# Patient Record
Sex: FEMALE | Race: WHITE | NOT HISPANIC OR LATINO | Employment: FULL TIME | URBAN - METROPOLITAN AREA
[De-identification: names, ages, dates, MRNs, and addresses within clinical notes are randomized per-mention and may not be internally consistent; named-entity substitution may affect disease eponyms.]

---

## 2017-08-06 ENCOUNTER — HOSPITAL ENCOUNTER (EMERGENCY)
Facility: CLINIC | Age: 22
Discharge: HOME | End: 2017-08-06
Attending: EMERGENCY MEDICINE | Admitting: EMERGENCY MEDICINE
Payer: COMMERCIAL

## 2017-08-06 VITALS
OXYGEN SATURATION: 99 % | TEMPERATURE: 97.6 F | RESPIRATION RATE: 18 BRPM | SYSTOLIC BLOOD PRESSURE: 132 MMHG | HEART RATE: 82 BPM | DIASTOLIC BLOOD PRESSURE: 96 MMHG

## 2017-08-06 DIAGNOSIS — N23 RENAL COLIC: ICD-10-CM

## 2017-08-06 LAB
ALBUMIN SERPL-MCNC: 4.2 G/DL (ref 3.4–5)
ALBUMIN UR-MCNC: NEGATIVE MG/DL
ALP SERPL-CCNC: 61 U/L (ref 40–150)
ALT SERPL W P-5'-P-CCNC: 31 U/L (ref 0–50)
ANION GAP SERPL CALCULATED.3IONS-SCNC: 6 MMOL/L (ref 3–14)
APPEARANCE UR: CLEAR
AST SERPL W P-5'-P-CCNC: 31 U/L (ref 0–45)
BACTERIA #/AREA URNS HPF: ABNORMAL /HPF
BASOPHILS # BLD AUTO: 0.1 10E9/L (ref 0–0.2)
BASOPHILS NFR BLD AUTO: 0.7 %
BILIRUB SERPL-MCNC: 0.3 MG/DL (ref 0.2–1.3)
BILIRUB UR QL STRIP: NEGATIVE
BUN SERPL-MCNC: 12 MG/DL (ref 7–30)
CALCIUM SERPL-MCNC: 9.7 MG/DL (ref 8.5–10.1)
CHLORIDE SERPL-SCNC: 108 MMOL/L (ref 94–109)
CO2 SERPL-SCNC: 28 MMOL/L (ref 20–32)
COLOR UR AUTO: ABNORMAL
CREAT SERPL-MCNC: 0.95 MG/DL (ref 0.52–1.04)
DIFFERENTIAL METHOD BLD: NORMAL
EOSINOPHIL # BLD AUTO: 0.2 10E9/L (ref 0–0.7)
EOSINOPHIL NFR BLD AUTO: 2.6 %
ERYTHROCYTE [DISTWIDTH] IN BLOOD BY AUTOMATED COUNT: 12 % (ref 10–15)
GFR SERPL CREATININE-BSD FRML MDRD: 74 ML/MIN/1.7M2
GLUCOSE SERPL-MCNC: 96 MG/DL (ref 70–99)
GLUCOSE UR STRIP-MCNC: NEGATIVE MG/DL
HCG UR QL: NEGATIVE
HCT VFR BLD AUTO: 41.6 % (ref 35–47)
HGB BLD-MCNC: 14.2 G/DL (ref 11.7–15.7)
HGB UR QL STRIP: ABNORMAL
IMM GRANULOCYTES # BLD: 0 10E9/L (ref 0–0.4)
IMM GRANULOCYTES NFR BLD: 0.3 %
KETONES UR STRIP-MCNC: NEGATIVE MG/DL
LEUKOCYTE ESTERASE UR QL STRIP: ABNORMAL
LIPASE SERPL-CCNC: 158 U/L (ref 73–393)
LYMPHOCYTES # BLD AUTO: 2.5 10E9/L (ref 0.8–5.3)
LYMPHOCYTES NFR BLD AUTO: 36.7 %
MCH RBC QN AUTO: 29.8 PG (ref 26.5–33)
MCHC RBC AUTO-ENTMCNC: 34.1 G/DL (ref 31.5–36.5)
MCV RBC AUTO: 87 FL (ref 78–100)
MONOCYTES # BLD AUTO: 0.7 10E9/L (ref 0–1.3)
MONOCYTES NFR BLD AUTO: 9.5 %
NEUTROPHILS # BLD AUTO: 3.4 10E9/L (ref 1.6–8.3)
NEUTROPHILS NFR BLD AUTO: 50.2 %
NITRATE UR QL: NEGATIVE
NRBC # BLD AUTO: 0 10*3/UL
NRBC BLD AUTO-RTO: 0 /100
PH UR STRIP: 7 PH (ref 5–7)
PLATELET # BLD AUTO: 295 10E9/L (ref 150–450)
POTASSIUM SERPL-SCNC: 3.9 MMOL/L (ref 3.4–5.3)
PROT SERPL-MCNC: 7.7 G/DL (ref 6.8–8.8)
RBC # BLD AUTO: 4.77 10E12/L (ref 3.8–5.2)
RBC #/AREA URNS AUTO: 3 /HPF (ref 0–2)
SODIUM SERPL-SCNC: 142 MMOL/L (ref 133–144)
SP GR UR STRIP: 1 (ref 1–1.03)
SQUAMOUS #/AREA URNS AUTO: <1 /HPF (ref 0–1)
URN SPEC COLLECT METH UR: ABNORMAL
UROBILINOGEN UR STRIP-MCNC: 0 MG/DL (ref 0–2)
WBC # BLD AUTO: 6.8 10E9/L (ref 4–11)
WBC #/AREA URNS AUTO: 5 /HPF (ref 0–2)

## 2017-08-06 PROCEDURE — 80053 COMPREHEN METABOLIC PANEL: CPT | Performed by: EMERGENCY MEDICINE

## 2017-08-06 PROCEDURE — 81025 URINE PREGNANCY TEST: CPT | Performed by: EMERGENCY MEDICINE

## 2017-08-06 PROCEDURE — 96374 THER/PROPH/DIAG INJ IV PUSH: CPT

## 2017-08-06 PROCEDURE — 81001 URINALYSIS AUTO W/SCOPE: CPT | Performed by: EMERGENCY MEDICINE

## 2017-08-06 PROCEDURE — 83690 ASSAY OF LIPASE: CPT | Performed by: EMERGENCY MEDICINE

## 2017-08-06 PROCEDURE — 96361 HYDRATE IV INFUSION ADD-ON: CPT

## 2017-08-06 PROCEDURE — 25000132 ZZH RX MED GY IP 250 OP 250 PS 637: Performed by: EMERGENCY MEDICINE

## 2017-08-06 PROCEDURE — 99285 EMERGENCY DEPT VISIT HI MDM: CPT | Mod: 25

## 2017-08-06 PROCEDURE — 96375 TX/PRO/DX INJ NEW DRUG ADDON: CPT

## 2017-08-06 PROCEDURE — 85025 COMPLETE CBC W/AUTO DIFF WBC: CPT | Performed by: EMERGENCY MEDICINE

## 2017-08-06 PROCEDURE — 25000128 H RX IP 250 OP 636: Performed by: EMERGENCY MEDICINE

## 2017-08-06 RX ORDER — ONDANSETRON 4 MG/1
4 TABLET, ORALLY DISINTEGRATING ORAL EVERY 8 HOURS PRN
Qty: 10 TABLET | Refills: 0 | Status: SHIPPED | OUTPATIENT
Start: 2017-08-06 | End: 2017-08-09

## 2017-08-06 RX ORDER — HYDROMORPHONE HYDROCHLORIDE 1 MG/ML
0.5 INJECTION, SOLUTION INTRAMUSCULAR; INTRAVENOUS; SUBCUTANEOUS
Status: DISCONTINUED | OUTPATIENT
Start: 2017-08-06 | End: 2017-08-06 | Stop reason: HOSPADM

## 2017-08-06 RX ORDER — TAMSULOSIN HYDROCHLORIDE 0.4 MG/1
0.4 CAPSULE ORAL DAILY
Qty: 10 CAPSULE | Refills: 0 | Status: SHIPPED | OUTPATIENT
Start: 2017-08-06 | End: 2017-08-16

## 2017-08-06 RX ORDER — OXYCODONE AND ACETAMINOPHEN 5; 325 MG/1; MG/1
1-2 TABLET ORAL EVERY 4 HOURS PRN
Qty: 15 TABLET | Refills: 0 | Status: SHIPPED | OUTPATIENT
Start: 2017-08-06 | End: 2017-10-12

## 2017-08-06 RX ORDER — SODIUM CHLORIDE 9 MG/ML
1000 INJECTION, SOLUTION INTRAVENOUS CONTINUOUS
Status: DISCONTINUED | OUTPATIENT
Start: 2017-08-06 | End: 2017-08-06 | Stop reason: HOSPADM

## 2017-08-06 RX ORDER — TAMSULOSIN HYDROCHLORIDE 0.4 MG/1
0.4 CAPSULE ORAL ONCE
Status: COMPLETED | OUTPATIENT
Start: 2017-08-06 | End: 2017-08-06

## 2017-08-06 RX ORDER — KETOROLAC TROMETHAMINE 30 MG/ML
30 INJECTION, SOLUTION INTRAMUSCULAR; INTRAVENOUS ONCE
Status: COMPLETED | OUTPATIENT
Start: 2017-08-06 | End: 2017-08-06

## 2017-08-06 RX ORDER — OXYCODONE AND ACETAMINOPHEN 5; 325 MG/1; MG/1
1 TABLET ORAL ONCE
Status: COMPLETED | OUTPATIENT
Start: 2017-08-06 | End: 2017-08-06

## 2017-08-06 RX ORDER — ONDANSETRON 2 MG/ML
4 INJECTION INTRAMUSCULAR; INTRAVENOUS EVERY 30 MIN PRN
Status: DISCONTINUED | OUTPATIENT
Start: 2017-08-06 | End: 2017-08-06 | Stop reason: HOSPADM

## 2017-08-06 RX ADMIN — SODIUM CHLORIDE 1000 ML: 9 INJECTION, SOLUTION INTRAVENOUS at 05:12

## 2017-08-06 RX ADMIN — OXYCODONE HYDROCHLORIDE AND ACETAMINOPHEN 1 TABLET: 5; 325 TABLET ORAL at 06:37

## 2017-08-06 RX ADMIN — Medication 0.5 MG: at 05:56

## 2017-08-06 RX ADMIN — ONDANSETRON 4 MG: 2 INJECTION INTRAMUSCULAR; INTRAVENOUS at 05:13

## 2017-08-06 RX ADMIN — KETOROLAC TROMETHAMINE 30 MG: 30 INJECTION, SOLUTION INTRAMUSCULAR at 05:14

## 2017-08-06 RX ADMIN — Medication 0.5 MG: at 05:41

## 2017-08-06 RX ADMIN — TAMSULOSIN HYDROCHLORIDE 0.4 MG: 0.4 CAPSULE ORAL at 05:13

## 2017-08-06 ASSESSMENT — ENCOUNTER SYMPTOMS
VOMITING: 0
FREQUENCY: 0
NAUSEA: 1
DYSURIA: 1
FLANK PAIN: 1
FEVER: 1
HEMATURIA: 0

## 2017-08-06 NOTE — ED AVS SNAPSHOT
[facility] Emergency Department    [address]    [facility] [address]    Phone:  [phone]    Fax:  [phone]                                       Shelly Cerda   MRN: [id number]    Department:  [facility] Emergency Department   Date of Visit:  8/6/2017           Patient Information     Date Of Birth          1995        Your diagnoses for this visit were:     Renal colic        You were seen by Yonathan Crawford MD.      Follow-up Information     Follow up with [facility]. Call in 1 week.    Contact information:    [address]  [facility] [address]          Follow up with [facility]. Call in 3 days.    Contact information:    [address]  [address]  [facility] [address] [phone]        Discharge Instructions         Discharge Instructions  Kidney Stones    Kidney stones are a common problem that can cause a lot of pain but fortunately are usually not dangerous and can be generally treated with medicine at home.  However, sometimes your condition may be worse than it seemed at first, or may get worse with time.     You need to follow-up with your regular doctor within 3 days.    Most kidney stones will pass on their own, but occasionally stones may need to be removed by an urologist. We will send you home with a urine strainer. Be sure to urinate into this, or urinate into a container and pour the urine through the fine filter to catch the kidney stone as it comes out. The stone will seem like a pebble or grain of sand. Be sure to save this in a Ziploc  bag and take it to the doctor s office with you.       Return to the Emergency Department if:    Your pain is not controlled.    You are vomiting and can t keep fluids or medications down.    You develop fever (>101).    You feel much more ill or develop new symptoms.  What can I do to help myself?    Be sure to drink plenty of fluids.    Staying  active is good, and may help the stone to pass. You may do whatever you feel up to doing without restrictions.   Treatment:    Non-steroidal anti-inflammatory drugs (NSAIDs). This includes prescription medicines like Toradol  (ketorolac) and non-prescription medicines like Advil  (ibuprofen) and Nuprin  (ibuprofen). These pain relievers are very effective for kidney stones.    Narcotic pain pills. If you have been given a narcotic such as Vicodin  (hydrocodone with acetaminophen), Percocet  (oxycodone with acetaminophen), or codeine, do not drive for four hours after you have taken it. If the narcotic contains Tylenol  (acetaminophen), do not take Tylenol  with it. All narcotics will cause constipation, so eat a high fiber diet.      Nausea medication.  Nausea and vomiting are common with kidney stones, so your physician may send you home with medicine for this.     Flomax  (tamsulosin). This medicine is sometimes used for men with prostate problems, but also can help kidney stones to pass. This medicine can lower blood pressure, and you may feel faint, especially when you first stand up. Be sure to get up gradually, sit down if you feel faint, and avoid activity where feeling faint would be dangerous, such as climbing ladders.   If you were given a prescription for medicine here today, be sure to read all of the information (including the package insert) that comes with your prescription.  This will include important information about the medicine, its side effects, and any warnings that you need to know about.  The pharmacist who fills the prescription can provide more information and answer questions you may have about the medicine.  If you have questions or concerns that the pharmacist cannot address, please call or return to the Emergency Department.   Opioid Medication Information    Pain medications are among the most commonly prescribed medicines, so we are including this information for all our patients. If  you did not receive pain medication or get a prescription for pain medicine, you can ignore it.     You may have been given a prescription for an opioid (narcotic) pain medicine and/or have received a pain medicine while here in the Emergency Department. These medicines can make you drowsy or impaired. You must not drive, operate dangerous equipment, or engage in any other dangerous activities while taking these medications. If you drive while taking these medications, you could be arrested for DUI, or driving under the influence. Do not drink any alcohol while you are taking these medications.     Opioid pain medications can cause addiction. If you have a history of chemical dependency of any type, you are at a higher risk of becoming addicted to pain medications.  Only take these prescribed medications to treat your pain when all other options have been tried. Take it for as short a time and as few doses as possible. Store your pain pills in a secure place, as they are frequently stolen and provide a dangerous opportunity for children or visitors in your house to start abusing these powerful medications. We will not replace any lost or stolen medicine.  As soon as your pain is better, you should flush all your remaining medication.     Many prescription pain medications contain Tylenol  (acetaminophen), including Vicodin , Tylenol #3 , Norco , Lortab , and Percocet .  You should not take any extra pills of Tylenol  if you are using these prescription medications or you can get very sick.  Do not ever take more than 3000 mg of acetaminophen in any 24 hour period.    All opioids tend to cause constipation. Drink plenty of water and eat foods that have a lot of fiber, such as fruits, vegetables, prune juice, apple juice and high fiber cereal.  Take a laxative if you don t move your bowels at least every other day. Miralax , Milk of Magnesia, Colace , or Senna  can be used to keep you regular.      Remember that you  can always come back to the Emergency Department if you are not able to see your regular doctor in the amount of time listed above, if you get any new symptoms, or if there is anything that worries you.        24 Hour Appointment Hotline       To make an appointment at any [facility], call [phone] ([phone]). If you don't have a family doctor or clinic, we will help you find one. [place] clinics are conveniently located to serve the needs of you and your family.             Review of your medicines      START taking        Dose / Directions Last dose taken    ondansetron 4 MG ODT tab   Commonly known as:  ZOFRAN ODT   Dose:  4 mg   Quantity:  10 tablet        Take 1 tablet (4 mg) by mouth every 8 hours as needed for nausea   Refills:  0        oxyCODONE-acetaminophen 5-325 MG per tablet   Commonly known as:  PERCOCET   Dose:  1-2 tablet   Quantity:  15 tablet        Take 1-2 tablets by mouth every 4 hours as needed for moderate to severe pain   Refills:  0        tamsulosin 0.4 MG capsule   Commonly known as:  FLOMAX   Dose:  0.4 mg   Quantity:  10 capsule        Take 1 capsule (0.4 mg) by mouth daily for 10 doses   Refills:  0          Our records show that you are taking the medicines listed below. If these are incorrect, please call your family doctor or clinic.        Dose / Directions Last dose taken    UNKNOWN TO PATIENT        Birth ctl   Refills:  0                Prescriptions were sent or printed at these locations (3 Prescriptions)                   Other Prescriptions                Printed at Department/Unit printer (3 of 3)         oxyCODONE-acetaminophen (PERCOCET) 5-325 MG per tablet               ondansetron (ZOFRAN ODT) 4 MG ODT tab               tamsulosin (FLOMAX) 0.4 MG capsule                Procedures and tests performed during your visit     CBC with platelets differential    Comprehensive metabolic panel    HCG qualitative urine    Lipase    Peripheral IV: Standard    UA  with Microscopic      Orders Needing Specimen Collection     None      Pending Results     No orders found from 8/4/2017 to 8/7/2017.            Pending Culture Results     No orders found from 8/4/2017 to 8/7/2017.            Pending Results Instructions     If you had any lab results that were not finalized at the time of your Discharge, you can call the ED Lab Result RN at [phone]. You will be contacted by this team for any positive Lab results or changes in treatment. The nurses are available 7 days a week from 10A to 6:30P.  You can leave a message 24 hours per day and they will return your call.        Test Results From Your Hospital Stay        8/6/2017  5:13 AM      Component Results     Component Value Ref Range & Units Status    WBC 6.8 4.0 - 11.0 10e9/L Final    RBC Count 4.77 3.8 - 5.2 10e12/L Final    Hemoglobin 14.2 11.7 - 15.7 g/dL Final    Hematocrit 41.6 35.0 - 47.0 % Final    MCV 87 78 - 100 fl Final    MCH 29.8 26.5 - 33.0 pg Final    MCHC 34.1 31.5 - 36.5 g/dL Final    RDW 12.0 10.0 - 15.0 % Final    Platelet Count 295 150 - 450 10e9/L Final    Diff Method Automated Method  Final    % Neutrophils 50.2 % Final    % Lymphocytes 36.7 % Final    % Monocytes 9.5 % Final    % Eosinophils 2.6 % Final    % Basophils 0.7 % Final    % Immature Granulocytes 0.3 % Final    Nucleated RBCs 0 0 /100 Final    Absolute Neutrophil 3.4 1.6 - 8.3 10e9/L Final    Absolute Lymphocytes 2.5 0.8 - 5.3 10e9/L Final    Absolute Monocytes 0.7 0.0 - 1.3 10e9/L Final    Absolute Eosinophils 0.2 0.0 - 0.7 10e9/L Final    Absolute Basophils 0.1 0.0 - 0.2 10e9/L Final    Abs Immature Granulocytes 0.0 0 - 0.4 10e9/L Final    Absolute Nucleated RBC 0.0  Final         8/6/2017  5:29 AM      Component Results     Component Value Ref Range & Units Status    Sodium 142 133 - 144 mmol/L Final    Potassium 3.9 3.4 - 5.3 mmol/L Final    Chloride 108 94 - 109 mmol/L Final    Carbon Dioxide 28 20 - 32 mmol/L Final    Anion Gap 6 3 -  14 mmol/L Final    Glucose 96 70 - 99 mg/dL Final    Urea Nitrogen 12 7 - 30 mg/dL Final    Creatinine 0.95 0.52 - 1.04 mg/dL Final    GFR Estimate 74 >60 mL/min/1.7m2 Final    Non  GFR Calc    GFR Estimate If Black 89 >60 mL/min/1.7m2 Final    African American GFR Calc    Calcium 9.7 8.5 - 10.1 mg/dL Final    Bilirubin Total 0.3 0.2 - 1.3 mg/dL Final    Albumin 4.2 3.4 - 5.0 g/dL Final    Protein Total 7.7 6.8 - 8.8 g/dL Final    Alkaline Phosphatase 61 40 - 150 U/L Final    ALT 31 0 - 50 U/L Final    AST 31 0 - 45 U/L Final         8/6/2017  5:29 AM      Component Results     Component Value Ref Range & Units Status    Lipase 158 73 - 393 U/L Final         8/6/2017  5:22 AM      Component Results     Component Value Ref Range & Units Status    Color Urine Straw  Final    Appearance Urine Clear  Final    Glucose Urine Negative NEG mg/dL Final    Bilirubin Urine Negative NEG Final    Ketones Urine Negative NEG mg/dL Final    Specific Gravity Urine 1.002 (L) 1.003 - 1.035 Final    Blood Urine Large (A) NEG Final    pH Urine 7.0 5.0 - 7.0 pH Final    Protein Albumin Urine Negative NEG mg/dL Final    Urobilinogen mg/dL 0.0 0.0 - 2.0 mg/dL Final    Nitrite Urine Negative NEG Final    Leukocyte Esterase Urine Small (A) NEG Final    Source Midstream Urine  Final    WBC Urine 5 (H) 0 - 2 /HPF Final    RBC Urine 3 (H) 0 - 2 /HPF Final    Bacteria Urine Few (A) NEG /HPF Final    Squamous Epithelial /HPF Urine <1 0 - 1 /HPF Final         8/6/2017  5:29 AM      Component Results     Component Value Ref Range & Units Status    HCG Qual Urine Negative NEG Final                Clinical Quality Measure: Blood Pressure Screening     Your blood pressure was checked while you were in the emergency department today. The last reading we obtained was  BP: (!) 132/96 . Please read the guidelines below about what these numbers mean and what you should do about them.  If your systolic blood pressure (the top number) is less  "than 120 and your diastolic blood pressure (the bottom number) is less than 80, then your blood pressure is normal. There is nothing more that you need to do about it.  If your systolic blood pressure (the top number) is 120-139 or your diastolic blood pressure (the bottom number) is 80-89, your blood pressure may be higher than it should be. You should have your blood pressure rechecked within a year by a primary care provider.  If your systolic blood pressure (the top number) is 140 or greater or your diastolic blood pressure (the bottom number) is 90 or greater, you may have high blood pressure. High blood pressure is treatable, but if left untreated over time it can put you at risk for heart attack, stroke, or kidney failure. You should have your blood pressure rechecked by a primary care provider within the next 4 weeks.  If your provider in the emergency department today gave you specific instructions to follow-up with your doctor or provider even sooner than that, you should follow that instruction and not wait for up to 4 weeks for your follow-up visit.        Thank you for choosing [facility]       Thank you for choosing [facility] for your care. Our goal is always to provide you with excellent care. Hearing back from our patients is one way we can continue to improve our services. Please take a few minutes to complete the written survey that you may receive in the mail after you visit with us. Thank you!        [facility] Information     [facility] lets you send messages to your doctor, view your test results, renew your prescriptions, schedule appointments and more. To sign up, go to [email or web address] . Click on \"Log in\" on the left side of the screen, which will take you to the Welcome page. Then click on \"Sign up Now\" on the right side of the page.     You will be asked to enter the access code listed below, as well as some personal information. Please follow the directions to create your username and " password.     Your access code is: [email or web address]  Expires: 2017  6:16 AM     Your access code will  in 90 days. If you need help or a new code, please call your [place] clinic or [phone].        Care EveryWhere ID     This is your Care EveryWhere ID. This could be used by other organizations to access your [facility] medical records  [id number]        Equal Access to Services     BON HONEYCUTT : Hadii charanjit watson hadasho Soomaali, waaxda luqadaha, qaybta kaalmada adeegyada, carly kimballin lovely summers . So [facility] [phone].    ATENCIÓN: Si habla miriam, tiene a mckeon disposición servicios gratuitos de asistencia lingüística. Llame al [phone].    We comply with applicable federal civil rights laws and [place] laws. We do not discriminate on the basis of race, color, national origin, age, disability sex, sexual orientation or gender identity.            After Visit Summary       This is your record. Keep this with you and show to your community pharmacist(s) and doctor(s) at your next visit.

## 2017-08-06 NOTE — ED AVS SNAPSHOT
[facility] Emergency Department    [address]    [facility] [address]    Phone:  [phone]    Fax:  [phone]                                       Shelly Cerda   MRN: [id number]    Department:  [facility] Emergency Department   Date of Visit:  8/6/2017           After Visit Summary Signature Page     I have received my discharge instructions, and my questions have been answered. I have discussed any challenges I see with this plan with the nurse or doctor.    ..........................................................................................................................................  Patient/Patient Representative Signature      ..........................................................................................................................................  Patient Representative Print Name and Relationship to Patient    ..................................................               ................................................  Date                                            Time    ..........................................................................................................................................  Reviewed by Signature/Title    ...................................................              ..............................................  Date                                                            Time

## 2017-08-06 NOTE — ED PROVIDER NOTES
History     Chief Complaint:  Flank Pain      HPI   Shelly Cerda is a 22 year old female with a history of frequent kidney stones and cystinuria who presents accompanied by her mother for evaluation of flank pain. On the morning of 8/5/2017, the patient started to develop sharp waxing and waning right flank pain, nausea without vomiting, and slight dysuria. She has taken tylenol without significant improvement of her pain. Due to her persistent symptoms this morning, the patient chose to seek evaluation in the ED. She reports that her current symptoms are identical to what she has experienced with previous kidney stones. Currently in the ED, the patient rates the severity of her flank pain at 8/10. She is uncertain when her last abdominal CT was. She has not had any fever, urinary frequency, or obvious hematuria in association with her current symptoms.     Allergies:  NKDA     Medications:    The patient is not currently taking any prescribed medications.     Past Medical History:    Cystinuria   Kidney stones     Past Surgical History:    Appendectomy   Laser holmium lithotripsy ureter(s), insert stent, combined     Family History:    Cystinuria - Sister     Social History:  Tobacco use:    Never smoker  Alcohol use:    Positive   Marital status:    Single   Accompanied to ED by:  Mother      Review of Systems   Constitutional: Positive for fever.   Gastrointestinal: Positive for nausea. Negative for vomiting.   Genitourinary: Positive for dysuria and flank pain (right). Negative for frequency and hematuria.   All other systems reviewed and are negative.    Physical Exam   First Vitals:  BP: (!) 132/96  Pulse: 82  Temp: 97.6  F (36.4  C)  Resp: 18  SpO2: 100 %      Physical Exam  Constitutional:  Oriented to person, place, and time. In mild distress due to pain.   HENT:   Head:    Normocephalic.   Mouth/Throat:   Oropharynx is clear and moist.   Eyes:    EOM are normal. Pupils are equal, round, and reactive to  light.   Neck:    Neck supple.   Cardiovascular:  Normal rate, regular rhythm and normal heart sounds.      Exam reveals no gallop and no friction rub.       No murmur heard.  Pulmonary/Chest:  Effort normal and breath sounds normal.      No respiratory distress. No wheezes. No rales.      No reproducible chest wall pain.  Abdominal:   Soft. No distension. No tenderness. No rebound and no guarding.      Mild pain with percussion of right flank.   Musculoskeletal:  Normal range of motion.   Neurological:   Alert and oriented to person, place, and time.           Moves all 4 extremities spontaneously    Skin:    No rash noted. No pallor.     Emergency Department Course     Laboratory:  CBC: WNL (WBC 6.8, HGB 14.2, )  CMP: WNL (Creatinine 0.95)  Lipase: 158     UA with Microscopic: Specific gravity 1.002 low, Large blood, Small leukocyte esterase, WBC 5 high, RBC 3 high, Few bacteria, o/w Negative  HCG Qualitative Urine: Negative     Interventions:  0512 NS 1,000 mL IV   0513 Zofran 4 mg IV   0513 Flomax 0.4 mg PO   0514 Toradol 30 mg IV   0541 Dilaudid 0.5 mg IV     Emergency Department Course:  Nursing notes and vitals reviewed.  0451: I performed an exam of the patient as documented above.     0552: I updated and reassessed the patient.     I personally reviewed the laboratory results with the Patient and mother and answered all related questions prior to discharge.      Findings and plan explained to the Patient and mother. Patient discharged home with instructions regarding supportive care, medications, and reasons to return. The importance of close follow-up was reviewed. The patient was prescribed Zofran ODT, Percocet, and Flomax.     Impression & Plan      Medical Decision Making:  Evaluation today was consistent with nephrolithiasis and renal colic.  This explains the patient's pain.  Based on hx and exam and the above studies, I do not feel that there is evidence of other acute intraabdominal process  at this time.  CT scan not obtained due to history of frequent kidney stones. UA was obtained and no overt signs of infection are present.  Given the presence of the kidney stone, urine culture was sent.  The patient received pain control, antiemetic and IV fluids as documented above.  Pain and nausea was well controlled with these measures.  The patient was able to tolerate PO.  Based on the patient's good response to symptomatic control, I feel that this patient can be managed expectantly with close outpatient follow up within the next 2 days.   The patient was instructed to return to the emergency department for uncontrolled pain, fever or inability to tolerate oral liquids.  Prescriptions for Zofran ODT, Percocet, and Flomax were given.    Diagnosis:    ICD-10-CM    1. Renal colic N23        Disposition:  discharged to home with Zofran ODT, Percocet, and Flomax.     Discharge Medications:  New Prescriptions    ONDANSETRON (ZOFRAN ODT) 4 MG ODT TAB    Take 1 tablet (4 mg) by mouth every 8 hours as needed for nausea    OXYCODONE-ACETAMINOPHEN (PERCOCET) 5-325 MG PER TABLET    Take 1-2 tablets by mouth every 4 hours as needed for moderate to severe pain    TAMSULOSIN (FLOMAX) 0.4 MG CAPSULE    Take 1 capsule (0.4 mg) by mouth daily for 10 doses       Edd RAPP, ruth serving as a scribe at 4:51 AM on 8/6/2017 to document services personally performed by Dr. Crawford, based on my observations and the provider's statements to me.    [facility] EMERGENCY DEPARTMENT       Yonathan Crawford MD  08/06/17 0557

## 2017-10-12 ENCOUNTER — HOSPITAL ENCOUNTER (OUTPATIENT)
Facility: CLINIC | Age: 22
Setting detail: OBSERVATION
Discharge: HOME | End: 2017-10-12
Attending: EMERGENCY MEDICINE | Admitting: INTERNAL MEDICINE
Payer: COMMERCIAL

## 2017-10-12 ENCOUNTER — SURGERY (OUTPATIENT)
Age: 22
End: 2017-10-12

## 2017-10-12 ENCOUNTER — ANESTHESIA (OUTPATIENT)
Dept: SURGERY | Facility: CLINIC | Age: 22
End: 2017-10-12
Payer: COMMERCIAL

## 2017-10-12 ENCOUNTER — APPOINTMENT (OUTPATIENT)
Dept: CT IMAGING | Facility: CLINIC | Age: 22
End: 2017-10-12
Attending: EMERGENCY MEDICINE
Payer: COMMERCIAL

## 2017-10-12 ENCOUNTER — ANESTHESIA EVENT (OUTPATIENT)
Dept: SURGERY | Facility: CLINIC | Age: 22
End: 2017-10-12
Payer: COMMERCIAL

## 2017-10-12 ENCOUNTER — APPOINTMENT (OUTPATIENT)
Dept: GENERAL RADIOLOGY | Facility: CLINIC | Age: 22
End: 2017-10-12
Attending: UROLOGY
Payer: COMMERCIAL

## 2017-10-12 VITALS
HEART RATE: 89 BPM | DIASTOLIC BLOOD PRESSURE: 79 MMHG | OXYGEN SATURATION: 97 % | TEMPERATURE: 98.1 F | WEIGHT: 185 LBS | RESPIRATION RATE: 18 BRPM | SYSTOLIC BLOOD PRESSURE: 136 MMHG | HEIGHT: 63 IN | BODY MASS INDEX: 32.78 KG/M2

## 2017-10-12 DIAGNOSIS — N28.9 ACUTE RENAL INSUFFICIENCY: ICD-10-CM

## 2017-10-12 DIAGNOSIS — N23 RENAL COLIC ON LEFT SIDE: ICD-10-CM

## 2017-10-12 DIAGNOSIS — N20.1 URETEROLITHIASIS: ICD-10-CM

## 2017-10-12 LAB
ALBUMIN UR-MCNC: NEGATIVE MG/DL
ANION GAP SERPL CALCULATED.3IONS-SCNC: 6 MMOL/L (ref 3–14)
ANION GAP SERPL CALCULATED.3IONS-SCNC: 7 MMOL/L (ref 3–14)
APPEARANCE UR: CLEAR
BACTERIA #/AREA URNS HPF: ABNORMAL /HPF
BASOPHILS # BLD AUTO: 0 10E9/L (ref 0–0.2)
BASOPHILS NFR BLD AUTO: 0.3 %
BILIRUB UR QL STRIP: NEGATIVE
BUN SERPL-MCNC: 15 MG/DL (ref 7–30)
BUN SERPL-MCNC: 17 MG/DL (ref 7–30)
CALCIUM SERPL-MCNC: 8.4 MG/DL (ref 8.5–10.1)
CALCIUM SERPL-MCNC: 8.7 MG/DL (ref 8.5–10.1)
CHLORIDE SERPL-SCNC: 104 MMOL/L (ref 94–109)
CHLORIDE SERPL-SCNC: 106 MMOL/L (ref 94–109)
CO2 SERPL-SCNC: 24 MMOL/L (ref 20–32)
CO2 SERPL-SCNC: 25 MMOL/L (ref 20–32)
COLOR UR AUTO: ABNORMAL
COPATH REPORT: NORMAL
CREAT SERPL-MCNC: 1.54 MG/DL (ref 0.52–1.04)
CREAT SERPL-MCNC: 1.66 MG/DL (ref 0.52–1.04)
DIFFERENTIAL METHOD BLD: NORMAL
EOSINOPHIL # BLD AUTO: 0.1 10E9/L (ref 0–0.7)
EOSINOPHIL NFR BLD AUTO: 1.3 %
ERYTHROCYTE [DISTWIDTH] IN BLOOD BY AUTOMATED COUNT: 11.4 % (ref 10–15)
GFR SERPL CREATININE-BSD FRML MDRD: 39 ML/MIN/1.7M2
GFR SERPL CREATININE-BSD FRML MDRD: 42 ML/MIN/1.7M2
GLUCOSE SERPL-MCNC: 92 MG/DL (ref 70–99)
GLUCOSE SERPL-MCNC: 97 MG/DL (ref 70–99)
GLUCOSE UR STRIP-MCNC: NEGATIVE MG/DL
HCG UR QL: NEGATIVE
HCT VFR BLD AUTO: 35.1 % (ref 35–47)
HGB BLD-MCNC: 12 G/DL (ref 11.7–15.7)
HGB UR QL STRIP: ABNORMAL
HYALINE CASTS #/AREA URNS LPF: 1 /LPF (ref 0–2)
IMM GRANULOCYTES # BLD: 0 10E9/L (ref 0–0.4)
IMM GRANULOCYTES NFR BLD: 0.4 %
KETONES UR STRIP-MCNC: NEGATIVE MG/DL
LEUKOCYTE ESTERASE UR QL STRIP: ABNORMAL
LYMPHOCYTES # BLD AUTO: 2.1 10E9/L (ref 0.8–5.3)
LYMPHOCYTES NFR BLD AUTO: 19.1 %
MCH RBC QN AUTO: 29.4 PG (ref 26.5–33)
MCHC RBC AUTO-ENTMCNC: 34.2 G/DL (ref 31.5–36.5)
MCV RBC AUTO: 86 FL (ref 78–100)
MONOCYTES # BLD AUTO: 1 10E9/L (ref 0–1.3)
MONOCYTES NFR BLD AUTO: 9.1 %
MUCOUS THREADS #/AREA URNS LPF: PRESENT /LPF
NEUTROPHILS # BLD AUTO: 7.6 10E9/L (ref 1.6–8.3)
NEUTROPHILS NFR BLD AUTO: 69.8 %
NITRATE UR QL: NEGATIVE
NRBC # BLD AUTO: 0 10*3/UL
NRBC BLD AUTO-RTO: 0 /100
PH UR STRIP: 6 PH (ref 5–7)
PLATELET # BLD AUTO: 285 10E9/L (ref 150–450)
POTASSIUM SERPL-SCNC: 3.8 MMOL/L (ref 3.4–5.3)
POTASSIUM SERPL-SCNC: 4.2 MMOL/L (ref 3.4–5.3)
RBC # BLD AUTO: 4.08 10E12/L (ref 3.8–5.2)
RBC #/AREA URNS AUTO: 5 /HPF (ref 0–2)
SODIUM SERPL-SCNC: 135 MMOL/L (ref 133–144)
SODIUM SERPL-SCNC: 137 MMOL/L (ref 133–144)
SOURCE: ABNORMAL
SP GR UR STRIP: 1.01 (ref 1–1.03)
SQUAMOUS #/AREA URNS AUTO: 1 /HPF (ref 0–1)
UROBILINOGEN UR STRIP-MCNC: 0 MG/DL (ref 0–2)
WBC # BLD AUTO: 10.8 10E9/L (ref 4–11)
WBC #/AREA URNS AUTO: 17 /HPF (ref 0–2)

## 2017-10-12 PROCEDURE — 52332 CYSTOSCOPY AND TREATMENT: CPT | Mod: 59 | Performed by: UROLOGY

## 2017-10-12 PROCEDURE — 96374 THER/PROPH/DIAG INJ IV PUSH: CPT

## 2017-10-12 PROCEDURE — 25000128 H RX IP 250 OP 636: Performed by: NURSE ANESTHETIST, CERTIFIED REGISTERED

## 2017-10-12 PROCEDURE — 85025 COMPLETE CBC W/AUTO DIFF WBC: CPT | Performed by: EMERGENCY MEDICINE

## 2017-10-12 PROCEDURE — 96375 TX/PRO/DX INJ NEW DRUG ADDON: CPT

## 2017-10-12 PROCEDURE — 25000128 H RX IP 250 OP 636: Performed by: INTERNAL MEDICINE

## 2017-10-12 PROCEDURE — 81001 URINALYSIS AUTO W/SCOPE: CPT | Performed by: EMERGENCY MEDICINE

## 2017-10-12 PROCEDURE — 25800025 ZZH RX 258: Performed by: UROLOGY

## 2017-10-12 PROCEDURE — 25000128 H RX IP 250 OP 636: Performed by: ANESTHESIOLOGY

## 2017-10-12 PROCEDURE — 27210995 ZZH RX 272: Performed by: UROLOGY

## 2017-10-12 PROCEDURE — G0378 HOSPITAL OBSERVATION PER HR: HCPCS

## 2017-10-12 PROCEDURE — C2617 STENT, NON-COR, TEM W/O DEL: HCPCS | Performed by: UROLOGY

## 2017-10-12 PROCEDURE — 87086 URINE CULTURE/COLONY COUNT: CPT | Performed by: EMERGENCY MEDICINE

## 2017-10-12 PROCEDURE — 99222 1ST HOSP IP/OBS MODERATE 55: CPT | Mod: 25 | Performed by: UROLOGY

## 2017-10-12 PROCEDURE — 40000277 XR SURGERY CARM FLUORO LESS THAN 5 MIN W STILLS: Mod: TC

## 2017-10-12 PROCEDURE — 25000128 H RX IP 250 OP 636: Performed by: EMERGENCY MEDICINE

## 2017-10-12 PROCEDURE — 37000009 ZZH ANESTHESIA TECHNICAL FEE, EACH ADDTL 15 MIN: Performed by: UROLOGY

## 2017-10-12 PROCEDURE — 71000012 ZZH RECOVERY PHASE 1 LEVEL 1 FIRST HR: Performed by: UROLOGY

## 2017-10-12 PROCEDURE — 81025 URINE PREGNANCY TEST: CPT | Performed by: EMERGENCY MEDICINE

## 2017-10-12 PROCEDURE — 99285 EMERGENCY DEPT VISIT HI MDM: CPT | Mod: 25

## 2017-10-12 PROCEDURE — 25000132 ZZH RX MED GY IP 250 OP 250 PS 637: Performed by: EMERGENCY MEDICINE

## 2017-10-12 PROCEDURE — 36000058 ZZH SURGERY LEVEL 3 EA 15 ADDTL MIN: Performed by: UROLOGY

## 2017-10-12 PROCEDURE — 80048 BASIC METABOLIC PNL TOTAL CA: CPT | Mod: 91 | Performed by: INTERNAL MEDICINE

## 2017-10-12 PROCEDURE — 74420 UROGRAPHY RTRGR +-KUB: CPT | Mod: 26 | Performed by: UROLOGY

## 2017-10-12 PROCEDURE — 36415 COLL VENOUS BLD VENIPUNCTURE: CPT | Performed by: INTERNAL MEDICINE

## 2017-10-12 PROCEDURE — 96361 HYDRATE IV INFUSION ADD-ON: CPT

## 2017-10-12 PROCEDURE — 96376 TX/PRO/DX INJ SAME DRUG ADON: CPT

## 2017-10-12 PROCEDURE — 99207 ZZC CDG-CODE CATEGORY CHANGED: CPT | Performed by: INTERNAL MEDICINE

## 2017-10-12 PROCEDURE — 25000132 ZZH RX MED GY IP 250 OP 250 PS 637: Performed by: INTERNAL MEDICINE

## 2017-10-12 PROCEDURE — 74176 CT ABD & PELVIS W/O CONTRAST: CPT

## 2017-10-12 PROCEDURE — 25000125 ZZHC RX 250: Performed by: NURSE ANESTHETIST, CERTIFIED REGISTERED

## 2017-10-12 PROCEDURE — 88300 SURGICAL PATH GROSS: CPT | Performed by: EMERGENCY MEDICINE

## 2017-10-12 PROCEDURE — 27211024 ZZHC OR SUPPLY OTHER OPNP: Performed by: UROLOGY

## 2017-10-12 PROCEDURE — 99236 HOSP IP/OBS SAME DATE HI 85: CPT | Performed by: INTERNAL MEDICINE

## 2017-10-12 PROCEDURE — C1769 GUIDE WIRE: HCPCS | Performed by: UROLOGY

## 2017-10-12 PROCEDURE — 27210794 ZZH OR GENERAL SUPPLY STERILE: Performed by: UROLOGY

## 2017-10-12 PROCEDURE — 82365 CALCULUS SPECTROSCOPY: CPT | Performed by: UROLOGY

## 2017-10-12 PROCEDURE — 37000008 ZZH ANESTHESIA TECHNICAL FEE, 1ST 30 MIN: Performed by: UROLOGY

## 2017-10-12 PROCEDURE — 99207 ZZC APP CREDIT; MD BILLING SHARED VISIT: CPT | Performed by: PHYSICIAN ASSISTANT

## 2017-10-12 PROCEDURE — 25000132 ZZH RX MED GY IP 250 OP 250 PS 637: Performed by: UROLOGY

## 2017-10-12 PROCEDURE — 40000306 ZZH STATISTIC PRE PROC ASSESS II: Performed by: UROLOGY

## 2017-10-12 PROCEDURE — 80048 BASIC METABOLIC PNL TOTAL CA: CPT | Performed by: EMERGENCY MEDICINE

## 2017-10-12 PROCEDURE — 25000566 ZZH SEVOFLURANE, EA 15 MIN: Performed by: UROLOGY

## 2017-10-12 PROCEDURE — 25000128 H RX IP 250 OP 636: Performed by: UROLOGY

## 2017-10-12 PROCEDURE — 52352 CYSTOURETERO W/STONE REMOVE: CPT | Performed by: UROLOGY

## 2017-10-12 PROCEDURE — 36000060 ZZH SURGERY LEVEL 3 W FLUORO 1ST 30 MIN: Performed by: UROLOGY

## 2017-10-12 PROCEDURE — 25000128 H RX IP 250 OP 636

## 2017-10-12 PROCEDURE — 71000027 ZZH RECOVERY PHASE 2 EACH 15 MINS: Performed by: UROLOGY

## 2017-10-12 PROCEDURE — 88300 SURGICAL PATH GROSS: CPT | Mod: 26 | Performed by: EMERGENCY MEDICINE

## 2017-10-12 DEVICE — STENT URETERAL DBL PIGTAIL INLAY 6FRX26CM 778626: Type: IMPLANTABLE DEVICE | Site: URETER | Status: FUNCTIONAL

## 2017-10-12 RX ORDER — CIPROFLOXACIN 500 MG/1
500 TABLET, FILM COATED ORAL 2 TIMES DAILY
COMMUNITY
End: 2017-12-19

## 2017-10-12 RX ORDER — DIMENHYDRINATE 50 MG/ML
25 INJECTION, SOLUTION INTRAMUSCULAR; INTRAVENOUS
Status: DISCONTINUED | OUTPATIENT
Start: 2017-10-12 | End: 2017-10-12 | Stop reason: HOSPADM

## 2017-10-12 RX ORDER — HYDROCODONE BITARTRATE AND ACETAMINOPHEN 5; 325 MG/1; MG/1
1-2 TABLET ORAL EVERY 4 HOURS PRN
Qty: 6 TABLET | Refills: 0 | Status: SHIPPED | OUTPATIENT
Start: 2017-10-12 | End: 2017-12-19

## 2017-10-12 RX ORDER — SODIUM CHLORIDE, SODIUM LACTATE, POTASSIUM CHLORIDE, CALCIUM CHLORIDE 600; 310; 30; 20 MG/100ML; MG/100ML; MG/100ML; MG/100ML
INJECTION, SOLUTION INTRAVENOUS CONTINUOUS PRN
Status: DISCONTINUED | OUTPATIENT
Start: 2017-10-12 | End: 2017-10-12

## 2017-10-12 RX ORDER — TOLTERODINE TARTRATE 1 MG/1
2 TABLET, EXTENDED RELEASE ORAL ONCE
Status: COMPLETED | OUTPATIENT
Start: 2017-10-12 | End: 2017-10-12

## 2017-10-12 RX ORDER — TOLTERODINE TARTRATE 2 MG/1
2 TABLET, EXTENDED RELEASE ORAL EVERY 12 HOURS PRN
Qty: 15 TABLET | Refills: 0 | Status: SHIPPED | OUTPATIENT
Start: 2017-10-12 | End: 2017-12-19

## 2017-10-12 RX ORDER — LIDOCAINE HYDROCHLORIDE 10 MG/ML
INJECTION, SOLUTION INFILTRATION; PERINEURAL PRN
Status: DISCONTINUED | OUTPATIENT
Start: 2017-10-12 | End: 2017-10-12

## 2017-10-12 RX ORDER — CEFAZOLIN SODIUM 1 G/3ML
1 INJECTION, POWDER, FOR SOLUTION INTRAMUSCULAR; INTRAVENOUS SEE ADMIN INSTRUCTIONS
Status: DISCONTINUED | OUTPATIENT
Start: 2017-10-12 | End: 2017-10-12 | Stop reason: HOSPADM

## 2017-10-12 RX ORDER — HYDROMORPHONE HYDROCHLORIDE 1 MG/ML
0.5 INJECTION, SOLUTION INTRAMUSCULAR; INTRAVENOUS; SUBCUTANEOUS
Status: DISCONTINUED | OUTPATIENT
Start: 2017-10-12 | End: 2017-10-12

## 2017-10-12 RX ORDER — NALOXONE HYDROCHLORIDE 0.4 MG/ML
.1-.4 INJECTION, SOLUTION INTRAMUSCULAR; INTRAVENOUS; SUBCUTANEOUS
Status: DISCONTINUED | OUTPATIENT
Start: 2017-10-12 | End: 2017-10-12 | Stop reason: HOSPADM

## 2017-10-12 RX ORDER — TAMSULOSIN HYDROCHLORIDE 0.4 MG/1
0.4 CAPSULE ORAL ONCE
Status: COMPLETED | OUTPATIENT
Start: 2017-10-12 | End: 2017-10-12

## 2017-10-12 RX ORDER — SODIUM CHLORIDE, SODIUM LACTATE, POTASSIUM CHLORIDE, CALCIUM CHLORIDE 600; 310; 30; 20 MG/100ML; MG/100ML; MG/100ML; MG/100ML
500 INJECTION, SOLUTION INTRAVENOUS CONTINUOUS
Status: DISCONTINUED | OUTPATIENT
Start: 2017-10-12 | End: 2017-10-12 | Stop reason: HOSPADM

## 2017-10-12 RX ORDER — MEPERIDINE HYDROCHLORIDE 25 MG/ML
12.5 INJECTION INTRAMUSCULAR; INTRAVENOUS; SUBCUTANEOUS ONCE
Status: COMPLETED | OUTPATIENT
Start: 2017-10-12 | End: 2017-10-12

## 2017-10-12 RX ORDER — ONDANSETRON 4 MG/1
4 TABLET, ORALLY DISINTEGRATING ORAL EVERY 30 MIN PRN
Status: DISCONTINUED | OUTPATIENT
Start: 2017-10-12 | End: 2017-10-12 | Stop reason: HOSPADM

## 2017-10-12 RX ORDER — ONDANSETRON 2 MG/ML
INJECTION INTRAMUSCULAR; INTRAVENOUS
Status: COMPLETED
Start: 2017-10-12 | End: 2017-10-12

## 2017-10-12 RX ORDER — TRAMADOL HYDROCHLORIDE 50 MG/1
50-100 TABLET ORAL EVERY 6 HOURS PRN
COMMUNITY
End: 2017-12-19

## 2017-10-12 RX ORDER — GLYCOPYRROLATE 0.2 MG/ML
INJECTION, SOLUTION INTRAMUSCULAR; INTRAVENOUS PRN
Status: DISCONTINUED | OUTPATIENT
Start: 2017-10-12 | End: 2017-10-12

## 2017-10-12 RX ORDER — CEFAZOLIN SODIUM 2 G/100ML
2 INJECTION, SOLUTION INTRAVENOUS
Status: COMPLETED | OUTPATIENT
Start: 2017-10-12 | End: 2017-10-12

## 2017-10-12 RX ORDER — ONDANSETRON 2 MG/ML
4 INJECTION INTRAMUSCULAR; INTRAVENOUS EVERY 6 HOURS PRN
Status: DISCONTINUED | OUTPATIENT
Start: 2017-10-12 | End: 2017-10-12 | Stop reason: HOSPADM

## 2017-10-12 RX ORDER — HYDROCODONE BITARTRATE AND ACETAMINOPHEN 5; 325 MG/1; MG/1
1-2 TABLET ORAL EVERY 4 HOURS PRN
Status: DISCONTINUED | OUTPATIENT
Start: 2017-10-12 | End: 2017-10-12 | Stop reason: HOSPADM

## 2017-10-12 RX ORDER — SODIUM CHLORIDE, SODIUM LACTATE, POTASSIUM CHLORIDE, CALCIUM CHLORIDE 600; 310; 30; 20 MG/100ML; MG/100ML; MG/100ML; MG/100ML
INJECTION, SOLUTION INTRAVENOUS CONTINUOUS
Status: DISCONTINUED | OUTPATIENT
Start: 2017-10-12 | End: 2017-10-12 | Stop reason: HOSPADM

## 2017-10-12 RX ORDER — HYDRALAZINE HYDROCHLORIDE 20 MG/ML
2.5-5 INJECTION INTRAMUSCULAR; INTRAVENOUS EVERY 10 MIN PRN
Status: DISCONTINUED | OUTPATIENT
Start: 2017-10-12 | End: 2017-10-12 | Stop reason: HOSPADM

## 2017-10-12 RX ORDER — ONDANSETRON 2 MG/ML
4 INJECTION INTRAMUSCULAR; INTRAVENOUS ONCE
Status: COMPLETED | OUTPATIENT
Start: 2017-10-12 | End: 2017-10-12

## 2017-10-12 RX ORDER — LABETALOL HYDROCHLORIDE 5 MG/ML
10 INJECTION, SOLUTION INTRAVENOUS
Status: DISCONTINUED | OUTPATIENT
Start: 2017-10-12 | End: 2017-10-12 | Stop reason: HOSPADM

## 2017-10-12 RX ORDER — SODIUM CHLORIDE 9 MG/ML
INJECTION, SOLUTION INTRAVENOUS CONTINUOUS
Status: DISCONTINUED | OUTPATIENT
Start: 2017-10-12 | End: 2017-10-12 | Stop reason: HOSPADM

## 2017-10-12 RX ORDER — IBUPROFEN 200 MG
600 TABLET ORAL EVERY 6 HOURS PRN
COMMUNITY
End: 2017-12-19

## 2017-10-12 RX ORDER — FENTANYL CITRATE 50 UG/ML
25-50 INJECTION, SOLUTION INTRAMUSCULAR; INTRAVENOUS
Status: DISCONTINUED | OUTPATIENT
Start: 2017-10-12 | End: 2017-10-12 | Stop reason: HOSPADM

## 2017-10-12 RX ORDER — ONDANSETRON 4 MG/1
4 TABLET, ORALLY DISINTEGRATING ORAL EVERY 6 HOURS PRN
Status: DISCONTINUED | OUTPATIENT
Start: 2017-10-12 | End: 2017-10-12 | Stop reason: HOSPADM

## 2017-10-12 RX ORDER — POLYETHYLENE GLYCOL 3350 17 G/17G
17 POWDER, FOR SOLUTION ORAL DAILY PRN
Status: DISCONTINUED | OUTPATIENT
Start: 2017-10-12 | End: 2017-10-12 | Stop reason: HOSPADM

## 2017-10-12 RX ORDER — HYDROCODONE BITARTRATE AND ACETAMINOPHEN 5; 325 MG/1; MG/1
1-2 TABLET ORAL EVERY 4 HOURS PRN
Status: ON HOLD | COMMUNITY
End: 2017-10-12

## 2017-10-12 RX ORDER — ONDANSETRON 4 MG/1
4 TABLET, ORALLY DISINTEGRATING ORAL EVERY 8 HOURS PRN
COMMUNITY
End: 2017-12-19

## 2017-10-12 RX ORDER — FENTANYL CITRATE 50 UG/ML
INJECTION, SOLUTION INTRAMUSCULAR; INTRAVENOUS PRN
Status: DISCONTINUED | OUTPATIENT
Start: 2017-10-12 | End: 2017-10-12

## 2017-10-12 RX ORDER — OXYCODONE HYDROCHLORIDE 5 MG/1
5-10 TABLET ORAL
Status: DISCONTINUED | OUTPATIENT
Start: 2017-10-12 | End: 2017-10-12 | Stop reason: HOSPADM

## 2017-10-12 RX ORDER — ONDANSETRON 2 MG/ML
4 INJECTION INTRAMUSCULAR; INTRAVENOUS EVERY 30 MIN PRN
Status: DISCONTINUED | OUTPATIENT
Start: 2017-10-12 | End: 2017-10-12 | Stop reason: HOSPADM

## 2017-10-12 RX ORDER — HYDROMORPHONE HYDROCHLORIDE 1 MG/ML
.3-.5 INJECTION, SOLUTION INTRAMUSCULAR; INTRAVENOUS; SUBCUTANEOUS EVERY 5 MIN PRN
Status: DISCONTINUED | OUTPATIENT
Start: 2017-10-12 | End: 2017-10-12 | Stop reason: HOSPADM

## 2017-10-12 RX ORDER — TAMSULOSIN HYDROCHLORIDE 0.4 MG/1
0.4 CAPSULE ORAL DAILY
COMMUNITY
End: 2017-10-13

## 2017-10-12 RX ORDER — ONDANSETRON 2 MG/ML
4 INJECTION INTRAMUSCULAR; INTRAVENOUS
Status: COMPLETED | OUTPATIENT
Start: 2017-10-12 | End: 2017-10-12

## 2017-10-12 RX ORDER — ACETAMINOPHEN 325 MG/1
650 TABLET ORAL EVERY 4 HOURS PRN
Status: DISCONTINUED | OUTPATIENT
Start: 2017-10-12 | End: 2017-10-12 | Stop reason: HOSPADM

## 2017-10-12 RX ORDER — DEXAMETHASONE SODIUM PHOSPHATE 4 MG/ML
INJECTION, SOLUTION INTRA-ARTICULAR; INTRALESIONAL; INTRAMUSCULAR; INTRAVENOUS; SOFT TISSUE PRN
Status: DISCONTINUED | OUTPATIENT
Start: 2017-10-12 | End: 2017-10-12

## 2017-10-12 RX ORDER — KETOROLAC TROMETHAMINE 15 MG/ML
15 INJECTION, SOLUTION INTRAMUSCULAR; INTRAVENOUS ONCE
Status: COMPLETED | OUTPATIENT
Start: 2017-10-12 | End: 2017-10-12

## 2017-10-12 RX ORDER — HYDROMORPHONE HYDROCHLORIDE 1 MG/ML
.3-.5 INJECTION, SOLUTION INTRAMUSCULAR; INTRAVENOUS; SUBCUTANEOUS
Status: DISCONTINUED | OUTPATIENT
Start: 2017-10-12 | End: 2017-10-12 | Stop reason: HOSPADM

## 2017-10-12 RX ORDER — PROPOFOL 10 MG/ML
INJECTION, EMULSION INTRAVENOUS PRN
Status: DISCONTINUED | OUTPATIENT
Start: 2017-10-12 | End: 2017-10-12

## 2017-10-12 RX ORDER — AMOXICILLIN 250 MG
1-2 CAPSULE ORAL 2 TIMES DAILY
Status: DISCONTINUED | OUTPATIENT
Start: 2017-10-12 | End: 2017-10-12 | Stop reason: HOSPADM

## 2017-10-12 RX ADMIN — SENNOSIDES AND DOCUSATE SODIUM 1 TABLET: 8.6; 5 TABLET ORAL at 08:39

## 2017-10-12 RX ADMIN — MEPERIDINE HYDROCHLORIDE 12.5 MG: 25 INJECTION, SOLUTION INTRAMUSCULAR; INTRAVENOUS; SUBCUTANEOUS at 12:19

## 2017-10-12 RX ADMIN — SODIUM CHLORIDE, POTASSIUM CHLORIDE, SODIUM LACTATE AND CALCIUM CHLORIDE: 600; 310; 30; 20 INJECTION, SOLUTION INTRAVENOUS at 08:56

## 2017-10-12 RX ADMIN — HYDROMORPHONE HYDROCHLORIDE 0.5 MG: 1 INJECTION, SOLUTION INTRAMUSCULAR; INTRAVENOUS; SUBCUTANEOUS at 03:37

## 2017-10-12 RX ADMIN — SODIUM CHLORIDE 1000 ML: 900 IRRIGANT IRRIGATION at 11:29

## 2017-10-12 RX ADMIN — MIDAZOLAM HYDROCHLORIDE 2 MG: 1 INJECTION, SOLUTION INTRAMUSCULAR; INTRAVENOUS at 11:00

## 2017-10-12 RX ADMIN — ONDANSETRON 4 MG: 2 INJECTION INTRAMUSCULAR; INTRAVENOUS at 03:44

## 2017-10-12 RX ADMIN — KETOROLAC TROMETHAMINE 15 MG: 15 INJECTION, SOLUTION INTRAMUSCULAR; INTRAVENOUS at 03:37

## 2017-10-12 RX ADMIN — DEXAMETHASONE SODIUM PHOSPHATE 4 MG: 4 INJECTION, SOLUTION INTRA-ARTICULAR; INTRALESIONAL; INTRAMUSCULAR; INTRAVENOUS; SOFT TISSUE at 11:03

## 2017-10-12 RX ADMIN — HYDROMORPHONE HYDROCHLORIDE 0.5 MG: 1 INJECTION, SOLUTION INTRAMUSCULAR; INTRAVENOUS; SUBCUTANEOUS at 08:39

## 2017-10-12 RX ADMIN — HYDROMORPHONE HYDROCHLORIDE 0.5 MG: 1 INJECTION, SOLUTION INTRAMUSCULAR; INTRAVENOUS; SUBCUTANEOUS at 03:06

## 2017-10-12 RX ADMIN — TOLTERODINE TARTRATE 2 MG: 1 TABLET, FILM COATED ORAL at 14:38

## 2017-10-12 RX ADMIN — LIDOCAINE HYDROCHLORIDE 50 MG: 10 INJECTION, SOLUTION INFILTRATION; PERINEURAL at 11:03

## 2017-10-12 RX ADMIN — HYDROMORPHONE HYDROCHLORIDE 1 MG: 1 INJECTION, SOLUTION INTRAMUSCULAR; INTRAVENOUS; SUBCUTANEOUS at 02:17

## 2017-10-12 RX ADMIN — TAMSULOSIN HYDROCHLORIDE 0.4 MG: 0.4 CAPSULE ORAL at 02:17

## 2017-10-12 RX ADMIN — HYDROMORPHONE HYDROCHLORIDE 0.5 MG: 1 INJECTION, SOLUTION INTRAMUSCULAR; INTRAVENOUS; SUBCUTANEOUS at 05:16

## 2017-10-12 RX ADMIN — PROCHLORPERAZINE EDISYLATE 10 MG: 5 INJECTION INTRAMUSCULAR; INTRAVENOUS at 06:45

## 2017-10-12 RX ADMIN — ONDANSETRON 4 MG: 2 INJECTION INTRAMUSCULAR; INTRAVENOUS at 02:17

## 2017-10-12 RX ADMIN — CEFAZOLIN SODIUM 2 G: 2 INJECTION, SOLUTION INTRAVENOUS at 11:00

## 2017-10-12 RX ADMIN — SODIUM CHLORIDE 1000 ML: 9 INJECTION, SOLUTION INTRAVENOUS at 02:17

## 2017-10-12 RX ADMIN — PROPOFOL 200 MG: 10 INJECTION, EMULSION INTRAVENOUS at 11:03

## 2017-10-12 RX ADMIN — HYDROCODONE BITARTRATE AND ACETAMINOPHEN 2 TABLET: 5; 325 TABLET ORAL at 13:34

## 2017-10-12 RX ADMIN — SODIUM CHLORIDE, PRESERVATIVE FREE: 5 INJECTION INTRAVENOUS at 05:15

## 2017-10-12 RX ADMIN — FENTANYL CITRATE 50 MCG: 50 INJECTION INTRAMUSCULAR; INTRAVENOUS at 13:17

## 2017-10-12 RX ADMIN — SODIUM CHLORIDE, POTASSIUM CHLORIDE, SODIUM LACTATE AND CALCIUM CHLORIDE: 600; 310; 30; 20 INJECTION, SOLUTION INTRAVENOUS at 11:30

## 2017-10-12 RX ADMIN — ONDANSETRON 4 MG: 2 INJECTION INTRAMUSCULAR; INTRAVENOUS at 11:03

## 2017-10-12 RX ADMIN — WATER 20 ML GIVEN: 1 INJECTION INTRAMUSCULAR; INTRAVENOUS; SUBCUTANEOUS at 11:28

## 2017-10-12 RX ADMIN — GLYCOPYRROLATE 0.2 MG: 0.2 INJECTION, SOLUTION INTRAMUSCULAR; INTRAVENOUS at 11:03

## 2017-10-12 RX ADMIN — ONDANSETRON 4 MG: 2 INJECTION INTRAMUSCULAR; INTRAVENOUS at 08:44

## 2017-10-12 RX ADMIN — FENTANYL CITRATE 100 MCG: 50 INJECTION, SOLUTION INTRAMUSCULAR; INTRAVENOUS at 11:03

## 2017-10-12 RX ADMIN — SODIUM CHLORIDE, POTASSIUM CHLORIDE, SODIUM LACTATE AND CALCIUM CHLORIDE: 600; 310; 30; 20 INJECTION, SOLUTION INTRAVENOUS at 12:45

## 2017-10-12 ASSESSMENT — ENCOUNTER SYMPTOMS
FEVER: 0
BACK PAIN: 1
STRIDOR: 0
SEIZURES: 0
NAUSEA: 1
FLANK PAIN: 1
VOMITING: 1
DYSRHYTHMIAS: 0

## 2017-10-12 ASSESSMENT — COPD QUESTIONNAIRES: COPD: 0

## 2017-10-12 ASSESSMENT — LIFESTYLE VARIABLES: TOBACCO_USE: 0

## 2017-10-12 NOTE — PROGRESS NOTES
ROOM # 202-1    Living Situation (if not independent, order SW consult): ind  Facility name: n/a  : Rosemarie (mom)    Activity level at baseline: ind  Activity level on admit: SBA      Patient registered to observation; given Patient Bill of Rights; given the opportunity to ask questions about observation status and their plan of care.  Patient has been oriented to the observation room, bathroom and call light is in place.    Discussed discharge goals and expectations with patient/family.

## 2017-10-12 NOTE — PHARMACY-ADMISSION MEDICATION HISTORY
Admission medication history interview status for this patient is complete. See [facility] admission navigator for allergy information, prior to admission medications and immunization status.     Medication history interview source(s): Patient and Family  Medication history resources (including written lists, pill bottles, clinic record): [facility] med list, Care Everywhere, Printed discharge med list  Primary pharmacy: [facility] in [place] (by [address])    Changes made to PTA medication list:  Added: Tamsulosin, tramadol, ondansetron, ibuprofen  Deleted: None  Changed: Added strength and directions to ciprofloxacin; changed hydrocodone/APAP from 1 tablet q6h prn to 1-2 tablets q4h prn; changed birth control to Junel 1.5/30 once daily    Actions taken by pharmacist (provider contacted, etc): None     Additional medication history information: None    Medication reconciliation/reorder completed by provider prior to medication history? Yes    Do you take OTC medications (eg tylenol, ibuprofen, fish oil, eye/ear drops, etc)? Y (Y/N)    For patients on insulin therapy: N (Y/N)    Prior to Admission medications    Medication Sig Last Dose Taking? Auth Provider   HYDROcodone-acetaminophen (NORCO) 5-325 MG per tablet Take 1-2 tablets by mouth every 4 hours as needed for moderate to severe pain  10/11/2017 at 2315 Yes Reported, Patient   ciprofloxacin (CIPRO) 500 MG tablet Take 500 mg by mouth 2 times daily for 5 days. 10/11/2017 Yes Unknown, Entered By History   Norethindrone Acet-Ethinyl Est (JUNEL 1.5/30 PO) Take 1 tablet by mouth daily 10/11/2017 at AM Yes Unknown, Entered By History   tamsulosin (FLOMAX) 0.4 MG capsule Take 0.4 mg by mouth daily 10/10/2017 Yes Unknown, Entered By History   traMADol (ULTRAM) 50 MG tablet Take  mg by mouth every 6 hours as needed for moderate pain 10/9/2017 Yes Unknown, Entered By History   ondansetron (ZOFRAN-ODT) 4 MG ODT tab Take 4 mg by mouth every 8 hours as needed for nausea 10/11/2017  at 1900 Yes Unknown, Entered By History   ibuprofen (ADVIL/MOTRIN) 200 MG tablet Take 600 mg by mouth every 6 hours as needed 10/8/2017 Yes Unknown, Entered By History

## 2017-10-12 NOTE — ANESTHESIA PREPROCEDURE EVALUATION
Anesthesia Evaluation     . Pt has had prior anesthetic. Type: General    History of anesthetic complications   - PONV        ROS/MED HX    ENT/Pulmonary:  - neg pulmonary ROS    (-) tobacco use, asthma, COPD, TE risk factors and recent URI   Neurologic:     (+)migraines,    (-) seizures and CVA   Cardiovascular:  - neg cardiovascular ROS      (-) hypertension, CAD, arrhythmias and valvular problems/murmurs   METS/Exercise Tolerance:     Hematologic: Comments: Lab Test        10/12/17     08/06/17     09/03/12                       0207          0502          0150          WBC          10.8         6.8          14.6*         HGB          12.0         14.2         13.1          MCV          86           87           78            PLT          285          295          285            Lab Test        10/12/17     10/12/17     08/06/17                       0847          0207          0502          NA           137          135          142           POTASSIUM    4.2          3.8          3.9           CHLORIDE     106          104          108           CO2          24           25           28            BUN          15           17           12            CR           1.54*        1.66*        0.95          ANIONGAP     7            6            6             VALERIANO          8.4*         8.7          9.7           GLC          92           97           96           - neg hematologic  ROS       Musculoskeletal:  - neg musculoskeletal ROS       GI/Hepatic:  - neg GI/hepatic ROS      (-) GERD, hepatitis and liver disease   Renal/Genitourinary:     (+) chronic renal disease, type: CRI, Nephrolithiasis , Pt does not require dialysis, Pt has no history of transplant,       Endo:     (+) Obesity, .   (-) Type I DM, Type II DM, thyroid disease and chronic steroid usage   Psychiatric:  - neg psychiatric ROS       Infectious Disease:  - neg infectious disease ROS       Malignancy:      - no malignancy   Other:    - neg other  ROS                 Physical Exam  Normal systems: cardiovascular, pulmonary and dental    Airway   Mallampati: II  TM distance: >3 FB  Neck ROM: full    Dental     Cardiovascular   Rhythm and rate: regular and normal  (-) no friction rub, no systolic click and no murmur    Pulmonary    breath sounds clear to auscultation(-) no rhonchi, no decreased breath sounds, no wheezes, no rales and no stridor                    Anesthesia Plan      History & Physical Review  History and physical reviewed and following examination; no interval change.    ASA Status:  2 .    NPO Status:  > 8 hours    Plan for General and LMA with Intravenous induction. Maintenance will be Balanced.    PONV prophylaxis:  Ondansetron (or other 5HT-3) and Dexamethasone or Solumedrol       Postoperative Care  Postoperative pain management:  IV analgesics.      Consents  Anesthetic plan, risks, benefits and alternatives discussed with:  Patient or representative, Patient and Parent (Mother and/or Father)..                          .

## 2017-10-12 NOTE — ANESTHESIA POSTPROCEDURE EVALUATION
Patient: Shelly Cerda    Procedure(s):  COMBINED CYSTOSCOPY, RETROGRADES, URETEROSCOPY, LASER HOLMIUM LITHOTRIPSY URETER(S), INSERT STENT  - Wound Class: II-Clean Contaminated    Diagnosis:Unknown  Diagnosis Additional Information: Left ureterolithiasis    Anesthesia Type:  General, LMA    Note:  Anesthesia Post Evaluation    Patient location during evaluation: PACU  Patient participation: Able to fully participate in evaluation  Level of consciousness: awake  Pain management: adequate  Airway patency: patent  Cardiovascular status: acceptable  Respiratory status: acceptable  Hydration status: acceptable  PONV: controlled     Anesthetic complications: None          Last vitals:  Vitals:    10/12/17 1334 10/12/17 1348 10/12/17 1355   BP:  113/75    Pulse:      Resp: 18 18 18   Temp:  98.1  F (36.7  C)    SpO2: 96% 96% 96%         Electronically Signed By: Randy Naqvi MD  October 12, 2017  2:13 PM

## 2017-10-12 NOTE — ED NOTES
[facility]  ED Nurse Handoff Report    Shelly Cerda is a 22 year old female   ED Chief complaint: Post-op Problem  . ED Diagnosis:   Final diagnoses:   Renal colic on left side   Ureterolithiasis   Acute renal insufficiency     Allergies: No Known Allergies    Code Status: Full Code  Activity level - Baseline/Home:  Independent. Activity Level - Current:   Independent. Lift room needed: No. Bariatric: No   Needed: No   Isolation: No. Infection: Not Applicable.     Vital Signs:   Vitals:    10/12/17 0230 10/12/17 0234 10/12/17 0240 10/12/17 0245   BP:   113/69    Pulse:       Resp:       Temp:       TempSrc:       SpO2: 95% 96% 96% 96%   Weight:       Height:           Cardiac Rhythm:  ,      Pain level: 0-10 Pain Scale: 8  Patient confused: No. Patient Falls Risk: No.   Elimination Status: Has voided   Patient Report - Initial Complaint: flank pain  . Focused Assessment: flank pain   Tests Performed: labscan. Abnormal Results:   Labs Ordered and Resulted from Time of ED Arrival Up to the Time of Departure from the ED   BASIC METABOLIC PANEL - Abnormal; Notable for the following:        Result Value    Creatinine 1.66 (*)     GFR Estimate 39 (*)     GFR Estimate If Black 47 (*)     All other components within normal limits   ROUTINE UA WITH MICROSCOPIC - Abnormal; Notable for the following:     Blood Urine Small (*)     Leukocyte Esterase Urine Small (*)     WBC Urine 17 (*)     RBC Urine 5 (*)     Bacteria Urine Few (*)     Mucous Urine Present (*)     All other components within normal limits   CBC WITH PLATELETS DIFFERENTIAL   HCG QUALITATIVE URINE   PULSE OXIMETRY NURSING   PERIPHERAL IV CATHETER   URINE CULTURE AEROBIC BACTERIAL     .   Treatments provided: below  Family Comments: na  OBS brochure/video discussed/provided to patient:  Yes  ED Medications:   Medications   HYDROmorphone (PF) (DILAUDID) injection 0.5 mg (0.5 mg Intravenous Given 10/12/17 0337)   HYDROmorphone (DILAUDID)  injection 1 mg (not administered)   0.9% sodium chloride BOLUS (0 mLs Intravenous Stopped 10/12/17 0339)   HYDROmorphone (DILAUDID) injection 1 mg (1 mg Intravenous Given 10/12/17 0217)   ondansetron (ZOFRAN) injection 4 mg (4 mg Intravenous Given 10/12/17 0217)   tamsulosin (FLOMAX) capsule 0.4 mg (0.4 mg Oral Given 10/12/17 0217)   ketorolac (TORADOL) injection 15 mg (15 mg Intravenous Given 10/12/17 0337)   ondansetron (ZOFRAN) injection 4 mg (4 mg Intravenous Given 10/12/17 0344)     Drips infusing:  No  For the majority of the shift, the patient's behavior Green. Interventions performed were na.     Severe Sepsis OR Septic Shock Diagnosis Present: No      ED Nurse Name/Phone Number: Hawk FAROOQ Yinayumiko,   3:56 AM    RECEIVING UNIT ED HANDOFF REVIEW    Above ED Nurse Handoff Report was reviewed: Yes  Reviewed by: Joann Ann on October 12, 2017 at 4:12 AM

## 2017-10-12 NOTE — ANESTHESIA CARE TRANSFER NOTE
Patient: Shelly Cerda    Procedure(s):  COMBINED CYSTOSCOPY, RETROGRADES, URETEROSCOPY, LASER HOLMIUM LITHOTRIPSY URETER(S), INSERT STENT  - Wound Class: II-Clean Contaminated    Diagnosis: Unknown  Diagnosis Additional Information: No value filed.    Anesthesia Type:   General, LMA     Note:  Airway :Face Mask  Patient transferred to:PACU  Handoff Report: Identifed the Patient, Identified the Reponsible Provider, Reviewed the pertinent medical history, Discussed the surgical course, Reviewed Intra-OP anesthesia mangement and issues during anesthesia, Set expectations for post-procedure period and Allowed opportunity for questions and acknowledgement of understanding      Vitals: (Last set prior to Anesthesia Care Transfer)    CRNA VITALS  10/12/2017 1140 - 10/12/2017 1214      10/12/2017             Resp Rate (observed): 14                Electronically Signed By: BRITTNEE Álvarez CRNA  October 12, 2017  12:14 PM

## 2017-10-12 NOTE — IP AVS SNAPSHOT
[facility] PreOP/PostOP    [address]    [facility] [address]    Phone:  [phone]    Fax:  [phone]                                       After Visit Summary   10/12/2017    Shelly Cerda    MRN: [id number]           After Visit Summary Signature Page     I have received my discharge instructions, and my questions have been answered. I have discussed any challenges I see with this plan with the nurse or doctor.    ..........................................................................................................................................  Patient/Patient Representative Signature      ..........................................................................................................................................  Patient Representative Print Name and Relationship to Patient    ..................................................               ................................................  Date                                            Time    ..........................................................................................................................................  Reviewed by Signature/Title    ...................................................              ..............................................  Date                                                            Time

## 2017-10-12 NOTE — H&P
CHIEF COMPLAINT:  Flank pain.      HISTORY OF PRESENT ILLNESS:  Ms. Shelly Cerda is a 22-year-old female with a history of nephrolithiasis, who presents to the hospital today for concerns about left-sided flank pain.  She was found to have a large left mid-ureteral stone on CT imaging this evening.      Her recent history is notable for nephrolithiasis and ureteral stone in the left ureter.  She was seen at an outside hospital, and had a ureteral stent placed in the left ureter on 10/03/2017.  She also reports that she had a lithotripsy procedure at that point.  The stent was removed on 10/08/2017, and after that she began to develop worsening left flank pain.  She was seen yesterday and diagnosed with a urinary tract infection and started on ciprofloxacin.  However, her symptoms persisted, which prompted her appearance here in the [facility] Emergency Department.      On arrival to the ER, vital signs included blood pressure of 125/100 with a heart rate of 110.  Temperature 99.3 degrees.  Saturation 98% on room air.  Workup in the ER included basic labs.  CBC was normal.  Creatinine was 1.6.      Urinalysis shows 17 white blood cells, 5 red blood cells, and small leukocyte esterase.  Pregnancy test is negative.  Urine culture is pending.      Abdominopelvic CT scan showed a 1.0 x 0.6 cm calculous in the mid left ureter, resulting in mild to moderate obstruction.      I spoke with Dr. Fried, of the ER, who is requesting admission of this patient to the hospital.      PAST MEDICAL HISTORY:   1.  Nephrolithiasis.  Recent left ureteral stone, treated with ureteral stent and what sounds like lithotripsy a little over one week ago.  Stent removed four days ago.   2.  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   3.  Appendectomy.      CURRENT MEDICATIONS:   1.  Birth control pill.   2.  Ciprofloxacin started yesterday for presumed UTI.      ALLERGIES:  None.      FAMILY HISTORY:  Reviewed.  Sister  also has cystinuria and makes numerous kidney stones.      SOCIAL HISTORY:  The patient denies smoking or drinking.  She is here with her mother.      REVIEW OF SYSTEMS:  Please see HPI for details.  Comprehensive greater than 10-point review of systems is otherwise negative besides that as detailed above.      PHYSICAL EXAM:   VITAL SIGNS:  Blood pressure is currently 135/80 with a heart rate of 90.  Temperature 99.3 degrees.  Saturation 98% on room air.   GENERAL:  The patient appears nontoxic and in no acute distress.  She looks comfortable.  Mother is present at bedside.  Good historian.   HEENT:  Head is atraumatic.  Sclerae white.  Eyelids are normal.  Conjunctivae normal.  Extraocular movements are intact.   NECK:  Supple.  No cervical or supraclavicular lymphadenopathy.   HEART:  Regular rate and rhythm.  No significant murmurs.  No lower extremity edema.   LUNGS:  Clear to auscultation bilaterally.  No intercostal retractions.  No conversational dyspnea.   ABDOMEN:  Nontender, nondistended.  Soft.  No masses.  No organomegaly.   EXTREMITIES:  No edema.   SKIN:  No rash.  No jaundice.  Skin is dry to touch.   NEUROLOGIC:  Cranial nerves II-XII are intact.  Moves all extremities appropriately.  Sensation intact to light touch in the upper and lower extremities bilaterally.   PSYCHIATRIC:  The patient is awake, alert and oriented x3.  Mood and affect normal and appropriate.      LABORATORY AND IMAGING DATA:  Reviewed above in HPI.      CT imaging performed, with findings as noted above, including a 1.0 x 0.6 cm stone in the mid left ureter.      ASSESSMENT:  Ms. Cerda is a 22-year-old female with a history of nephrolithiasis.  She presents to the hospital this evening for concerns about left flank pain.  Workup in the ER included CT imaging showing a 1.0 x 0.6 cm calculous in the mid left ureter, resulting in mild to moderate obstruction.  Labs notable for presence of elevated creatinine of 1.6.  She has a  history of a recent left ureteral stone with a lithotripsy procedure at outside hospital.  Stent was placed on 10/03/2017, and removed on 10/08/2017.   1.  Renal colic.   2.  Left ureteral stone.  Interestingly, the patient apparently had a left ureteral stone a little over 12 days ago, and apparently treated with lithotripsy and ureteral stent placement.   3.  Diagnosis of urinary tract infection yesterday, based on pain symptoms, though I doubt UTI, and symptoms are all related to the stone.      PLAN:   1.  Admit to observation.   2.  IV fluids.   3.  Strain urine.   4.  Urology consultation.   5.  Pain control including IV Dilaudid as needed.   6.  Keep n.p.o. for probable ureteral stent placement.   7.  Creatinine elevated, suspect related to ureteral stone with hydronephrosis.  It sounds like creatinine is normal at baseline.   8.  The patient is admitted to observation status.         MAYRA GRAHAM MD             D: 10/12/2017 04:12   T: 10/12/2017 04:58   MT: [id number]      Name:     KARO CLAYTON   MRN:      [id number]        Account:      [id number]   :      1995           Admitted:     [id number]      Document: [id number]       cc: [facility]

## 2017-10-12 NOTE — PLAN OF CARE
Problem: Patient Care Overview  Goal: Plan of Care/Patient Progress Review  Outcome: No Change  PRIMARY DIAGNOSIS: ACUTE RENAL COLIC     OUTPATIENT/OBSERVATION GOALS TO BE MET BEFORE DISCHARGE  1. Pain Status: Improved but still requiring IV narcotics.     2. Tolerating adequate PO diet: No     3. Surgical Intervention planned: Yes     4. Cleared by consultants (if involved): No     5. Return to near baseline physical activity: Yes     Discharge Planner Nurse   Safe discharge environment identified: Yes  Barriers to discharge: No       Entered by: Ebony Brown 10/12/2017 8:52 AM     Please review provider order for any additional goals.   Nurse to notify provider when observation goals have been met and patient is ready for discharge.     Patient is alert and oriented. Patient rates pain 6-7 and pain is tolerable with IV dilaudid. Patient states she gets nauseated with narcotics so will be given IV zofran. Patient has mom at bedside. Urology consult in place. VSS. Will continue to monitor.

## 2017-10-12 NOTE — BRIEF OP NOTE
[facility] Brief Operative Note    Pre-operative diagnosis: Left ureterolithiasis   Post-operative diagnosis Same   Procedure: Procedure(s):  COMBINED CYSTOSCOPY, RETROGRADES, URETEROSCOPY, LASER HOLMIUM LITHOTRIPSY URETER(S), INSERT STENT  - Wound Class: II-Clean Contaminated   Surgeon(s): Surgeon(s) and Role:     * Luis Banda MD - Primary   Estimated blood loss: 0 mL    Specimens:   ID Type Source Tests Collected by Time Destination   1 : Left ureteral stone Calculus/Stone Ureter, Left STONE ANALYSIS Luis Banda MD 10/12/2017 11:29 AM       Findings: Stone fragments obstructing left mid ureter. Removed and stent replaced.

## 2017-10-12 NOTE — CONSULTS
Urology Consult History and Physical    Name: Shelly Cerda    MRN: [id number]   YOB: 1995       We were asked to see Shelly Cerda at the request of Dr. Rico for evaluation and treatment of ureteral stone.        Chief Complaint:   Left flank pain          History of Present Illness:   Shelly Cerda is a 22 year old female who is being seen for evaluation of left ureteral stone. Has history of cysteine stones. Had ureteroscopy at [facility] last week with subsequent stent removal. Now with ongoing flank pain and on CT found to have retained stone fragment (6mm) in mid left ureter with hydro. Still having ongoing pain. No fevers/chills. No evidence of infection. Mild nausea. No gross hematuria. Has recent 24 hour urine study with cysteinuria           Past Medical History:     Past Medical History:   Diagnosis Date     Cystinuria (H)      Kidney stones           Past Surgical History:     Past Surgical History:   Procedure Laterality Date     APPENDECTOMY  9/2011     LASER HOLMIUM LITHOTRIPSY URETER(S), INSERT STENT, COMBINED  9/4/2012    Procedure: COMBINED CYSTOSCOPY, URETEROSCOPY, LASER HOLMIUM LITHOTRIPSY URETER(S), INSERT STENT;  Holmium Laser Lithotripsy.  Right Stent Placement;  Surgeon: Cathi Hernandez MD;  Location: [facility] OR     Ureteral stent placement with subsequent removal.  2017          Social History:     Social History   Substance Use Topics     Smoking status: Never Smoker     Smokeless tobacco: Never Used     Alcohol use Yes          Family History:     Family History   Problem Relation Age of Onset     Genetic Disorder Sister      Cystinuria          Allergies:   No Known Allergies         Medications:     Current Facility-Administered Medications   Medication     0.9% sodium chloride infusion     naloxone (NARCAN) injection 0.1-0.4 mg     acetaminophen (TYLENOL) tablet 650 mg     oxyCODONE (ROXICODONE) IR tablet 5-10 mg     HYDROmorphone (PF) (DILAUDID) injection 0.3-0.5 mg  "    senna-docusate (SENOKOT-S;PERICOLACE) 8.6-50 MG per tablet 1-2 tablet     polyethylene glycol (MIRALAX/GLYCOLAX) Packet 17 g     ondansetron (ZOFRAN-ODT) ODT tab 4 mg    Or     ondansetron (ZOFRAN) injection 4 mg     prochlorperazine (COMPAZINE) injection 5-10 mg          Review of Systems:    ROS: 10 point ROS neg other than the symptoms noted above in the HPI.          Physical Exam:   Patient Vitals for the past 24 hrs:   BP Temp Temp src Pulse Resp SpO2 Height Weight   10/12/17 0450 123/68 97  F (36.1  C) Oral 77 16 97 % 1.6 m (5' 3\") 83.9 kg (185 lb)   10/12/17 0435 - - - - - 96 % - -   10/12/17 0430 - - - - - 96 % - -   10/12/17 0425 106/64 - - - - 96 % - -   10/12/17 0420 - - - - - 98 % - -   10/12/17 0415 - - - - - 97 % - -   10/12/17 0410 113/77 - - - - 96 % - -   10/12/17 0405 - - - - - 97 % - -   10/12/17 0400 - - - - - 99 % - -   10/12/17 0355 135/79 - - - - 98 % - -   10/12/17 0350 - - - - - 95 % - -   10/12/17 0345 - - - - - 96 % - -   10/12/17 0325 126/82 - - - - 96 % - -   10/12/17 0320 - - - - - 97 % - -   10/12/17 0245 - - - - - 96 % - -   10/12/17 0240 113/69 - - - - 96 % - -   10/12/17 0234 - - - - - 96 % - -   10/12/17 0230 - - - - - 95 % - -   10/12/17 0227 - - - - - 96 % - -   10/12/17 0225 116/65 - - - - 98 % - -   10/12/17 0220 120/74 - - - - - - -   10/12/17 0133 (!) 125/100 99.3  F (37.4  C) Oral 110 22 98 % 1.6 m (5' 3\") 81.6 kg (180 lb)     General: age-appropriate appearing female in NAD  HEENT: Head AT/NC, EOMI, CN Grossly intact  Resp: no respiratory distress, lung sounds clear.  CV: heart rate regular, S1, S2.  Back: no bony midline tenderness, no CVAT bilaterally.  Abdomen: soft, non-distended, non-tender. No organomegaly  Back: No CVAT  LE: no edema  Neuro: moving all 4 extremities equally.  Skin: clear of rashes or ecchymoses.          Data:   All laboratory data reviewed:    Recent Labs  Lab 10/12/17  0207   WBC 10.8   HGB 12.0          Recent Labs  Lab 10/12/17  0207 "      POTASSIUM 3.8   CHLORIDE 104   CO2 25   BUN 17   CR 1.66*   GLC 97   VALERIANO 8.7       Recent Labs  Lab 10/12/17  0210   COLOR Straw   APPEARANCE Clear   URINEGLC Negative   URINEBILI Negative   URINEKETONE Negative   SG 1.012   URINEPH 6.0   PROTEIN Negative   NITRITE Negative   LEUKEST Small*   RBCU 5*   WBCU 17*     All pertinent imaging reviewed:  CT 10/12/2017  IMPRESSION:  1. 1.0 x 0.6 cm calculus in the mid left ureter, resulting in mild to  moderate obstruction.  2. Two tiny nonobstructing left renal calculi.  3. A very small amount of free fluid in the pelvis.         Impression and Plan:   Impression:   22 year old female with obstructing mid left ureteral stone - 6mm. Ongoing pain. No evidence of infection. Discussed options and patient would like to proceed with ureteroscopy and stone removal. Risks/benefits of procedure discussed. Will likely leave stent after procedure. Patient would like to have string left on so she can pull at home (attends [employer] in [place]).       Plan:   - NPO  - To OR for left ureteroscopy with stone removal and stent placement      Luis Banda MD  October 12, 2017

## 2017-10-12 NOTE — PLAN OF CARE
Problem: Patient Care Overview  Goal: Plan of Care/Patient Progress Review  PRIMARY DIAGNOSIS: ACUTE RENAL COLIC     OUTPATIENT/OBSERVATION GOALS TO BE MET BEFORE DISCHARGE  1. Pain Status: Improved but still requiring IV narcotics.     2. Tolerating adequate PO diet: NPO     3. Surgical Intervention planned: No, urology consulted     4. Cleared by consultants (if involved): No     5. Return to near baseline physical activity: SBA for safety     Pt A&Ox4, VSS. Pt c/o 7/10 L flank pain, PRN dilaudid given. Pt c/o continued nausea, zofran too soon to give, MD paged for additional options. Pt reports needing multiple antiemetics to manage nausea. Pt up with SBA for safety. Straining urine. IVF infusing,. Urology consulted. Mom at bedside. Will continue to monitor.     Discharge Planner Nurse   Safe discharge environment identified: Yes  Barriers to discharge: Yes       Entered by: Joann Ann 10/12/2017 5:11 AM     Please review provider order for any additional goals.   Nurse to notify provider when observation goals have been met and patient is ready for discharge.

## 2017-10-12 NOTE — DISCHARGE SUMMARY
Critical access hospital Outpatient / Observation Unit  Discharge Summary        Shelly Cerda MRN# [id number]   YOB: 1995 Age: 22 year old     Date of Admission:  10/12/2017  Date of Discharge:  10/12/2017  Admitting Physician:  Suhas Rico MD  Discharge Physician: Latanya James PA-C, ARIANA  Discharging Service: Hospitalist      Primary Provider: [facility]  Primary Care Physician Phone Number: None         Primary Discharge Diagnoses:    Shelly Cerda was admitted on 10/12/2017 for acute renal colic.  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 She was seen at an outside hospital on 10/3/17 and had a stent placed with subsequent removal on 10/8/17. Since then she has had increased left flank pain. She was seen at a clinic yesterday and started on oral Cipro for possible UTI. Her symptoms persisted so she came to the ER early this AM. CT in the ER showed [id number] obstructing left ureter stone. CBC was normal.  Creatinine was 1.6. Patient was registered to Obs, started on IVF and pain management. Urology was consulted and the patient was sent for cystoscopy with laser lithotripsy and stent placement. Patient discharged from PACU with plans to follow up with Urology as an outpatient.     1. Acute renal colic-sxs improved. Stone not passed.  2. S/p COMBINED CYSTOSCOPY, RETROGRADES, URETEROSCOPY, LASER HOLMIUM LITHOTRIPSY URETER(S), INSERT STENT (Left Flank)          Secondary Discharge Diagnoses:     Past Medical History:   Diagnosis Date     Cystinuria (H)      Kidney stones               Code Status:      Full Code        Brief Hospital Summary:         Please refer to initial admission history and physical for further details.   Briefly, Shelly Cerda was admitted on 10/12/2017 with acute renal colic.   Initial work up in the ED did not reveal evidence of grossly infected stone or significant renal failure/ATN to require inpatient hospitalization.  Pt was registered to the Observation Unit for pain control and supportive measures.     Pt was resuscitated with IVF, and anti-emetics. Urine was strained and stone likely not passed.   Urology consult was obtained.   Pain control was transitioned from IV to PO form. Labs reviewed and significant results addressed. On the day of discharge, pt's pain was controlled and was able to tolerate PO intake.  Medications were reviewed and adjustments made as necessary. Pt is instructed to follow up as below.               Significant Lab During Hospitalization:        Recent Labs  Lab 10/12/17  0207   WBC 10.8   HGB 12.0   HCT 35.1   MCV 86             Lab Results   Component Value Date     10/12/2017     10/12/2017     08/06/2017    Lab Results   Component Value Date    CHLORIDE 106 10/12/2017    CHLORIDE 104 10/12/2017    CHLORIDE 108 08/06/2017    Lab Results   Component Value Date    BUN 15 10/12/2017    BUN 17 10/12/2017    BUN 12 08/06/2017      Lab Results   Component Value Date    POTASSIUM 4.2 10/12/2017    POTASSIUM 3.8 10/12/2017    POTASSIUM 3.9 08/06/2017    Lab Results   Component Value Date    CO2 24 10/12/2017    CO2 25 10/12/2017    CO2 28 08/06/2017    Lab Results   Component Value Date    CR 1.54 10/12/2017    CR 1.66 10/12/2017    CR 0.95 08/06/2017          Recent Labs  Lab 10/12/17  0847 10/12/17  0207    135   POTASSIUM 4.2 3.8   CHLORIDE 106 104   CO2 24 25   ANIONGAP 7 6   GLC 92 97   BUN 15 17   CR 1.54* 1.66*   GFRESTIMATED 42* 39*   GFRESTBLACK 51* 47*   VALERIANO 8.4* 8.7     No results for input(s): INR in the last 168 hours.  No results for input(s): LACT in the last 168 hours.  No results for input(s): LIPASE in the last 168 hours.  No results for input(s): TROPONIN, TROPI, TROPR in the last 168 hours.    Invalid input(s): TROP, TROPONINIES    Recent Labs  Lab 10/12/17  0210   COLOR Straw   APPEARANCE Clear   URINEGLC Negative   URINEBILI Negative   URINEKETONE  Negative   SG 1.012   UBLD Small*   URINEPH 6.0   PROTEIN Negative   NITRITE Negative   LEUKEST Small*   RBCU 5*   WBCU 17*                  Significant Imaging During Hospitalization:      Recent Results (from the past 48 hour(s))   CT Abdomen Pelvis without Contrast (stone protocol)    Narrative    CT ABDOMEN AND PELVIS WITHOUT CONTRAST  10/12/2017 2:51 AM     HISTORY: Recent removal of a left ureteral stent. Left flank pain.    COMPARISON: 8/28/2012.    TECHNIQUE: Without intravenous or oral contrast, helical sections were  acquired from the top of the diaphragm through the pubic symphysis.  Coronal reconstructions were generated. Radiation dose for this scan  was reduced using automated exposure control, adjustment of the mA  and/or kV according to the patient's size, or iterative reconstruction  technique. (Renal stone protocol).    FINDINGS:  Right urinary tract: No renal or ureteral calculi. No dilatation of  the intrarenal collecting system or ureter.    Left urinary tract: 1.0 x 0.6 cm calculus in the mid ureter. Mild to  moderate dilatation of the more proximal ureter and intrarenal  collecting system. Two less than 0.5 cm diameter nonobstructing  calculi in the inferior pole of the kidney. Mild perinephric haziness.    Urinary bladder: No visualized calculi.    Remainder of the abdomen and pelvis: The liver, spleen, pancreas and  adrenal glands are unremarkable to the limits of a noncontrast CT  scan. The gallbladder is present. The small and large bowel are normal  in caliber. The appendix is not visualized. No bowel wall thickening,  pneumatosis or free intraperitoneal gas. The uterus is present. A very  small amount of free fluid in the pelvis. No enlarged lymph nodes in  the abdomen or pelvis.    Scan through the lower chest is significant for a trace amount of  bilateral pleural fluid.      Impression    IMPRESSION:  1. 1.0 x 0.6 cm calculus in the mid left ureter, resulting in mild to  moderate  "obstruction.  2. Two tiny nonobstructing left renal calculi.  3. A very small amount of free fluid in the pelvis.    MARS FERNANDES MD              Pending Results:      Unresulted Labs Ordered in the Past 30 Days of this Admission     Date and Time Order Name Status Description    10/12/2017 0248 Urine Culture Aerobic Bacterial Preliminary               Consultations This Hospital Stay:      Consultation during this admission received from urology         Discharge Instructions and Follow-Up:      Follow-up Appointments     Follow-up and recommended labs and tests        Follow up with Urology per their recommendations.   Follow up with your family doctor in 1 week for recheck and to discuss   your recent admission.                  Follow-up Labs BMP        Discharge Disposition:      Discharged to home         Discharge Medications:        Current Discharge Medication List      CONTINUE these medications which have NOT CHANGED    Details   HYDROcodone-acetaminophen (NORCO) 5-325 MG per tablet Take 1-2 tablets by mouth every 4 hours as needed for moderate to severe pain       ciprofloxacin (CIPRO) 500 MG tablet Take 500 mg by mouth 2 times daily for 5 days.      Norethindrone Acet-Ethinyl Est (JUNEL 1.5/30 PO) Take 1 tablet by mouth daily      tamsulosin (FLOMAX) 0.4 MG capsule Take 0.4 mg by mouth daily      traMADol (ULTRAM) 50 MG tablet Take  mg by mouth every 6 hours as needed for moderate pain      ondansetron (ZOFRAN-ODT) 4 MG ODT tab Take 4 mg by mouth every 8 hours as needed for nausea      ibuprofen (ADVIL/MOTRIN) 200 MG tablet Take 600 mg by mouth every 6 hours as needed                 Allergies:       No Known Allergies        Condition and Physical on Discharge:      Discharge condition: Stable   Vitals: Blood pressure 110/56, pulse 89, temperature 97.5  F (36.4  C), temperature source Oral, resp. rate 16, height 1.6 m (5' 3\"), weight 83.9 kg (185 lb), last menstrual period 10/01/2017, SpO2 100 " %.  185 lbs 0 oz      GENERAL:  Comfortable.  PSYCH: pleasant, oriented, No acute distress.  HEENT:  PERRLA. Normal conjunctiva, normal hearing, nasal mucosa and Oropharynx are normal.  NECK:  Supple, no neck vein distention, adenopathy or bruits, normal thyroid.  HEART:  Normal S1, S2 with no murmur, no pericardial rub, gallops or S3 or S4.  LUNGS:  Clear to auscultation, normal Respiratory effort. No wheezing, rales or ronchi.  ABDOMEN:  Soft, no hepatosplenomegaly, normal bowel sounds. Non-tender, non distended.   EXTREMITIES:  No pedal edema, +2 pulses bilateral and equal.  SKIN:  Dry to touch, No rash, wound or ulcerations.  NEUROLOGIC:  CN 2-12 intact, BL 5/5 symmetric upper and lower extremity strength, sensation is intact with no focal deficits.

## 2017-10-12 NOTE — IP AVS SNAPSHOT
MRN:[id number]                      After Visit Summary   10/12/2017    Shelly Cerda    MRN: [id number]           Thank you!     Thank you for choosing [facility] for your care. Our goal is always to provide you with excellent care. Hearing back from our patients is one way we can continue to improve our services. Please take a few minutes to complete the written survey that you may receive in the mail after you visit. If you would like to speak to someone directly about your visit please contact Patient Relations at [phone]. Thank you!          Patient Information     Date Of Birth          1995        About your hospital stay     You were admitted on:  October 12, 2017 You last received care in the:  [facility] PreOP/PostOP    You were discharged on:  October 12, 2017        Reason for your hospital stay       You were registered to the observation unit for left flank pain. You were found to have a 6mm obstructing stone in the left ureter. Urology was consulted and you were sent for stone extraction with lithotripsy and stent placement.                  Who to Call     For medical emergencies, please call 911.  For non-urgent questions about your medical care, please call your primary care provider or clinic, None  For questions related to your surgery, please call your surgery clinic        Attending Provider     Provider Specialty    Brian Fried MD Emergency Medicine    [facility] Internal Medicine       Primary Care Provider Fax #    [facility] [phone]      After Care Instructions     Activity       Your activity upon discharge: activity as tolerated            Diet       Follow this diet upon discharge: Regular            Diet Instructions       Resume pre procedure diet            Discharge Instructions       Patient to arrange for follow up appointment in 1 week for cystoscopy and stent removal in urology clinic.             Encourage fluids       Encourage fluids at home to keep urine clear to light pink            No Alcohol       for 24 hours post procedure            No driving or operating machinery        until the day after procedure                  Follow-up Appointments     Follow-up and recommended labs and tests        Follow up with Urology per their recommendations.   Follow up with your family doctor in 1 week for recheck and to discuss your recent admission.                  Your next 10 appointments already scheduled     Nov 09, 2017  2:00 PM CST   Cystoscopy with Aidee Banda MD   [facility] ([facility])    [address]  [address]  [facility] [address] [phone]              Further instructions from your care team       DR. AIDEE BANDA M.D.         CLINIC PHONE NUMBER:  [phone]      GENERAL ANESTHESIA OR SEDATION ADULT DISCHARGE INSTRUCTIONS   SPECIAL PRECAUTIONS FOR 24 HOURS AFTER SURGERY    IT IS NOT UNUSUAL TO FEEL LIGHT-HEADED OR FAINT, UP TO 24 HOURS AFTER SURGERY OR WHILE TAKING PAIN MEDICATION.  IF YOU HAVE THESE SYMPTOMS; SIT FOR A FEW MINUTES BEFORE STANDING AND HAVE SOMEONE ASSIST YOU WHEN YOU GET UP TO WALK OR USE THE BATHROOM.    YOU SHOULD REST AND RELAX FOR THE NEXT 24 HOURS AND YOU MUST MAKE ARRANGEMENTS TO HAVE SOMEONE STAY WITH YOU FOR AT LEAST 24 HOURS AFTER YOUR DISCHARGE.  AVOID HAZARDOUS AND STRENUOUS ACTIVITIES.  DO NOT MAKE IMPORTANT DECISIONS FOR 24 HOURS.    DO NOT DRIVE ANY VEHICLE OR OPERATE MECHANICAL EQUIPMENT FOR 24 HOURS FOLLOWING THE END OF YOUR SURGERY.  EVEN THOUGH YOU MAY FEEL NORMAL, YOUR REACTIONS MAY BE AFFECTED BY THE MEDICATION YOU HAVE RECEIVED.    DO NOT DRINK ALCOHOLIC BEVERAGES FOR 24 HOURS FOLLOWING YOUR SURGERY.    DRINK CLEAR LIQUIDS (APPLE JUICE, GINGER ALE, 7-UP, BROTH, ETC.).  PROGRESS TO YOUR REGULAR DIET AS YOU FEEL ABLE.    YOU MAY HAVE A DRY MOUTH, A SORE  THROAT, MUSCLES ACHES OR TROUBLE SLEEPING.  THESE SHOULD GO AWAY AFTER 24 HOURS.    CALL YOUR DOCTOR FOR ANY OF THE FOLLOWING:  SIGNS OF INFECTION (FEVER, GROWING TENDERNESS AT THE SURGERY SITE, A LARGE AMOUNT OF DRAINAGE OR BLEEDING, SEVERE PAIN, FOUL-SMELLING DRAINAGE, REDNESS OR SWELLING.    IT HAS BEEN OVER 8 TO 10 HOURS SINCE SURGERY AND YOU ARE STILL NOT ABLE TO URINATE (PASS WATER).       STENT INFORMATION/DISCHARGE INSTRUCTIONS  [facility] / UROLOGY  [facility]  [phone]    During surgery, a stent may be placed in the ureter.  The ureter is the tube that drains urine from the kidney to the bladder.  The stent is placed to dilate (open) the ureter so stone fragments can pass easily through the ureter or to decrease ureteral swelling after surgery or to relieve an obstruction.      The stent is made of silicone.  The upper end of the stent curls in the kidney while the lower end rests in the bladder.    While the stent is in place you may experience the following symptoms:  Blood and/or small blood clots in the urine  Bladder spasms (frequency and urgency of urination)  Discomfort or aching in the back or side where the stent is  Burning or discomfort at the end of urine stream    To decrease these symptoms you should:  Take antispasmodic medication as prescribed (Detrol, Ditropan, etc.)  Drink plenty of fluids but avoid caffeine and citrus (include cranberry)  If you are having discomfort in back or side, decrease activity    Please call your physician or the physician on call if you experience:  Fever greater than 101 degrees  Severe pain not relieved by pain medication or rest    Please make an appointment for the removal of the stent according to your physician's instructions.        CYSTOSCOPY DISCHARGE INSTRUCTIONS  Counts include 234 beds at the [facility] / UROLOGY  [facility]  [phone]    YOU MAY GO BACK TO YOUR NORMAL DIET AND ACTIVITY, UNLESS YOUR DOCTOR TELLS  YOU NOT TO.    FOR THE NEXT TWO DAYS, YOU MAY NOTICE:    SOME BLOOD IN YOUR URINE.  SOME BURNING WHEN YOU URINATE (USE THE TOILET).  AN URGE TO URINATE MORE OFTEN.  BLADDER SPASMS.    THESE ARE NORMAL AFTER THE PROCEDURE.  THEY SHOULD GO AWAY AFTER A DAY OR TWO.  TO RELIEVE THESE PROBLEMS:     DRINK 6 TO 8 LARGE GLASSES OF WATER EACH DAY (INCLUDES DRINKS AT MEALS).  THIS WILL HELP CLEAR THE URINE.    TAKE WARM BATHS TO RELIEVE PAIN AND BLADDER SPASMS.  DO NOT ADD ANYTHING TO THE BATH WATER.    YOUR DOCTOR MAY PRESCRIBE PAIN MEDICINE.  YOU MAY ALSO TAKE TYLENOL (ACETAMINOPHEN) FOR PAIN.    CALL YOUR SURGEON IF YOU HAVE:    A FEVER OVER 101 DEGREES.  CHECK YOUR TEMPERATURE UNDER YOUR TONGUE.    CHILLS.    FAILURE TO URINATE (NO URINE COMES OUT WHEN YOU TRY TO USE THE TOILET).  TRY SOAKING IN A BATHTUB FULL OF WARM WATER.  IF STILL NO URINE, CALL YOUR DOCTOR.    A LOT OF BLOOD IN THE URINE, OR BLOOD CLOTS LARGER THAN A NICKEL.      PAIN IN THE BACK OR BELLY AREA (ABDOMEN).    PAIN OR SPASMS THAT ARE NOT RELIEVED BY WARM TUB BATHS AND PAIN MEDICINE.      SEVERE PAIN, BURNING OR OTHER PROBLEMS WHILE PASSING URINE.    PAIN THAT GETS WORSE AFTER TWO DAYS.     You received Norco 2 tablets at 135 pm.  You may have another dose if needed in 4 hours at 535 pm.             Pending Results     Date and Time Order Name Status Description    10/12/2017 1132 Stone analysis In process     10/12/2017 0248 Urine Culture Aerobic Bacterial Preliminary             Statement of Approval     Ordered          10/12/17 1245  I have reviewed and agree with all the recommendations and orders detailed in this document.  EFFECTIVE NOW     Approved and electronically signed by:  Latanya James PA-C             Admission Information     Date & Time Department Dept. Phone    10/12/2017 [facility] PreOP/PostOP [phone]      Your Vitals Were     Blood Pressure Pulse Temperature Respirations Height Weight    113/75 89 98.1  F (36.7  " C) 18 1.6 m (5' 3\") 83.9 kg (185 lb)    Last Period Pulse Oximetry BMI (Body Mass Index)             10/01/2017 96% 32.77 kg/m2         "[employer] Information     "[employer] lets you send messages to your doctor, view your test results, renew your prescriptions, schedule appointments and more. To sign up, go to [email or web address]" . Click on \"Log in\" on the left side of the screen, which will take you to the Welcome page. Then click on \"Sign up Now\" on the right side of the page.     You will be asked to enter the access code listed below, as well as some personal information. Please follow the directions to create your username and password.     Your access code is: [email or web address]  Expires: 2017  6:16 AM     Your access code will  in 90 days. If you need help or a new code, please call your [facility] clinic or [phone].        Care EveryWhere ID     This is your Care EveryWhere ID. This could be used by other organizations to access your [facility] medical records  [id number]        Equal Access to Services     BON [place] AH: Hadii charanjit Davison, wadacia jacobson, qarahel kaalmada sabra, carly alejo. So [facility] [phone].    ATENCIÓN: Si habla español, tiene a mckeon disposición servicios gratuitos de asistencia lingüística. Mi al [phone].    We comply with applicable federal civil rights laws and [place] laws. We do not discriminate on the basis of race, color, national origin, age, disability, sex, sexual orientation, or gender identity.               Review of your medicines      START taking        Dose / Directions    tolterodine 2 MG tablet   Commonly known as:  DETROL   Used for:  Ureterolithiasis        Dose:  2 mg   Take 1 tablet (2 mg) by mouth every 12 hours as needed for other (bladder spasms)   Quantity:  15 tablet   Refills:  0         CONTINUE these medicines which have NOT CHANGED        Dose / Directions    ciprofloxacin 500 MG tablet "   Commonly known as:  CIPRO        Dose:  500 mg   Take 500 mg by mouth 2 times daily for 5 days.   Refills:  0       HYDROcodone-acetaminophen 5-325 MG per tablet   Commonly known as:  NORCO   Used for:  Ureterolithiasis        Dose:  1-2 tablet   Take 1-2 tablets by mouth every 4 hours as needed for moderate to severe pain   Quantity:  6 tablet   Refills:  0       ibuprofen 200 MG tablet   Commonly known as:  ADVIL/MOTRIN        Dose:  600 mg   Take 600 mg by mouth every 6 hours as needed   Refills:  0       JUNEL 1.5/30 PO        Dose:  1 tablet   Take 1 tablet by mouth daily   Refills:  0       ondansetron 4 MG ODT tab   Commonly known as:  ZOFRAN-ODT        Dose:  4 mg   Take 4 mg by mouth every 8 hours as needed for nausea   Refills:  0       tamsulosin 0.4 MG capsule   Commonly known as:  FLOMAX        Dose:  0.4 mg   Take 0.4 mg by mouth daily   Refills:  0       traMADol 50 MG tablet   Commonly known as:  ULTRAM        Dose:   mg   Take  mg by mouth every 6 hours as needed for moderate pain   Refills:  0            Where to get your medicines      Some of these will need a paper prescription and others can be bought over the counter. Ask your nurse if you have questions.     Bring a paper prescription for each of these medications     HYDROcodone-acetaminophen 5-325 MG per tablet    tolterodine 2 MG tablet               ANTIBIOTIC INSTRUCTION     You've Been Prescribed an Antibiotic - Now What?  Your healthcare team thinks that you or your loved one might have an infection. Some infections can be treated with antibiotics, which are powerful, life-saving drugs. Like all medications, antibiotics have side effects and should only be used when necessary. There are some important things you should know about your antibiotic treatment.      Your healthcare team may run tests before you start taking an antibiotic.    Your team may take samples (e.g., from your blood, urine or other areas) to run tests  to look for bacteria. These test can be important to determine if you need an antibiotic at all and, if you do, which antibiotic will work best.      Within a few days, your healthcare team might change or even stop your antibiotic.    Your team may start you on an antibiotic while they are working to find out what is making you sick.    Your team might change your antibiotic because test results show that a different antibiotic would be better to treat your infection.    In some cases, once your team has more information, they learn that you do not need an antibiotic at all. They may find out that you don't have an infection, or that the antibiotic you're taking won't work against your infection. For example, an infection caused by a virus can't be treated with antibiotics. Staying on an antibiotic when you don't need it is more likely to be harmful than helpful.      You may experience side effects from your antibiotic.    Like all medications, antibiotics have side effects. Some of these can be serious.    Let you healthcare team know if you have any known allergies when you are admitted to the hospital.    One significant side effect of nearly all antibiotics is the risk of severe and sometimes deadly diarrhea caused by Clostridium difficile (C. Difficile). This occurs when a person takes antibiotics because some good germs are destroyed. Antibiotic use allows C. diificile to take over, putting patients at high risk for this serious infection.    As a patient or caregiver, it is important to understand your or your loved one's antibiotic treatment. It is especially important for caregivers to speak up when patients can't speak for themselves. Here are some important questions to ask your healthcare team.    What infection is this antibiotic treating and how do you know I have that infection?    What side effects might occur from this antibiotic?    How long will I need to take this antibiotic?    Is it safe to  take this antibiotic with other medications or supplements (e.g., vitamins) that I am taking?     Are there any special directions I need to know about taking this antibiotic? For example, should I take it with food?    How will I be monitored to know whether my infection is responding to the antibiotic?    What tests may help to make sure the right antibiotic is prescribed for me?      Information provided by:  www.cdc.gov/getsmart  U.S. Department of Health and Human Services  Centers for disease Control and Prevention  National Center for Emerging and Zoonotic Infectious Diseases  Division of Healthcare Quality Promotion         Protect others around you: Learn how to safely use, store and throw away your medicines at www.disposemymeds.org.             Medication List: This is a list of all your medications and when to take them. Check marks below indicate your daily home schedule. Keep this list as a reference.      Medications           Morning Afternoon Evening Bedtime As Needed    ciprofloxacin 500 MG tablet   Commonly known as:  CIPRO   Take 500 mg by mouth 2 times daily for 5 days.                                HYDROcodone-acetaminophen 5-325 MG per tablet   Commonly known as:  NORCO   Take 1-2 tablets by mouth every 4 hours as needed for moderate to severe pain   Last time this was given:  2 tablets on 10/12/2017  1:34 PM                                ibuprofen 200 MG tablet   Commonly known as:  ADVIL/MOTRIN   Take 600 mg by mouth every 6 hours as needed                                JUNEL 1.5/30 PO   Take 1 tablet by mouth daily                                ondansetron 4 MG ODT tab   Commonly known as:  ZOFRAN-ODT   Take 4 mg by mouth every 8 hours as needed for nausea                                tamsulosin 0.4 MG capsule   Commonly known as:  FLOMAX   Take 0.4 mg by mouth daily   Last time this was given:  0.4 mg on 10/12/2017  2:17 AM                                tolterodine 2 MG tablet    Commonly known as:  DETROL   Take 1 tablet (2 mg) by mouth every 12 hours as needed for other (bladder spasms)                                traMADol 50 MG tablet   Commonly known as:  ULTRAM   Take  mg by mouth every 6 hours as needed for moderate pain

## 2017-10-12 NOTE — ED PROVIDER NOTES
"  History     Chief Complaint:  Post-op Problem      HPI   Shelly Cerda is a 22 year old female who presents with a post operative problem. The patient states that on 10/3 she underwent ureteral stent placement. She was able to remove the stent herself on 10/8. Yesterday she began having left-sided flank and back pain, which she states is worse than the pain she had before surgery. The pain has been accompanied by nausea and vomiting. She presented to an ED in [place], where she was diagnosed with a UTI, prescribed Cipro, and told to stop taking Flomax. The pain was persisted since that time which led her to once again present to the ED. Patient denies fever and all other complaints.     Allergies:  No known drug allergies      Medications:    Ciprofloxacin - day 2  Norco prn    Past Medical History:    Cystinuria  Kidney Stones    Past Surgical History:    Appendectomy  Laser Holmium Lithotripsy Ureter, Insert Stent, Combined  Ureteral Stent Placement with Subsequent Removal - 2017    Family History:    Cystinuria    Social History:  Presents with mother   Tobacco use: never  Alcohol use: Yes  [facility]    Marital Status:  Single      Review of Systems   Constitutional: Negative for fever.   Gastrointestinal: Positive for nausea and vomiting.   Genitourinary: Positive for flank pain.   Musculoskeletal: Positive for back pain.   All other systems reviewed and are negative.    Physical Exam     Patient Vitals for the past 24 hrs:   BP Temp Temp src Pulse Resp SpO2 Height Weight   10/12/17 0450 123/68 97  F (36.1  C) Oral 77 16 97 % 1.6 m (5' 3\") 83.9 kg (185 lb)   10/12/17 0435 - - - - - 96 % - -   10/12/17 0430 - - - - - 96 % - -   10/12/17 0425 106/64 - - - - 96 % - -   10/12/17 0420 - - - - - 98 % - -   10/12/17 0415 - - - - - 97 % - -   10/12/17 0410 113/77 - - - - 96 % - -   10/12/17 0405 - - - - - 97 % - -   10/12/17 0400 - - - - - 99 % - -   10/12/17 0355 135/79 - - - - 98 % - - " "  10/12/17 0350 - - - - - 95 % - -   10/12/17 0345 - - - - - 96 % - -   10/12/17 0325 126/82 - - - - 96 % - -   10/12/17 0320 - - - - - 97 % - -   10/12/17 0245 - - - - - 96 % - -   10/12/17 0240 113/69 - - - - 96 % - -   10/12/17 0234 - - - - - 96 % - -   10/12/17 0230 - - - - - 95 % - -   10/12/17 0227 - - - - - 96 % - -   10/12/17 0225 116/65 - - - - 98 % - -   10/12/17 0220 120/74 - - - - - - -   10/12/17 0133 (!) 125/100 99.3  F (37.4  C) Oral 110 22 98 % 1.6 m (5' 3\") 81.6 kg (180 lb)      Physical Exam  Nursing note and vitals reviewed.  Constitutional: Cooperative. Uncomfortable appearing.  HENT:   Mouth/Throat: Mucous membranes are normal.   Cardiovascular: Tachycardic rate, regular rhythm and normal heart sounds.  No murmur.  Pulmonary/Chest: Effort normal and breath sounds normal. No respiratory distress. No wheezes. No rales.   Abdominal: Soft. Normal appearance and bowel sounds are normal. No distension. There is diffuse left sided tenderness. There is no rigidity and no guarding.   Neurological: Alert. Oriented x4  Skin: Skin is warm and dry.  Psychiatric: Normal mood and affect.     Emergency Department Course   Imaging:  Radiographic findings were communicated with the patient and family who voiced understanding of the findings.    CT Abdomen Pelvis w/ Contrast:   1. 1.0 x 0.6 cm calculus in the mid left ureter, resulting in mild to moderate obstruction.  2. Two tiny nonobstructing left renal calculi.  3. A very small amount of free fluid in the pelvis.     Results per radiology.     Laboratory:  CBC: WNL (WBC 10.8, HGB 12.0, )    BMP: (Creatinine 1.66 (H)), GFR 39 (L) o/w WNL     HCG: Negative     UA with micro: Blood Small (A), Leukocyte Esterase Small (A), WBC 17 (H), RBC 5 (H), Bacteria Few (A), Mucous Present (A) o/w negative     Urine Culture: Pending    Interventions:  0217: Flomax 0.4 mg PO  0217: Zofran 4 mg IV  0217: Dilaudid 1 mg IV  0217: NS 1L IV Bolus   0306: Dilaudid 0.5 mg PO - " multiple    The patient's symptoms were partially improved with parenteral narcotics.    Emergency Department Course:  Past medical records, nursing notes, and vitals reviewed.  0144: I performed an exam of the patient and obtained history, as documented above.  IV inserted and blood drawn.   Above interventions provided.   The patient was sent for a abdomen/pelvis CT while in the emergency department, findings above.   I personally reviewed the laboratory results with the Patient and mother and answered all related questions prior to admission.  Findings and plan explained to the Patient and mother who consents to admission.   0332: Discussed the patient with Dr. Rico, who will admit the patient to a observation medical bed for further monitoring, evaluation, and treatment.        Impression & Plan    Medical Decision Making:  Shelly Cerda is a 22 year old female who presents with left flank pain. She had recent placement of ureteral stent and subsequent removal, as detailed in the HPI. Unfortunately, CT scan shows a large 10 mm by 6 mm mid uretal stone, causing obstruction. She is already of Ciprofloxacin for a possible UTI. I am not convinced there is a UTI as I'd expect an abnormal UA with recent stent remvoal, but we will culture this so we have it in the records. Plan to continue antibiotics in the interim. Pain has been difficult to control, which is to be expected. Plan of care will be admission for pain control, urologic consultation, and likely surgical intervention.     Diagnosis:    ICD-10-CM   1. Renal colic on left side N23   2. Ureterolithiasis N20.1   3. Acute renal insufficiency N28.9       Disposition:  Admitted to an observation medical bed.         10/12/2017   [facility] EMERGENCY DEPARTMENT  Shahida RAPP am serving as a scribe at 1:44 AM on 10/12/2017 to document services personally performed by Brian Fried MD based on my observations and the provider's statements to  me.       Brian Fried MD  10/12/17 0546

## 2017-10-12 NOTE — DISCHARGE INSTRUCTIONS
DR. AIDEE BARRIOS M.D.         CLINIC PHONE NUMBER:  [phone]      GENERAL ANESTHESIA OR SEDATION ADULT DISCHARGE INSTRUCTIONS   SPECIAL PRECAUTIONS FOR 24 HOURS AFTER SURGERY    IT IS NOT UNUSUAL TO FEEL LIGHT-HEADED OR FAINT, UP TO 24 HOURS AFTER SURGERY OR WHILE TAKING PAIN MEDICATION.  IF YOU HAVE THESE SYMPTOMS; SIT FOR A FEW MINUTES BEFORE STANDING AND HAVE SOMEONE ASSIST YOU WHEN YOU GET UP TO WALK OR USE THE BATHROOM.    YOU SHOULD REST AND RELAX FOR THE NEXT 24 HOURS AND YOU MUST MAKE ARRANGEMENTS TO HAVE SOMEONE STAY WITH YOU FOR AT LEAST 24 HOURS AFTER YOUR DISCHARGE.  AVOID HAZARDOUS AND STRENUOUS ACTIVITIES.  DO NOT MAKE IMPORTANT DECISIONS FOR 24 HOURS.    DO NOT DRIVE ANY VEHICLE OR OPERATE MECHANICAL EQUIPMENT FOR 24 HOURS FOLLOWING THE END OF YOUR SURGERY.  EVEN THOUGH YOU MAY FEEL NORMAL, YOUR REACTIONS MAY BE AFFECTED BY THE MEDICATION YOU HAVE RECEIVED.    DO NOT DRINK ALCOHOLIC BEVERAGES FOR 24 HOURS FOLLOWING YOUR SURGERY.    DRINK CLEAR LIQUIDS (APPLE JUICE, GINGER ALE, 7-UP, BROTH, ETC.).  PROGRESS TO YOUR REGULAR DIET AS YOU FEEL ABLE.    YOU MAY HAVE A DRY MOUTH, A SORE THROAT, MUSCLES ACHES OR TROUBLE SLEEPING.  THESE SHOULD GO AWAY AFTER 24 HOURS.    CALL YOUR DOCTOR FOR ANY OF THE FOLLOWING:  SIGNS OF INFECTION (FEVER, GROWING TENDERNESS AT THE SURGERY SITE, A LARGE AMOUNT OF DRAINAGE OR BLEEDING, SEVERE PAIN, FOUL-SMELLING DRAINAGE, REDNESS OR SWELLING.    IT HAS BEEN OVER 8 TO 10 HOURS SINCE SURGERY AND YOU ARE STILL NOT ABLE TO URINATE (PASS WATER).       STENT INFORMATION/DISCHARGE INSTRUCTIONS  [facility] / UROLOGY  [facility]  [phone]    During surgery, a stent may be placed in the ureter.  The ureter is the tube that drains urine from the kidney to the bladder.  The stent is placed to dilate (open) the ureter so stone fragments can pass easily through the ureter or to decrease ureteral swelling after surgery or to relieve an obstruction.       The stent is made of silicone.  The upper end of the stent curls in the kidney while the lower end rests in the bladder.    While the stent is in place you may experience the following symptoms:  Blood and/or small blood clots in the urine  Bladder spasms (frequency and urgency of urination)  Discomfort or aching in the back or side where the stent is  Burning or discomfort at the end of urine stream    To decrease these symptoms you should:  Take antispasmodic medication as prescribed (Detrol, Ditropan, etc.)  Drink plenty of fluids but avoid caffeine and citrus (include cranberry)  If you are having discomfort in back or side, decrease activity    Please call your physician or the physician on call if you experience:  Fever greater than 101 degrees  Severe pain not relieved by pain medication or rest    Please make an appointment for the removal of the stent according to your physician's instructions.        CYSTOSCOPY DISCHARGE INSTRUCTIONS  [facility] / UROLOGY  [facility]  [phone]    YOU MAY GO BACK TO YOUR NORMAL DIET AND ACTIVITY, UNLESS YOUR DOCTOR TELLS YOU NOT TO.    FOR THE NEXT TWO DAYS, YOU MAY NOTICE:    SOME BLOOD IN YOUR URINE.  SOME BURNING WHEN YOU URINATE (USE THE TOILET).  AN URGE TO URINATE MORE OFTEN.  BLADDER SPASMS.    THESE ARE NORMAL AFTER THE PROCEDURE.  THEY SHOULD GO AWAY AFTER A DAY OR TWO.  TO RELIEVE THESE PROBLEMS:     DRINK 6 TO 8 LARGE GLASSES OF WATER EACH DAY (INCLUDES DRINKS AT MEALS).  THIS WILL HELP CLEAR THE URINE.    TAKE WARM BATHS TO RELIEVE PAIN AND BLADDER SPASMS.  DO NOT ADD ANYTHING TO THE BATH WATER.    YOUR DOCTOR MAY PRESCRIBE PAIN MEDICINE.  YOU MAY ALSO TAKE TYLENOL (ACETAMINOPHEN) FOR PAIN.    CALL YOUR SURGEON IF YOU HAVE:    A FEVER OVER 101 DEGREES.  CHECK YOUR TEMPERATURE UNDER YOUR TONGUE.    CHILLS.    FAILURE TO URINATE (NO URINE COMES OUT WHEN YOU TRY TO USE THE TOILET).  TRY SOAKING IN A BATHTUB FULL OF WARM WATER.   IF STILL NO URINE, CALL YOUR DOCTOR.    A LOT OF BLOOD IN THE URINE, OR BLOOD CLOTS LARGER THAN A NICKEL.      PAIN IN THE BACK OR BELLY AREA (ABDOMEN).    PAIN OR SPASMS THAT ARE NOT RELIEVED BY WARM TUB BATHS AND PAIN MEDICINE.      SEVERE PAIN, BURNING OR OTHER PROBLEMS WHILE PASSING URINE.    PAIN THAT GETS WORSE AFTER TWO DAYS.     You received Norco 2 tablets at 135 pm.  You may have another dose if needed in 4 hours at 535 pm.

## 2017-10-12 NOTE — OP NOTE
DATE OF SERVICE: 10/12/2017  PREOPERATIVE DIAGNOSIS: Left ureterolithiasis  POSTOPERATIVE DIAGNOSIS: Left ureterolithiasis    PROCEDURES PERFORMED:   1. Cystourethroscopy  2. Left ureteroscopy with holmium laser lithotripsy and stone extraction  3. Left retrograde pyelography with interpretation of intraoperative fluoroscopic imaging  4. Left ureteral stent placement    STAFF SURGEON: Luis Banda MD  ANESTHESIA: General    ESTIMATED BLOOD LOSS: 1 cc  DRAINS/TUBES:  6 Hungarian x 26 cm double-J ureteral stent     COMPLICATIONS: None.   SPECIMEN: Stone for analysis  SIGNIFICANT FINDINGS: Obstructing conglomeration of stone fragments in mid left ureter. Initially contrast would not pass beyond these stones. Ultimately wire advanced beyond stones and multiple small 1-2mm fragments were removed via basketing. No stones large enough to warrant laser. Relative narrowing noted in mid ureter.     BRIEF OPERATIVE INDICATIONS: Shelly Cerda is a 22 year old female who recently presented with left obstructing ureteral calculus. History of cysteine stones. Had ureteroscopy last week and subsequent stent removal, but now presents with obstrucgint residual stone fragment.. After a discussion of all risks, benefits, and alternatives, the patient elected to proceed with stone removal.     DESCRIPTION OF PROCEDURE:  After informed consent was verified, the patient was transported to the operating room, placed supine on the table. After adequate anesthesia was induced, she was placed in dorsal lithotomy and prepped and draped in the usual sterile fashion. A timeout was taken to confirm correct patient, procedure and laterality. Pre-operative IV antibiotics were administered.    A 22 Hungarian rigid cystoscope was inserted per a well-lubricated urethra. The anterior urethra was unremarkable. The ureteral orifices were orthotopic bilaterally. The left ureteral orifice was identified and cannulated with a Sensor wire and 6-Fr open-ended  catheter. Initial retrograde pyelogram demonstrated no contrast passing beyond presumed level of stone. The wire was subseuqently passed beyond this area with mild resistance, and into the upper pole. Repeat retrograde pyelogram demonstrated location of stone but otherwise no filling defects. Moderate hydronephrosis noted. Semi-rigid ureteroscope was then advanced without difficulty to level of stone. Mild narrowing of ureter noted at this point. 5-6 mm of stone burden was noted, however was found to be soft and friable and appeared consistent with conglomeration of 1-2 mm stone fragments, likely residual from previous procedure. Several of these were basketed out with the semi-rigid scope. Some fragments were also pushed up into the renal pelvis, so a ureteral access sheath was advanced into proximal ureter and flexible ureteroscope was used to remove multiple small fragments from left renal pelvis and lower pole calyx. No large stone fragments were noted and no laser was used. All stone fragments >1mm were removed, but there was ongoing debris and sand-like fragments remaining in kidney from presumed previous stone dusting. All stones >1mm were basketed and withdrawn.  Pullback ureteroscopy was performed and showed no retained stone fragments or ureteral injury. A 6 Fr x 26 cm double-J stent was advanced over the Sensor wire, and a good proximal curl was seen in the renal pelvis and the distal curl was seen in the bladder. The bladder was drained.   The patient tolerated the procedure well.  No apparent complications.Irving was transported to the postanesthesia care unit in stable condition.      PLAN: Return in 1 week for cystoscopy and ureteral stent removal in the office.

## 2017-10-12 NOTE — LETTER
[facility]  [address]  [facility] [address]  [phone]          October 12, 2017    RE:  Shelly Cerda                                                                                                                                                       [id number] [facility] [address]            To whom it may concern:    Shelly Cerda is under my professional care. She was admitted to the hospital and had surgery on 10/12/2017. She  may return to school/work, however may be limited by post-operative recovery for 1-2 weeks.        Sincerely,        Luis Banda MD  Urology  [facility]

## 2017-10-13 DIAGNOSIS — N20.0 KIDNEY STONE: Primary | ICD-10-CM

## 2017-10-13 LAB
BACTERIA SPEC CULT: NO GROWTH
Lab: NORMAL
SPECIMEN SOURCE: NORMAL

## 2017-10-13 RX ORDER — TAMSULOSIN HYDROCHLORIDE 0.4 MG/1
0.4 CAPSULE ORAL DAILY
Qty: 6 CAPSULE | Refills: 0 | Status: SHIPPED | OUTPATIENT
Start: 2017-10-13 | End: 2017-10-19

## 2017-10-16 LAB
APPEARANCE STONE: NORMAL
COMPN STONE: NORMAL
NUMBER STONE: 1
SIZE STONE: NORMAL MM
WT STONE: 3 MG

## 2017-10-18 ENCOUNTER — TELEPHONE (OUTPATIENT)
Dept: UROLOGY | Facility: CLINIC | Age: 22
End: 2017-10-18

## 2017-10-18 NOTE — TELEPHONE ENCOUNTER
Shelly called to discuss recent blood in her urine and more frequency she is wondering if she has a UTIand needs antibiotics. Shelly has an indwelling stent so informed her that these symptoms are most likely related to the stent. Offered her an appointment to come in for a culture however she is in [place], [place]. Suggested that she drink water avoid he dietary irritants and keep her appointment for stent removal tomorrow. If fever would develop go to have urine done in [place]. Latanya Samuels LPN

## 2017-10-19 ENCOUNTER — OFFICE VISIT (OUTPATIENT)
Dept: UROLOGY | Facility: CLINIC | Age: 22
End: 2017-10-19
Payer: COMMERCIAL

## 2017-10-19 VITALS — BODY MASS INDEX: 31.89 KG/M2 | WEIGHT: 180 LBS | OXYGEN SATURATION: 99 % | HEART RATE: 102 BPM | HEIGHT: 63 IN

## 2017-10-19 DIAGNOSIS — N20.0 NEPHROLITHIASIS: Primary | ICD-10-CM

## 2017-10-19 PROCEDURE — 99207 ZZC NO CHARGE LOS: CPT | Performed by: UROLOGY

## 2017-10-19 PROCEDURE — 52310 CYSTOSCOPY AND TREATMENT: CPT | Performed by: UROLOGY

## 2017-10-19 RX ORDER — CIPROFLOXACIN 500 MG/1
500 TABLET, FILM COATED ORAL ONCE
Qty: 1 TABLET | Refills: 0 | Status: SHIPPED | OUTPATIENT
Start: 2017-10-19 | End: 2017-10-19

## 2017-10-19 ASSESSMENT — PAIN SCALES - GENERAL: PAINLEVEL: MILD PAIN (3)

## 2017-10-19 NOTE — PROGRESS NOTES
Shelly Cerda is a 22 year old female with an indwelling ureteral stent in need of removal.    Comment: (Note)   Calculi composed primarily of cystine.        CYSTOSCOPY PROCEDURE:  After sterile preparation and draping of the patient,  a 17-French flexible cystoscope was introduced via the urethra.  It was passed without difficulty into the bladder.  The urethra was open without evidence of stricture.  The ureteral orifices were orthotopic.  The double J stent was seen coming out the left side.  It/They were grasped with an alligator forceps and extracted intact without any difficulty.  The patient tolerated the procedure well    A/P Successful stent removal  Cipro 500mg today    Watch for any new onset fevers, signs of UTI.  May expect some pain after removal.  If this is severe, or last many hours, you may need to return for replacement of stent.    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 She expressed interest in following up with the [facility] and provided referral for that today. Will follow-up with me as needed.    Luis Banda MD  Urology  [facility]

## 2017-10-19 NOTE — MR AVS SNAPSHOT
"              After Visit Summary   10/19/2017    Shelly Cerda    MRN: [id number]           Patient Information     Date Of Birth          1995        Visit Information        Provider Department      10/19/2017 4:00 PM Luis Banda MD; UB CYF [facility] [place]        Today's Diagnoses     Nephrolithiasis    -  1      Care Instructions      AFTER YOUR CYSTOSCOPY        You have just completed a cystoscopy, or \"cysto\", which allowed your physician to learn more about your bladder (or to remove a stent placed after surgery). We suggest that you continue to avoid caffeine, fruit juice, and alcohol for the next 24 hours, however, you are encouraged to return to your normal activities.         A few things that are considered normal after your cystoscopy:     * Small amount of bleeding (or spotting) that clears within the next 24 hours     * Slight burning sensation with urination     * Sensation to of needing to avoid more frequently     * The feeling of \"air\" in your urine     * Mild discomfort that is relieved with Tylenol        Please contact our office promptly if you:     * Develop a fever above 101 degrees     * Are unable to urinate     * Develop bright red blood that does not stop     * Severe pain or swelling         Please contact our office with any concerns or questions [phone]          Follow-ups after your visit        Additional Services     UROLOGY ADULT REFERRAL       Your provider has referred you to: [facility]: Kidney Stone [facility] [phone]   [email or web address]    Please be aware that coverage of these services is subject to the terms and limitations of your health insurance plan.  Call member services at your health plan with any benefit or coverage questions.      Please bring the following with you to your appointment:    (1) Any X-Rays, CTs or MRIs which have been performed.  " "Contact the facility where they were done to arrange for  prior to your scheduled appointment.    (2) List of current medications  (3) This referral request   (4) Any documents/labs given to you for this referral                  Who to contact     If you have questions or need follow up information about today's clinic visit or your schedule please contact [facility] directly at [phone].  Normal or non-critical lab and imaging results will be communicated to you by [employer], letter or phone within 4 business days after the clinic has received the results. If you do not hear from us within 7 days, please contact the clinic through [employer] or phone. If you have a critical or abnormal lab result, we will notify you by phone as soon as possible.  Submit refill requests through [employer] or call your pharmacy and they will forward the refill request to us. Please allow 3 business days for your refill to be completed.          Additional Information About Your Visit        Med[employer]nergies[employer]Fanchimp Information     [employer] lets you send messages to your doctor, view your test results, renew your prescriptions, schedule appointments and more. To sign up, go to [email or web address] . Click on \"Log in\" on the left side of the screen, which will take you to the Welcome page. Then click on \"Sign up Now\" on the right side of the page.     You will be asked to enter the access code listed below, as well as some personal information. Please follow the directions to create your username and password.     Your access code is: [email or web address]  Expires: 2017  6:16 AM     Your access code will  in 90 days. If you need help or a new code, please call your [facility] clinic or [phone].        Care EveryWhere ID     This is your Care EveryWhere ID. This could be used by other organizations to access your [facility] medical records  [id number]        Your Vitals Were     Pulse Height Last " "Period Pulse Oximetry BMI (Body Mass Index)       102 1.6 m (5' 3\") 10/01/2017 99% 31.89 kg/m2        Blood Pressure from Last 3 Encounters:   10/12/17 136/79   08/06/17 (!) 132/96   09/05/12 98/54    Weight from Last 3 Encounters:   10/19/17 81.6 kg (180 lb)   10/12/17 83.9 kg (185 lb)   09/03/12 82.5 kg (181 lb 14.1 oz) (96 %)*     * Growth percentiles are based on [facility] 2-20 Years data.              We Performed the Following     CYSTOSCOPY W FOREIGN BODY REMOVAL, SIMPLE     UROLOGY ADULT REFERRAL          Today's Medication Changes          These changes are accurate as of: 10/19/17  4:42 PM.  If you have any questions, ask your nurse or doctor.               These medicines have changed or have updated prescriptions.        Dose/Directions    * ciprofloxacin 500 MG tablet   Commonly known as:  CIPRO   This may have changed:  Another medication with the same name was added. Make sure you understand how and when to take each.        Dose:  500 mg   Take 500 mg by mouth 2 times daily for 5 days.   Refills:  0       * ciprofloxacin 500 MG tablet   Commonly known as:  CIPRO   This may have changed:  You were already taking a medication with the same name, and this prescription was added. Make sure you understand how and when to take each.   Used for:  Nephrolithiasis   Changed by:  Luis Banda MD        Dose:  500 mg   Take 1 tablet (500 mg) by mouth once for 1 dose   Quantity:  1 tablet   Refills:  0       * Notice:  This list has 2 medication(s) that are the same as other medications prescribed for you. Read the directions carefully, and ask your doctor or other care provider to review them with you.         Where to get your medicines      These medications were sent to [facility] [place], [place] - [facility] [address]  [address] [facility] [address]     Phone:  [phone]     ciprofloxacin 500 MG tablet                Primary Care Provider Fax #    [place] " [facility] [phone]       [address]  [facility] [address]        Equal Access to Services     HAYDEEKERRY TANGELA : Hadii charanjit watson erik Davison, waenedeliada lukellie, qaybta kaalmada johnjudd, carly rehanain hayaahilton lopezroseanna byrd melina alejo. So [facility] [phone].    ATENCIÓN: Si habla español, tiene a mckeon disposición servicios gratuitos de asistencia lingüística. Llame al [phone].    We comply with applicable federal civil rights laws and [place] laws. We do not discriminate on the basis of race, color, national origin, age, disability, sex, sexual orientation, or gender identity.            Thank you!     Thank you for choosing [facility] [place]  for your care. Our goal is always to provide you with excellent care. Hearing back from our patients is one way we can continue to improve our services. Please take a few minutes to complete the written survey that you may receive in the mail after your visit with us. Thank you!             Your Updated Medication List - Protect others around you: Learn how to safely use, store and throw away your medicines at www.disposemymeds.org.          This list is accurate as of: 10/19/17  4:42 PM.  Always use your most recent med list.                   Brand Name Dispense Instructions for use Diagnosis    * ciprofloxacin 500 MG tablet    CIPRO     Take 500 mg by mouth 2 times daily for 5 days.        * ciprofloxacin 500 MG tablet    CIPRO    1 tablet    Take 1 tablet (500 mg) by mouth once for 1 dose    Nephrolithiasis       HYDROcodone-acetaminophen 5-325 MG per tablet    NORCO    6 tablet    Take 1-2 tablets by mouth every 4 hours as needed for moderate to severe pain    Ureterolithiasis       ibuprofen 200 MG tablet    ADVIL/MOTRIN     Take 600 mg by mouth every 6 hours as needed        JUNEL 1.5/30 PO      Take 1 tablet by mouth daily        ondansetron 4 MG ODT tab    ZOFRAN-ODT     Take 4 mg by mouth every 8 hours as needed for  nausea        tamsulosin 0.4 MG capsule    FLOMAX    6 capsule    Take 1 capsule (0.4 mg) by mouth daily for 6 days    Kidney stone       tolterodine 2 MG tablet    DETROL    15 tablet    Take 1 tablet (2 mg) by mouth every 12 hours as needed for other (bladder spasms)    Ureterolithiasis       traMADol 50 MG tablet    ULTRAM     Take  mg by mouth every 6 hours as needed for moderate pain        * Notice:  This list has 2 medication(s) that are the same as other medications prescribed for you. Read the directions carefully, and ask your doctor or other care provider to review them with you.

## 2017-10-19 NOTE — NURSING NOTE
Prior to the start of the procedure and with procedural staff participation, I verbally confirmed the patient s identity using two indicators, relevant allergies, that the procedure was appropriate and matched the consent or emergent situation, and that the correct equipment/implants were available. Immediately prior to starting the procedure I conducted the Time Out with the procedural staff and re-confirmed the patient s name, procedure, and site/side. (The Joint Commission universal protocol was followed.)  Yes    Sedation (Moderate or Deep): None  Pt has signed the consent form stating that we will be doing a CYSTOSCOPY (with or without stent removal) today, and that it is the correct procedure. I verbally confirmed the patient s identity using two indicators, relevant allergies, and that the correct equipment was available. Post-op information given to the pt as needed at check-out. I have sent an appropriate antibiotic to the pharmacy in our building as recommended by the MD. HEDY Martinez CMA

## 2017-10-19 NOTE — PATIENT INSTRUCTIONS

## 2017-10-19 NOTE — LETTER
10/19/2017       RE: Shelly Cerda  [address] [facility] [address]     Dear Colleague,    Thank you for referring your patient, Shelly Cerda, to the [facility] [place] at [facility]. Please see a copy of my visit note below.    Shelly Cerda is a 22 year old female with an indwelling ureteral stent in need of removal.    Comment: (Note)   Calculi composed primarily of cystine.        CYSTOSCOPY PROCEDURE:  After sterile preparation and draping of the patient,  a 17-French flexible cystoscope was introduced via the urethra.  It was passed without difficulty into the bladder.  The urethra was open without evidence of stricture.  The ureteral orifices were orthotopic.  The double J stent was seen coming out the left side.  It/They were grasped with an alligator forceps and extracted intact without any difficulty.  The patient tolerated the procedure well    A/P Successful stent removal  Cipro 500mg today    Watch for any new onset fevers, signs of UTI.  May expect some pain after removal.  If this is severe, or last many hours, you may need to return for replacement of stent.    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 She expressed interest in following up with the [facility] and provided referral for that today. Will follow-up with me as needed.    Luis Banda MD  Urology  [facility]

## 2017-10-23 ENCOUNTER — PRE VISIT (OUTPATIENT)
Dept: UROLOGY | Facility: CLINIC | Age: 22
End: 2017-10-23

## 2017-10-23 DIAGNOSIS — E72.01 CYSTINURIA (H): ICD-10-CM

## 2017-10-23 DIAGNOSIS — N20.1 URETERAL STONE: Primary | ICD-10-CM

## 2017-10-24 ENCOUNTER — PRE VISIT (OUTPATIENT)
Dept: UROLOGY | Facility: CLINIC | Age: 22
End: 2017-10-24

## 2017-10-24 DIAGNOSIS — N20.1 URETERAL STONE: Primary | ICD-10-CM

## 2017-11-16 ENCOUNTER — PRE VISIT (OUTPATIENT)
Dept: UROLOGY | Facility: CLINIC | Age: 22
End: 2017-11-16

## 2017-12-06 ENCOUNTER — PRE VISIT (OUTPATIENT)
Dept: UROLOGY | Facility: CLINIC | Age: 22
End: 2017-12-06

## 2017-12-19 ENCOUNTER — OFFICE VISIT (OUTPATIENT)
Dept: UROLOGY | Facility: CLINIC | Age: 22
End: 2017-12-19
Attending: UROLOGY
Payer: COMMERCIAL

## 2017-12-19 ENCOUNTER — RADIANT APPOINTMENT (OUTPATIENT)
Dept: GENERAL RADIOLOGY | Facility: CLINIC | Age: 22
End: 2017-12-19
Attending: UROLOGY
Payer: COMMERCIAL

## 2017-12-19 ENCOUNTER — RADIANT APPOINTMENT (OUTPATIENT)
Dept: ULTRASOUND IMAGING | Facility: CLINIC | Age: 22
End: 2017-12-19
Attending: UROLOGY
Payer: COMMERCIAL

## 2017-12-19 VITALS
HEIGHT: 63 IN | WEIGHT: 179.1 LBS | BODY MASS INDEX: 31.73 KG/M2 | SYSTOLIC BLOOD PRESSURE: 115 MMHG | HEART RATE: 67 BPM | DIASTOLIC BLOOD PRESSURE: 65 MMHG

## 2017-12-19 DIAGNOSIS — N20.1 URETERAL STONE: ICD-10-CM

## 2017-12-19 DIAGNOSIS — E72.01 CYSTINURIA (H): ICD-10-CM

## 2017-12-19 DIAGNOSIS — N20.0 CALCULUS OF KIDNEY: ICD-10-CM

## 2017-12-19 DIAGNOSIS — N20.0 CALCULUS OF KIDNEY: Primary | ICD-10-CM

## 2017-12-19 LAB
ALBUMIN SERPL-MCNC: 4.2 G/DL (ref 3.4–5)
ALP SERPL-CCNC: 52 U/L (ref 40–150)
ALT SERPL W P-5'-P-CCNC: 19 U/L (ref 0–50)
ANION GAP SERPL CALCULATED.3IONS-SCNC: 8 MMOL/L (ref 3–14)
AST SERPL W P-5'-P-CCNC: 16 U/L (ref 0–45)
BILIRUB SERPL-MCNC: 0.6 MG/DL (ref 0.2–1.3)
BUN SERPL-MCNC: 13 MG/DL (ref 7–30)
CALCIUM SERPL-MCNC: 9.3 MG/DL (ref 8.5–10.1)
CHLORIDE SERPL-SCNC: 104 MMOL/L (ref 94–109)
CO2 SERPL-SCNC: 26 MMOL/L (ref 20–32)
CREAT SERPL-MCNC: 0.95 MG/DL (ref 0.52–1.04)
GFR SERPL CREATININE-BSD FRML MDRD: 73 ML/MIN/1.7M2
GLUCOSE SERPL-MCNC: 75 MG/DL (ref 70–99)
POTASSIUM SERPL-SCNC: 4 MMOL/L (ref 3.4–5.3)
PROT SERPL-MCNC: 7.9 G/DL (ref 6.8–8.8)
SODIUM SERPL-SCNC: 137 MMOL/L (ref 133–144)

## 2017-12-19 RX ORDER — TOLTERODINE TARTRATE 2 MG/1
TABLET, EXTENDED RELEASE ORAL
COMMUNITY
Start: 2017-10-12 | End: 2018-03-20

## 2017-12-19 RX ORDER — ONDANSETRON 4 MG/1
TABLET, ORALLY DISINTEGRATING ORAL
Refills: 0 | COMMUNITY
Start: 2017-10-11 | End: 2018-03-20

## 2017-12-19 RX ORDER — NORETHINDRONE ACETATE AND ETHINYL ESTRADIOL AND FERROUS FUMARATE 1.5-30(21)
KIT ORAL
Refills: 3 | COMMUNITY
Start: 2017-10-28 | End: 2018-05-29

## 2017-12-19 RX ORDER — LORAZEPAM 1 MG/1
TABLET ORAL
Refills: 0 | COMMUNITY
Start: 2017-12-14 | End: 2018-05-29

## 2017-12-19 RX ORDER — HYDROCODONE BITARTRATE AND ACETAMINOPHEN 5; 325 MG/1; MG/1
TABLET ORAL
Status: ON HOLD | COMMUNITY
Start: 2017-10-12 | End: 2018-03-09

## 2017-12-19 RX ORDER — OXYCODONE AND ACETAMINOPHEN 5; 325 MG/1; MG/1
TABLET ORAL
Status: ON HOLD | COMMUNITY
Start: 2017-08-06 | End: 2018-03-09

## 2017-12-19 RX ORDER — TRAMADOL HYDROCHLORIDE 50 MG/1
TABLET ORAL
Refills: 0 | COMMUNITY
Start: 2017-10-04 | End: 2018-03-20

## 2017-12-19 RX ORDER — BRIMONIDINE TARTRATE 2 MG/ML
SOLUTION/ DROPS OPHTHALMIC
COMMUNITY
Start: 2017-07-17 | End: 2018-05-29

## 2017-12-19 ASSESSMENT — PAIN SCALES - GENERAL: PAINLEVEL: MILD PAIN (3)

## 2017-12-19 NOTE — MR AVS SNAPSHOT
After Visit Summary   12/19/2017    Shelly Cerda    MRN: [id number]           Patient Information     Date Of Birth          1995        Visit Information        Provider Department      12/19/2017 11:30 AM Jayy Koehler MD [facility]        Today's Diagnoses     Calculus of kidney    -  1    Cystinuria (H)          Care Instructions    Two month follow up with Dr. Koehler.  Labs today.      It was a pleasure meeting with you today.  Thank you for allowing me and my team the privilege of caring for you today.  YOU are the reason we are here, and I truly hope we provided you with the excellent service you deserve.  Please let us know if there is anything else we can do for you so that we can be sure you are leaving completely satisfied with your care experience.                  Follow-ups after your visit        Your next 10 appointments already scheduled     Dec 19, 2017 11:30 AM CST   (Arrive by 11:15 AM)   New Renal Calculi with Jayy Koehler MD   [facility] ([facility])    [address] [place] [id number] [phone]            Mar 06, 2018  1:00 PM CST   (Arrive by 12:45 PM)   Return Visit with Jayy Koehler MD   [facility] ([facility])    [address] [place] [id number] [phone]              Future tests that were ordered for you today     Open Future Orders        Priority Expected Expires Ordered    Comprehensive metabolic panel Routine  12/19/2018 12/19/2017            Who to contact     Please call your clinic at [phone] to:    Ask questions about your health    Make or cancel appointments    Discuss your medicines    Learn about your test results    Speak to your doctor   If you have compliments or concerns  "about an experience at your clinic, or if you wish to file a complaint, please contact [facility] Patient Relations at [phone] or email us at [email or web address]         Additional Information About Your Visit        [employer] Information     [employer] gives you secure access to your electronic health record. If you see a primary care provider, you can also send messages to your care team and make appointments. If you have questions, please call your primary care clinic.  If you do not have a primary care provider, please call [phone] and they will assist you.      [facility] is an electronic gateway that provides easy, online access to your medical records. With [facility], you can request a clinic appointment, read your test results, renew a prescription or communicate with your care team.     To access your existing account, please contact your [facility] Clinic or call [phone] for assistance.        Care EveryWhere ID     This is your Care EveryWhere ID. This could be used by other organizations to access your [place] medical records  [id number]        Your Vitals Were     Pulse Height BMI (Body Mass Index)             67 1.6 m (5' 3\") 31.73 kg/m2          Blood Pressure from Last 3 Encounters:   12/19/17 115/65   10/12/17 136/79   08/06/17 (!) 132/96    Weight from Last 3 Encounters:   12/19/17 81.2 kg (179 lb 1.6 oz)   10/19/17 81.6 kg (180 lb)   10/12/17 83.9 kg (185 lb)                 Today's Medication Changes          These changes are accurate as of: 12/19/17 11:07 AM.  If you have any questions, ask your nurse or doctor.               These medicines have changed or have updated prescriptions.        Dose/Directions    HYDROcodone-acetaminophen 5-325 MG per tablet   Commonly known as:  NORCO   This may have changed:  Another medication with the same name was removed. Continue taking this medication, and follow the directions you see " here.   Changed by:  Jayy Koehler MD        Refills:  0       ondansetron 4 MG ODT tab   Commonly known as:  ZOFRAN-ODT   This may have changed:  Another medication with the same name was removed. Continue taking this medication, and follow the directions you see here.   Changed by:  Jayy Koehler MD        DISSOLVE AND SWALLOW 1 TAB (4 MG) BY MOUTH EVERY 8 HOURS AS NEEDED FOR NAUSEA.   Refills:  0       tolterodine 2 MG tablet   Commonly known as:  DETROL   This may have changed:  Another medication with the same name was removed. Continue taking this medication, and follow the directions you see here.   Changed by:  Jayy Koehler MD        Refills:  0       traMADol 50 MG tablet   Commonly known as:  ULTRAM   This may have changed:  Another medication with the same name was removed. Continue taking this medication, and follow the directions you see here.   Changed by:  Jayy Koehler MD        TAKE 1-2 TABLETS BY MOUTH EVERY 6 HOURS AS NEEDED FOR PAIN   Refills:  0         Stop taking these medicines if you haven't already. Please contact your care team if you have questions.     ciprofloxacin 500 MG tablet   Commonly known as:  CIPRO   Stopped by:  Jayy Koehler MD           ibuprofen 200 MG tablet   Commonly known as:  ADVIL/MOTRIN   Stopped by:  Jayy Koehler MD                    Primary Care Provider Fax #    [facility] [phone] [address] [facility] [address]        Equal Access to Services     [facility] UCLAKADEEM AH: Hadii charanjit watson hadasho Soishmael, waaxda luqadaha, qaybta kaalmada sabra, carly summers . So [facility] [phone].    ATENCIÓN: Si habla español, tiene a mckeon disposición servicios gratuitos de asistencia lingüística. Mi roberts [phone].    We comply with applicable federal civil rights laws and [place] laws. We do not discriminate on the basis of race, color, national origin, age, disability, sex,  sexual orientation, or gender identity.            Thank you!     Thank you for choosing [facility] AND [facility]  for your care. Our goal is always to provide you with excellent care. Hearing back from our patients is one way we can continue to improve our services. Please take a few minutes to complete the written survey that you may receive in the mail after your visit with us. Thank you!             Your Updated Medication List - Protect others around you: Learn how to safely use, store and throw away your medicines at www.disposemymeds.org.          This list is accurate as of: 12/19/17 11:07 AM.  Always use your most recent med list.                   Brand Name Dispense Instructions for use Diagnosis    brimonidine 0.2 % ophthalmic solution    ALPHAGAN          HYDROcodone-acetaminophen 5-325 MG per tablet    NORCO          JUNEL 1.5/30 PO      Take 1 tablet by mouth daily        JUNEL FE 1.5/30 1.5-30 MG-MCG per tablet   Generic drug:  norethindrone-ethinyl estradiol-iron      TAKE 1 TABLET BY ORAL ROUTE ONCE DAILY        LORazepam 1 MG tablet    ATIVAN     TAKE 2.5 TABLETS BY MOUTH 2 HOURS BEFORE DENTAL APPOINTMENT. BRING 1.5 TABLETS TO APPOINTMENT        ondansetron 4 MG ODT tab    ZOFRAN-ODT     DISSOLVE AND SWALLOW 1 TAB (4 MG) BY MOUTH EVERY 8 HOURS AS NEEDED FOR NAUSEA.        oxyCODONE-acetaminophen 5-325 MG per tablet    PERCOCET          tolterodine 2 MG tablet    DETROL          traMADol 50 MG tablet    ULTRAM     TAKE 1-2 TABLETS BY MOUTH EVERY 6 HOURS AS NEEDED FOR PAIN

## 2017-12-19 NOTE — PROGRESS NOTES
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 She has a sister who is 2 years older and also has cystinuria with high stone activity relative to her own.  Her stone activity had been relatively quiet off medication (has never been on preventative meds) until 2 months ago when she had a large left ureteral stone treated with staged ureteroscopy.  She has felt overall well since stent was last removed, no current flank pain, hematuria, dysuria, UTI.  HAs seen occasional gravel in urine.    First stone: Age 10  Number of stone surgeries: 3 (right ureteral stone treated with ureteroscopy in 2012 w/ Dr. Hernandez, another stone treated shortly after, ureteroscopy in 10/2017 w/ Dr. Banda). Of note, stent was encrusted upon removal after 10/2017 ureteroscopy.   Number of stones passed spontaneously: Several   History of UTI: None  Prior Stone Analysis: Cystine  Family History Nephrolithasis: Sister with cystinuria   Stone Risk Factors: Cystinuria  Prior Metabolic Workup: Yes, at [facility]          Past Medical History:     Past Medical History:   Diagnosis Date     Complication of anesthesia      Cystinuria (H)      Kidney stones             Past Surgical History:     Past Surgical History:   Procedure Laterality Date     APPENDECTOMY  9/2011     COMBINED CYSTOSCOPY, RETROGRADES, URETEROSCOPY, INSERT STENT Left 10/12/2017    Procedure: COMBINED CYSTOSCOPY, RETROGRADES, URETEROSCOPY, INSERT STENT;  COMBINED CYSTOSCOPY, RETROGRADES, URETEROSCOPY, LASER HOLMIUM STANDBY,  INSERT STENT LEFT URETER;  Surgeon: Luis Banda MD;  Location: [facility] OR     CYSTOSCOPY       LASER HOLMIUM LITHOTRIPSY URETER(S), INSERT  STENT, COMBINED  9/4/2012    Procedure: COMBINED CYSTOSCOPY, URETEROSCOPY, LASER HOLMIUM LITHOTRIPSY URETER(S), INSERT STENT;  Holmium Laser Lithotripsy.  Right Stent Placement;  Surgeon: Cathi Hernandez MD;  Location: [facility] OR     Ureteral stent placement with subsequent removal.  2017            Medications     Current Outpatient Prescriptions   Medication     JUNEL FE 1.5/30 1.5-30 MG-MCG per tablet     oxyCODONE-acetaminophen (PERCOCET) 5-325 MG per tablet     LORazepam (ATIVAN) 1 MG tablet     brimonidine (ALPHAGAN) 0.2 % ophthalmic solution     HYDROcodone-acetaminophen (NORCO) 5-325 MG per tablet     ondansetron (ZOFRAN-ODT) 4 MG ODT tab     tolterodine (DETROL) 2 MG tablet     traMADol (ULTRAM) 50 MG tablet     Norethindrone Acet-Ethinyl Est (JUNEL 1.5/30 PO)     No current facility-administered medications for this visit.             Family History:     Family History   Problem Relation Age of Onset     Genetic Disorder Sister      Cystinuria            Social History:     Social History     Social History     Marital status: Single     Spouse name: N/A     Number of children: N/A     Years of education: N/A     Occupational History     Not on file.     Social History Main Topics     Smoking status: Never Smoker     Smokeless tobacco: Never Used     Alcohol use Yes     Drug use: No     Sexual activity: Not on file     Other Topics Concern     Not on file     Social History Narrative    Shelly lives with both parents and 4 siblings in [place]. She is due to start senior year at [employer] this year. She has aspirations to be a nurse and plans to attend [employer] next year. 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    She denies any ETOH, cigarette, illicit drug use. She denies any sexual activity.            Allergies:   Review of patient's allergies indicates no known allergies.         Review of Systems:  From intake questionnaire   Negative 14  "system review except as noted on HPI, nurse's note.         Physical Exam:   Patient is a 22 year old  female   Vitals: Blood pressure 115/65, pulse 67, height 1.6 m (5' 3\"), weight 81.2 kg (179 lb 1.6 oz).  General Appearance Adult: Alert, no acute distress, oriented  HENT: throat/mouth:normal, good dentition  Neck: No adenopathy,masses or thyromegaly  Lungs: no respiratory distress, or pursed lip breathing  Heart: No obvious jugular venous distension present  Abdomen: soft, nontender, no organomegaly or masses, Body mass index is 31.73 kg/(m^2).  Lymphatics: No cervical or supraclavicular adenopathy  Musculoskeltal: extremities normal, no peripheral edema  Skin: no suspicious lesions or rashes  Neuro: Alert, oriented, speech and mentation normal  Psych: affect and mood normal  Gait: Normal  : Deferred         Labs and Pathology:    I personally reviewed all applicable laboratory data and went over findings with patient  Significant for:    CBC RESULTS:  Recent Labs   Lab Test  10/12/17   0207  08/06/17   0502  09/03/12   0150  09/17/11   1205   WBC  10.8  6.8  14.6*  10.5   HGB  12.0  14.2  13.1  13.7   PLT  285  295  285  273        BMP RESULTS:  Recent Labs   Lab Test  10/12/17   0847  10/12/17   0207  08/06/17   0502  09/05/12   0808   NA  137  135  142  134   POTASSIUM  4.2  3.8  3.9  3.8   CHLORIDE  106  104  108  105   CO2  24  25  28  23   ANIONGAP  7  6  6  6   GLC  92  97  96  80   BUN  15  17  12  9   CR  1.54*  1.66*  0.95  1.06*   GFRESTIMATED  42*  39*  74  68   GFRESTBLACK  51*  47*  89  83   VALERIANO  8.4*  8.7  9.7  8.7       UA RESULTS:   Recent Labs   Lab Test  10/12/17   0210  08/06/17   0502  09/04/12   1905   SG  1.012  1.002*  1.010   URINEPH  6.0  7.0  5.0   NITRITE  Negative  Negative  Negative   RBCU  5*  3*  1   WBCU  17*  5*  <1       CALCIUM RESULTS  Lab Results   Component Value Date    VALERIANO 8.4 10/12/2017    VALERIANO 8.7 10/12/2017    VALERIANO 9.7 08/06/2017           Imaging:    I personally " reviewed all applicable imaging and went over findings with patient.  Significant for:    Results for orders placed or performed during the hospital encounter of 10/12/17   CT Abdomen Pelvis without Contrast (stone protocol)    Narrative    CT ABDOMEN AND PELVIS WITHOUT CONTRAST  10/12/2017 2:51 AM     HISTORY: Recent removal of a left ureteral stent. Left flank pain.    COMPARISON: 8/28/2012.    TECHNIQUE: Without intravenous or oral contrast, helical sections were  acquired from the top of the diaphragm through the pubic symphysis.  Coronal reconstructions were generated. Radiation dose for this scan  was reduced using automated exposure control, adjustment of the mA  and/or kV according to the patient's size, or iterative reconstruction  technique. (Renal stone protocol).    FINDINGS:  Right urinary tract: No renal or ureteral calculi. No dilatation of  the intrarenal collecting system or ureter.    Left urinary tract: 1.0 x 0.6 cm calculus in the mid ureter. Mild to  moderate dilatation of the more proximal ureter and intrarenal  collecting system. Two less than 0.5 cm diameter nonobstructing  calculi in the inferior pole of the kidney. Mild perinephric haziness.    Urinary bladder: No visualized calculi.    Remainder of the abdomen and pelvis: The liver, spleen, pancreas and  adrenal glands are unremarkable to the limits of a noncontrast CT  scan. The gallbladder is present. The small and large bowel are normal  in caliber. The appendix is not visualized. No bowel wall thickening,  pneumatosis or free intraperitoneal gas. The uterus is present. A very  small amount of free fluid in the pelvis. No enlarged lymph nodes in  the abdomen or pelvis.    Scan through the lower chest is significant for a trace amount of  bilateral pleural fluid.      Impression    IMPRESSION:  1. 1.0 x 0.6 cm calculus in the mid left ureter, resulting in mild to  moderate obstruction.  2. Two tiny nonobstructing left renal calculi.  3.  A very small amount of free fluid in the pelvis.    MARS FERNANDES MD       KUB today  IMPRESSION: No radiopaque renal calculi identified.     CHRISTY today  IMPRESSION:  1.  No renal stone is definitively identified. No hydronephrosis.   2.  Mild prominence of the visualized proximal left ureter. Favor that  this represents residual mildly patulous appearance following prior  episodes of ureteral obstruction, with prior left ureteral stent as  seen on 10/12/2017. A distal ureteral stone, not visualized on the  current exam, cannot be completely excluded.         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     Jayy RAPP saw and evaluated this patient and agree with the plan as stated above     CC:  CC  Patient Care Team:  [facility] as PCP - General  Jayy Koehler MD as MD (Urology)  Gabrielle Sanchez, RN as Registered Nurse  AIDEE BARRIOS          Answers for HPI/ROS submitted by the patient on 12/17/2017   General Symptoms: No  Skin Symptoms: No  HENT Symptoms: No  EYE SYMPTOMS: No  HEART SYMPTOMS: No  LUNG SYMPTOMS: No  INTESTINAL SYMPTOMS: No  URINARY SYMPTOMS: No  GYNECOLOGIC SYMPTOMS: No  BREAST SYMPTOMS: No  SKELETAL SYMPTOMS: No  BLOOD SYMPTOMS: No  NERVOUS SYSTEM SYMPTOMS: No  MENTAL HEALTH SYMPTOMS: No

## 2017-12-19 NOTE — LETTER
12/19/2017       RE: Shelly Cerda  [id number] [facility] [address]     Dear Colleague,    Thank you for referring your patient, Shelly Cerda, to the [facility] UROLOGY AND INST FOR PROSTATE AND UROLOGIC CANCERS at [facility]. Please see a copy of my visit note below.          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 She has a sister who is 2 years older and also has cystinuria with high stone activity relative to her own.  Her stone activity had been relatively quiet off medication (has never been on preventative meds) until 2 months ago when she had a large left ureteral stone treated with staged ureteroscopy.  She has felt overall well since stent was last removed, no current flank pain, hematuria, dysuria, UTI.  HAs seen occasional gravel in urine.    First stone: Age 10  Number of stone surgeries: 3 (right ureteral stone treated with ureteroscopy in 2012 w/ Dr. Hernandez, another stone treated shortly after, ureteroscopy in 10/2017 w/ Dr. Banda). Of note, stent was encrusted upon removal after 10/2017 ureteroscopy.   Number of stones passed spontaneously: Several   History of UTI: None  Prior Stone Analysis: Cystine  Family History Nephrolithasis: Sister with cystinuria   Stone Risk Factors: Cystinuria  Prior Metabolic Workup: Yes, at [facility]          Past Medical History:     Past Medical History:   Diagnosis Date     Complication of anesthesia      Cystinuria (H)      Kidney stones             Past Surgical History:     Past Surgical History:   Procedure Laterality Date     APPENDECTOMY  9/2011     COMBINED CYSTOSCOPY, RETROGRADES, URETEROSCOPY, INSERT  STENT Left 10/12/2017    Procedure: COMBINED CYSTOSCOPY, RETROGRADES, URETEROSCOPY, INSERT STENT;  COMBINED CYSTOSCOPY, RETROGRADES, URETEROSCOPY, LASER HOLMIUM STANDBY,  INSERT STENT LEFT URETER;  Surgeon: Luis Banda MD;  Location: [facility] OR     CYSTOSCOPY       LASER HOLMIUM LITHOTRIPSY URETER(S), INSERT STENT, COMBINED  9/4/2012    Procedure: COMBINED CYSTOSCOPY, URETEROSCOPY, LASER HOLMIUM LITHOTRIPSY URETER(S), INSERT STENT;  Holmium Laser Lithotripsy.  Right Stent Placement;  Surgeon: Cathi Hernandez MD;  Location: [facility]     Ureteral stent placement with subsequent removal.  2017            Medications     Current Outpatient Prescriptions   Medication     JUNEL FE 1.5/30 1.5-30 MG-MCG per tablet     oxyCODONE-acetaminophen (PERCOCET) 5-325 MG per tablet     LORazepam (ATIVAN) 1 MG tablet     brimonidine (ALPHAGAN) 0.2 % ophthalmic solution     HYDROcodone-acetaminophen (NORCO) 5-325 MG per tablet     ondansetron (ZOFRAN-ODT) 4 MG ODT tab     tolterodine (DETROL) 2 MG tablet     traMADol (ULTRAM) 50 MG tablet     Norethindrone Acet-Ethinyl Est (JUNEL 1.5/30 PO)     No current facility-administered medications for this visit.             Family History:     Family History   Problem Relation Age of Onset     Genetic Disorder Sister      Cystinuria            Social History:     Social History     Social History     Marital status: Single     Spouse name: N/A     Number of children: N/A     Years of education: N/A     Occupational History     Not on file.     Social History Main Topics     Smoking status: Never Smoker     Smokeless tobacco: Never Used     Alcohol use Yes     Drug use: No     Sexual activity: Not on file     Other Topics Concern     Not on file     Social History Narrative    Shelly lives with both parents and 4 siblings in [place]. She is due to start senior year at [employer] this year. 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    She denies any ETOH, cigarette, illicit drug use. She denies any sexual activity.            Allergies:   Review of patient's allergies indicates no known allergies.         Review of Systems:  From intake questionnaire   Negative 14 system review except as noted on HPI, nurse's note.         Physical Exam:   Patient is a 22 year old  female   Vitals: Blood pressure 115/65, pulse 67, height 1.6 m (5' 3\"), weight 81.2 kg (179 lb 1.6 oz).  General Appearance Adult: Alert, no acute distress, oriented  HENT: throat/mouth:normal, good dentition  Neck: No adenopathy,masses or thyromegaly  Lungs: no respiratory distress, or pursed lip breathing  Heart: No obvious jugular venous distension present  Abdomen: soft, nontender, no organomegaly or masses, Body mass index is 31.73 kg/(m^2).  Lymphatics: No cervical or supraclavicular adenopathy  Musculoskeltal: extremities normal, no peripheral edema  Skin: no suspicious lesions or rashes  Neuro: Alert, oriented, speech and mentation normal  Psych: affect and mood normal  Gait: Normal  : Deferred         Labs and Pathology:    I personally reviewed all applicable laboratory data and went over findings with patient  Significant for:    CBC RESULTS:  Recent Labs   Lab Test  10/12/17   0207  08/06/17   0502  09/03/12   0150  09/17/11   1205   WBC  10.8  6.8  14.6*  10.5   HGB  12.0  14.2  13.1  13.7   PLT  285  295  285  273        BMP RESULTS:  Recent Labs   Lab Test  10/12/17   0847  10/12/17   0207  08/06/17   0502  09/05/12   0808   NA  137  135  142  134   POTASSIUM  4.2  3.8  3.9  3.8   CHLORIDE  106  104  108  105   CO2  24  25  28  23   ANIONGAP  7  6  6  6   GLC  92  97  96  80   BUN  15  17  12  9   CR  1.54*  1.66*  0.95  1.06*   GFRESTIMATED  42*  39*  74  68   GFRESTBLACK  51*  47*  89  83   VALERIANO  8.4*  8.7  9.7  8.7       UA RESULTS:   Recent Labs   Lab Test  10/12/17   0210  " 08/06/17   0502  09/04/12   1905   SG  1.012  1.002*  1.010   URINEPH  6.0  7.0  5.0   NITRITE  Negative  Negative  Negative   RBCU  5*  3*  1   WBCU  17*  5*  <1       CALCIUM RESULTS  Lab Results   Component Value Date    VALERIANO 8.4 10/12/2017    VALERIANO 8.7 10/12/2017    VALERIANO 9.7 08/06/2017           Imaging:    I personally reviewed all applicable imaging and went over findings with patient.  Significant for:    Results for orders placed or performed during the hospital encounter of 10/12/17   CT Abdomen Pelvis without Contrast (stone protocol)    Narrative    CT ABDOMEN AND PELVIS WITHOUT CONTRAST  10/12/2017 2:51 AM     HISTORY: Recent removal of a left ureteral stent. Left flank pain.    COMPARISON: 8/28/2012.    TECHNIQUE: Without intravenous or oral contrast, helical sections were  acquired from the top of the diaphragm through the pubic symphysis.  Coronal reconstructions were generated. Radiation dose for this scan  was reduced using automated exposure control, adjustment of the mA  and/or kV according to the patient's size, or iterative reconstruction  technique. (Renal stone protocol).    FINDINGS:  Right urinary tract: No renal or ureteral calculi. No dilatation of  the intrarenal collecting system or ureter.    Left urinary tract: 1.0 x 0.6 cm calculus in the mid ureter. Mild to  moderate dilatation of the more proximal ureter and intrarenal  collecting system. Two less than 0.5 cm diameter nonobstructing  calculi in the inferior pole of the kidney. Mild perinephric haziness.    Urinary bladder: No visualized calculi.    Remainder of the abdomen and pelvis: The liver, spleen, pancreas and  adrenal glands are unremarkable to the limits of a noncontrast CT  scan. The gallbladder is present. The small and large bowel are normal  in caliber. The appendix is not visualized. No bowel wall thickening,  pneumatosis or free intraperitoneal gas. The uterus is present. A very  small amount of free fluid in the pelvis.  No enlarged lymph nodes in  the abdomen or pelvis.    Scan through the lower chest is significant for a trace amount of  bilateral pleural fluid.      Impression    IMPRESSION:  1. 1.0 x 0.6 cm calculus in the mid left ureter, resulting in mild to  moderate obstruction.  2. Two tiny nonobstructing left renal calculi.  3. A very small amount of free fluid in the pelvis.    MARS FERNANDES MD       KUB today  IMPRESSION: No radiopaque renal calculi identified.     CHRISTY today  IMPRESSION:  1.  No renal stone is definitively identified. No hydronephrosis.   2.  Mild prominence of the visualized proximal left ureter. Favor that  this represents residual mildly patulous appearance following prior  episodes of ureteral obstruction, with prior left ureteral stent as  seen on 10/12/2017. A distal ureteral stone, not visualized on the  current exam, cannot be completely excluded.         Assessment and Plan:     Assessment:  21 yo F w/ hx cystinuria    Plan:  - We had a discussion on cystinuria including the recurrent nature of stones and high risk of recurrence as well as need for surveillance and potentially prevention meds  - 24hr urine analysis to determine baseline chemistries  - Follow up in 6-8 weeks to review chems and determine long term prevention plan    Orders  Orders Placed This Encounter   Procedures     Comprehensive metabolic panel     Scribe Disclosure:   Lee RAPP, am serving as a scribe; to document services personally performed by Jayy Koehler MD based on data collection and the provider's statements to me.     Jayy RAPP saw and evaluated this patient and agree with the plan as stated above     Answers for HPI/ROS submitted by the patient on 12/17/2017   General Symptoms: No  Skin Symptoms: No  HENT Symptoms: No  EYE SYMPTOMS: No  HEART SYMPTOMS: No  LUNG SYMPTOMS: No  INTESTINAL SYMPTOMS: No  URINARY SYMPTOMS: No  GYNECOLOGIC SYMPTOMS: No  BREAST SYMPTOMS: No  SKELETAL  SYMPTOMS: No  BLOOD SYMPTOMS: No  NERVOUS SYSTEM SYMPTOMS: No  MENTAL HEALTH SYMPTOMS: No      Again, thank you for allowing me to participate in the care of your patient.      Sincerely,    Jayy Koehler MD    CC  Patient Care Team:  [facility] as PCP - General  Jayy Koehler MD as MD (Urology)  Gabrielle aSnchez, RN as Registered Nurse  AIDEE BARRIOS

## 2017-12-19 NOTE — PATIENT INSTRUCTIONS
Two month follow up with Dr. Koehler.  Labs today.      It was a pleasure meeting with you today.  Thank you for allowing me and my team the privilege of caring for you today.  YOU are the reason we are here, and I truly hope we provided you with the excellent service you deserve.  Please let us know if there is anything else we can do for you so that we can be sure you are leaving completely satisfied with your care experience.

## 2017-12-19 NOTE — NURSING NOTE
Chief Complaint   Patient presents with     Consult     New stone referral from Dr. Banda.       Radha Barrios

## 2017-12-23 ENCOUNTER — HEALTH MAINTENANCE LETTER (OUTPATIENT)
Age: 22
End: 2017-12-23

## 2018-02-02 ENCOUNTER — PRE VISIT (OUTPATIENT)
Dept: UROLOGY | Facility: CLINIC | Age: 23
End: 2018-02-02

## 2018-02-02 ENCOUNTER — TELEPHONE (OUTPATIENT)
Dept: UROLOGY | Facility: CLINIC | Age: 23
End: 2018-02-02

## 2018-02-02 DIAGNOSIS — N20.0 KIDNEY STONE: Primary | ICD-10-CM

## 2018-02-02 NOTE — TELEPHONE ENCOUNTER
Patient following up to review Litholink for cystinuria.  Left vm today asking for her to complete Litholink ASAP.

## 2018-02-06 ENCOUNTER — PRE VISIT (OUTPATIENT)
Dept: UROLOGY | Facility: CLINIC | Age: 23
End: 2018-02-06

## 2018-02-13 ENCOUNTER — ALLIED HEALTH/NURSE VISIT (OUTPATIENT)
Dept: UROLOGY | Facility: CLINIC | Age: 23
End: 2018-02-13
Payer: COMMERCIAL

## 2018-02-13 ENCOUNTER — RADIANT APPOINTMENT (OUTPATIENT)
Dept: CT IMAGING | Facility: CLINIC | Age: 23
End: 2018-02-13
Attending: UROLOGY
Payer: COMMERCIAL

## 2018-02-13 ENCOUNTER — OFFICE VISIT (OUTPATIENT)
Dept: UROLOGY | Facility: CLINIC | Age: 23
End: 2018-02-13
Payer: COMMERCIAL

## 2018-02-13 VITALS
SYSTOLIC BLOOD PRESSURE: 118 MMHG | BODY MASS INDEX: 32.62 KG/M2 | WEIGHT: 184.1 LBS | HEIGHT: 63 IN | DIASTOLIC BLOOD PRESSURE: 76 MMHG | HEART RATE: 88 BPM

## 2018-02-13 DIAGNOSIS — N20.0 CALCULUS OF KIDNEY: ICD-10-CM

## 2018-02-13 DIAGNOSIS — N20.0 KIDNEY STONE: ICD-10-CM

## 2018-02-13 DIAGNOSIS — N20.0 CALCULUS OF KIDNEY: Primary | ICD-10-CM

## 2018-02-13 LAB
ALBUMIN UR-MCNC: 100 MG/DL
ANION GAP SERPL CALCULATED.3IONS-SCNC: 6 MMOL/L (ref 3–14)
APPEARANCE UR: ABNORMAL
BACTERIA #/AREA URNS HPF: ABNORMAL /HPF
BILIRUB UR QL STRIP: NEGATIVE
BUN SERPL-MCNC: 15 MG/DL (ref 7–30)
CALCIUM SERPL-MCNC: 8.8 MG/DL (ref 8.5–10.1)
CAOX CRY #/AREA URNS HPF: ABNORMAL /HPF
CHLORIDE SERPL-SCNC: 105 MMOL/L (ref 94–109)
CO2 SERPL-SCNC: 26 MMOL/L (ref 20–32)
COLOR UR AUTO: YELLOW
CREAT SERPL-MCNC: 0.89 MG/DL (ref 0.52–1.04)
CRYSTALS #/AREA URNS HPF: ABNORMAL /HPF
ERYTHROCYTE [DISTWIDTH] IN BLOOD BY AUTOMATED COUNT: 11.6 % (ref 10–15)
GFR SERPL CREATININE-BSD FRML MDRD: 78 ML/MIN/1.7M2
GLUCOSE SERPL-MCNC: 95 MG/DL (ref 70–99)
GLUCOSE UR STRIP-MCNC: NEGATIVE MG/DL
HCT VFR BLD AUTO: 44.4 % (ref 35–47)
HGB BLD-MCNC: 15 G/DL (ref 11.7–15.7)
HGB UR QL STRIP: ABNORMAL
KETONES UR STRIP-MCNC: NEGATIVE MG/DL
LEUKOCYTE ESTERASE UR QL STRIP: ABNORMAL
MCH RBC QN AUTO: 30.1 PG (ref 26.5–33)
MCHC RBC AUTO-ENTMCNC: 33.8 G/DL (ref 31.5–36.5)
MCV RBC AUTO: 89 FL (ref 78–100)
MUCOUS THREADS #/AREA URNS LPF: PRESENT /LPF
NITRATE UR QL: NEGATIVE
PH UR STRIP: 6 PH (ref 5–7)
PLATELET # BLD AUTO: 321 10E9/L (ref 150–450)
POTASSIUM SERPL-SCNC: 4.4 MMOL/L (ref 3.4–5.3)
RBC # BLD AUTO: 4.99 10E12/L (ref 3.8–5.2)
RBC #/AREA URNS AUTO: >182 /HPF (ref 0–2)
SODIUM SERPL-SCNC: 137 MMOL/L (ref 133–144)
SOURCE: ABNORMAL
SP GR UR STRIP: 1.02 (ref 1–1.03)
SQUAMOUS #/AREA URNS AUTO: 1 /HPF (ref 0–1)
UROBILINOGEN UR STRIP-MCNC: 0 MG/DL (ref 0–2)
WBC # BLD AUTO: 7.6 10E9/L (ref 4–11)
WBC #/AREA URNS AUTO: 0 /HPF (ref 0–2)

## 2018-02-13 ASSESSMENT — PAIN SCALES - GENERAL: PAINLEVEL: NO PAIN (0)

## 2018-02-13 NOTE — PATIENT INSTRUCTIONS
Stop at the lab to leave urine sample today.    Gabrielle will discuss surgery with you.    It was a pleasure meeting with you today.  Thank you for allowing me and my team the privilege of caring for you today.  YOU are the reason we are here, and I truly hope we provided you with the excellent service you deserve.  Please let us know if there is anything else we can do for you so that we can be sure you are leaving completely satisfied with your care experience.      Damian Mijares LPN

## 2018-02-13 NOTE — MR AVS SNAPSHOT
After Visit Summary   2/13/2018    Shelly Cerda    MRN: [id number]           Patient Information     Date Of Birth          1995        Visit Information        Provider Department      2/13/2018 8:00 AM Jayy Koehler MD [facility] Urology and [facility] for Prostate and Urologic Cancers        Today's Diagnoses     Calculus of kidney    -  1      Care Instructions    Stop at the lab to leave urine sample today.    Gabrielle will discuss surgery with you.    It was a pleasure meeting with you today.  Thank you for allowing me and my team the privilege of caring for you today.  YOU are the reason we are here, and I truly hope we provided you with the excellent service you deserve.  Please let us know if there is anything else we can do for you so that we can be sure you are leaving completely satisfied with your care experience.      Damian Mijares LPN          Follow-ups after your visit        Your next 10 appointments already scheduled     Mar 06, 2018  1:00 PM CST   (Arrive by 12:45 PM)   Return Visit with MD MOHSEN [place] [facility] Urology and [facility] for Prostate and Urologic Cancers ([facility])    [address] [id number] [phone]            Mar 08, 2018   Procedure with Jayy Koehler MD   [facility] [place], Same Day Surgery (--)    [facility] [address] [phone]              Who to contact     Please call your clinic at [phone] to:    Ask questions about your health    Make or cancel appointments    Discuss your medicines    Learn about your test results    Speak to your doctor            Additional Information About Your Visit        Oradhart Information     Guarnic gives you secure access to your electronic health record. If you see a primary care provider, you can also send messages to your care team and make appointments. If you have questions, please call your primary care clinic.  If you  "do not have a primary care provider, please call [phone] and they will assist you.      MiniLuxe is an electronic gateway that provides easy, online access to your medical records. With MiniLuxe, you can request a clinic appointment, read your test results, renew a prescription or communicate with your care team.     To access your existing account, please contact your [facility] or call [phone] for assistance.        Care EveryWhere ID     This is your Care EveryWhere ID. This could be used by other organizations to access your [place] medical records  [id number]        Your Vitals Were     Pulse Height BMI (Body Mass Index)             88 1.6 m (5' 3\") 32.61 kg/m2          Blood Pressure from Last 3 Encounters:   02/13/18 118/76   12/19/17 115/65   10/12/17 136/79    Weight from Last 3 Encounters:   02/13/18 83.5 kg (184 lb 1.6 oz)   12/19/17 81.2 kg (179 lb 1.6 oz)   10/19/17 81.6 kg (180 lb)              We Performed the Following     Daphne-Operative Worksheet (Urology)        Primary Care Provider Fax #    [facility] [phone] [address] [facility] [address]        Equal Access to Services     [place] : Hadii charanjit ku hadasho Soomaali, waaxda luqadaha, qaybta kaalmada adeegyada, carly alejo. So [facility] [phone].    ATENCIÓN: Si habla español, tiene a mckeon disposición servicios gratuitos de asistencia lingüística. Llliset al [phone].    We comply with applicable federal civil rights laws and [place] laws. We do not discriminate on the basis of race, color, national origin, age, disability, sex, sexual orientation, or gender identity.            Thank you!     Thank you for choosing [facility] UROLOGY AND [facility] FOR PROSTATE AND UROLOGIC CANCERS  for your care. Our goal is always to provide you with excellent care. Hearing back from our patients is one way we can continue to improve our services. Please " take a few minutes to complete the written survey that you may receive in the mail after your visit with us. Thank you!             Your Updated Medication List - Protect others around you: Learn how to safely use, store and throw away your medicines at www.disposemymeds.org.          This list is accurate as of 2/13/18  4:23 PM.  Always use your most recent med list.                   Brand Name Dispense Instructions for use Diagnosis    brimonidine 0.2 % ophthalmic solution    ALPHAGAN          HYDROcodone-acetaminophen 5-325 MG per tablet    NORCO          JUNEL 1.5/30 PO      Take 1 tablet by mouth daily        JUNEL FE 1.5/30 1.5-30 MG-MCG per tablet   Generic drug:  norethindrone-ethinyl estradiol-iron      TAKE 1 TABLET BY ORAL ROUTE ONCE DAILY        LORazepam 1 MG tablet    ATIVAN     TAKE 2.5 TABLETS BY MOUTH 2 HOURS BEFORE DENTAL APPOINTMENT. BRING 1.5 TABLETS TO APPOINTMENT        ondansetron 4 MG ODT tab    ZOFRAN-ODT     DISSOLVE AND SWALLOW 1 TAB (4 MG) BY MOUTH EVERY 8 HOURS AS NEEDED FOR NAUSEA.        oxyCODONE-acetaminophen 5-325 MG per tablet    PERCOCET          tolterodine 2 MG tablet    DETROL          traMADol 50 MG tablet    ULTRAM     TAKE 1-2 TABLETS BY MOUTH EVERY 6 HOURS AS NEEDED FOR PAIN

## 2018-02-13 NOTE — NURSING NOTE
Pre Op Teaching Flowsheet       Pre and Post op Patient Education  Relevant Diagnosis:  stone      Motivation Level:  Asks Questions: Yes  Eager to Learn:  Yes  Cooperative: Yes  Receptive (willing/able to accept information):  Yes  Patient demonstrates understanding of the following:  Date and time of surgery:  March 8th  Location of surgery: [place]  History and Physical and any other testing necessary prior to surgery: N/A, will have lab and urine done today. Dr. Koehler will do H&P day of surgery  Required time line for completion of History and Physical and any pre-op testing: N/A    NPO Guidelines: Nothing to eat 8 hours prior to surgery. Can have clear liquids up to 2 hours prior to sugery    Patient demonstrates understanding of the following:  Pre-op bowel prep: N/A  Pre-op showering/scrub information with Hibiclens Soap: Yes  Medications to take the day of surgery:  Per PCP  Blood thinner medications discussed and when to stop (if applicable):  Yes  Diabetes medication management (if applicable):  N/A  Discussed pain control after surgery: pain scale, pain medications and pain management techniques  Infection Prevention: Patient demonstrates understanding of the following:  Patient instructed on hand hygiene:  Yes  Surgical procedure site care taught: Yes  Signs and symptoms of infection taught:  Yes  Wound care will be taught at the time of discharge.  Central venous catheter care will be taught at the time of discharge (if applicable).    Post-op follow-up:  Discussed how to contact the hospital, nurse, and clinic scheduling staff if necessary.    Instructional materials used/given/mailed:  [facility] Surgery Booklet, post op teaching sheet, Map, Soap, and arrival/location information.    Surgical instructions given to patient in clinic: Yes.    Instructional Materials given:  Before your surgery packet , Medications to avoid before surgery , Showering or Bathing instructions before surgery  and What to  expect after surgery  Total time with patient: 10 minutes    Gabrielle Sanchez RN

## 2018-02-13 NOTE — LETTER
"2/13/2018       RE: Shelly Cerda  [address] [facility] [address]     Dear Colleague,    Thank you for referring your patient, Shelly Cerda, to the [facility] UROLOGY AND INST FOR PROSTATE AND UROLOGIC CANCERS at [facility]. Please see a copy of my visit note below.      UROLOGY FOLLOW-UP NOTE          Chief Complaint:   Today I had the pleasure of seeing Ms. Shelly Cerda in follow-up for a chief complaint of kidney stones, cystinuria.          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 Her stone activity had been relatively quiet off medication until 10/2017 when she had a large left ureteral stone treated with staged ureteroscopy. In total she has had 3 stone surgeries (ureteroscopy x3).     Today notes:   Today she reports right flank pain on and off for past several weeks. Since last being seen, she notes she has increased fluid intake and has been infusing lemon into her drinks. She reports occasional gross hematuria. She denies any fevers, chills, or infection.          Physical Exam:   Patient is a 22 year old  female   Vitals: Blood pressure 118/76, pulse 88, height 1.6 m (5' 3\"), weight 83.5 kg (184 lb 1.6 oz).  Notable Findings on Exam well-nourished female in no apparent distress   Abd: No CVAT b/l      Labs and Pathology:    I personally reviewed all applicable laboratory data and went over findings with patient  Significant for:    CBC RESULTS:  Recent Labs   Lab Test  10/12/17   0207  08/06/17   0502  09/03/12   0150  09/17/11   1205   WBC  10.8  6.8  14.6*  10.5   HGB  12.0  14.2  13.1  13.7   PLT  285  295  285  273        BMP RESULTS:  Recent Labs   Lab Test  12/19/17   1122  10/12/17   0847  10/12/17   0207  08/06/17   0502   NA  137  137  135  142   POTASSIUM  4.0  4.2  3.8  3.9   CHLORIDE  104  106  104  108   CO2  26  " 24  25  28   ANIONGAP  8  7  6  6   GLC  75  92  97  96   BUN  13  15  17  12   CR  0.95  1.54*  1.66*  0.95   GFRESTIMATED  73  42*  39*  74   GFRESTBLACK  89  51*  47*  89   VALERIANO  9.3  8.4*  8.7  9.7       UA RESULTS:   Recent Labs   Lab Test  10/12/17   0210  08/06/17   0502  09/04/12   1905   SG  1.012  1.002*  1.010   URINEPH  6.0  7.0  5.0   NITRITE  Negative  Negative  Negative   RBCU  5*  3*  1   WBCU  17*  5*  <1           Imaging:    I personally reviewed all applicable imaging and went over the below findings with patient.     Significant for CT Abd/Pelvis 2/13/2018:    IMPRESSION:   1. Resolution of left ureteral and calyceal stones and associated  hydronephrosis.  2. New 13 x 16 x 14 mm calcified stone in the right renal pelvis  without significant associated hydronephrosis.            Assessment/Plan   22 year old female w/ cystinuria, history of cystinuria, kidney stones     We discussed shock wave lithotripsy, ureteroscopy and percutaneous nephrolithotomy as the main surgical approaches to upper urinary tract stones.  We reviewed risks and benefits including but not limited to the following: bleeding, infection, damage to adjacent organs, ureteral stricture, need for staged treatment, incomplete stone removal, hemthorax, hydrothorax, pneumothorax.    Ultimately the patient has elected to proceed with right PCNL because of the large size of the stone and the increased desire to establish complete stone free status in light of rapid stone growth (Stone was not present on imaging 4 months ago)    Will also obtain 24hr urine analysis. Consider starting potassium citrate and thiola based on results    Urine culture today           Past Medical History:     Past Medical History:   Diagnosis Date     Complication of anesthesia      Cystinuria (H)      Kidney stones             Past Surgical History:     Past Surgical History:   Procedure Laterality Date     APPENDECTOMY  9/2011     COMBINED CYSTOSCOPY,  RETROGRADES, URETEROSCOPY, INSERT STENT Left 10/12/2017    Procedure: COMBINED CYSTOSCOPY, RETROGRADES, URETEROSCOPY, INSERT STENT;  COMBINED CYSTOSCOPY, RETROGRADES, URETEROSCOPY, LASER HOLMIUM STANDBY,  INSERT STENT LEFT URETER;  Surgeon: Luis Banda MD;  Location: [facility] OR     CYSTOSCOPY       LASER HOLMIUM LITHOTRIPSY URETER(S), INSERT STENT, COMBINED  9/4/2012    Procedure: COMBINED CYSTOSCOPY, URETEROSCOPY, LASER HOLMIUM LITHOTRIPSY URETER(S), INSERT STENT;  Holmium Laser Lithotripsy.  Right Stent Placement;  Surgeon: Cathi Hernandez MD;  Location: [facility]     Ureteral stent placement with subsequent removal.  2017            Medications     Current Outpatient Prescriptions   Medication     JUNEL FE 1.5/30 1.5-30 MG-MCG per tablet     LORazepam (ATIVAN) 1 MG tablet     brimonidine (ALPHAGAN) 0.2 % ophthalmic solution     traMADol (ULTRAM) 50 MG tablet     Norethindrone Acet-Ethinyl Est (JUNEL 1.5/30 PO)     oxyCODONE-acetaminophen (PERCOCET) 5-325 MG per tablet     HYDROcodone-acetaminophen (NORCO) 5-325 MG per tablet     ondansetron (ZOFRAN-ODT) 4 MG ODT tab     tolterodine (DETROL) 2 MG tablet     No current facility-administered medications for this visit.             Family History:     Family History   Problem Relation Age of Onset     Genetic Disorder Sister      Cystinuria            Social History:     Social History     Social History     Marital status: Single     Spouse name: N/A     Number of children: N/A     Years of education: N/A     Occupational History     Not on file.     Social History Main Topics     Smoking status: Never Smoker     Smokeless tobacco: Never Used     Alcohol use Yes     Drug use: No     Sexual activity: Not on file     Other Topics Concern     Not on file     Social History Narrative    Shelly lives with both parents and 4 siblings in [place]. She is due to start senior year at [employer] this year. 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    She denies any ETOH, cigarette, illicit drug use. She denies any sexual activity.            Allergies:   Review of patient's allergies indicates no known allergies.         Review of Systems:  From intake questionnaire   Negative 14 system review except as noted on HPI, nurse's note.    Scribe Disclosure:   Lee RAPP, am serving as a scribe; to document services personally performed by Jayy Koehler MD based on data collection and the provider's statements to me.     IJayy saw and evaluated this patient and agree with the plan as stated above    CC:  [facility]      >15 minutes were spent face to face with patient over half of which was spent providing medical counseling regarding cystinuria/kidney stones.     Again, thank you for allowing me to participate in the care of your patient.      Sincerely,    Jayy Koehler MD

## 2018-02-13 NOTE — MR AVS SNAPSHOT
After Visit Summary   2/13/2018    Shelly Cerda    MRN: [id number]           Patient Information     Date Of Birth          1995        Visit Information        Provider Department      2/13/2018 8:30 AM Nurse, [facility] for Prostate and Urologic Cancers        Today's Diagnoses     Calculus of kidney    -  1       Follow-ups after your visit        Your next 10 appointments already scheduled     Mar 06, 2018  1:00 PM CST   (Arrive by 12:45 PM)   Return Visit with Jayy Koehler MD   [facility] for Prostate and Urologic Cancers ([facility])    [address]  [address] [facility] [address] [phone]              Future tests that were ordered for you today     Open Future Orders        Priority Expected Expires Ordered    Routine UA with micro reflex to culture Routine  2/13/2019 2/13/2018    Urine Culture Aerobic Bacterial Routine  2/13/2019 2/13/2018    CBC with platelets Routine  2/13/2019 2/13/2018    Basic metabolic panel Routine  2/13/2019 2/13/2018            Who to contact     Please call your clinic at [phone] to:    Ask questions about your health    Make or cancel appointments    Discuss your medicines    Learn about your test results    Speak to your doctor            Additional Information About Your Visit        [employer] Information     [employer] gives you secure access to your electronic health record. If you see a primary care provider, you can also send messages to your care team and make appointments. If you have questions, please call your primary care clinic.  If you do not have a primary care provider, please call [phone] and they will assist you.      [employer] is an electronic gateway that provides easy, online access to your medical records. With [employer], you can request a clinic appointment, read your test results, renew a prescription or communicate with your care  team.     To access your existing account, please contact your [facility] or call [phone] for assistance.        Care EveryWhere ID     This is your Care EveryWhere ID. This could be used by other organizations to access your [place] medical records  [id number]         Blood Pressure from Last 3 Encounters:   02/13/18 118/76   12/19/17 115/65   10/12/17 136/79    Weight from Last 3 Encounters:   02/13/18 83.5 kg (184 lb 1.6 oz)   12/19/17 81.2 kg (179 lb 1.6 oz)   10/19/17 81.6 kg (180 lb)              Today, you had the following     No orders found for display       Primary Care Provider Fax #    [facility] [phone] [address]  [facility] [address]        Equal Access to Services     BON [place] : Hadii charanjit watson hadasho Soomaali, waaxda luqadaha, qaybta kaalmada viridianayarajani, carly summers . So [facility] [phone].    ATENCIÓN: Si habla español, tiene a mckeon disposición servicios gratuitos de asistencia lingüística. Llame al [phone].    We comply with applicable federal civil rights laws and [place] laws. We do not discriminate on the basis of race, color, national origin, age, disability, sex, sexual orientation, or gender identity.            Thank you!     Thank you for choosing [facility] UROLOGY AND [facility] FOR PROSTATE AND UROLOGIC CANCERS  for your care. Our goal is always to provide you with excellent care. Hearing back from our patients is one way we can continue to improve our services. Please take a few minutes to complete the written survey that you may receive in the mail after your visit with us. Thank you!             Your Updated Medication List - Protect others around you: Learn how to safely use, store and throw away your medicines at www.disposemymeds.org.          This list is accurate as of 2/13/18  8:36 AM.  Always use your most recent med list.                   Brand Name Dispense Instructions for  use Diagnosis    brimonidine 0.2 % ophthalmic solution    ALPHAGAN          HYDROcodone-acetaminophen 5-325 MG per tablet    NORCO          JUNEL 1.5/30 PO      Take 1 tablet by mouth daily        JUNEL FE 1.5/30 1.5-30 MG-MCG per tablet   Generic drug:  norethindrone-ethinyl estradiol-iron      TAKE 1 TABLET BY ORAL ROUTE ONCE DAILY        LORazepam 1 MG tablet    ATIVAN     TAKE 2.5 TABLETS BY MOUTH 2 HOURS BEFORE DENTAL APPOINTMENT. BRING 1.5 TABLETS TO APPOINTMENT        ondansetron 4 MG ODT tab    ZOFRAN-ODT     DISSOLVE AND SWALLOW 1 TAB (4 MG) BY MOUTH EVERY 8 HOURS AS NEEDED FOR NAUSEA.        oxyCODONE-acetaminophen 5-325 MG per tablet    PERCOCET          tolterodine 2 MG tablet    DETROL          traMADol 50 MG tablet    ULTRAM     TAKE 1-2 TABLETS BY MOUTH EVERY 6 HOURS AS NEEDED FOR PAIN

## 2018-02-13 NOTE — PROGRESS NOTES
"  UROLOGY FOLLOW-UP NOTE          Chief Complaint:   Today I had the pleasure of seeing Ms. Shelly Cerda in follow-up for a chief complaint of kidney stones, cystinuria.          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 Her stone activity had been relatively quiet off medication until 10/2017 when she had a large left ureteral stone treated with staged ureteroscopy. In total she has had 3 stone surgeries (ureteroscopy x3).     Today notes:   Today she reports right flank pain on and off for past several weeks. Since last being seen, she notes she has increased fluid intake and has been infusing lemon into her drinks. She reports occasional gross hematuria. She denies any fevers, chills, or infection.          Physical Exam:   Patient is a 22 year old  female   Vitals: Blood pressure 118/76, pulse 88, height 1.6 m (5' 3\"), weight 83.5 kg (184 lb 1.6 oz).  Notable Findings on Exam well-nourished female in no apparent distress   Abd: No CVAT b/l      Labs and Pathology:    I personally reviewed all applicable laboratory data and went over findings with patient  Significant for:    CBC RESULTS:  Recent Labs   Lab Test  10/12/17   0207  08/06/17   0502  09/03/12   0150  09/17/11   1205   WBC  10.8  6.8  14.6*  10.5   HGB  12.0  14.2  13.1  13.7   PLT  285  295  285  273        BMP RESULTS:  Recent Labs   Lab Test  12/19/17   1122  10/12/17   0847  10/12/17   0207  08/06/17   0502   NA  137  137  135  142   POTASSIUM  4.0  4.2  3.8  3.9   CHLORIDE  104  106  104  108   CO2  26  24  25  28   ANIONGAP  8  7  6  6   GLC  75  92  97  96   BUN  13  15  17  12   CR  0.95  1.54*  1.66*  0.95   GFRESTIMATED  73  42*  39*  74   GFRESTBLACK  89  51*  47*  89   VALERIANO  9.3  8.4*  8.7  9.7       UA RESULTS:   Recent Labs   Lab Test  10/12/17   0210  08/06/17   0502  09/04/12   1905   SG  1.012  1.002*  1.010 "   URINEPH  6.0  7.0  5.0   NITRITE  Negative  Negative  Negative   RBCU  5*  3*  1   WBCU  17*  5*  <1           Imaging:    I personally reviewed all applicable imaging and went over the below findings with patient.     Significant for CT Abd/Pelvis 2/13/2018:    IMPRESSION:   1. Resolution of left ureteral and calyceal stones and associated  hydronephrosis.  2. New 13 x 16 x 14 mm calcified stone in the right renal pelvis  without significant associated hydronephrosis.            Assessment/Plan   22 year old female w/ cystinuria, history of cystinuria, kidney stones     We discussed shock wave lithotripsy, ureteroscopy and percutaneous nephrolithotomy as the main surgical approaches to upper urinary tract stones.  We reviewed risks and benefits including but not limited to the following: bleeding, infection, damage to adjacent organs, ureteral stricture, need for staged treatment, incomplete stone removal, hemthorax, hydrothorax, pneumothorax.    Ultimately the patient has elected to proceed with right PCNL because of the large size of the stone and the increased desire to establish complete stone free status in light of rapid stone growth (Stone was not present on imaging 4 months ago)    Will also obtain 24hr urine analysis. Consider starting potassium citrate and thiola based on results    Urine culture today           Past Medical History:     Past Medical History:   Diagnosis Date     Complication of anesthesia      Cystinuria (H)      Kidney stones             Past Surgical History:     Past Surgical History:   Procedure Laterality Date     APPENDECTOMY  9/2011     COMBINED CYSTOSCOPY, RETROGRADES, URETEROSCOPY, INSERT STENT Left 10/12/2017    Procedure: COMBINED CYSTOSCOPY, RETROGRADES, URETEROSCOPY, INSERT STENT;  COMBINED CYSTOSCOPY, RETROGRADES, URETEROSCOPY, LASER HOLMIUM STANDBY,  INSERT STENT LEFT URETER;  Surgeon: Luis Banda MD;  Location: [facility] OR     CYSTOSCOPY       LASER HOLMIUM  LITHOTRIPSY URETER(S), INSERT STENT, COMBINED  9/4/2012    Procedure: COMBINED CYSTOSCOPY, URETEROSCOPY, LASER HOLMIUM LITHOTRIPSY URETER(S), INSERT STENT;  Holmium Laser Lithotripsy.  Right Stent Placement;  Surgeon: Cathi Hernandez MD;  Location: [facility]     Ureteral stent placement with subsequent removal.  2017            Medications     Current Outpatient Prescriptions   Medication     JUNEL FE 1.5/30 1.5-30 MG-MCG per tablet     LORazepam (ATIVAN) 1 MG tablet     brimonidine (ALPHAGAN) 0.2 % ophthalmic solution     traMADol (ULTRAM) 50 MG tablet     Norethindrone Acet-Ethinyl Est (JUNEL 1.5/30 PO)     oxyCODONE-acetaminophen (PERCOCET) 5-325 MG per tablet     HYDROcodone-acetaminophen (NORCO) 5-325 MG per tablet     ondansetron (ZOFRAN-ODT) 4 MG ODT tab     tolterodine (DETROL) 2 MG tablet     No current facility-administered medications for this visit.             Family History:     Family History   Problem Relation Age of Onset     Genetic Disorder Sister      Cystinuria            Social History:     Social History     Social History     Marital status: Single     Spouse name: N/A     Number of children: N/A     Years of education: N/A     Occupational History     Not on file.     Social History Main Topics     Smoking status: Never Smoker     Smokeless tobacco: Never Used     Alcohol use Yes     Drug use: No     Sexual activity: Not on file     Other Topics Concern     Not on file     Social History Narrative    Shelly lives with both parents and 4 siblings in [place]. She is due to start senior year at [employer] this year. 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    She denies any ETOH, cigarette, illicit drug use. She denies any sexual activity.            Allergies:   Review of patient's allergies indicates no known allergies.         Review of Systems:  From intake  questionnaire   Negative 14 system review except as noted on HPI, nurse's note.    Scribe Disclosure:   I,Lee Kerr, am serving as a scribe; to document services personally performed by Jayy Koehler MD based on data collection and the provider's statements to me.     IJayy saw and evaluated this patient and agree with the plan as stated above    CC:  [facility]      >15 minutes were spent face to face with patient over half of which was spent providing medical counseling regarding cystinuria/kidney stones.

## 2018-02-15 LAB
BACTERIA SPEC CULT: ABNORMAL
Lab: ABNORMAL
SPECIMEN SOURCE: ABNORMAL

## 2018-02-17 ENCOUNTER — TRANSFERRED RECORDS (OUTPATIENT)
Dept: HEALTH INFORMATION MANAGEMENT | Facility: CLINIC | Age: 23
End: 2018-02-17

## 2018-02-20 ENCOUNTER — CARE COORDINATION (OUTPATIENT)
Dept: UROLOGY | Facility: CLINIC | Age: 23
End: 2018-02-20

## 2018-02-20 DIAGNOSIS — R30.0 DYSURIA: Primary | ICD-10-CM

## 2018-02-20 RX ORDER — NITROFURANTOIN 25; 75 MG/1; MG/1
100 CAPSULE ORAL 2 TIMES DAILY
Qty: 10 CAPSULE | Refills: 0 | Status: ON HOLD | OUTPATIENT
Start: 2018-03-03 | End: 2018-03-09

## 2018-02-20 NOTE — PROGRESS NOTES
Spoke with patient. She will start taking 5 days prior to her surgery on March 8th. Gabrielle Sanchez RN

## 2018-02-26 ENCOUNTER — TELEPHONE (OUTPATIENT)
Dept: UROLOGY | Facility: CLINIC | Age: 23
End: 2018-02-26

## 2018-02-26 NOTE — TELEPHONE ENCOUNTER
----- Message from Jayy Koehler MD sent at 2/25/2018  9:11 PM CST -----  Correct - I dont think needs preop, young and healthy.  Just macrobid preop    Pablo    ----- Message -----     From: Gabrielle Sanchez RN     Sent: 2/21/2018   3:48 PM       To: Jayy Koehler MD    Just spoke with Shellysheridan Cerda and she mentioned that you said she doesn't need a pre-op H&P for surgery on the 8th. Is this correct? She will be on Macrobid 5 days prior.

## 2018-03-06 ENCOUNTER — ANESTHESIA EVENT (OUTPATIENT)
Dept: SURGERY | Facility: CLINIC | Age: 23
End: 2018-03-06
Payer: COMMERCIAL

## 2018-03-08 ENCOUNTER — APPOINTMENT (OUTPATIENT)
Dept: GENERAL RADIOLOGY | Facility: CLINIC | Age: 23
End: 2018-03-08
Attending: UROLOGY
Payer: COMMERCIAL

## 2018-03-08 ENCOUNTER — APPOINTMENT (OUTPATIENT)
Dept: CT IMAGING | Facility: CLINIC | Age: 23
End: 2018-03-08
Attending: UROLOGY
Payer: COMMERCIAL

## 2018-03-08 ENCOUNTER — HOSPITAL ENCOUNTER (OUTPATIENT)
Facility: CLINIC | Age: 23
Setting detail: OBSERVATION
Discharge: HOME | End: 2018-03-09
Attending: UROLOGY | Admitting: UROLOGY
Payer: COMMERCIAL

## 2018-03-08 ENCOUNTER — ANESTHESIA (OUTPATIENT)
Dept: SURGERY | Facility: CLINIC | Age: 23
End: 2018-03-08
Payer: COMMERCIAL

## 2018-03-08 ENCOUNTER — SURGERY (OUTPATIENT)
Age: 23
End: 2018-03-08

## 2018-03-08 DIAGNOSIS — N20.1 URETEROLITHIASIS: Primary | ICD-10-CM

## 2018-03-08 DIAGNOSIS — K59.03 DRUG-INDUCED CONSTIPATION: ICD-10-CM

## 2018-03-08 DIAGNOSIS — R11.2 NAUSEA AND VOMITING, INTRACTABILITY OF VOMITING NOT SPECIFIED, UNSPECIFIED VOMITING TYPE: ICD-10-CM

## 2018-03-08 PROBLEM — N20.9 UROLITHIASIS: Status: ACTIVE | Noted: 2018-03-08

## 2018-03-08 LAB
ABO + RH BLD: NORMAL
ABO + RH BLD: NORMAL
ANION GAP SERPL CALCULATED.3IONS-SCNC: 7 MMOL/L (ref 3–14)
BLD GP AB SCN SERPL QL: NORMAL
BLOOD BANK CMNT PATIENT-IMP: NORMAL
BUN SERPL-MCNC: 11 MG/DL (ref 7–30)
CALCIUM SERPL-MCNC: 8.6 MG/DL (ref 8.5–10.1)
CHLORIDE SERPL-SCNC: 108 MMOL/L (ref 94–109)
CO2 SERPL-SCNC: 24 MMOL/L (ref 20–32)
CREAT SERPL-MCNC: 0.86 MG/DL (ref 0.52–1.04)
ERYTHROCYTE [DISTWIDTH] IN BLOOD BY AUTOMATED COUNT: 11.9 % (ref 10–15)
ERYTHROCYTE [DISTWIDTH] IN BLOOD BY AUTOMATED COUNT: 11.9 % (ref 10–15)
GFR SERPL CREATININE-BSD FRML MDRD: 82 ML/MIN/1.7M2
GLUCOSE BLDC GLUCOMTR-MCNC: 81 MG/DL (ref 70–99)
GLUCOSE SERPL-MCNC: 94 MG/DL (ref 70–99)
HCG UR QL: NEGATIVE
HCT VFR BLD AUTO: 38.7 % (ref 35–47)
HCT VFR BLD AUTO: 42.7 % (ref 35–47)
HGB BLD-MCNC: 13 G/DL (ref 11.7–15.7)
HGB BLD-MCNC: 14.5 G/DL (ref 11.7–15.7)
MCH RBC QN AUTO: 29.1 PG (ref 26.5–33)
MCH RBC QN AUTO: 30 PG (ref 26.5–33)
MCHC RBC AUTO-ENTMCNC: 33.6 G/DL (ref 31.5–36.5)
MCHC RBC AUTO-ENTMCNC: 34 G/DL (ref 31.5–36.5)
MCV RBC AUTO: 87 FL (ref 78–100)
MCV RBC AUTO: 88 FL (ref 78–100)
PLATELET # BLD AUTO: 257 10E9/L (ref 150–450)
PLATELET # BLD AUTO: 269 10E9/L (ref 150–450)
POTASSIUM SERPL-SCNC: 4.5 MMOL/L (ref 3.4–5.3)
RBC # BLD AUTO: 4.47 10E12/L (ref 3.8–5.2)
RBC # BLD AUTO: 4.83 10E12/L (ref 3.8–5.2)
SODIUM SERPL-SCNC: 139 MMOL/L (ref 133–144)
SPECIMEN EXP DATE BLD: NORMAL
WBC # BLD AUTO: 10.3 10E9/L (ref 4–11)
WBC # BLD AUTO: 7.5 10E9/L (ref 4–11)

## 2018-03-08 PROCEDURE — 40000170 ZZH STATISTIC PRE-PROCEDURE ASSESSMENT II: Performed by: UROLOGY

## 2018-03-08 PROCEDURE — 86900 BLOOD TYPING SEROLOGIC ABO: CPT | Performed by: UROLOGY

## 2018-03-08 PROCEDURE — 85027 COMPLETE CBC AUTOMATED: CPT | Mod: 91 | Performed by: UROLOGY

## 2018-03-08 PROCEDURE — 25000125 ZZHC RX 250: Performed by: UROLOGY

## 2018-03-08 PROCEDURE — 36000070 ZZH SURGERY LEVEL 5 EA 15 ADDTL MIN - UMMC: Performed by: UROLOGY

## 2018-03-08 PROCEDURE — 40000278 XR SURGERY CARM FLUORO LESS THAN 5 MIN: Mod: TC

## 2018-03-08 PROCEDURE — 25000128 H RX IP 250 OP 636: Performed by: ANESTHESIOLOGY

## 2018-03-08 PROCEDURE — 36000072 ZZH SURGERY LEVEL 5 W FLUORO 1ST 30 MIN - UMMC: Performed by: UROLOGY

## 2018-03-08 PROCEDURE — 86850 RBC ANTIBODY SCREEN: CPT | Performed by: UROLOGY

## 2018-03-08 PROCEDURE — 81025 URINE PREGNANCY TEST: CPT | Performed by: UROLOGY

## 2018-03-08 PROCEDURE — 25000132 ZZH RX MED GY IP 250 OP 250 PS 637: Performed by: STUDENT IN AN ORGANIZED HEALTH CARE EDUCATION/TRAINING PROGRAM

## 2018-03-08 PROCEDURE — 80048 BASIC METABOLIC PNL TOTAL CA: CPT | Performed by: UROLOGY

## 2018-03-08 PROCEDURE — 71000015 ZZH RECOVERY PHASE 1 LEVEL 2 EA ADDTL HR: Performed by: UROLOGY

## 2018-03-08 PROCEDURE — 25000128 H RX IP 250 OP 636: Performed by: STUDENT IN AN ORGANIZED HEALTH CARE EDUCATION/TRAINING PROGRAM

## 2018-03-08 PROCEDURE — C1887 CATHETER, GUIDING: HCPCS | Performed by: UROLOGY

## 2018-03-08 PROCEDURE — 25000125 ZZHC RX 250: Performed by: STUDENT IN AN ORGANIZED HEALTH CARE EDUCATION/TRAINING PROGRAM

## 2018-03-08 PROCEDURE — 82962 GLUCOSE BLOOD TEST: CPT

## 2018-03-08 PROCEDURE — 36000066 ZZH SURGERY LEVEL 4 W FLUORO 1ST 30 MIN - UMMC: Performed by: UROLOGY

## 2018-03-08 PROCEDURE — 37000008 ZZH ANESTHESIA TECHNICAL FEE, 1ST 30 MIN: Performed by: UROLOGY

## 2018-03-08 PROCEDURE — 71000014 ZZH RECOVERY PHASE 1 LEVEL 2 FIRST HR: Performed by: UROLOGY

## 2018-03-08 PROCEDURE — 25500064 ZZH RX 255 OP 636: Performed by: UROLOGY

## 2018-03-08 PROCEDURE — 27211024 ZZHC OR SUPPLY OTHER OPNP: Performed by: UROLOGY

## 2018-03-08 PROCEDURE — C1729 CATH, DRAINAGE: HCPCS | Performed by: UROLOGY

## 2018-03-08 PROCEDURE — 36415 COLL VENOUS BLD VENIPUNCTURE: CPT | Performed by: UROLOGY

## 2018-03-08 PROCEDURE — 74019 RADEX ABDOMEN 2 VIEWS: CPT

## 2018-03-08 PROCEDURE — C1769 GUIDE WIRE: HCPCS | Performed by: UROLOGY

## 2018-03-08 PROCEDURE — 71045 X-RAY EXAM CHEST 1 VIEW: CPT

## 2018-03-08 PROCEDURE — 25000125 ZZHC RX 250: Performed by: ANESTHESIOLOGY

## 2018-03-08 PROCEDURE — 25000132 ZZH RX MED GY IP 250 OP 250 PS 637: Performed by: UROLOGY

## 2018-03-08 PROCEDURE — 25000128 H RX IP 250 OP 636: Performed by: UROLOGY

## 2018-03-08 PROCEDURE — 25000125 ZZHC RX 250: Performed by: NURSE ANESTHETIST, CERTIFIED REGISTERED

## 2018-03-08 PROCEDURE — 36000064 ZZH SURGERY LEVEL 4 EA 15 ADDTL MIN - UMMC: Performed by: UROLOGY

## 2018-03-08 PROCEDURE — 25000128 H RX IP 250 OP 636: Performed by: NURSE ANESTHETIST, CERTIFIED REGISTERED

## 2018-03-08 PROCEDURE — 37000009 ZZH ANESTHESIA TECHNICAL FEE, EACH ADDTL 15 MIN: Performed by: UROLOGY

## 2018-03-08 PROCEDURE — 82365 CALCULUS SPECTROSCOPY: CPT | Performed by: UROLOGY

## 2018-03-08 PROCEDURE — 74176 CT ABD & PELVIS W/O CONTRAST: CPT

## 2018-03-08 PROCEDURE — 27210794 ZZH OR GENERAL SUPPLY STERILE: Performed by: UROLOGY

## 2018-03-08 PROCEDURE — 25000132 ZZH RX MED GY IP 250 OP 250 PS 637: Performed by: NURSE ANESTHETIST, CERTIFIED REGISTERED

## 2018-03-08 PROCEDURE — 86901 BLOOD TYPING SEROLOGIC RH(D): CPT | Performed by: UROLOGY

## 2018-03-08 PROCEDURE — 25000566 ZZH SEVOFLURANE, EA 15 MIN: Performed by: UROLOGY

## 2018-03-08 RX ORDER — ONDANSETRON 2 MG/ML
4 INJECTION INTRAMUSCULAR; INTRAVENOUS EVERY 6 HOURS PRN
Status: DISCONTINUED | OUTPATIENT
Start: 2018-03-08 | End: 2018-03-08

## 2018-03-08 RX ORDER — SODIUM CHLORIDE 9 MG/ML
INJECTION, SOLUTION INTRAVENOUS CONTINUOUS
Status: DISCONTINUED | OUTPATIENT
Start: 2018-03-08 | End: 2018-03-09 | Stop reason: HOSPADM

## 2018-03-08 RX ORDER — LIDOCAINE 40 MG/G
CREAM TOPICAL
Status: DISCONTINUED | OUTPATIENT
Start: 2018-03-08 | End: 2018-03-09 | Stop reason: HOSPADM

## 2018-03-08 RX ORDER — OXYCODONE HYDROCHLORIDE 5 MG/1
5-10 TABLET ORAL
Qty: 12 TABLET | Refills: 0 | Status: SHIPPED | OUTPATIENT
Start: 2018-03-08 | End: 2018-03-20

## 2018-03-08 RX ORDER — FENTANYL CITRATE 50 UG/ML
25-50 INJECTION, SOLUTION INTRAMUSCULAR; INTRAVENOUS EVERY 5 MIN PRN
Status: DISCONTINUED | OUTPATIENT
Start: 2018-03-08 | End: 2018-03-08 | Stop reason: HOSPADM

## 2018-03-08 RX ORDER — ONDANSETRON 4 MG/1
4 TABLET, ORALLY DISINTEGRATING ORAL EVERY 6 HOURS PRN
Status: DISCONTINUED | OUTPATIENT
Start: 2018-03-08 | End: 2018-03-09 | Stop reason: HOSPADM

## 2018-03-08 RX ORDER — NALOXONE HYDROCHLORIDE 0.4 MG/ML
.1-.4 INJECTION, SOLUTION INTRAMUSCULAR; INTRAVENOUS; SUBCUTANEOUS
Status: DISCONTINUED | OUTPATIENT
Start: 2018-03-08 | End: 2018-03-09 | Stop reason: HOSPADM

## 2018-03-08 RX ORDER — PROPOFOL 10 MG/ML
INJECTION, EMULSION INTRAVENOUS PRN
Status: DISCONTINUED | OUTPATIENT
Start: 2018-03-08 | End: 2018-03-08

## 2018-03-08 RX ORDER — ONDANSETRON 2 MG/ML
4 INJECTION INTRAMUSCULAR; INTRAVENOUS EVERY 30 MIN PRN
Status: DISCONTINUED | OUTPATIENT
Start: 2018-03-08 | End: 2018-03-08 | Stop reason: HOSPADM

## 2018-03-08 RX ORDER — KETOROLAC TROMETHAMINE 30 MG/ML
30 INJECTION, SOLUTION INTRAMUSCULAR; INTRAVENOUS ONCE
Status: COMPLETED | OUTPATIENT
Start: 2018-03-08 | End: 2018-03-08

## 2018-03-08 RX ORDER — KETOROLAC TROMETHAMINE 30 MG/ML
30 INJECTION, SOLUTION INTRAMUSCULAR; INTRAVENOUS EVERY 6 HOURS PRN
Status: DISCONTINUED | OUTPATIENT
Start: 2018-03-08 | End: 2018-03-09

## 2018-03-08 RX ORDER — FENTANYL CITRATE 50 UG/ML
25-50 INJECTION, SOLUTION INTRAMUSCULAR; INTRAVENOUS
Status: DISCONTINUED | OUTPATIENT
Start: 2018-03-08 | End: 2018-03-08 | Stop reason: HOSPADM

## 2018-03-08 RX ORDER — SODIUM CHLORIDE, SODIUM LACTATE, POTASSIUM CHLORIDE, CALCIUM CHLORIDE 600; 310; 30; 20 MG/100ML; MG/100ML; MG/100ML; MG/100ML
INJECTION, SOLUTION INTRAVENOUS CONTINUOUS PRN
Status: DISCONTINUED | OUTPATIENT
Start: 2018-03-08 | End: 2018-03-08

## 2018-03-08 RX ORDER — ACETAMINOPHEN 325 MG/1
650 TABLET ORAL EVERY 4 HOURS PRN
Qty: 60 TABLET | Refills: 0 | Status: SHIPPED | OUTPATIENT
Start: 2018-03-08 | End: 2018-03-20

## 2018-03-08 RX ORDER — HYDRALAZINE HYDROCHLORIDE 20 MG/ML
2.5-5 INJECTION INTRAMUSCULAR; INTRAVENOUS EVERY 10 MIN PRN
Status: DISCONTINUED | OUTPATIENT
Start: 2018-03-08 | End: 2018-03-08 | Stop reason: HOSPADM

## 2018-03-08 RX ORDER — LORAZEPAM 1 MG/1
1 TABLET ORAL EVERY 4 HOURS PRN
Status: DISCONTINUED | OUTPATIENT
Start: 2018-03-08 | End: 2018-03-09 | Stop reason: HOSPADM

## 2018-03-08 RX ORDER — ONDANSETRON 4 MG/1
4 TABLET, ORALLY DISINTEGRATING ORAL EVERY 30 MIN PRN
Status: DISCONTINUED | OUTPATIENT
Start: 2018-03-08 | End: 2018-03-08 | Stop reason: HOSPADM

## 2018-03-08 RX ORDER — AMOXICILLIN 250 MG
1-2 CAPSULE ORAL 2 TIMES DAILY PRN
Qty: 30 TABLET | Refills: 0 | Status: SHIPPED | OUTPATIENT
Start: 2018-03-08 | End: 2018-03-20

## 2018-03-08 RX ORDER — BUPIVACAINE HYDROCHLORIDE 2.5 MG/ML
INJECTION, SOLUTION EPIDURAL; INFILTRATION; INTRACAUDAL PRN
Status: DISCONTINUED | OUTPATIENT
Start: 2018-03-08 | End: 2018-03-08 | Stop reason: HOSPADM

## 2018-03-08 RX ORDER — NALOXONE HYDROCHLORIDE 0.4 MG/ML
.1-.4 INJECTION, SOLUTION INTRAMUSCULAR; INTRAVENOUS; SUBCUTANEOUS
Status: DISCONTINUED | OUTPATIENT
Start: 2018-03-08 | End: 2018-03-08

## 2018-03-08 RX ORDER — NEOSTIGMINE METHYLSULFATE 1 MG/ML
VIAL (ML) INJECTION PRN
Status: DISCONTINUED | OUTPATIENT
Start: 2018-03-08 | End: 2018-03-08

## 2018-03-08 RX ORDER — LIDOCAINE HYDROCHLORIDE 20 MG/ML
INJECTION, SOLUTION INFILTRATION; PERINEURAL PRN
Status: DISCONTINUED | OUTPATIENT
Start: 2018-03-08 | End: 2018-03-08

## 2018-03-08 RX ORDER — GLYCOPYRROLATE 0.2 MG/ML
INJECTION, SOLUTION INTRAMUSCULAR; INTRAVENOUS PRN
Status: DISCONTINUED | OUTPATIENT
Start: 2018-03-08 | End: 2018-03-08

## 2018-03-08 RX ORDER — SCOLOPAMINE TRANSDERMAL SYSTEM 1 MG/1
1 PATCH, EXTENDED RELEASE TRANSDERMAL
Status: DISCONTINUED | OUTPATIENT
Start: 2018-03-08 | End: 2018-03-08 | Stop reason: HOSPADM

## 2018-03-08 RX ORDER — KETOROLAC TROMETHAMINE 15 MG/ML
15 INJECTION, SOLUTION INTRAMUSCULAR; INTRAVENOUS EVERY 6 HOURS
Status: DISCONTINUED | OUTPATIENT
Start: 2018-03-08 | End: 2018-03-08

## 2018-03-08 RX ORDER — FENTANYL CITRATE 50 UG/ML
INJECTION, SOLUTION INTRAMUSCULAR; INTRAVENOUS PRN
Status: DISCONTINUED | OUTPATIENT
Start: 2018-03-08 | End: 2018-03-08

## 2018-03-08 RX ORDER — DEXAMETHASONE SODIUM PHOSPHATE 4 MG/ML
INJECTION, SOLUTION INTRA-ARTICULAR; INTRALESIONAL; INTRAMUSCULAR; INTRAVENOUS; SOFT TISSUE PRN
Status: DISCONTINUED | OUTPATIENT
Start: 2018-03-08 | End: 2018-03-08

## 2018-03-08 RX ORDER — ONDANSETRON 2 MG/ML
4 INJECTION INTRAMUSCULAR; INTRAVENOUS EVERY 6 HOURS PRN
Status: DISCONTINUED | OUTPATIENT
Start: 2018-03-08 | End: 2018-03-09 | Stop reason: HOSPADM

## 2018-03-08 RX ORDER — ONDANSETRON 2 MG/ML
INJECTION INTRAMUSCULAR; INTRAVENOUS PRN
Status: DISCONTINUED | OUTPATIENT
Start: 2018-03-08 | End: 2018-03-08

## 2018-03-08 RX ORDER — SODIUM CHLORIDE, SODIUM LACTATE, POTASSIUM CHLORIDE, CALCIUM CHLORIDE 600; 310; 30; 20 MG/100ML; MG/100ML; MG/100ML; MG/100ML
INJECTION, SOLUTION INTRAVENOUS CONTINUOUS
Status: DISCONTINUED | OUTPATIENT
Start: 2018-03-08 | End: 2018-03-08 | Stop reason: HOSPADM

## 2018-03-08 RX ORDER — DIPHENHYDRAMINE HCL 25 MG
25 CAPSULE ORAL EVERY 6 HOURS PRN
Status: DISCONTINUED | OUTPATIENT
Start: 2018-03-08 | End: 2018-03-09 | Stop reason: HOSPADM

## 2018-03-08 RX ORDER — IBUPROFEN 600 MG/1
600 TABLET, FILM COATED ORAL EVERY 6 HOURS PRN
Status: DISCONTINUED | OUTPATIENT
Start: 2018-03-08 | End: 2018-03-08

## 2018-03-08 RX ORDER — MEPERIDINE HYDROCHLORIDE 25 MG/ML
12.5 INJECTION INTRAMUSCULAR; INTRAVENOUS; SUBCUTANEOUS
Status: DISCONTINUED | OUTPATIENT
Start: 2018-03-08 | End: 2018-03-08 | Stop reason: HOSPADM

## 2018-03-08 RX ORDER — NALOXONE HYDROCHLORIDE 0.4 MG/ML
.1-.4 INJECTION, SOLUTION INTRAMUSCULAR; INTRAVENOUS; SUBCUTANEOUS
Status: DISCONTINUED | OUTPATIENT
Start: 2018-03-08 | End: 2018-03-08 | Stop reason: HOSPADM

## 2018-03-08 RX ORDER — AMPICILLIN 2 G/1
2 INJECTION, POWDER, FOR SOLUTION INTRAVENOUS EVERY 6 HOURS
Status: DISCONTINUED | OUTPATIENT
Start: 2018-03-08 | End: 2018-03-08 | Stop reason: HOSPADM

## 2018-03-08 RX ORDER — OXYCODONE HYDROCHLORIDE 5 MG/1
5 TABLET ORAL EVERY 4 HOURS PRN
Status: DISCONTINUED | OUTPATIENT
Start: 2018-03-08 | End: 2018-03-09 | Stop reason: HOSPADM

## 2018-03-08 RX ADMIN — Medication 4 MG: at 11:58

## 2018-03-08 RX ADMIN — HYDROMORPHONE HYDROCHLORIDE: 10 INJECTION, SOLUTION INTRAMUSCULAR; INTRAVENOUS; SUBCUTANEOUS at 16:13

## 2018-03-08 RX ADMIN — KETOROLAC TROMETHAMINE 30 MG: 30 INJECTION, SOLUTION INTRAMUSCULAR at 14:35

## 2018-03-08 RX ADMIN — ONDANSETRON 4 MG: 2 INJECTION INTRAMUSCULAR; INTRAVENOUS at 17:33

## 2018-03-08 RX ADMIN — IBUPROFEN 600 MG: 600 TABLET ORAL at 17:41

## 2018-03-08 RX ADMIN — SODIUM CHLORIDE, POTASSIUM CHLORIDE, SODIUM LACTATE AND CALCIUM CHLORIDE: 600; 310; 30; 20 INJECTION, SOLUTION INTRAVENOUS at 10:03

## 2018-03-08 RX ADMIN — HYDROMORPHONE HYDROCHLORIDE 0.5 MG: 1 INJECTION, SOLUTION INTRAMUSCULAR; INTRAVENOUS; SUBCUTANEOUS at 13:26

## 2018-03-08 RX ADMIN — ROCURONIUM BROMIDE 50 MG: 10 INJECTION INTRAVENOUS at 10:19

## 2018-03-08 RX ADMIN — GENTAMICIN SULFATE 330 MG: 40 INJECTION, SOLUTION INTRAMUSCULAR; INTRAVENOUS at 10:25

## 2018-03-08 RX ADMIN — HYDROMORPHONE HYDROCHLORIDE 0.5 MG: 1 INJECTION, SOLUTION INTRAMUSCULAR; INTRAVENOUS; SUBCUTANEOUS at 13:56

## 2018-03-08 RX ADMIN — HYDROMORPHONE HYDROCHLORIDE 0.5 MG: 1 INJECTION, SOLUTION INTRAMUSCULAR; INTRAVENOUS; SUBCUTANEOUS at 15:11

## 2018-03-08 RX ADMIN — HYDROMORPHONE HYDROCHLORIDE 0.5 MG: 1 INJECTION, SOLUTION INTRAMUSCULAR; INTRAVENOUS; SUBCUTANEOUS at 14:22

## 2018-03-08 RX ADMIN — SODIUM CHLORIDE: 9 INJECTION, SOLUTION INTRAVENOUS at 19:27

## 2018-03-08 RX ADMIN — POLYETHYLENE GLYCOL 400 AND PROPYLENE GLYCOL 2 DROP: 4; 3 SOLUTION/ DROPS OPHTHALMIC at 10:27

## 2018-03-08 RX ADMIN — ONDANSETRON 4 MG: 2 INJECTION INTRAMUSCULAR; INTRAVENOUS at 11:47

## 2018-03-08 RX ADMIN — PROPOFOL 200 MG: 10 INJECTION, EMULSION INTRAVENOUS at 10:19

## 2018-03-08 RX ADMIN — FENTANYL CITRATE 50 MCG: 50 INJECTION, SOLUTION INTRAMUSCULAR; INTRAVENOUS at 12:20

## 2018-03-08 RX ADMIN — MIDAZOLAM 2 MG: 1 INJECTION INTRAMUSCULAR; INTRAVENOUS at 10:03

## 2018-03-08 RX ADMIN — DEXAMETHASONE SODIUM PHOSPHATE 6 MG: 4 INJECTION, SOLUTION INTRAMUSCULAR; INTRAVENOUS at 10:28

## 2018-03-08 RX ADMIN — FENTANYL CITRATE 50 MCG: 50 INJECTION, SOLUTION INTRAMUSCULAR; INTRAVENOUS at 12:14

## 2018-03-08 RX ADMIN — HYDROMORPHONE HYDROCHLORIDE 0.3 MG: 1 INJECTION, SOLUTION INTRAMUSCULAR; INTRAVENOUS; SUBCUTANEOUS at 12:59

## 2018-03-08 RX ADMIN — FENTANYL CITRATE 50 MCG: 50 INJECTION, SOLUTION INTRAMUSCULAR; INTRAVENOUS at 12:28

## 2018-03-08 RX ADMIN — LIDOCAINE HYDROCHLORIDE 40 MG: 20 INJECTION, SOLUTION INFILTRATION; PERINEURAL at 10:19

## 2018-03-08 RX ADMIN — BUPIVACAINE HYDROCHLORIDE 10 ML: 2.5 INJECTION, SOLUTION EPIDURAL; INFILTRATION; INTRACAUDAL at 11:58

## 2018-03-08 RX ADMIN — HYDROMORPHONE HYDROCHLORIDE 0.4 MG: 1 INJECTION, SOLUTION INTRAMUSCULAR; INTRAVENOUS; SUBCUTANEOUS at 12:49

## 2018-03-08 RX ADMIN — LORAZEPAM 1 MG: 1 TABLET ORAL at 17:41

## 2018-03-08 RX ADMIN — ATROPA BELLADONNA AND OPIUM 1 SUPPOSITORY: 16.2; 3 SUPPOSITORY RECTAL at 13:53

## 2018-03-08 RX ADMIN — AMPICILLIN SODIUM 2 G: 2 INJECTION, POWDER, FOR SOLUTION INTRAMUSCULAR; INTRAVENOUS at 10:25

## 2018-03-08 RX ADMIN — GLYCOPYRROLATE 0.8 MG: 0.2 INJECTION, SOLUTION INTRAMUSCULAR; INTRAVENOUS at 11:58

## 2018-03-08 RX ADMIN — FENTANYL CITRATE 50 MCG: 50 INJECTION, SOLUTION INTRAMUSCULAR; INTRAVENOUS at 10:19

## 2018-03-08 RX ADMIN — ROCURONIUM BROMIDE 20 MG: 10 INJECTION INTRAVENOUS at 10:56

## 2018-03-08 RX ADMIN — KETOROLAC TROMETHAMINE 30 MG: 30 INJECTION, SOLUTION INTRAMUSCULAR at 20:04

## 2018-03-08 RX ADMIN — FENTANYL CITRATE 50 MCG: 50 INJECTION, SOLUTION INTRAMUSCULAR; INTRAVENOUS at 11:44

## 2018-03-08 RX ADMIN — SCOPALAMINE 1 PATCH: 1 PATCH, EXTENDED RELEASE TRANSDERMAL at 08:57

## 2018-03-08 RX ADMIN — HYDROMORPHONE HYDROCHLORIDE 0.5 MG: 1 INJECTION, SOLUTION INTRAMUSCULAR; INTRAVENOUS; SUBCUTANEOUS at 13:10

## 2018-03-08 RX ADMIN — IOTHALAMATE MEGLUMINE 65 ML: 600 INJECTION INTRAVASCULAR at 11:54

## 2018-03-08 RX ADMIN — FENTANYL CITRATE 50 MCG: 50 INJECTION, SOLUTION INTRAMUSCULAR; INTRAVENOUS at 11:51

## 2018-03-08 RX ADMIN — SODIUM CHLORIDE, POTASSIUM CHLORIDE, SODIUM LACTATE AND CALCIUM CHLORIDE: 600; 310; 30; 20 INJECTION, SOLUTION INTRAVENOUS at 10:56

## 2018-03-08 RX ADMIN — HYDROMORPHONE HYDROCHLORIDE 0.3 MG: 1 INJECTION, SOLUTION INTRAMUSCULAR; INTRAVENOUS; SUBCUTANEOUS at 12:35

## 2018-03-08 RX ADMIN — ONDANSETRON 4 MG: 2 INJECTION INTRAMUSCULAR; INTRAVENOUS at 14:01

## 2018-03-08 RX ADMIN — HYDROMORPHONE HYDROCHLORIDE 0.5 MG: 1 INJECTION, SOLUTION INTRAMUSCULAR; INTRAVENOUS; SUBCUTANEOUS at 13:43

## 2018-03-08 RX ADMIN — OXYCODONE HYDROCHLORIDE 5 MG: 5 TABLET ORAL at 14:10

## 2018-03-08 RX ADMIN — FENTANYL CITRATE 50 MCG: 50 INJECTION, SOLUTION INTRAMUSCULAR; INTRAVENOUS at 10:13

## 2018-03-08 RX ADMIN — FENTANYL CITRATE 100 MCG: 50 INJECTION, SOLUTION INTRAMUSCULAR; INTRAVENOUS at 10:56

## 2018-03-08 ASSESSMENT — ENCOUNTER SYMPTOMS
DYSRHYTHMIAS: 0
SEIZURES: 0
STRIDOR: 0

## 2018-03-08 ASSESSMENT — LIFESTYLE VARIABLES: TOBACCO_USE: 0

## 2018-03-08 ASSESSMENT — COPD QUESTIONNAIRES: COPD: 0

## 2018-03-08 NOTE — PLAN OF CARE
Problem: Patient Care Overview  Goal: Plan of Care/Patient Progress Review  Outcome: Improving  1700:Patient is A&O x 3, able to make needs known. VS'S.   On capnography within normal limits. Patient C/O headache, nausea and pain at site. Incision dressing is long term saturated and writer marked.   Ibuprofen 600 mg given or headache and applied a cold compress at fore head. Zofran 4 mg IV given for nausea.   Patient C/o feeling  Anxious,felt SOB like could not breath and pain ain throat, patient reassured. Urology notified, ordered Ativan 1 mg given.   Virk patent with pinkish urine small amount. Patient is tolerating fluids at the moment.    1900: Patient 's nausea is resolved tolerating liquids, VS'S,/64, HR 75 capnography 41 and 10.   Pt continue to C/O pain , on PCA Dilaudid, was at 0.2 mg q 10 min, increased it to 0.3 q 10 min.   C/O SOB when she doses off which makes her nervous. reports feeling puffy in arms not LE   On call urology notified, is here to see patient and  will order a chest xray and Toradol for pain.    2100: Patient is resting, VS'S. Headache is slightly better. Nausea resolved. Xray done awaiting results.

## 2018-03-08 NOTE — BRIEF OP NOTE
[facility], [place]    Brief Operative Note    Pre-operative diagnosis: Right Renal Stone  Post-operative diagnosis Same  Procedure: Procedure(s):  Right Percutaneous Nephrolithotomy - Wound Class: I-Clean  Surgeon: Surgeon(s) and Role:     * Jayy Koehler MD - Primary     * Jeancarlos Pathak MD - Resident - Assisting  Anesthesia: General   Estimated blood loss: Less than 50 ml  Drains: 16F galindo catheter; 5F nephroureteral catheter; 10F PNT2  Specimens:   ID Type Source Tests Collected by Time Destination   1 :  Calculus/Stone Kidney, Right STONE ANALYSIS Jayy Koehler MD 3/8/2018 11:48 AM      Findings:   Patient visually stone free at end of case  Complications: None.

## 2018-03-08 NOTE — ANESTHESIA CARE TRANSFER NOTE
Patient: Shelly Cerda    Procedure(s):  Right Percutaneous Nephrolithotomy - Wound Class: I-Clean    Diagnosis: Right Renal Stone  Diagnosis Additional Information: No value filed.    Anesthesia Type:   General, LMA     Note:  Airway :Face Mask  Patient transferred to:PACU  Handoff Report: Identifed the Patient, Identified the Reponsible Provider, Reviewed the pertinent medical history, Discussed the surgical course, Reviewed Intra-OP anesthesia mangement and issues during anesthesia, Set expectations for post-procedure period and Allowed opportunity for questions and acknowledgement of understanding      Vitals: (Last set prior to Anesthesia Care Transfer)    CRNA VITALS  3/8/2018 1138 - 3/8/2018 1214      3/8/2018             Pulse: 145    SpO2: 100 %                Electronically Signed By: BRITTNEE Baer CRNA  March 8, 2018  12:14 PM

## 2018-03-08 NOTE — PROGRESS NOTES
PACU to Inpatient Nursing Handoff    Patient Shelly Cerda is a 22 year old female who speaks English.   Procedure Procedure(s):  Right Percutaneous Nephrolithotomy - Wound Class: I-Clean   Surgeon(s) Primary: Jayy Koehler MD  Resident - Assisting: Jeancarlos Pathak MD     No Known Allergies    Isolation  No active isolations     Past Medical History   has a past medical history of Complication of anesthesia; Cystinuria (H); Kidney stones; and PONV (postoperative nausea and vomiting). She also has no past medical history of Asymptomatic human immunodeficiency virus (HIV) infection status (H); Cerebral palsy (H); Congenital renal agenesis and dysgenesis; Goiter; Gout; Hernia, abdominal; History of spinal cord injury; History of thrombophlebitis; Horseshoe kidney; Hydrocephalus; Malignant hyperthermia; Mumps; Palpitations; Parkinsons disease (H); Spider veins; Spina bifida (H); STD (sexually transmitted disease); Tethered cord (H); or Tuberculosis.    Anesthesia General   Dermatome Level     Preop Meds Not applicable   Nerve block Not applicable   Intraop Meds dexamethasone (Decadron)  fentanyl (Sublimaze): 350 mcg total  ondansetron (Zofran): last given at 1400   Local Meds Yes   Antibiotics ampicillin (Omnipen) - last given at 1025  gentamicin (Garamycin) - last given at 1025     Pain Patient Currently in Pain: yes  Comfort: tolerable with discomfort  Pain Control: partially effective   PACU meds  fentanyl (Sublimaze): 150 mcg (total dose) last given at 1230   hydromorphone (Dilaudid): 4 mg (total dose) last given at 1511   ketorolac (Toradol): 30 mg last given at 1425   PCA / epidural No   Capnography     Telemetry ECG Rhythm: Normal sinus rhythm   Inpatient Telemetry Monitor Ordered? No        Labs Glucose Lab Results   Component Value Date    GLC 94 03/08/2018       Hgb Lab Results   Component Value Date    HGB 13.0 03/08/2018       INR No results found for: INR   PACU Imaging Not applicable      Wound/Incision Incision/Surgical Site 10/12/17 Groin (Active)   Number of days:147       Incision/Surgical Site 03/08/18 Right Back (Active)   Incision Assessment UTV 3/8/2018  3:00 PM   Closure Sutures 3/8/2018 11:56 AM   Dressing Intervention Moist drainage 3/8/2018  3:00 PM   Number of days:0      CMS Peripheral Neurovascular WDL: WDL (03/08/18 1500)  All Extremities Temperature: warm (03/08/18 0852)  All Extremities Color: no discoloration (03/08/18 0852)  All Extremities Sensation: no numbness;no tingling (03/08/18 0852)   Equipment ice pack   Other LDA       IV Access Peripheral IV 08/06/17 Right (Active)   Number of days:214       Peripheral IV 03/08/18 Left Hand (Active)   Site Assessment WDL 3/8/2018  3:00 PM   Line Status Infusing 3/8/2018  3:00 PM   Phlebitis Scale 0-->no symptoms 3/8/2018  3:00 PM   Infiltration Scale 0 3/8/2018  3:00 PM   Infiltration Site Treatment Method  None 3/8/2018  8:17 AM   Extravasation? No 3/8/2018  8:17 AM   Dressing Intervention New dressing  3/8/2018  8:17 AM   Number of days:0      Blood Products Not applicable EBL  mL   Intake/Output Date 03/08/18 0700 - 03/09/18 0659   Shift 4988-3595 7665-8514 2801-0379 24 Hour Total   I  N  T  A  K  E   I.V. 1150 282  1432    Shift Total  (mL/kg) 1150  (13.72) 282  (3.37)  1432  (17.09)   O  U  T  P  U  T   Urine  540  540    Blood 50   50    Shift Total  (mL/kg) 50  (0.6) 540  (6.44)  590  (7.04)   Weight (kg) 83.8 83.8 83.8 83.8        Drains / Virk Nephrostomy 1 Right (Active)   Dressing Status Drainage - Minimal 3/8/2018  3:00 PM   Output (mL) 90 mL 3/8/2018  3:00 PM   Number of days:0       Urethral Catheter Non-latex;Straight-tip;Double-lumen 16 fr (Active)   Tube Description UTV 3/8/2018  3:00 PM   Securement Method Securing device (Describe) 3/8/2018 12:12 PM   Urine Output 450 mL 3/8/2018  3:00 PM   Number of days:0       Ureteral Drain/Stent Right ureter 6 fr (Active)   Number of days:2011      Time of void PreOp Void  Prior to Procedure: 0730 (03/08/18 0831)    PostOp      Diapered? No   Bladder Scan     PO  (water, applesauce) (03/08/18 1500)  tolerating sips     Vitals    B/P: 114/75  T: 97.7  F (36.5  C)    Temp src: Axillary  P:  Pulse: 106 (03/08/18 0737)    Heart Rate: 97 (03/08/18 1500)     R: 12  O2:  SpO2: 96 %    O2 Device: None (Room air) (03/08/18 1500)    Oxygen Delivery: 7 LPM (03/08/18 1215)         Family/support present yes   Patient belongings Patient Belongings: clothing;shoes  Disposition of Belongings: Locker   Patient transported on cart   DC meds/scripts (obs/outpt) Not applicable   Inpatient Pain Meds Released? Yes       Special needs/considerations None   Tasks needing completion Patient responded well to Toradol. Mom would like to stay overnight        Luisa Timmons RN  [facility] [id number]

## 2018-03-08 NOTE — PROGRESS NOTES
Patient reached max for 2mg dilaudid. MDA called and ok to give additional dilaudid, MDA will update order

## 2018-03-08 NOTE — IP AVS SNAPSHOT
MRN:[id number]                      After Visit Summary   3/8/2018    Shelly Cerda    MRN: [id number]           Thank you!     Thank you for choosing [facility] for your care. Our goal is always to provide you with excellent care. Hearing back from our patients is one way we can continue to improve our services. Please take a few minutes to complete the written survey that you may receive in the mail after you visit with us. Thank you!        Patient Information     Date Of Birth          1995        Designated Caregiver       Most Recent Value    Caregiver    Will someone help with your care after discharge? yes    Name of designated caregiver Rosemarie    Phone number of caregiver [phone]    Caregiver address same as pt      About your hospital stay     You were admitted on:  March 8, 2018 You last received care in the:  [facility] [address]    You were discharged on:  March 9, 2018        Reason for your hospital stay       Postoperative pain management and evaluation following PCNL                  Who to Call     For medical emergencies, please call 911.  For non-urgent questions about your medical care, please call your primary care provider or clinic, None  For questions related to your surgery, please call your surgery clinic        Attending Provider     Provider Specialty    Jayy Koehler MD Urology       Primary Care Provider Fax #    [facility] [phone]      After Care Instructions     Activity       Your activity upon discharge: No heavy lifting or pushing (>15 pounds) for 3 weeks            Diet       Follow this diet upon discharge:regular            Diet Instructions       Resume pre-procedure diet            Discharge Instructions       Follow up appointment as instructed by Surgeon and or RN            Discharge Instructions       Activity  - No strenuous exercise for 3 weeks.  - No lifting, pushing, pulling more than 15 pounds for 3 weeks.   - Do not  "strain with bowel movements.  - Do not drive until you can press the brake pedal quickly and fully without pain.   - Do not operate a motor vehicle while taking narcotic pain medications.     Urination  - Some light redness (up to a watermelon-like-pink in color) is expected in the upcoming 7-10days.   - If having hematuria (blood in the urine), make sure to increase your water intake and monitor for improvement  - If you are unable to urinate you should return urgently to the ED or call clinic to try to arrange for an urgent visit same day.     Medications  - Transition from narcotic pain medications to tylenol (acetaminophen) as you are able.  Wean yourself off all pain medications as you are able.  - Some pain medications contain both tylenol (acetaminophen) and a narcotic (Norco, vicodin, percocet), do not take more than 4,000mg of Tylenol (acetaminophen) from all sources in any 24 hour period.  - Narcotics can make you constipated.  Take over the counter fiber (metamucil or benefiber) and stool softeners (miralax, docusate or senna) while taking narcotic pain medications, but stop if you develop diarrhea.  - No driving or operating machinery while taking narcotic pain medications     Follow-Up:  - Call your primary care provider to touch base regarding your recent admission.    - Call or return sooner than your regularly scheduled visit if you develop any of the following: fever (greater than 101.5), uncontrolled pain, uncontrolled nausea or vomiting, as well as increased redness, swelling, or drainage from your wound.   -You will be scheduled for follow up with Dr Koehler - if you are not contacted about the specific time/date please contact the clinic at the number below,      Phone numbers:   - Monday through Friday 8am to 4:30pm: Call [phone] with questions or to schedule or confirm appointment.    - Nights or weekends: call the after hours emergency pager - [phone] and tell the  \"I would " "like to page the Urology Resident on call.\"  - For emergencies, call 911            No driving or operating machinery        until the day after procedure.  No driving on narcotics            No lifting        No lifting over 15 lbs and no strenuous physical activity for 3 weeks            Wound care and dressings       Instructions to care for your wound at home: Your incision will continue to drain fluid over the next 5-7 days.  Place dressings as needed to keep yourself dry.  Please contact our team or return for evaluation if you have any concerns about the appearance of the surrounding skin or if the drainage changes in character.                  Pending Results     Date and Time Order Name Status Description    3/8/2018 1150 Stone analysis In process             Statement of Approval     Ordered          03/09/18 6215  I have reviewed and agree with all the recommendations and orders detailed in this document.  EFFECTIVE NOW     Approved and electronically signed by:  Jeancarlos Pathak MD             Admission Information     Date & Time Provider Department Dept. Phone    3/8/2018 Jayy Koehler MD [facility] [address] [facility] [phone]      Your Vitals Were     Blood Pressure Pulse Temperature Respirations Height Weight    101/51 (BP Location: Right arm) 55 97.4  F (36.3  C) (Oral) 16 1.6 m (5' 2.99\") 83.8 kg (184 lb 11.9 oz)    Last Period Pulse Oximetry BMI (Body Mass Index)             02/08/2018 (Exact Date) 100% 32.73 kg/m2         MyChart Information     [facility] [employer] gives you secure access to your electronic health record. If you see a primary care provider, you can also send messages to your care team and make appointments. If you have questions, please call your primary care clinic.  If you do not have a primary care provider, please call [phone] and they will assist you.        Care EveryWhere ID     This is your Care EveryWhere ID. This could be used by other organizations to access your [facility] " medical records  [id number]        Equal Access to Services     [place] : Hadii aad ku hadjosebibiana Davison, waaxda luaurelioadaha, qaybta sandraumangrajani montaño, carly bautistajakestevie alejo. So [facility] [phone].    ATENCIÓN: Si habla español, tiene a mckeon disposición servicios gratuitos de asistencia lingüística. Llame al [phone].    We comply with applicable federal civil rights laws and [place] laws. We do not discriminate on the basis of race, color, national origin, age, disability, sex, sexual orientation, or gender identity.               Review of your medicines      START taking        Dose / Directions    * acetaminophen 325 MG tablet   Commonly known as:  TYLENOL   Used for:  Ureterolithiasis        Dose:  650 mg   Take 2 tablets (650 mg) by mouth every 4 hours as needed for other (mild pain)   Quantity:  60 tablet   Refills:  0       * acetaminophen 325 MG tablet   Commonly known as:  TYLENOL   Used for:  Ureterolithiasis        Dose:  650 mg   Take 2 tablets (650 mg) by mouth every 4 hours   Quantity:  100 tablet   Refills:  0       ondansetron 4 MG tablet   Commonly known as:  ZOFRAN   Used for:  Nausea and vomiting, intractability of vomiting not specified, unspecified vomiting type        Dose:  4 mg   Take 1 tablet (4 mg) by mouth every 8 hours as needed for nausea or vomiting   Quantity:  18 tablet   Refills:  0       * oxyCODONE IR 5 MG tablet   Commonly known as:  ROXICODONE   Used for:  Ureterolithiasis        Dose:  5-10 mg   Take 1-2 tablets (5-10 mg) by mouth every 3 hours as needed for pain or other (Moderate to Severe)   Quantity:  12 tablet   Refills:  0       * oxyCODONE IR 5 MG tablet   Commonly known as:  ROXICODONE   Used for:  Ureterolithiasis        Dose:  5 mg   Take 1 tablet (5 mg) by mouth every 4 hours as needed for pain maximum 8 tablet(s) per day   Quantity:  30 tablet   Refills:  0       potassium citrate 10 MEQ (1080 MG) CR tablet   Commonly known as:  UROCIT-K    Used for:  Ureterolithiasis        Dose:  10 mEq   Take 1 tablet (10 mEq) by mouth 3 times daily (with meals)   Quantity:  90 tablet   Refills:  3       senna 8.6 MG tablet   Commonly known as:  SENOKOT   Used for:  Drug-induced constipation        Dose:  1 tablet   Take 1 tablet by mouth 2 times daily as needed for constipation   Quantity:  40 tablet   Refills:  0       senna-docusate 8.6-50 MG per tablet   Commonly known as:  SENOKOT-S;PERICOLACE   Used for:  Drug-induced constipation        Dose:  1-2 tablet   Take 1-2 tablets by mouth 2 times daily as needed for constipation Take while on oral narcotics to prevent or treat constipation.   Quantity:  30 tablet   Refills:  0       * Notice:  This list has 4 medication(s) that are the same as other medications prescribed for you. Read the directions carefully, and ask your doctor or other care provider to review them with you.      CONTINUE these medicines which have NOT CHANGED        Dose / Directions    brimonidine 0.2 % ophthalmic solution   Commonly known as:  ALPHAGAN        Refills:  0       JUNEL 1.5/30 PO        Dose:  1 tablet   Take 1 tablet by mouth daily   Refills:  0       JUNEL FE 1.5/30 1.5-30 MG-MCG per tablet   Generic drug:  norethindrone-ethinyl estradiol-iron        TAKE 1 TABLET BY ORAL ROUTE ONCE DAILY   Refills:  3       LORazepam 1 MG tablet   Commonly known as:  ATIVAN        TAKE 2.5 TABLETS BY MOUTH 2 HOURS BEFORE DENTAL APPOINTMENT. BRING 1.5 TABLETS TO APPOINTMENT   Refills:  0       ondansetron 4 MG ODT tab   Commonly known as:  ZOFRAN-ODT        DISSOLVE AND SWALLOW 1 TAB (4 MG) BY MOUTH EVERY 8 HOURS AS NEEDED FOR NAUSEA.   Refills:  0       tolterodine 2 MG tablet   Commonly known as:  DETROL        Refills:  0       traMADol 50 MG tablet   Commonly known as:  ULTRAM        TAKE 1-2 TABLETS BY MOUTH EVERY 6 HOURS AS NEEDED FOR PAIN   Refills:  0         STOP taking     HYDROcodone-acetaminophen 5-325 MG per tablet   Commonly  known as:  NORCO           nitroFURantoin (macrocrystal-monohydrate) 100 MG capsule   Commonly known as:  MACROBID           oxyCODONE-acetaminophen 5-325 MG per tablet   Commonly known as:  PERCOCET                Where to get your medicines      These medications were sent to [facility] [place], [place] - [address]  [address], [facility] [address]     Phone:  [phone]     acetaminophen 325 MG tablet    acetaminophen 325 MG tablet    senna 8.6 MG tablet    senna-docusate 8.6-50 MG per tablet         Some of these will need a paper prescription and others can be bought over the counter. Ask your nurse if you have questions.     Bring a paper prescription for each of these medications     ondansetron 4 MG tablet    oxyCODONE IR 5 MG tablet    oxyCODONE IR 5 MG tablet    potassium citrate 10 MEQ (1080 MG) CR tablet                Protect others around you: Learn how to safely use, store and throw away your medicines at www.disposemymeds.org.        Information about OPIOIDS     PRESCRIPTION OPIOIDS: WHAT YOU NEED TO KNOW    Prescription opioids can be used to help relieve moderate to severe pain and are often prescribed following a surgery or injury, or for certain health conditions. These medications can be an important part of treatment but also come with serious risks. It is important to work with your health care provider to make sure you are getting the safest, most effective care.    WHAT ARE THE RISKS AND SIDE EFFECTS OF OPIOID USE?  Prescription opioids carry serious risks of addiction and overdose, especially with prolonged use. An opioid overdose, often marked by slowed breathing can cause sudden death. The use of prescription opioids can have a number of side effects as well, even when taken as directed:      Tolerance - meaning you might need to take more of a medication for the same pain relief    Physical dependence - meaning you have symptoms of withdrawal when a  medication is stopped    Increased sensitivity to pain    Constipation    Nausea, vomiting, and dry mouth    Sleepiness and dizziness    Confusion    Depression    Low levels of testosterone that can result in lower sex drive, energy, and strength    Itching and sweating    RISKS ARE GREATER WITH:    History of drug misuse, substance use disorder, or overdose    Mental health conditions (such as depression or anxiety)    Sleep apnea    Older age (65 years or older)    Pregnancy    Avoid alcohol while taking prescription opioids.   Also, unless specifically advised by your health care provider, medications to avoid include:    Benzodiazepines (such as Xanax or Valium)    Muscle relaxants (such as Soma or Flexeril)    Hypnotics (such as Ambien or Lunesta)    Other prescription opioids    KNOW YOUR OPTIONS:  Talk to your health care provider about ways to manage your pain that do not involve prescription opioids. Some of these options may actually work better and have fewer risks and side effects:    Pain relievers such as acetaminophen, ibuprofen, and naproxen    Some medications that are also used for depression or seizures    Physical therapy and exercise    Cognitive behavioral therapy, a psychological, goal-directed approach, in which patients learn how to modify physical, behavioral, and emotional triggers of pain and stress    IF YOU ARE PRESCRIBED OPIOIDS FOR PAIN:    Never take opioids in greater amounts or more often than prescribed    Follow up with your primary health care provider and work together to create a plan on how to manage your pain.    Talk about ways to help manage your pain that do not involve prescription opioids    Talk about all concerns and side effects    Help prevent misuse and abuse    Never sell or share prescription opioids    Never use another person's prescription opioids    Store prescription opioids in a secure place and out of reach of others (this may include visitors, children,  friends, and family)    Visit www.cdc.gov/drugoverdose to learn about risks of opioid abuse and overdose    If you believe you may be struggling with addiction, tell your health care provider and ask for guidance or call [facility]'s National Helpline at 7-248-230-HELP    LEARN MORE / www.cdc.gov/drugoverdose/prescribing/guideline.html    Safely dispose of unused prescription opioids: Find your local drug take-back programs and more information about the importance of safe disposal at www.doseofreality.[place].gov             Medication List: This is a list of all your medications and when to take them. Check marks below indicate your daily home schedule. Keep this list as a reference.      Medications           Morning Afternoon Evening Bedtime As Needed    * acetaminophen 325 MG tablet   Commonly known as:  TYLENOL   Take 2 tablets (650 mg) by mouth every 4 hours as needed for other (mild pain)   Last time this was given:  650 mg on 3/9/2018 12:52 PM                                * acetaminophen 325 MG tablet   Commonly known as:  TYLENOL   Take 2 tablets (650 mg) by mouth every 4 hours   Last time this was given:  650 mg on 3/9/2018 12:52 PM                                brimonidine 0.2 % ophthalmic solution   Commonly known as:  ALPHAGAN                                JUNEL 1.5/30 PO   Take 1 tablet by mouth daily                                JUNEL FE 1.5/30 1.5-30 MG-MCG per tablet   TAKE 1 TABLET BY ORAL ROUTE ONCE DAILY   Generic drug:  norethindrone-ethinyl estradiol-iron                                LORazepam 1 MG tablet   Commonly known as:  ATIVAN   TAKE 2.5 TABLETS BY MOUTH 2 HOURS BEFORE DENTAL APPOINTMENT. BRING 1.5 TABLETS TO APPOINTMENT   Last time this was given:  1 mg on 3/8/2018  5:41 PM                                ondansetron 4 MG ODT tab   Commonly known as:  ZOFRAN-ODT   DISSOLVE AND SWALLOW 1 TAB (4 MG) BY MOUTH EVERY 8 HOURS AS NEEDED FOR NAUSEA.                                ondansetron 4  MG tablet   Commonly known as:  ZOFRAN   Take 1 tablet (4 mg) by mouth every 8 hours as needed for nausea or vomiting                                * oxyCODONE IR 5 MG tablet   Commonly known as:  ROXICODONE   Take 1-2 tablets (5-10 mg) by mouth every 3 hours as needed for pain or other (Moderate to Severe)   Last time this was given:  5 mg on 3/9/2018  9:50 AM                                * oxyCODONE IR 5 MG tablet   Commonly known as:  ROXICODONE   Take 1 tablet (5 mg) by mouth every 4 hours as needed for pain maximum 8 tablet(s) per day   Last time this was given:  5 mg on 3/9/2018  9:50 AM                                potassium citrate 10 MEQ (1080 MG) CR tablet   Commonly known as:  UROCIT-K   Take 1 tablet (10 mEq) by mouth 3 times daily (with meals)                                senna 8.6 MG tablet   Commonly known as:  SENOKOT   Take 1 tablet by mouth 2 times daily as needed for constipation                                senna-docusate 8.6-50 MG per tablet   Commonly known as:  SENOKOT-S;PERICOLACE   Take 1-2 tablets by mouth 2 times daily as needed for constipation Take while on oral narcotics to prevent or treat constipation.                                tolterodine 2 MG tablet   Commonly known as:  DETROL                                traMADol 50 MG tablet   Commonly known as:  ULTRAM   TAKE 1-2 TABLETS BY MOUTH EVERY 6 HOURS AS NEEDED FOR PAIN                                * Notice:  This list has 4 medication(s) that are the same as other medications prescribed for you. Read the directions carefully, and ask your doctor or other care provider to review them with you.

## 2018-03-08 NOTE — IP AVS SNAPSHOT
[facility] [address]    [address] [place] [address]    Phone:  [phone]                                       After Visit Summary   3/8/2018    Shelly Cerda    MRN: [id number]           After Visit Summary Signature Page     I have received my discharge instructions, and my questions have been answered. I have discussed any challenges I see with this plan with the nurse or doctor.    ..........................................................................................................................................  Patient/Patient Representative Signature      ..........................................................................................................................................  Patient Representative Print Name and Relationship to Patient    ..................................................               ................................................  Date                                            Time    ..........................................................................................................................................  Reviewed by Signature/Title    ...................................................              ..............................................  Date                                                            Time

## 2018-03-08 NOTE — PROGRESS NOTES
MDA here to assess pain, ok to give toradol. Dressing has some moist drainage, MD aware and continue to monitor, no reinforce per MD. Patient has been having a lot of pain and using max PACU doses. MD will place order for PCA. MD ok with color and amount of drainage from nephrostomy and galindo.

## 2018-03-08 NOTE — OP NOTE
PREOPERATIVE DIAGNOSIS:    Right Renal Stone    Largest stone size:2.4 cm      POSTOPERATIVE DIAGNOSIS: Same    PROCEDURES:    Cystoscopy    Placement of ureteral catheter    Retrograde Pyelogram    Percutaneous access to the right kidney    Dilation of renal access tract    Percutaneous nephrolithotomy for stone > 2cm     Antegrade ureteroscopy    Placement of nephrostomy tube    Antegrade nephrostogram    SURGEON: Dr. Jayy Koehler MD present and participated for the entirety of the procedure  RESIDENT SURGEON: Jeancarlos Crews MD      ESTIMATED BLOOD LOSS: 50 mL.     SPECIMENS: Right renal stone for analysis    DRAINS:  10F right cope loop nephrostomy tube, 18F galindo, 5 Estonian nephroureteral catheter    COMPLICATIONS: None    SIGNIFICANT FINDINGS:     Radiographic ally and visually stone free at the end of the case    OPERATIVE INDICATIONS:  Shelly Cerda who is a 22 year old female here for percutaneous nephrolithotomy.  she was recently diagnosed with a large right renal stone (2.4 cm on sagital view) and was counseled regarding available treatment options and associated risks.  she elected to proceed with percutaneous nephrolithotomy.  she is aware of the risks of surgery.    OPERATIVE DETAILS: After informed consent was obtained, the patient was taken back to the operating room. Anesthesia was induced and the patient was intubated. IV culture directed antibiotics were given and bilateral sequential compression devices were placed. Prep and drape was done in the usual fashion. A timeout was taken to ensure proper patient, placement and procedure.     Cystoscopy was started.  The urethra was normal, the bladder mucosa did not show evidence of any lesions or trabeculation.  There were no stones.  A guidewire was then placed up the right ureter over which we positioned a balloon occulusion catheter into the upper pole as confirmed on fluoroscopy.  A galindo catheter was placed.   The patient was then carefully  placed in the prone position onto the operating room table ensuring padding of shoulders and all pressure points.   imaging revealed a faintly radiopaque stone and a retrograde pyelogram was performed showing the renal anatomy to be relatively simple.    Percutaneous access to the lower pole was undertaken using the triangulation technique.  A hydrophilic glidewire was passed through the obturator of the 18g access needle and was navigated down the ureter using an angle tipped catheter.  Te glidewire was then exchnged for a superstiff wire. An 8/10 coaxial dilator was passed over the wire after a skin incision was made 1cm along the wire. A sensor wire was passed to use as a safety wire.  With 2 wires in place we then used a 24F balloon dilator over the Super Stiff guidewire and used this to dilate our percutaneous tract. This was monitored with fluoroscopy. The sheath was advanced into the calyx of access. At this point we deflated the balloon and removed the dilator. We then inserted the rigid nephroscope into the dilated tract and removed all accessible stones with the Shockpulse lithotriptor.  We then switched to the flexible cystoscope and ureteroscope to clear the remaining calyces.  We performed antegrade ureteroscopy to remove stone fragments from the ureter ensured that the ureter was patent with no evidence of residual ureteral calculi or ureteral trauma.  We were able to pass the scope all the way to the bladder.   Next, we mapped out the kidney meticulously using the endoscope as well as fluoroscopy.  We ensured that there were no residual radiopaque calculi and that we had visualized all accessible calyces.  We placed a 10 Kiswahili cope loop nephrostomy tube into the kidney which we ensured had a full curl.  We performed an antegrade nephrostogram confirming the tube was in the appropriate position and that there was no extravasation.  We did place an antegrade 5 Kiswahili catheter for potential second  stage procedure if necessary.     We secured the tubes to the skin and injected local anesthetic into both the wound as well as the intercostal nerve.  Our access was  Subcostal..  We placed a clean dressing over the wound and held several minutes or pressure ensuring there was no significant bleeding.  At this point, the patient was carefully placed back into the supine position, awakened from anesthesia and extubated. she was transferred to the PACU in stable condition. she tolerated the procedure well without complication.      PLAN:  Discharge home vs. Overnight observation based upon post-op labs, risk of bleeding, pain control, and presence of nephrostomy tube    Jayy Koehler

## 2018-03-08 NOTE — ANESTHESIA POSTPROCEDURE EVALUATION
Patient: Shelly Cerda    Procedure(s):  Right Percutaneous Nephrolithotomy - Wound Class: I-Clean    Diagnosis:Right Renal Stone  Diagnosis Additional Information: No value filed.    Anesthesia Type:  General, LMA    Note:  Anesthesia Post Evaluation    Patient location during evaluation: PACU  Patient participation: Able to fully participate in evaluation  Level of consciousness: sleepy but conscious and responsive to verbal stimuli  Pain management: adequate  Airway patency: patent  Cardiovascular status: acceptable  Respiratory status: acceptable  Hydration status: acceptable     Anesthetic complications: None          Last vitals:  Vitals:    03/08/18 1315 03/08/18 1330 03/08/18 1400   BP: 115/65 113/57    Pulse:      Resp: 13 11 11   Temp:      SpO2: 96% 94% 96%         Electronically Signed By: Marisabel Vilchis MD  March 8, 2018  2:03 PM

## 2018-03-09 ENCOUNTER — HOSPITAL ENCOUNTER (EMERGENCY)
Facility: CLINIC | Age: 23
Discharge: HOME | End: 2018-03-10
Attending: EMERGENCY MEDICINE | Admitting: EMERGENCY MEDICINE
Payer: COMMERCIAL

## 2018-03-09 ENCOUNTER — NURSE TRIAGE (OUTPATIENT)
Dept: NURSING | Facility: CLINIC | Age: 23
End: 2018-03-09

## 2018-03-09 ENCOUNTER — CARE COORDINATION (OUTPATIENT)
Dept: UROLOGY | Facility: CLINIC | Age: 23
End: 2018-03-09

## 2018-03-09 VITALS
DIASTOLIC BLOOD PRESSURE: 51 MMHG | WEIGHT: 184.75 LBS | HEIGHT: 63 IN | RESPIRATION RATE: 16 BRPM | BODY MASS INDEX: 32.73 KG/M2 | HEART RATE: 55 BPM | OXYGEN SATURATION: 100 % | SYSTOLIC BLOOD PRESSURE: 101 MMHG | TEMPERATURE: 97.4 F

## 2018-03-09 DIAGNOSIS — R58 HEMORRHAGE: ICD-10-CM

## 2018-03-09 DIAGNOSIS — N23 RENAL COLIC: ICD-10-CM

## 2018-03-09 DIAGNOSIS — N99.528 NEPHROSTOMY COMPLICATION (H): ICD-10-CM

## 2018-03-09 PROBLEM — G89.18 POSTOPERATIVE PAIN: Status: ACTIVE | Noted: 2018-03-09

## 2018-03-09 LAB
ANION GAP SERPL CALCULATED.3IONS-SCNC: 7 MMOL/L (ref 3–14)
ANION GAP SERPL CALCULATED.3IONS-SCNC: 7 MMOL/L (ref 3–14)
BASOPHILS # BLD AUTO: 0 10E9/L (ref 0–0.2)
BASOPHILS NFR BLD AUTO: 0.2 %
BUN SERPL-MCNC: 13 MG/DL (ref 7–30)
BUN SERPL-MCNC: 14 MG/DL (ref 7–30)
CALCIUM SERPL-MCNC: 8.5 MG/DL (ref 8.5–10.1)
CALCIUM SERPL-MCNC: 8.8 MG/DL (ref 8.5–10.1)
CHLORIDE SERPL-SCNC: 100 MMOL/L (ref 94–109)
CHLORIDE SERPL-SCNC: 101 MMOL/L (ref 94–109)
CO2 SERPL-SCNC: 25 MMOL/L (ref 20–32)
CO2 SERPL-SCNC: 27 MMOL/L (ref 20–32)
CREAT SERPL-MCNC: 0.86 MG/DL (ref 0.52–1.04)
CREAT SERPL-MCNC: 0.94 MG/DL (ref 0.52–1.04)
DIFFERENTIAL METHOD BLD: ABNORMAL
EOSINOPHIL # BLD AUTO: 0 10E9/L (ref 0–0.7)
EOSINOPHIL NFR BLD AUTO: 0.3 %
ERYTHROCYTE [DISTWIDTH] IN BLOOD BY AUTOMATED COUNT: 11.4 % (ref 10–15)
ERYTHROCYTE [DISTWIDTH] IN BLOOD BY AUTOMATED COUNT: 11.4 % (ref 10–15)
GFR SERPL CREATININE-BSD FRML MDRD: 74 ML/MIN/1.7M2
GFR SERPL CREATININE-BSD FRML MDRD: 81 ML/MIN/1.7M2
GLUCOSE SERPL-MCNC: 105 MG/DL (ref 70–99)
GLUCOSE SERPL-MCNC: 110 MG/DL (ref 70–99)
HCT VFR BLD AUTO: 35.8 % (ref 35–47)
HCT VFR BLD AUTO: 36.1 % (ref 35–47)
HGB BLD-MCNC: 12.4 G/DL (ref 11.7–15.7)
HGB BLD-MCNC: 12.5 G/DL (ref 11.7–15.7)
IMM GRANULOCYTES # BLD: 0 10E9/L (ref 0–0.4)
IMM GRANULOCYTES NFR BLD: 0.3 %
LYMPHOCYTES # BLD AUTO: 2.3 10E9/L (ref 0.8–5.3)
LYMPHOCYTES NFR BLD AUTO: 18.9 %
MCH RBC QN AUTO: 29.4 PG (ref 26.5–33)
MCH RBC QN AUTO: 30 PG (ref 26.5–33)
MCHC RBC AUTO-ENTMCNC: 34.6 G/DL (ref 31.5–36.5)
MCHC RBC AUTO-ENTMCNC: 34.6 G/DL (ref 31.5–36.5)
MCV RBC AUTO: 85 FL (ref 78–100)
MCV RBC AUTO: 87 FL (ref 78–100)
MONOCYTES # BLD AUTO: 0.9 10E9/L (ref 0–1.3)
MONOCYTES NFR BLD AUTO: 7.5 %
NEUTROPHILS # BLD AUTO: 8.8 10E9/L (ref 1.6–8.3)
NEUTROPHILS NFR BLD AUTO: 72.8 %
NRBC # BLD AUTO: 0 10*3/UL
NRBC BLD AUTO-RTO: 0 /100
PLATELET # BLD AUTO: 258 10E9/L (ref 150–450)
PLATELET # BLD AUTO: 287 10E9/L (ref 150–450)
POTASSIUM SERPL-SCNC: 3.8 MMOL/L (ref 3.4–5.3)
POTASSIUM SERPL-SCNC: 4.6 MMOL/L (ref 3.4–5.3)
RBC # BLD AUTO: 4.17 10E12/L (ref 3.8–5.2)
RBC # BLD AUTO: 4.22 10E12/L (ref 3.8–5.2)
SODIUM SERPL-SCNC: 132 MMOL/L (ref 133–144)
SODIUM SERPL-SCNC: 135 MMOL/L (ref 133–144)
WBC # BLD AUTO: 12.1 10E9/L (ref 4–11)
WBC # BLD AUTO: 13.7 10E9/L (ref 4–11)

## 2018-03-09 PROCEDURE — 25000128 H RX IP 250 OP 636: Performed by: EMERGENCY MEDICINE

## 2018-03-09 PROCEDURE — 96372 THER/PROPH/DIAG INJ SC/IM: CPT

## 2018-03-09 PROCEDURE — 80048 BASIC METABOLIC PNL TOTAL CA: CPT | Performed by: STUDENT IN AN ORGANIZED HEALTH CARE EDUCATION/TRAINING PROGRAM

## 2018-03-09 PROCEDURE — 25000128 H RX IP 250 OP 636: Performed by: STUDENT IN AN ORGANIZED HEALTH CARE EDUCATION/TRAINING PROGRAM

## 2018-03-09 PROCEDURE — 80048 BASIC METABOLIC PNL TOTAL CA: CPT | Performed by: EMERGENCY MEDICINE

## 2018-03-09 PROCEDURE — 85025 COMPLETE CBC W/AUTO DIFF WBC: CPT | Performed by: EMERGENCY MEDICINE

## 2018-03-09 PROCEDURE — 81001 URINALYSIS AUTO W/SCOPE: CPT | Performed by: EMERGENCY MEDICINE

## 2018-03-09 PROCEDURE — 25000132 ZZH RX MED GY IP 250 OP 250 PS 637: Performed by: EMERGENCY MEDICINE

## 2018-03-09 PROCEDURE — 36415 COLL VENOUS BLD VENIPUNCTURE: CPT | Performed by: STUDENT IN AN ORGANIZED HEALTH CARE EDUCATION/TRAINING PROGRAM

## 2018-03-09 PROCEDURE — 25000132 ZZH RX MED GY IP 250 OP 250 PS 637: Performed by: STUDENT IN AN ORGANIZED HEALTH CARE EDUCATION/TRAINING PROGRAM

## 2018-03-09 PROCEDURE — 99285 EMERGENCY DEPT VISIT HI MDM: CPT | Mod: 25

## 2018-03-09 PROCEDURE — G0378 HOSPITAL OBSERVATION PER HR: HCPCS

## 2018-03-09 PROCEDURE — 25000125 ZZHC RX 250: Performed by: EMERGENCY MEDICINE

## 2018-03-09 PROCEDURE — 85027 COMPLETE CBC AUTOMATED: CPT | Performed by: STUDENT IN AN ORGANIZED HEALTH CARE EDUCATION/TRAINING PROGRAM

## 2018-03-09 PROCEDURE — 96375 TX/PRO/DX INJ NEW DRUG ADDON: CPT

## 2018-03-09 PROCEDURE — 25000125 ZZHC RX 250: Performed by: STUDENT IN AN ORGANIZED HEALTH CARE EDUCATION/TRAINING PROGRAM

## 2018-03-09 PROCEDURE — 96374 THER/PROPH/DIAG INJ IV PUSH: CPT

## 2018-03-09 RX ORDER — OXYCODONE HYDROCHLORIDE 5 MG/1
5 TABLET ORAL EVERY 4 HOURS PRN
Qty: 30 TABLET | Refills: 0 | Status: SHIPPED | OUTPATIENT
Start: 2018-03-09 | End: 2018-03-20

## 2018-03-09 RX ORDER — ACETAMINOPHEN 325 MG/1
650 TABLET ORAL EVERY 4 HOURS
Qty: 100 TABLET | Refills: 0 | Status: SHIPPED | OUTPATIENT
Start: 2018-03-09 | End: 2018-05-29

## 2018-03-09 RX ORDER — ONDANSETRON 4 MG/1
4 TABLET, ORALLY DISINTEGRATING ORAL ONCE
Status: COMPLETED | OUTPATIENT
Start: 2018-03-09 | End: 2018-03-09

## 2018-03-09 RX ORDER — SENNOSIDES A AND B 8.6 MG/1
1 TABLET, FILM COATED ORAL 2 TIMES DAILY PRN
Qty: 40 TABLET | Refills: 0 | Status: SHIPPED | OUTPATIENT
Start: 2018-03-09 | End: 2018-03-20

## 2018-03-09 RX ORDER — POTASSIUM CITRATE 10 MEQ/1
10 TABLET, EXTENDED RELEASE ORAL
Qty: 90 TABLET | Refills: 3 | Status: SHIPPED | OUTPATIENT
Start: 2018-03-09 | End: 2018-06-26

## 2018-03-09 RX ORDER — OXYCODONE HYDROCHLORIDE 5 MG/1
5 TABLET ORAL EVERY 4 HOURS PRN
Qty: 30 TABLET | Refills: 0 | Status: SHIPPED | OUTPATIENT
Start: 2018-03-09 | End: 2018-03-09

## 2018-03-09 RX ORDER — ONDANSETRON 4 MG/1
4 TABLET, FILM COATED ORAL EVERY 8 HOURS PRN
Qty: 18 TABLET | Refills: 0 | Status: SHIPPED | OUTPATIENT
Start: 2018-03-09 | End: 2018-03-20

## 2018-03-09 RX ORDER — KETOROLAC TROMETHAMINE 30 MG/ML
30 INJECTION, SOLUTION INTRAMUSCULAR; INTRAVENOUS EVERY 6 HOURS
Status: DISCONTINUED | OUTPATIENT
Start: 2018-03-09 | End: 2018-03-09 | Stop reason: HOSPADM

## 2018-03-09 RX ORDER — ACETAMINOPHEN 325 MG/1
650 TABLET ORAL EVERY 4 HOURS
Status: DISCONTINUED | OUTPATIENT
Start: 2018-03-09 | End: 2018-03-09 | Stop reason: HOSPADM

## 2018-03-09 RX ORDER — OXYCODONE HYDROCHLORIDE 5 MG/1
5 TABLET ORAL ONCE
Status: COMPLETED | OUTPATIENT
Start: 2018-03-09 | End: 2018-03-09

## 2018-03-09 RX ADMIN — ONDANSETRON 4 MG: 4 TABLET, ORALLY DISINTEGRATING ORAL at 23:11

## 2018-03-09 RX ADMIN — KETOROLAC TROMETHAMINE 30 MG: 30 INJECTION, SOLUTION INTRAMUSCULAR at 14:36

## 2018-03-09 RX ADMIN — KETOROLAC TROMETHAMINE 30 MG: 30 INJECTION, SOLUTION INTRAMUSCULAR at 08:28

## 2018-03-09 RX ADMIN — ACETAMINOPHEN 650 MG: 325 TABLET, FILM COATED ORAL at 12:52

## 2018-03-09 RX ADMIN — ONDANSETRON 4 MG: 4 TABLET, ORALLY DISINTEGRATING ORAL at 15:54

## 2018-03-09 RX ADMIN — OXYCODONE HYDROCHLORIDE 5 MG: 5 TABLET ORAL at 15:50

## 2018-03-09 RX ADMIN — METHYLENE BLUE 97 MG: 10 INJECTION INTRAVENOUS at 06:56

## 2018-03-09 RX ADMIN — HYDROMORPHONE HYDROCHLORIDE 1 MG: 1 INJECTION, SOLUTION INTRAMUSCULAR; INTRAVENOUS; SUBCUTANEOUS at 23:48

## 2018-03-09 RX ADMIN — OXYCODONE HYDROCHLORIDE 5 MG: 5 TABLET ORAL at 23:11

## 2018-03-09 RX ADMIN — KETOROLAC TROMETHAMINE 30 MG: 30 INJECTION, SOLUTION INTRAMUSCULAR at 02:13

## 2018-03-09 RX ADMIN — ONDANSETRON 4 MG: 2 INJECTION INTRAMUSCULAR; INTRAVENOUS at 09:26

## 2018-03-09 RX ADMIN — OXYCODONE HYDROCHLORIDE 5 MG: 5 TABLET ORAL at 09:50

## 2018-03-09 RX ADMIN — ONDANSETRON 4 MG: 2 INJECTION INTRAMUSCULAR; INTRAVENOUS at 03:10

## 2018-03-09 ASSESSMENT — ENCOUNTER SYMPTOMS
DIFFICULTY URINATING: 1
FLANK PAIN: 0
COLOR CHANGE: 0
NAUSEA: 0
WEAKNESS: 0
VOMITING: 0
CHILLS: 0
HEADACHES: 0
HEMATURIA: 1
FEVER: 0
WOUND: 1
DIZZINESS: 0

## 2018-03-09 ASSESSMENT — ACTIVITIES OF DAILY LIVING (ADL)
SWALLOWING: 0-->SWALLOWS FOODS/LIQUIDS WITHOUT DIFFICULTY
COGNITION: 0 - NO COGNITION ISSUES REPORTED
FALL_HISTORY_WITHIN_LAST_SIX_MONTHS: NO
TOILETING: 0-->INDEPENDENT
DRESS: 0-->INDEPENDENT
RETIRED_COMMUNICATION: 0-->UNDERSTANDS/COMMUNICATES WITHOUT DIFFICULTY
AMBULATION: 0-->INDEPENDENT
TRANSFERRING: 0-->INDEPENDENT
RETIRED_EATING: 0-->INDEPENDENT
BATHING: 0-->INDEPENDENT

## 2018-03-09 NOTE — PLAN OF CARE
Problem: Patient Care Overview  Goal: Plan of Care/Patient Progress Review  Outcome: Improving  1700:Patient is A&O x 3, able to make needs known. VS'S.   On capnography within normal limits. Patient C/O headache, nausea and pain at site. Incision dressing is detention saturated and writer marked.   Ibuprofen 600 mg given or headache and applied a cold compress at fore head. Zofran 4 mg IV given for nausea.   Patient C/o feeling  Anxious,felt SOB like could not breath and pain ain throat, patient reassured. Urology notified, ordered Ativan 1 mg given.   Galindo patent with pinkish urine small amount. Patient is tolerating fluids at the moment.    1900: Patient 's nausea is resolved tolerating liquids, VS'S,/64, HR 75 capnography 41 and 10.   Pt continue to C/O pain , on PCA Dilaudid, was at 0.2 mg q 10 min, increased it to 0.3 q 10 min.   C/O SOB when she doses off which makes her nervous. reports feeling puffy in arms not LE   On call urology notified, is here to see patient and  will order a chest xray and Toradol for pain.    2100: Patient is resting, VS'S. Headache is slightly better. Nausea resolved.Chest Xray done awaiting results.   2256: Patient is resting, reports some pain relief with PCA Dilaudid and IV Toradol. Capnography within normal limits Urostomy output  100 cc and galindo cath output  is 220 cc.On humidified O2 at @L per n/c SATs 96-98%. Mom at bedside.

## 2018-03-09 NOTE — PROVIDER NOTIFICATION
Urology paged. Nephrostomy dressing is saturated. Pressure dressing placed. Urology stated that no further intervention is needed at this time.

## 2018-03-09 NOTE — PLAN OF CARE
Problem: Patient Care Overview  Goal: Plan of Care/Patient Progress Review  Outcome: Improving  Alert and oriented. VSS. C/o R sided back pain and headache managed with prn oxycodone, tylenol, and toradol(see eMAR). CMS/neuros intact. Dressing saturated with serous drainage x2 this shift from where neph tube removed this morning, urology provider notified of drainage and urology provider stated this was to be expected and to change dressing as needed. Dressing changed and extra dressing supplies given to pt and pt's mother to use upon discharge. Pt voided small amounts with minimal PVRs, urology provider notified of output amounts and stated pt was ok to discharge. At 1430 pt voided larger amount of 450 mL. Lungs clear. Bowel sounds active and passing flatus. C/o nausea, given prn zofran as available. No emesis and pt able to keep down some food and tolerating good amounts of clear liquids. Up ambulating with SBA. Discharge orders received, next nurse aware of plan to discharge pt.

## 2018-03-09 NOTE — PLAN OF CARE
Problem: Patient Care Overview  Goal: Plan of Care/Patient Progress Review  Pt A/O X 4. VSS on 2L NC. Sleep: awake with pain frequently. Lungs- clear/equal bilaterally. IS encouraged.  PP2+ DP2+. CMS and Neuro's are intact. Denies numbness and tingling in all extremities. Denies nausea, shortness of breath, and chest pain. Has pain in the bilateral back and given PRN toradol and has PCA Dilaudid pump. Pressure dressing in place over right nephrostomy and CDI. Galindo patent and draining clear/yellow urine. Is on a regular diet, no meal this shift. Bowels- audible/active in all four quadrants, is passing flatus, last BM 3/7. Pt up standby assist, stood at bedside overnight. PIV is patent in the left hand and NS infusing 100 mL/hr and PCA pump infusing. Pt is able to make needs known and the call light is within the pt's reach. Continue to monitor.     0655: Nephrostomy and galindo catheter removed. Patient is due to void.

## 2018-03-09 NOTE — PROGRESS NOTES
"Urology  Progress Note  -Pain control improved overnight  -Mild nausea  -Improved SOB with unremarkable CXR  -Hypotension recorded overnight (possible difficulties with accurate testing due to position) with improvement this morning    Exam  /56 (BP Location: Right arm)  Pulse 55  Temp 97.9  F (36.6  C) (Oral)  Resp 14  Ht 1.6 m (5' 2.99\")  Wt 83.8 kg (184 lb 11.9 oz)  LMP 02/08/2018 (Exact Date)  SpO2 97%  Breastfeeding? No  BMI 32.73 kg/m2  No acute distress  Unlabored breathing  Abdomen soft, nt/nd  Incisional dressing saturated - removed on rounds; 5F catheter removed; Methylene blue instilled into PNT with passage into galindo catheter; New dressing placed  Lower extremities non-edematous bilaterally    UOP - Galindo 670/500, /225      Labs  WBC 13.7  Hgb 12.5  Cr 0.9    Assessment/Plan  22 year old y/o female POD#1 s/p right PCNL.  Postoperative course notable for pain control issues and     Neuro: Discontinue PCA - transition to orals  CV: HDS - continue to monitor pressures  Pulm: incentive spirometry while awake  FEN/GI: Regular diet; Antiemetics; Continue IVF  Endo: No acute issues  : Galindo to be removed  Heme/ID: No acute issues  Activity: As tolerate  PPx: SCDs  Dispo: Possible DC to home pending improved pain control and nausea    Seen and examined with Dr Rodo Lorenzo, PGY-3  Urology Resident     Contacting the Urology Team     Please use the following job codes to reach the Urology Team. Note that you must use an in house phone and that job codes cannot receive text pages.     On weekdays, dial [phone] (or star-star-star [phone] on the new [employer] telephones) then [phone] to reach the Adult Urology resident or PA on call    On weekdays, dial [phone] (or star-star-star [phone] on the new [employer] telephones) then [phone] to reach the Pediatric Urology resident    On weeknights and weekends, dial [phone] (or star-star-star 777 on the new [employer] telephones) then [phone] to reach the Urology " resident on call (for both Adult and Pediatrics)

## 2018-03-09 NOTE — DISCHARGE SUMMARY
Discharge Summary     Shelly Cerda MRN# [id number]   YOB: 1995 Age: 22 year old     Date of Admission:  3/8/2018  Date of Discharge::  3/9/2018  Admitting Physician:  Jayy Koehler MD  Discharge Physician:  Jeancarlos Lorenzo MD  Primary Care Physician:         [facility]          Admission Diagnoses:   Right Renal Stone  Urolithiasis  Postoperative pain            Discharge Diagnosis:   Same as above           Procedures:   : Procedure(s):  Right Percutaneous Nephrolithotomy - Wound Class: I-Clean        Non-operative procedures:   None performed          Consultations:   None           Imaging Studies:     Results for orders placed or performed during the hospital encounter of 03/08/18   XR Surgery ANTIONE Fluoro L/T 5 Min    Narrative    This exam was marked as non-reportable because it will not be read by a   radiologist or a [facility] non-radiologist provider.             XR KUB    Narrative    Exam: XR KUB, 3/8/2018 8:18 AM    Indication: Renal Calculus;     Comparison: Abdominal CT 2/13/2018    Findings:   Stable appearance and position of 14 x 16 mm stone in the right renal  pelvis. No stones visualized over the left renal shadow.    Moderate stool burden. Bowel gas pattern is nonobstructive. No  pneumatosis. Bones are unremarkable.      Impression    Impression:   1. Persistent 14 x 16 mm stone in the right renal pelvis appears in  unchanged position.  2. Moderate stool burden.    I have personally reviewed the examination and initial interpretation  and I agree with the findings.    JOSE ALBERTO VILLANUEVA MD ([place])   CT Abdomen Pelvis w/o Contrast    Narrative    CT abdomen and pelvis without contrast 3/8/2018    CLINICAL HISTORY: Left flank pain. History of renal stones.    COMPARISONS: 10/12/2017 and 2/13/2018    TECHNIQUE: Unenhanced CT performed through the abdomen and pelvis.  Renal stone protocol. Coronal and sagittal reconstructions  were  produced.    DOSE (DLP): 318 mGy*cm    CONTRAST: None.    FINDINGS: 14 x 17 x 15 mm right renal pelvis stone, unchanged from  2/13/2018. No hydronephrosis. No perinephric fat stranding.    No new renal or ureteral stone.     Lack of intravenous contrast limits evaluation of the abdominal and  pelvic viscera.    Lung bases clear.      Impression    IMPRESSION: Unchanged right renal pelvis stone. Minor differences in  measurements due to technique. No hydronephrosis. No perinephric  stranding. No new renal or ureteral stone.    LUIS RIVERA MD   XR Chest Port 1 View    Narrative    Exam: XR CHEST PORT 1 VW, 3/8/2018 7:57 PM    Indication: SOB following PCNL;     Comparison: None    Findings:   Single portable view of the chest. The cardiomediastinum is  unremarkable. There is a prominent gastric bubble.    No pleural effusion. No pneumothorax. Streaky opacities likely  representing atelectasis secondary to low inspiratory volumes..      Impression    Impression:   1. Streaky opacities likely representing atelectasis secondary to low  inspiratory volumes.  2. Prominent gastric bubble.    I have personally reviewed the examination and initial interpretation  and I agree with the findings.    BRENNA STEINBERG MD            Medications Prior to Admission:     Prescriptions Prior to Admission   Medication Sig Dispense Refill Last Dose     JUNEL FE 1.5/30 1.5-30 MG-MCG per tablet TAKE 1 TABLET BY ORAL ROUTE ONCE DAILY  3 3/7/2018 at 2100     [DISCONTINUED] nitroFURantoin, macrocrystal-monohydrate, (MACROBID) 100 MG capsule Take 1 capsule (100 mg) by mouth 2 times daily for 5 days 10 capsule 0 3/7/2018 at 2000     LORazepam (ATIVAN) 1 MG tablet TAKE 2.5 TABLETS BY MOUTH 2 HOURS BEFORE DENTAL APPOINTMENT. BRING 1.5 TABLETS TO APPOINTMENT  0 More than a month at Unknown time     brimonidine (ALPHAGAN) 0.2 % ophthalmic solution    More than a month at Unknown time     ondansetron (ZOFRAN-ODT) 4 MG ODT tab DISSOLVE AND  SWALLOW 1 TAB (4 MG) BY MOUTH EVERY 8 HOURS AS NEEDED FOR NAUSEA.  0 More than a month at Unknown time     tolterodine (DETROL) 2 MG tablet    More than a month at Unknown time     traMADol (ULTRAM) 50 MG tablet TAKE 1-2 TABLETS BY MOUTH EVERY 6 HOURS AS NEEDED FOR PAIN  0 More than a month at Unknown time     [DISCONTINUED] oxyCODONE-acetaminophen (PERCOCET) 5-325 MG per tablet    Unknown at Unknown time     [DISCONTINUED] HYDROcodone-acetaminophen (NORCO) 5-325 MG per tablet    More than a month at Unknown time     Norethindrone Acet-Ethinyl Est (JUNEL 1.5/30 PO) Take 1 tablet by mouth daily   Unknown at Unknown time            Discharge Medications:     Current Discharge Medication List      START taking these medications    Details   ondansetron (ZOFRAN) 4 MG tablet Take 1 tablet (4 mg) by mouth every 8 hours as needed for nausea or vomiting  Qty: 18 tablet, Refills: 0    Associated Diagnoses: Nausea and vomiting, intractability of vomiting not specified, unspecified vomiting type      potassium citrate (UROCIT-K) 10 MEQ (1080 MG) CR tablet Take 1 tablet (10 mEq) by mouth 3 times daily (with meals)  Qty: 90 tablet, Refills: 3    Associated Diagnoses: Ureterolithiasis      !! oxyCODONE IR (ROXICODONE) 5 MG tablet Take 1 tablet (5 mg) by mouth every 4 hours as needed for pain maximum 8 tablet(s) per day  Qty: 30 tablet, Refills: 0    Associated Diagnoses: Ureterolithiasis      !! acetaminophen (TYLENOL) 325 MG tablet Take 2 tablets (650 mg) by mouth every 4 hours  Qty: 100 tablet, Refills: 0    Associated Diagnoses: Ureterolithiasis      senna (SENOKOT) 8.6 MG tablet Take 1 tablet by mouth 2 times daily as needed for constipation  Qty: 40 tablet, Refills: 0    Associated Diagnoses: Drug-induced constipation      !! acetaminophen (TYLENOL) 325 MG tablet Take 2 tablets (650 mg) by mouth every 4 hours as needed for other (mild pain)  Qty: 60 tablet, Refills: 0    Associated Diagnoses: Ureterolithiasis      !!  oxyCODONE IR (ROXICODONE) 5 MG tablet Take 1-2 tablets (5-10 mg) by mouth every 3 hours as needed for pain or other (Moderate to Severe)  Qty: 12 tablet, Refills: 0    Associated Diagnoses: Ureterolithiasis      senna-docusate (SENOKOT-S;PERICOLACE) 8.6-50 MG per tablet Take 1-2 tablets by mouth 2 times daily as needed for constipation Take while on oral narcotics to prevent or treat constipation.  Qty: 30 tablet, Refills: 0    Comments: While on narcotics  Associated Diagnoses: Drug-induced constipation       !! - Potential duplicate medications found. Please discuss with provider.      CONTINUE these medications which have NOT CHANGED    Details   JUNEL FE 1.5/30 1.5-30 MG-MCG per tablet TAKE 1 TABLET BY ORAL ROUTE ONCE DAILY  Refills: 3      LORazepam (ATIVAN) 1 MG tablet TAKE 2.5 TABLETS BY MOUTH 2 HOURS BEFORE DENTAL APPOINTMENT. BRING 1.5 TABLETS TO APPOINTMENT  Refills: 0      brimonidine (ALPHAGAN) 0.2 % ophthalmic solution       ondansetron (ZOFRAN-ODT) 4 MG ODT tab DISSOLVE AND SWALLOW 1 TAB (4 MG) BY MOUTH EVERY 8 HOURS AS NEEDED FOR NAUSEA.  Refills: 0      tolterodine (DETROL) 2 MG tablet       traMADol (ULTRAM) 50 MG tablet TAKE 1-2 TABLETS BY MOUTH EVERY 6 HOURS AS NEEDED FOR PAIN  Refills: 0      Norethindrone Acet-Ethinyl Est (JUNEL 1.5/30 PO) Take 1 tablet by mouth daily         STOP taking these medications       nitroFURantoin, macrocrystal-monohydrate, (MACROBID) 100 MG capsule Comments:   Reason for Stopping:         oxyCODONE-acetaminophen (PERCOCET) 5-325 MG per tablet Comments:   Reason for Stopping:         HYDROcodone-acetaminophen (NORCO) 5-325 MG per tablet Comments:   Reason for Stopping:                      Brief History of Illness:   Reason for admission requiring a surgical or invasive procedure:   Right Renal Stone   The patient underwent the following procedure(s):   See above   There were no immediate complications during this procedure.    Please refer to the full operative  summary for details.           Hospital Course:   The patient's hospital course was unremarkable.  Shelly Cerda recovered as anticipated and experienced no post-operative complications following right PCNL.      On POD#1 the patient's galindo catheter and PNT were removed.  The patient was ambulating without assitance, tolerating the discharge diet, had pain controlled with PO medications to go home with, and requiring no IV medications or fluids. Patient was discharged home with appropriate contact information, follow-up and instructions as seen below in the discharge paperwork.       Final Pathology Result:   Pending at time of discharge         Discharge Instructions and Follow-Up:     Discharge Procedure Orders  No lifting    Order Comments: No lifting over 15 lbs and no strenuous physical activity for 3 weeks     No driving or operating machinery    Order Comments: until the day after procedure.  No driving on narcotics     Diet Instructions   Order Comments: Resume pre-procedure diet     Discharge Instructions   Order Comments: Follow up appointment as instructed by Surgeon and or RN     Reason for your hospital stay   Order Comments: Postoperative pain management and evaluation following PCNL     Activity   Order Comments: Your activity upon discharge: No heavy lifting or pushing (>15 pounds) for 3 weeks   Order Specific Question Answer Comments   Is discharge order? Yes      Wound care and dressings   Order Comments: Instructions to care for your wound at home: Your incision will continue to drain fluid over the next 5-7 days.  Place dressings as needed to keep yourself dry.  Please contact our team or return for evaluation if you have any concerns about the appearance of the surrounding skin or if the drainage changes in character.     Discharge Instructions   Order Comments: Activity  - No strenuous exercise for 3 weeks.  - No lifting, pushing, pulling more than 15 pounds for 3 weeks.   - Do not strain  "with bowel movements.  - Do not drive until you can press the brake pedal quickly and fully without pain.   - Do not operate a motor vehicle while taking narcotic pain medications.     Urination  - Some light redness (up to a watermelon-like-pink in color) is expected in the upcoming 7-10days.   - If having hematuria (blood in the urine), make sure to increase your water intake and monitor for improvement  - If you are unable to urinate you should return urgently to the ED or call clinic to try to arrange for an urgent visit same day.     Medications  - Transition from narcotic pain medications to tylenol (acetaminophen) as you are able.  Wean yourself off all pain medications as you are able.  - Some pain medications contain both tylenol (acetaminophen) and a narcotic (Norco, vicodin, percocet), do not take more than 4,000mg of Tylenol (acetaminophen) from all sources in any 24 hour period.  - Narcotics can make you constipated.  Take over the counter fiber (metamucil or benefiber) and stool softeners (miralax, docusate or senna) while taking narcotic pain medications, but stop if you develop diarrhea.  - No driving or operating machinery while taking narcotic pain medications     Follow-Up:  - Call your primary care provider to touch base regarding your recent admission.    - Call or return sooner than your regularly scheduled visit if you develop any of the following: fever (greater than 101.5), uncontrolled pain, uncontrolled nausea or vomiting, as well as increased redness, swelling, or drainage from your wound.   -You will be scheduled for follow up with Dr Koehler - if you are not contacted about the specific time/date please contact the clinic at the number below,      Phone numbers:   - Monday through Friday 8am to 4:30pm: Call [phone] with questions or to schedule or confirm appointment.    - Nights or weekends: call the after hours emergency pager - [phone] and tell the  \"I would like " "to page the Urology Resident on call.\"  - For emergencies, call 911     Full Code     Diet   Order Comments: Follow this diet upon discharge:regular   Order Specific Question Answer Comments   Is discharge order? Yes               Discharge Disposition:   Discharged to Home      Condition at discharge: Good    --    Jeancarlos Lorenzo MD  Urology Resident    2:58 PM, 3/9/2018    "

## 2018-03-09 NOTE — ED AVS SNAPSHOT
[facility] Emergency Department    [address]    [facility] [address]    Phone:  [phone]    Fax:  [phone]                                       Shelly Cerda   MRN: [id number]    Department:  [facility] Emergency Department   Date of Visit:  3/9/2018           After Visit Summary Signature Page     I have received my discharge instructions, and my questions have been answered. I have discussed any challenges I see with this plan with the nurse or doctor.    ..........................................................................................................................................  Patient/Patient Representative Signature      ..........................................................................................................................................  Patient Representative Print Name and Relationship to Patient    ..................................................               ................................................  Date                                            Time    ..........................................................................................................................................  Reviewed by Signature/Title    ...................................................              ..............................................  Date                                                            Time

## 2018-03-09 NOTE — ED AVS SNAPSHOT
[facility] Emergency Department    [address]    [facility] [address]    Phone:  [phone]    Fax:  [phone]                                       Shelly Cerda   MRN: [id number]    Department:  [facility] Emergency Department   Date of Visit:  3/9/2018           Patient Information     Date Of Birth          1995        Your diagnoses for this visit were:     Nephrostomy complication (H)     Hemorrhage     Renal colic        You were seen by Yonathan Crawford MD.      Follow-up Information     Follow up with Urology. Call in 1 day.        Discharge Instructions       P  Please remove and change dressing in 24 hours    First Aid: Bleeding  External bleeding occurs when the body s protective skin is broken. In severe cases, blood loss may place the victim s life in danger. Direct pressure and elevation usually stops bleeding, even the rush of blood from an artery.  Call 911 if you can t stop the bleeding or the victim shows signs of shock.                1. Apply direct pressure    Put on gloves or use other protection to avoid contact with the victim s blood.    Press directly on the wound with a clean gauze patch or cloth.    Keep the pressure constant for at least 5 minutes. This allows the blood to clot (thicken), preventing additional bleeding.    Do not lift the bandage to check on the bleeding until at least 5 minutes have passed.  Step 2. Elevate the injury    Raise the wound above heart level, if possible, to reduce blood flow to the injury.    If a bone is broken, immobilize the joints above and below the break before elevating the wound.  Pinch off nosebleeds  Reassure the person with the nosebleed and tell him or her to do the following:    Pinch the nostrils below the bone.    Tip the person's head slightly forward and sit quietly, maintaining pressure on the nose for at least 5 minutes.    Don't tilt the person's head backwards. This may cause blood to  run backwards into the person's mouth and throat.    Don't destroy the clot by blowing or rubbing nose after the bleeding stops.  Bleeding: How much is too much?  The victim s age, body size, and overall health all help determine when bleeding becomes serious. Concentrate on the victim s condition and appearance--not on the amount of blood lost. Watch the victim for signs of shock. If any shock symptoms appear, blood loss is a threat to life.  Signs to watch for:    Pale, clammy skin    Racing pulse    Confusion or unconsciousness    Pulsing, spurting bleeding   Date Last Reviewed: 12/1/2016 2000-2017 [employer]. [address] [place] [address], [place] [address]. All rights reserved. This information is not intended as a substitute for professional medical care. Always follow your healthcare professional's instructions.        Discharge Instructions  Kidney Stones    Kidney stones are a common problem that can cause a lot of pain but fortunately are usually not dangerous and can be generally treated with medicine at home.  However, sometimes your condition may be worse than it seemed at first, or may get worse with time.     You need to follow-up with your regular doctor within 3 days.    Most kidney stones will pass on their own, but occasionally stones may need to be removed by an urologist. We will send you home with a urine strainer. Be sure to urinate into this, or urinate into a container and pour the urine through the fine filter to catch the kidney stone as it comes out. The stone will seem like a pebble or grain of sand. Be sure to save this in a Ziploc  bag and take it to the doctor s office with you.       Return to the Emergency Department if:    Your pain is not controlled.    You are vomiting and can t keep fluids or medications down.    You develop fever (>101).    You feel much more ill or develop new symptoms.  What can I do to help myself?    Be sure to drink plenty of fluids.    Staying  active is good, and may help the stone to pass. You may do whatever you feel up to doing without restrictions.   Treatment:    Non-steroidal anti-inflammatory drugs (NSAIDs). This includes prescription medicines like Toradol  (ketorolac) and non-prescription medicines like Advil  (ibuprofen) and Nuprin  (ibuprofen). These pain relievers are very effective for kidney stones.    Narcotic pain pills. If you have been given a narcotic such as Vicodin  (hydrocodone with acetaminophen), Percocet  (oxycodone with acetaminophen), or codeine, do not drive for four hours after you have taken it. If the narcotic contains Tylenol  (acetaminophen), do not take Tylenol  with it. All narcotics will cause constipation, so eat a high fiber diet.      Nausea medication.  Nausea and vomiting are common with kidney stones, so your physician may send you home with medicine for this.     Flomax  (tamsulosin). This medicine is sometimes used for men with prostate problems, but also can help kidney stones to pass. This medicine can lower blood pressure, and you may feel faint, especially when you first stand up. Be sure to get up gradually, sit down if you feel faint, and avoid activity where feeling faint would be dangerous, such as climbing ladders.   If you were given a prescription for medicine here today, be sure to read all of the information (including the package insert) that comes with your prescription.  This will include important information about the medicine, its side effects, and any warnings that you need to know about.  The pharmacist who fills the prescription can provide more information and answer questions you may have about the medicine.  If you have questions or concerns that the pharmacist cannot address, please call or return to the Emergency Department.   Opioid Medication Information    Pain medications are among the most commonly prescribed medicines, so we are including this information for all our patients. If  you did not receive pain medication or get a prescription for pain medicine, you can ignore it.     You may have been given a prescription for an opioid (narcotic) pain medicine and/or have received a pain medicine while here in the Emergency Department. These medicines can make you drowsy or impaired. You must not drive, operate dangerous equipment, or engage in any other dangerous activities while taking these medications. If you drive while taking these medications, you could be arrested for DUI, or driving under the influence. Do not drink any alcohol while you are taking these medications.     Opioid pain medications can cause addiction. If you have a history of chemical dependency of any type, you are at a higher risk of becoming addicted to pain medications.  Only take these prescribed medications to treat your pain when all other options have been tried. Take it for as short a time and as few doses as possible. Store your pain pills in a secure place, as they are frequently stolen and provide a dangerous opportunity for children or visitors in your house to start abusing these powerful medications. We will not replace any lost or stolen medicine.  As soon as your pain is better, you should flush all your remaining medication.     Many prescription pain medications contain Tylenol  (acetaminophen), including Vicodin , Tylenol #3 , Norco , Lortab , and Percocet .  You should not take any extra pills of Tylenol  if you are using these prescription medications or you can get very sick.  Do not ever take more than 3000 mg of acetaminophen in any 24 hour period.    All opioids tend to cause constipation. Drink plenty of water and eat foods that have a lot of fiber, such as fruits, vegetables, prune juice, apple juice and high fiber cereal.  Take a laxative if you don t move your bowels at least every other day. Miralax , Milk of Magnesia, Colace , or Senna  can be used to keep you regular.      Remember that you  can always come back to the Emergency Department if you are not able to see your regular doctor in the amount of time listed above, if you get any new symptoms, or if there is anything that worries you.        24 Hour Appointment Hotline       To make an appointment at any [facility], call [phone] ([phone]). If you don't have a family doctor or clinic, we will help you find one. [facility] are conveniently located to serve the needs of you and your family.             Review of your medicines      Our records show that you are taking the medicines listed below. If these are incorrect, please call your family doctor or clinic.        Dose / Directions Last dose taken    * acetaminophen 325 MG tablet   Commonly known as:  TYLENOL   Dose:  650 mg   Quantity:  60 tablet        Take 2 tablets (650 mg) by mouth every 4 hours as needed for other (mild pain)   Refills:  0        * acetaminophen 325 MG tablet   Commonly known as:  TYLENOL   Dose:  650 mg   Quantity:  100 tablet        Take 2 tablets (650 mg) by mouth every 4 hours   Refills:  0        brimonidine 0.2 % ophthalmic solution   Commonly known as:  ALPHAGAN        Refills:  0        JUNEL 1.5/30 PO   Dose:  1 tablet        Take 1 tablet by mouth daily   Refills:  0        JUNEL FE 1.5/30 1.5-30 MG-MCG per tablet   Generic drug:  norethindrone-ethinyl estradiol-iron        TAKE 1 TABLET BY ORAL ROUTE ONCE DAILY   Refills:  3        LORazepam 1 MG tablet   Commonly known as:  ATIVAN        TAKE 2.5 TABLETS BY MOUTH 2 HOURS BEFORE DENTAL APPOINTMENT. BRING 1.5 TABLETS TO APPOINTMENT   Refills:  0        ondansetron 4 MG ODT tab   Commonly known as:  ZOFRAN-ODT        DISSOLVE AND SWALLOW 1 TAB (4 MG) BY MOUTH EVERY 8 HOURS AS NEEDED FOR NAUSEA.   Refills:  0        ondansetron 4 MG tablet   Commonly known as:  ZOFRAN   Dose:  4 mg   Quantity:  18 tablet        Take 1 tablet (4 mg) by mouth every 8 hours as needed for nausea or vomiting    Refills:  0        * oxyCODONE IR 5 MG tablet   Commonly known as:  ROXICODONE   Dose:  5-10 mg   Quantity:  12 tablet        Take 1-2 tablets (5-10 mg) by mouth every 3 hours as needed for pain or other (Moderate to Severe)   Refills:  0        * oxyCODONE IR 5 MG tablet   Commonly known as:  ROXICODONE   Dose:  5 mg   Quantity:  30 tablet        Take 1 tablet (5 mg) by mouth every 4 hours as needed for pain maximum 8 tablet(s) per day   Refills:  0        potassium citrate 10 MEQ (1080 MG) CR tablet   Commonly known as:  UROCIT-K   Dose:  10 mEq   Quantity:  90 tablet        Take 1 tablet (10 mEq) by mouth 3 times daily (with meals)   Refills:  3        senna 8.6 MG tablet   Commonly known as:  SENOKOT   Dose:  1 tablet   Quantity:  40 tablet        Take 1 tablet by mouth 2 times daily as needed for constipation   Refills:  0        senna-docusate 8.6-50 MG per tablet   Commonly known as:  SENOKOT-S;PERICOLACE   Dose:  1-2 tablet   Quantity:  30 tablet        Take 1-2 tablets by mouth 2 times daily as needed for constipation Take while on oral narcotics to prevent or treat constipation.   Refills:  0        tolterodine 2 MG tablet   Commonly known as:  DETROL        Refills:  0        traMADol 50 MG tablet   Commonly known as:  ULTRAM        TAKE 1-2 TABLETS BY MOUTH EVERY 6 HOURS AS NEEDED FOR PAIN   Refills:  0        * Notice:  This list has 4 medication(s) that are the same as other medications prescribed for you. Read the directions carefully, and ask your doctor or other care provider to review them with you.            Procedures and tests performed during your visit     Basic metabolic panel (BMP)    CBC + differential    ISTAT gases lactate idris POCT    UA with Microscopic      Orders Needing Specimen Collection     None      Pending Results     Date and Time Order Name Status Description    3/8/2018 1150 Stone analysis In process             Pending Culture Results     Date and Time Order Name Status  Description    3/8/2018 1150 Stone analysis In process             Pending Results Instructions     If you had any lab results that were not finalized at the time of your Discharge, you can call the ED Lab Result RN at [phone]. You will be contacted by this team for any positive Lab results or changes in treatment. The nurses are available 7 days a week from 10A to 6:30P.  You can leave a message 24 hours per day and they will return your call.        Test Results From Your Hospital Stay        3/9/2018 11:15 PM      Component Results     Component Value Ref Range & Units Status    WBC 12.1 (H) 4.0 - 11.0 10e9/L Final    RBC Count 4.22 3.8 - 5.2 10e12/L Final    Hemoglobin 12.4 11.7 - 15.7 g/dL Final    Hematocrit 35.8 35.0 - 47.0 % Final    MCV 85 78 - 100 fl Final    MCH 29.4 26.5 - 33.0 pg Final    MCHC 34.6 31.5 - 36.5 g/dL Final    RDW 11.4 10.0 - 15.0 % Final    Platelet Count 287 150 - 450 10e9/L Final    Diff Method Automated Method  Final    % Neutrophils 72.8 % Final    % Lymphocytes 18.9 % Final    % Monocytes 7.5 % Final    % Eosinophils 0.3 % Final    % Basophils 0.2 % Final    % Immature Granulocytes 0.3 % Final    Nucleated RBCs 0 0 /100 Final    Absolute Neutrophil 8.8 (H) 1.6 - 8.3 10e9/L Final    Absolute Lymphocytes 2.3 0.8 - 5.3 10e9/L Final    Absolute Monocytes 0.9 0.0 - 1.3 10e9/L Final    Absolute Eosinophils 0.0 0.0 - 0.7 10e9/L Final    Absolute Basophils 0.0 0.0 - 0.2 10e9/L Final    Abs Immature Granulocytes 0.0 0 - 0.4 10e9/L Final    Absolute Nucleated RBC 0.0  Final         3/9/2018 11:29 PM      Component Results     Component Value Ref Range & Units Status    Sodium 132 (L) 133 - 144 mmol/L Final    Potassium 3.8 3.4 - 5.3 mmol/L Final    Chloride 100 94 - 109 mmol/L Final    Carbon Dioxide 25 20 - 32 mmol/L Final    Anion Gap 7 3 - 14 mmol/L Final    Glucose 105 (H) 70 - 99 mg/dL Final    Urea Nitrogen 14 7 - 30 mg/dL Final    Creatinine 0.94 0.52 - 1.04 mg/dL Final    GFR  Estimate 74 >60 mL/min/1.7m2 Final    Non  GFR Calc    GFR Estimate If Black 89 >60 mL/min/1.7m2 Final    African American GFR Calc    Calcium 8.8 8.5 - 10.1 mg/dL Final         3/10/2018 12:20 AM      Component Results     Component Value Ref Range & Units Status    Color Urine Red  Final    Appearance Urine Clear  Final    Glucose Urine Negative NEG^Negative mg/dL Final    Bilirubin Urine Negative NEG^Negative Final    Ketones Urine Negative NEG^Negative mg/dL Final    Specific Gravity Urine 1.003 1.003 - 1.035 Final    Blood Urine Large (A) NEG^Negative Final    pH Urine 7.0 5.0 - 7.0 pH Final    Protein Albumin Urine 100 (A) NEG^Negative mg/dL Final    Urobilinogen mg/dL 0.0 0.0 - 2.0 mg/dL Final    Nitrite Urine Negative NEG^Negative Final    Leukocyte Esterase Urine Negative NEG^Negative Final    Source Midstream Urine  Final    WBC Urine 6 (H) 0 - 5 /HPF Final    RBC Urine 13 (H) 0 - 2 /HPF Final    Bacteria Urine Few (A) NEG^Negative /HPF Final    Squamous Epithelial /HPF Urine 2 (H) 0 - 1 /HPF Final               3/10/2018  1:01 AM      Component Results     Component Value Ref Range & Units Status    Ph Venous 7.45 (H) 7.32 - 7.43 pH Final    PCO2 Venous 36 (L) 40 - 50 mm Hg Final    PO2 Venous 56 (H) 25 - 47 mm Hg Final    Bicarbonate Venous 25 21 - 28 mmol/L Final    O2 Sat Venous 90 % Final    Lactic Acid 0.8 0.7 - 2.1 mmol/L Final                Clinical Quality Measure: Blood Pressure Screening     Your blood pressure was checked while you were in the emergency department today. The last reading we obtained was  BP: 119/76 . Please read the guidelines below about what these numbers mean and what you should do about them.  If your systolic blood pressure (the top number) is less than 120 and your diastolic blood pressure (the bottom number) is less than 80, then your blood pressure is normal. There is nothing more that you need to do about it.  If your systolic blood pressure (the top  number) is 120-139 or your diastolic blood pressure (the bottom number) is 80-89, your blood pressure may be higher than it should be. You should have your blood pressure rechecked within a year by a primary care provider.  If your systolic blood pressure (the top number) is 140 or greater or your diastolic blood pressure (the bottom number) is 90 or greater, you may have high blood pressure. High blood pressure is treatable, but if left untreated over time it can put you at risk for heart attack, stroke, or kidney failure. You should have your blood pressure rechecked by a primary care provider within the next 4 weeks.  If your provider in the emergency department today gave you specific instructions to follow-up with your doctor or provider even sooner than that, you should follow that instruction and not wait for up to 4 weeks for your follow-up visit.        Thank you for choosing [facility]       Thank you for choosing [facility] for your care. Our goal is always to provide you with excellent care. Hearing back from our patients is one way we can continue to improve our services. Please take a few minutes to complete the written survey that you may receive in the mail after you visit with us. Thank you!        [employer] Information     [employer] gives you secure access to your electronic health record. If you see a primary care provider, you can also send messages to your care team and make appointments. If you have questions, please call your primary care clinic.  If you do not have a primary care provider, please call [phone] and they will assist you.        Care EveryWhere ID     This is your Care EveryWhere ID. This could be used by other organizations to access your [facility] medical records  [id number]        Equal Access to Services     [place] : Festus Davison, wadacia jacobson, qaybta kacarly hussein. So [facility] [phone].    ATENCIÓN: Si  habla miriam, tiene a mckeon disposición servicios gratuitos de asistencia lingüística. Llame al [phone].    We comply with applicable federal civil rights laws and [place] laws. We do not discriminate on the basis of race, color, national origin, age, disability, sex, sexual orientation, or gender identity.            After Visit Summary       This is your record. Keep this with you and show to your community pharmacist(s) and doctor(s) at your next visit.

## 2018-03-09 NOTE — PROGRESS NOTES
POST OP CA;;~Left message of VM for patient to return my call to see how she is doing following surgery.

## 2018-03-10 VITALS — SYSTOLIC BLOOD PRESSURE: 119 MMHG | TEMPERATURE: 99.5 F | OXYGEN SATURATION: 97 % | DIASTOLIC BLOOD PRESSURE: 76 MMHG

## 2018-03-10 LAB
ALBUMIN UR-MCNC: 100 MG/DL
APPEARANCE UR: CLEAR
BACTERIA #/AREA URNS HPF: ABNORMAL /HPF
BILIRUB UR QL STRIP: NEGATIVE
CO2 BLDCOV-SCNC: 25 MMOL/L (ref 21–28)
COLOR UR AUTO: ABNORMAL
GLUCOSE UR STRIP-MCNC: NEGATIVE MG/DL
HGB UR QL STRIP: ABNORMAL
KETONES UR STRIP-MCNC: NEGATIVE MG/DL
LACTATE BLD-SCNC: 0.8 MMOL/L (ref 0.7–2.1)
LEUKOCYTE ESTERASE UR QL STRIP: NEGATIVE
NITRATE UR QL: NEGATIVE
PCO2 BLDV: 36 MM HG (ref 40–50)
PH BLDV: 7.45 PH (ref 7.32–7.43)
PH UR STRIP: 7 PH (ref 5–7)
PO2 BLDV: 56 MM HG (ref 25–47)
RBC #/AREA URNS AUTO: 13 /HPF (ref 0–2)
SAO2 % BLDV FROM PO2: 90 %
SOURCE: ABNORMAL
SP GR UR STRIP: 1 (ref 1–1.03)
SQUAMOUS #/AREA URNS AUTO: 2 /HPF (ref 0–1)
UROBILINOGEN UR STRIP-MCNC: 0 MG/DL (ref 0–2)
WBC #/AREA URNS AUTO: 6 /HPF (ref 0–5)

## 2018-03-10 PROCEDURE — 96361 HYDRATE IV INFUSION ADD-ON: CPT

## 2018-03-10 PROCEDURE — 96374 THER/PROPH/DIAG INJ IV PUSH: CPT

## 2018-03-10 PROCEDURE — 25000128 H RX IP 250 OP 636: Performed by: EMERGENCY MEDICINE

## 2018-03-10 PROCEDURE — 83605 ASSAY OF LACTIC ACID: CPT

## 2018-03-10 PROCEDURE — 82803 BLOOD GASES ANY COMBINATION: CPT

## 2018-03-10 PROCEDURE — 96375 TX/PRO/DX INJ NEW DRUG ADDON: CPT

## 2018-03-10 RX ORDER — ONDANSETRON 2 MG/ML
4 INJECTION INTRAMUSCULAR; INTRAVENOUS EVERY 30 MIN PRN
Status: COMPLETED | OUTPATIENT
Start: 2018-03-10 | End: 2018-03-10

## 2018-03-10 RX ORDER — HYDROMORPHONE HYDROCHLORIDE 1 MG/ML
0.5 INJECTION, SOLUTION INTRAMUSCULAR; INTRAVENOUS; SUBCUTANEOUS
Status: DISCONTINUED | OUTPATIENT
Start: 2018-03-10 | End: 2018-03-10 | Stop reason: HOSPADM

## 2018-03-10 RX ORDER — KETOROLAC TROMETHAMINE 30 MG/ML
30 INJECTION, SOLUTION INTRAMUSCULAR; INTRAVENOUS ONCE
Status: COMPLETED | OUTPATIENT
Start: 2018-03-10 | End: 2018-03-10

## 2018-03-10 RX ADMIN — Medication 0.5 MG: at 00:54

## 2018-03-10 RX ADMIN — KETOROLAC TROMETHAMINE 30 MG: 30 INJECTION, SOLUTION INTRAMUSCULAR at 00:54

## 2018-03-10 RX ADMIN — SODIUM CHLORIDE 1000 ML: 9 INJECTION, SOLUTION INTRAVENOUS at 00:59

## 2018-03-10 RX ADMIN — Medication 0.5 MG: at 00:26

## 2018-03-10 RX ADMIN — ONDANSETRON 4 MG: 2 INJECTION INTRAMUSCULAR; INTRAVENOUS at 02:03

## 2018-03-10 NOTE — DISCHARGE INSTRUCTIONS
P  Please remove and change dressing in 24 hours    First Aid: Bleeding  External bleeding occurs when the body s protective skin is broken. In severe cases, blood loss may place the victim s life in danger. Direct pressure and elevation usually stops bleeding, even the rush of blood from an artery.  Call 911 if you can t stop the bleeding or the victim shows signs of shock.                1. Apply direct pressure    Put on gloves or use other protection to avoid contact with the victim s blood.    Press directly on the wound with a clean gauze patch or cloth.    Keep the pressure constant for at least 5 minutes. This allows the blood to clot (thicken), preventing additional bleeding.    Do not lift the bandage to check on the bleeding until at least 5 minutes have passed.  Step 2. Elevate the injury    Raise the wound above heart level, if possible, to reduce blood flow to the injury.    If a bone is broken, immobilize the joints above and below the break before elevating the wound.  Pinch off nosebleeds  Reassure the person with the nosebleed and tell him or her to do the following:    Pinch the nostrils below the bone.    Tip the person's head slightly forward and sit quietly, maintaining pressure on the nose for at least 5 minutes.    Don't tilt the person's head backwards. This may cause blood to run backwards into the person's mouth and throat.    Don't destroy the clot by blowing or rubbing nose after the bleeding stops.  Bleeding: How much is too much?  The victim s age, body size, and overall health all help determine when bleeding becomes serious. Concentrate on the victim s condition and appearance--not on the amount of blood lost. Watch the victim for signs of shock. If any shock symptoms appear, blood loss is a threat to life.  Signs to watch for:    Pale, clammy skin    Racing pulse    Confusion or unconsciousness    Pulsing, spurting bleeding   Date Last Reviewed: 12/1/2016 2000-2017 [employer]. [address] [place] [address], [place] [address]. All rights reserved. This information is not intended as a substitute for professional medical care. Always follow your healthcare professional's instructions.        Discharge Instructions  Kidney Stones    Kidney stones are a common problem that can cause a lot of pain but fortunately are usually not dangerous and can be generally treated with medicine at home.  However, sometimes your condition may be worse than it seemed at first, or may get worse with time.     You need to follow-up with your regular doctor within 3 days.    Most kidney stones will pass on their own, but occasionally stones may need to be removed by an urologist. We will send you home with a urine strainer. Be sure to urinate into this, or urinate into a container and pour the urine through the fine filter to catch the kidney stone as it comes out. The stone will seem like a pebble or grain of sand. Be sure to save this in a Ziploc  bag and take it to the doctor s office with you.       Return to the Emergency Department if:    Your pain is not controlled.    You are vomiting and can t keep fluids or medications down.    You develop fever (>101).    You feel much more ill or develop new symptoms.  What can I do to help myself?    Be sure to drink plenty of fluids.    Staying active is good, and may help the stone to pass. You may do whatever you feel up to doing without restrictions.   Treatment:    Non-steroidal anti-inflammatory drugs (NSAIDs). This includes prescription medicines like Toradol  (ketorolac) and non-prescription medicines like Advil  (ibuprofen) and Nuprin  (ibuprofen). These pain relievers are very effective for kidney stones.    Narcotic pain pills. If you have been given a narcotic such as Vicodin  (hydrocodone with acetaminophen), Percocet  (oxycodone with acetaminophen), or codeine, do not drive for four hours after you have taken it. If the narcotic contains  Tylenol  (acetaminophen), do not take Tylenol  with it. All narcotics will cause constipation, so eat a high fiber diet.      Nausea medication.  Nausea and vomiting are common with kidney stones, so your physician may send you home with medicine for this.     Flomax  (tamsulosin). This medicine is sometimes used for men with prostate problems, but also can help kidney stones to pass. This medicine can lower blood pressure, and you may feel faint, especially when you first stand up. Be sure to get up gradually, sit down if you feel faint, and avoid activity where feeling faint would be dangerous, such as climbing ladders.   If you were given a prescription for medicine here today, be sure to read all of the information (including the package insert) that comes with your prescription.  This will include important information about the medicine, its side effects, and any warnings that you need to know about.  The pharmacist who fills the prescription can provide more information and answer questions you may have about the medicine.  If you have questions or concerns that the pharmacist cannot address, please call or return to the Emergency Department.   Opioid Medication Information    Pain medications are among the most commonly prescribed medicines, so we are including this information for all our patients. If you did not receive pain medication or get a prescription for pain medicine, you can ignore it.     You may have been given a prescription for an opioid (narcotic) pain medicine and/or have received a pain medicine while here in the Emergency Department. These medicines can make you drowsy or impaired. You must not drive, operate dangerous equipment, or engage in any other dangerous activities while taking these medications. If you drive while taking these medications, you could be arrested for DUI, or driving under the influence. Do not drink any alcohol while you are taking these medications.     Opioid pain  medications can cause addiction. If you have a history of chemical dependency of any type, you are at a higher risk of becoming addicted to pain medications.  Only take these prescribed medications to treat your pain when all other options have been tried. Take it for as short a time and as few doses as possible. Store your pain pills in a secure place, as they are frequently stolen and provide a dangerous opportunity for children or visitors in your house to start abusing these powerful medications. We will not replace any lost or stolen medicine.  As soon as your pain is better, you should flush all your remaining medication.     Many prescription pain medications contain Tylenol  (acetaminophen), including Vicodin , Tylenol #3 , Norco , Lortab , and Percocet .  You should not take any extra pills of Tylenol  if you are using these prescription medications or you can get very sick.  Do not ever take more than 3000 mg of acetaminophen in any 24 hour period.    All opioids tend to cause constipation. Drink plenty of water and eat foods that have a lot of fiber, such as fruits, vegetables, prune juice, apple juice and high fiber cereal.  Take a laxative if you don t move your bowels at least every other day. Miralax , Milk of Magnesia, Colace , or Senna  can be used to keep you regular.      Remember that you can always come back to the Emergency Department if you are not able to see your regular doctor in the amount of time listed above, if you get any new symptoms, or if there is anything that worries you.

## 2018-03-10 NOTE — PLAN OF CARE
Problem: Patient Care Overview  Goal: Plan of Care/Patient Progress Review  Outcome: Adequate for Discharge Date Met: 03/09/18  Pt. discharged at 1600 to home, was accompanied by her mother, and left with personal belongings. Pt. received complete discharge paperwork and medications as filled by discharge pharmacy. Pt. was given times of last dose for all discharge medications in writing on discharge medication sheets. Discharge teaching included medication, pain management, activity restrictions, dressing changes, and signs and symptoms of infection. Dressing supplies sent home. Pt. had no further questions at the time of discharge and no unmet needs were identified.

## 2018-03-10 NOTE — ED NOTES
Pt complaining of 10/10 right sided abdominal pain.  Noted to be writhing in bed.  Grabbing seats.  Diaphoretic. Dr. Crawford notified. Obtained a 1mg IM dilaudid order. This medication did not reduce pain.  Ty notified.  Additional orders obtained.

## 2018-03-10 NOTE — ED PROVIDER NOTES
History     Chief Complaint:  Bleeding    HPI   Shelly Cerda is a 22 year old female who presents with bleeding. The patient reports that she had a nephrostomy tube placed 2 days ago for a kidney stone that was removed yesterday during a hospital admission. The tube was removed this morning at approximately 1000 and tonight, the patient started to have blood tinged urine discharge from the nephrostomy wound site and moments later started to bleed excessively through her dressings. On presentation bleeding is controlled but patient is in significant pain at the wound site and has not had her prescribed dose of oxycodone yet this evening. The patient is also warm and flush, but denies any nausea, abdominal pain, or any other symptoms.    Allergies:  No known drug allergies.    Medications:    ondansetron (ZOFRAN) 4 MG tablet  potassium citrate (UROCIT-K) 10 MEQ (1080 MG) CR tablet  oxyCODONE IR (ROXICODONE) 5 MG tablet  senna (SENOKOT) 8.6 MG tablet  acetaminophen (TYLENOL) 325 MG tablet  senna-docusate (SENOKOT-S;PERICOLACE) 8.6-50 MG per tablet  JUNEL FE 1.5/30 1.5-30 MG-MCG per tablet  LORazepam (ATIVAN) 1 MG tablet  brimonidine (ALPHAGAN) 0.2 % ophthalmic solution  ondansetron (ZOFRAN-ODT) 4 MG ODT tab  tolterodine (DETROL) 2 MG tablet  traMADol (ULTRAM) 50 MG tablet  Norethindrone Acet-Ethinyl Est (JUNEL 1.5/30 PO)    Past Medical History:    Complication of anesthesia  Cystinuria  Kidney stones  PONV    Past Surgical History:    Appendectomy  Cystoscopy  Lithotripsy  Ureteral stent placement    Family History:    Sister-cystinuria    Social History:  Smoking status: Never smoker  Alcohol use: Yes  Marital Status:  Single      Review of Systems   Constitutional: Negative for chills and fever.   Cardiovascular: Negative for chest pain.   Gastrointestinal: Negative for nausea and vomiting.   Genitourinary: Positive for difficulty urinating and hematuria. Negative for flank pain, pelvic pain and urgency.   Skin:  Positive for wound. Negative for color change.   Neurological: Negative for dizziness, weakness and headaches.   All other systems reviewed and are negative.      Physical Exam     Patient Vitals for the past 24 hrs:   BP Temp Temp src Heart Rate SpO2   03/10/18 0130 119/76 - - 101 97 %   03/10/18 0115 - - - 75 95 %   03/10/18 0100 116/81 - - 89 96 %   03/09/18 2240 118/74 99.5  F (37.5  C) Oral 94 92 %         Physical Exam  Constitutional:  Oriented to person, place, and time. Minor distress  HENT:   Head:    Normocephalic.   Mouth/Throat:   Oropharynx is clear and moist.   Eyes:    EOM are normal. Pupils are equal, round, and reactive to light.   Neck:    Neck supple.   Cardiovascular:  Normal rate, regular rhythm and normal heart sounds.      Exam reveals no gallop and no friction rub.       No murmur heard.  Pulmonary/Chest:  Effort normal and breath sounds normal.      No respiratory distress. No wheezes. No rales.      No reproducible chest wall pain.  Abdominal:   Soft. No distension. No tenderness. No rebound and no guarding.   Musculoskeletal:  Normal range of motion.   Neurological:   Alert and oriented to person, place, and time.           Moves all 4 extremities spontaneously    Skin:    Has 3cm horizontal open incision at right CVA nephrostomy site with old blood noted on pads.  There is small clots on the wound with no active bleeding, no erythema or purulence  Emergency Department Course   Laboratory:  CBC:  WBC 12.4 (H), HGB 12.4, ,  BMP:  (L), Glucose 105 (H), otherwise WNL (Creatinine 0.94)  (0054) ISTAT gases lactate venous POCT: lactic acid 0.8, pH 7.45 (H), pCO2 36 (L), pO2 56 (H), bicarbonate 25, O2 saturation 90    UA: Clear Red urine, Blood Large, Albumin 100, WBC 6 (H), RBC 13 (H), Bacteria Few, Squamous Epithelial 2 (H), otherwise WNL    Interventions:  (2311) Oxycodone 5 mg, PO   (2311) Zofran ODT, 4 mg, PO  (2348) Dilaudid, 1 mg, IV injection  (0054) Dilaudid, 0.5 mg, IV  injection  (0059) Normal Saline, 1 liter, IV bolus  (0054) Toradol, 30 mg, IV injection    Emergency Department Course:  Nursing notes and vitals reviewed.  (2230) I performed an exam of the patient as documented above.    Blood was drawn from the patient. This was sent for laboratory testing, findings above.   Urine sample was obtained and sent for laboratory analysis, findings above.  Gel foam dressing placed on patient after wound site cleaned.    (0142) I spoke with Urologist on-call, Dr. Dasilva, who agreed that patient should be safe for outpatient follow up with urology.   (0145) I revisited with the patient in their room to discuss results. Patient is feeling improved at this time and there was no continued bleeding from the site.     Findings and plan explained to the patient. Patient discharged home with instructions regarding supportive care, medications, and reasons to return. The importance of close follow-up was reviewed.   Impression & Plan    Medical Decision Making:  Shelly Cerda is a 22 year old female status post-nephrostomy tube placement and removal for urinary stone and obstruction. Is here for bleeding from nephrostomy wound site. On exam, I note a clot with no further bleeding. I did place gel foam with ABD pad and pressure dressing with abdominal binding. She has been observed here for greater than 2 hours and has had no further bleeding. She has had some significant pain, secondary due to procedure for renal cholic which has been somewhat controlled with interventions. Patient does feel improved enough to go home. She has no signs of infection, anemia, or significant blood loss. I did discuss the case with urology on-call whoa greed with this plan. They requested that mom changes the wound dressing within 24 hours and that sh should return with any new concerns or worsening pain, bleeding, fevers, or other symptoms. She should follow up with urology within the next 3-4  days.    Diagnosis:    ICD-10-CM   1. Nephrostomy complication (H) N99.528   2. Hemorrhage R58   3. Renal colic N23       Disposition:  Patient is discharged to home.            I, Lavon Alex, am serving as a scribe on 3/9/2018 at 10:30 PM to personally document services performed by Dr. Crawford based on my observations and the provider's statements to me.      Lavon Alex  3/9/2018   [facility] EMERGENCY DEPARTMENT       Yonathan Crawford MD  03/10/18 0431

## 2018-03-10 NOTE — ED NOTES
MD notified to observe blood flow from nephrostomy and bandages before removed.  All gauze removed.  Nephrostomy site appeared to no longer be bleeding. Gelfoam applied to nephrostomy site, 4x4, then an ABD pad, followed by an abdominal binder.  Pt binder checked several times and not active bleeding noted.  Patient up to commode and noted to have good urine output.  Pt appears to be more calm and is resting in bed drinking water.

## 2018-03-10 NOTE — TELEPHONE ENCOUNTER
Patient is calling and asks that I speak with her mother, Rosemarie Cerda (phone [phone]). Rosemarie and patient both report that patient had surgery yesterday (Right Percutaneous Nephrolithotomy) and left the hospital at 3:30 pm today. Patient states she is having bloody urinary drainage from the incision on her back that won't stop. Mother states she was given dressing supplies, but the dressings are not containing the drainage. Mother asks to speak with the urologist on call. Latha, at the answering service ([phone]), is contacted at 6:40 pm and will page the on call doctor for patient's urologist at the [facility], Dr Jayy Koehler and that is Dr Jeancarlos Lorenzo, the urology resident, who will call the patient's mother back. Mother will call back if no return call in 20 minutes. Mother agrees with plan.  Reason for Disposition    Dressing soaked with blood or body fluid (e.g., drainage)    Additional Information    Negative: [1] Major abdominal surgical wound AND [2] visible internal organs    Negative: Sounds like a life-threatening emergency to the triager    Surgical incision symptoms and questions    Negative: [1] Major abdominal surgical incision AND [2] wound gaping open AND [3] visible internal organs    Negative: Sounds like a life-threatening emergency to the triager    Negative: [1] Bleeding from incision AND [2] won't stop after 10 minutes of direct pressure    Negative: [1] Widespread rash AND [2] bright red, sunburn-like    Negative: Severe pain in the incision    Negative: [1] Suture came out early AND [2] wound gaping AND [3] < 48 hours since sutures placed    Negative: [1] Incision gaping open AND [2] length of opening > 2 inches (5 cm)    Negative: Patient sounds very sick or weak to the triager    Negative: Sounds like a serious complication to the triager    Negative: Fever > 100.5 F (38.1 C)    Negative: [1] Incision looks infected (spreading redness, pain) AND [2] fever > 99.5 F  (37.5 C)    Negative: [1] Incision looks infected (spreading redness, pain) AND [2] large red area (> 2 in. or 5 cm)    Negative: [1] Incision looks infected (spreading redness, pain) AND [2] face wound    Negative: [1] Red streak runs from the incision AND [2] longer than 1 inch (2.5 cm)    Negative: [1] Pus or bad-smelling fluid draining from incision AND [2] no fever    Negative: [1] Post-op pain AND [2] not controlled with pain medications    Protocols used: POST-OP INCISION SYMPTOMS-ADULT-AH, SUTURE OR STAPLE QUESTIONS-ADULT-AH

## 2018-03-10 NOTE — ED NOTES
Nephrostomy tube removed today that was placed for kidney stone.  Kidney stone resolved.  This evening on toilet and bright, red blood noted to be coming out of former nephrostomy wound.  Pt and mother tried to apply bandages to stop bleeding was unable to do so.  Came in roughly 30 mins after start of bleeding. Patient painful, temp 99.5 orally, al other VSS.

## 2018-03-12 ENCOUNTER — TELEPHONE (OUTPATIENT)
Dept: UROLOGY | Facility: CLINIC | Age: 23
End: 2018-03-12

## 2018-03-12 DIAGNOSIS — N20.0 CALCULUS OF KIDNEY: Primary | ICD-10-CM

## 2018-03-12 RX ORDER — TIOPRONIN 100 MG/1
300 TABLET, SUGAR COATED ORAL 3 TIMES DAILY
Qty: 300 TABLET | Refills: 3 | Status: SHIPPED | OUTPATIENT
Start: 2018-03-12 | End: 2018-05-29

## 2018-03-13 LAB
APPEARANCE STONE: NORMAL
COMPN STONE: NORMAL
NUMBER STONE: 4
SIZE STONE: NORMAL MM
WT STONE: 35 MG

## 2018-03-14 NOTE — TELEPHONE ENCOUNTER
PA Initiation    Medication: Tiopronin (THIOLA) 100 MG TABS tablet  Insurance Company: [employer] - Phone [phone] Fax [phone]  Pharmacy Filling the Rx: [id number] [place] [facility] [address]  Filling Pharmacy Phone: [phone]  Filling Pharmacy Fax:    Start Date: 3/14/2018    Central Prior Authorization Team   Phone: [phone]

## 2018-03-15 NOTE — TELEPHONE ENCOUNTER
Called [facility] at [phone] and representative will refax additional questions needed over.

## 2018-03-15 NOTE — TELEPHONE ENCOUNTER
Per additional questions from [facility] they state that the FDA approved dosing for initial therapy is 800mg/day divided in 3 doses with subsequent adjustment- the script does not reflect this, also if she has a urinary cystine greater than 500mg/day, was unable to locate in chart to verify, forwarding to clinic to advise:

## 2018-03-16 NOTE — TELEPHONE ENCOUNTER
PRIOR AUTHORIZATION DENIED    Medication: Tiopronin (THIOLA) 100 MG TABS tablet DENIED    Denial Date: 3/16/2018    Denial Rational: Denied as they need to know that patient has a urinay cystine greater than 500mg/day, who are resistant to treatment of high fluid intake, alkali and diet modification or who have adverse reactions to d-penicillamine:              Appeal Information:

## 2018-03-20 ENCOUNTER — OFFICE VISIT (OUTPATIENT)
Dept: UROLOGY | Facility: CLINIC | Age: 23
End: 2018-03-20
Payer: COMMERCIAL

## 2018-03-20 VITALS
BODY MASS INDEX: 32.51 KG/M2 | WEIGHT: 183.5 LBS | SYSTOLIC BLOOD PRESSURE: 129 MMHG | DIASTOLIC BLOOD PRESSURE: 77 MMHG | HEIGHT: 63 IN | HEART RATE: 104 BPM

## 2018-03-20 DIAGNOSIS — E72.09 CYSTINE STONES (H): ICD-10-CM

## 2018-03-20 DIAGNOSIS — E72.09 CYSTINE STONES (H): Primary | ICD-10-CM

## 2018-03-20 LAB
ALBUMIN UR-MCNC: NEGATIVE MG/DL
ANION GAP SERPL CALCULATED.3IONS-SCNC: 8 MMOL/L (ref 3–14)
APPEARANCE UR: CLEAR
BACTERIA #/AREA URNS HPF: ABNORMAL /HPF
BILIRUB UR QL STRIP: NEGATIVE
BUN SERPL-MCNC: 10 MG/DL (ref 7–30)
CALCIUM SERPL-MCNC: 9.3 MG/DL (ref 8.5–10.1)
CHLORIDE SERPL-SCNC: 103 MMOL/L (ref 94–109)
CO2 SERPL-SCNC: 26 MMOL/L (ref 20–32)
COLOR UR AUTO: ABNORMAL
CREAT SERPL-MCNC: 0.86 MG/DL (ref 0.52–1.04)
GFR SERPL CREATININE-BSD FRML MDRD: 82 ML/MIN/1.7M2
GLUCOSE SERPL-MCNC: 88 MG/DL (ref 70–99)
GLUCOSE UR STRIP-MCNC: NEGATIVE MG/DL
HGB UR QL STRIP: NEGATIVE
KETONES UR STRIP-MCNC: NEGATIVE MG/DL
LEUKOCYTE ESTERASE UR QL STRIP: NEGATIVE
NITRATE UR QL: NEGATIVE
PH UR STRIP: 8 PH (ref 5–7)
POTASSIUM SERPL-SCNC: 4 MMOL/L (ref 3.4–5.3)
RBC #/AREA URNS AUTO: <1 /HPF (ref 0–2)
SODIUM SERPL-SCNC: 137 MMOL/L (ref 133–144)
SOURCE: ABNORMAL
SP GR UR STRIP: 1 (ref 1–1.03)
UROBILINOGEN UR STRIP-MCNC: 0 MG/DL (ref 0–2)
WBC #/AREA URNS AUTO: 1 /HPF (ref 0–5)

## 2018-03-20 ASSESSMENT — PAIN SCALES - GENERAL: PAINLEVEL: NO PAIN (0)

## 2018-03-20 NOTE — LETTER
"3/20/2018       RE: Shelly Cerda  [address] [facility] [id number]     Dear Colleague,    Thank you for referring your patient, Shelly Cerda, to the [facility] UROLOGY AND INST FOR PROSTATE AND UROLOGIC CANCERS at [facility]. Please see a copy of my visit note below.      UROLOGY FOLLOW-UP NOTE          Chief Complaint:   Today I had the pleasure of seeing Ms. Shelly Cerda in follow-up for a chief complaint of nephrolithiasis.         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 Her stone activity had been relatively quiet off medication until 10/2017 when she had a large left ureteral stone treated with staged ureteroscopy. Approximately one month ago, she was discovered to have a new right renal stone. This was treated with R PCNL on 3/8/2018. She presents today for a routine postoperative follow up.     Today notes:   She has been doing well overall. She notes some discomfort with sneezing, yawning, and deep breathing but this has been gradually improving.   No hematuria, dysuria, UTI.  She is taking potassium citrate and tolerating it well (10TID)  Have been working on trying to obtain prior auth for thiola.         Physical Exam:   Patient is a 22 year old  female   Vitals: Height 1.6 m (5' 2.99\"), weight 83.2 kg (183 lb 8 oz), not currently breastfeeding.  Notable Findings on Exam well-nourished female in no apparent distress.  Right flank wound covered in dry dressing.        Labs and Pathology:    I personally reviewed all applicable laboratory data and went over findings with patient  Significant for:    CBC RESULTS:  Recent Labs   Lab Test  03/09/18   2300  03/09/18   0441  03/08/18   1325  03/08/18   0806   WBC  12.1*  13.7*  10.3  7.5   HGB  12.4  12.5  13.0  14.5   PLT  287  258  269  257        BMP RESULTS:  Recent Labs   Lab " Test  03/09/18   2300  03/09/18   0441  03/08/18   1325  02/13/18   0856   NA  132*  135  139  137   POTASSIUM  3.8  4.6  4.5  4.4   CHLORIDE  100  101  108  105   CO2  25  27  24  26   ANIONGAP  7  7  7  6   GLC  105*  110*  94  95   BUN  14  13  11  15   CR  0.94  0.86  0.86  0.89   GFRESTIMATED  74  81  82  78   GFRESTBLACK  89  >90  >90  >90   VALERIANO  8.8  8.5  8.6  8.8       UA RESULTS:   Recent Labs   Lab Test  03/09/18   2330  02/13/18   0904  10/12/17   0210   SG  1.003  1.023  1.012   URINEPH  7.0  6.0  6.0   NITRITE  Negative  Negative  Negative   RBCU  13*  >182*  5*   WBCU  6*  0  17*     Color Urine (no units)   Date Value   03/20/2018 Straw     Appearance Urine (no units)   Date Value   03/20/2018 Clear     Glucose Urine (mg/dL)   Date Value   03/20/2018 Negative     Bilirubin Urine (no units)   Date Value   03/20/2018 Negative     Ketones Urine (mg/dL)   Date Value   03/20/2018 Negative     Specific Gravity Urine (no units)   Date Value   03/20/2018 1.004     pH Urine (pH)   Date Value   03/20/2018 8.0 (H)     Protein Albumin Urine (mg/dL)   Date Value   03/20/2018 Negative     Urobilinogen Urine (EU/dL)   Date Value   09/03/2012 0.2     Nitrite Urine (no units)   Date Value   03/20/2018 Negative     Leukocyte Esterase Urine (no units)   Date Value   03/20/2018 Negative                Assessment/Plan   22 year old female w/ hx of 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  - Repeat formal 24hr urine analysis on urocitK  - Continue on potassium citrate, urine pH is higher on UA today  - BMP today to check K  - Follow up in 2 months with Renal US and for litholink review  - Continue to see if can get thiola approval  -High fluid, low salt, low animal protein diet         Past Medical History:     Past Medical History:   Diagnosis Date     Complication of anesthesia      Cystinuria (H)      Kidney stones      PONV (postoperative nausea and vomiting)             Past Surgical History:     Past Surgical History:    Procedure Laterality Date     APPENDECTOMY  9/2011     COMBINED CYSTOSCOPY, RETROGRADES, URETEROSCOPY, INSERT STENT Left 10/12/2017    Procedure: COMBINED CYSTOSCOPY, RETROGRADES, URETEROSCOPY, INSERT STENT;  COMBINED CYSTOSCOPY, RETROGRADES, URETEROSCOPY, LASER HOLMIUM STANDBY,  INSERT STENT LEFT URETER;  Surgeon: Luis Banda MD;  Location: [facility]     CYSTOSCOPY       LASER HOLMIUM LITHOTRIPSY URETER(S), INSERT STENT, COMBINED  9/4/2012    Procedure: COMBINED CYSTOSCOPY, URETEROSCOPY, LASER HOLMIUM LITHOTRIPSY URETER(S), INSERT STENT;  Holmium Laser Lithotripsy.  Right Stent Placement;  Surgeon: Cathi Hernandez MD;  [facility]     PERCUTANEOUS NEPHROLITHOTOMY Right 3/8/2018    Procedure: PERCUTANEOUS NEPHROLITHOTOMY;  Right Percutaneous Nephrolithotomy;  Surgeon: Jayy Koehler MD;  [facility]     Ureteral stent placement with subsequent removal.  2017            Medications     Current Outpatient Prescriptions   Medication     Tiopronin (THIOLA) 100 MG TABS tablet     ondansetron (ZOFRAN) 4 MG tablet     potassium citrate (UROCIT-K) 10 MEQ (1080 MG) CR tablet     oxyCODONE IR (ROXICODONE) 5 MG tablet     acetaminophen (TYLENOL) 325 MG tablet     senna (SENOKOT) 8.6 MG tablet     acetaminophen (TYLENOL) 325 MG tablet     oxyCODONE IR (ROXICODONE) 5 MG tablet     senna-docusate (SENOKOT-S;PERICOLACE) 8.6-50 MG per tablet     JUNEL FE 1.5/30 1.5-30 MG-MCG per tablet     LORazepam (ATIVAN) 1 MG tablet     brimonidine (ALPHAGAN) 0.2 % ophthalmic solution     ondansetron (ZOFRAN-ODT) 4 MG ODT tab     tolterodine (DETROL) 2 MG tablet     traMADol (ULTRAM) 50 MG tablet     Norethindrone Acet-Ethinyl Est (JUNEL 1.5/30 PO)     No current facility-administered medications for this visit.             Family History:     Family History   Problem Relation Age of Onset     Genetic Disorder Sister      Cystinuria            Social History:     Social History     Social History     Marital  status: Single     Spouse name: N/A     Number of children: N/A     Years of education: N/A     Occupational History     Not on file.     Social History Main Topics     Smoking status: Never Smoker     Smokeless tobacco: Never Used     Alcohol use Yes     Drug use: No     Sexual activity: Not on file     Other Topics Concern     Not on file     Social History Narrative    Shelly lives with both parents and 4 siblings in [place]. She is due to start senior year at [employer] this year. 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    She denies any ETOH, cigarette, illicit drug use. She denies any sexual activity.            Allergies:   Review of patient's allergies indicates no known allergies.         Review of Systems:  From intake questionnaire   Negative 14 system review except as noted on HPI, nurse's note.    Scribe Disclosure:   Lee RAPP, am serving as a scribe; to document services personally performed by Jayy Koehler MD based on data collection and the provider's statements to me.     IJayy saw and evaluated this patient and agree with the plan as stated above.  I personally performed all listed procedures.    CC:  [facility]      >15 minutes were spent face to face with patient over half of which was spent providing medical counseling regarding cystinuria/kidney stone prevention.      Again, thank you for allowing me to participate in the care of your patient.      Sincerely,    Jayy Koehler MD

## 2018-03-20 NOTE — NURSING NOTE
"Chief Complaint   Patient presents with     RECHECK     Kidney stone follow up       Blood pressure 129/77, pulse 104, height 1.6 m (5' 2.99\"), weight 83.2 kg (183 lb 8 oz), not currently breastfeeding. Body mass index is 32.52 kg/(m^2).    Patient Active Problem List   Diagnosis     Cystinuria (H)     Nausea with vomiting     Ureterolithiasis     Acute nontraumatic kidney injury (H)     Acute abdominal pain in right flank     Constipation due to pain medication     Group B streptococcal infection     Ureteral stone     Urolithiasis     Postoperative pain       No Known Allergies    Current Outpatient Prescriptions   Medication Sig Dispense Refill     Tiopronin (THIOLA) 100 MG TABS tablet Take 3 tablets (300 mg) by mouth 3 times daily 300 tablet 3     potassium citrate (UROCIT-K) 10 MEQ (1080 MG) CR tablet Take 1 tablet (10 mEq) by mouth 3 times daily (with meals) 90 tablet 3     acetaminophen (TYLENOL) 325 MG tablet Take 2 tablets (650 mg) by mouth every 4 hours 100 tablet 0     JUNEL FE 1.5/30 1.5-30 MG-MCG per tablet TAKE 1 TABLET BY ORAL ROUTE ONCE DAILY  3     LORazepam (ATIVAN) 1 MG tablet TAKE 2.5 TABLETS BY MOUTH 2 HOURS BEFORE DENTAL APPOINTMENT. BRING 1.5 TABLETS TO APPOINTMENT  0     brimonidine (ALPHAGAN) 0.2 % ophthalmic solution          Social History   Substance Use Topics     Smoking status: Never Smoker     Smokeless tobacco: Never Used     Alcohol use Yes       LIYA Scott  3/20/2018  11:34 AM       "

## 2018-03-20 NOTE — PATIENT INSTRUCTIONS
Labs today.    Return in two months with a renal US and a completed litholink.    It was a pleasure meeting with you today.  Thank you for allowing me and my team the privilege of caring for you today.  YOU are the reason we are here, and I truly hope we provided you with the excellent service you deserve.  Please let us know if there is anything else we can do for you so that we can be sure you are leaving completely satisfied with your care experience.

## 2018-03-20 NOTE — PROGRESS NOTES
"  UROLOGY FOLLOW-UP NOTE          Chief Complaint:   Today I had the pleasure of seeing Ms. Shelly Cerda in follow-up for a chief complaint of nephrolithiasis.         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 Her stone activity had been relatively quiet off medication until 10/2017 when she had a large left ureteral stone treated with staged ureteroscopy. Approximately one month ago, she was discovered to have a new right renal stone. This was treated with R PCNL on 3/8/2018. She presents today for a routine postoperative follow up.     Today notes:   She has been doing well overall. She notes some discomfort with sneezing, yawning, and deep breathing but this has been gradually improving.   No hematuria, dysuria, UTI.  She is taking potassium citrate and tolerating it well (10TID)  Have been working on trying to obtain prior auth for thiola.         Physical Exam:   Patient is a 22 year old  female   Vitals: Height 1.6 m (5' 2.99\"), weight 83.2 kg (183 lb 8 oz), not currently breastfeeding.  Notable Findings on Exam well-nourished female in no apparent distress.  Right flank wound covered in dry dressing.        Labs and Pathology:    I personally reviewed all applicable laboratory data and went over findings with patient  Significant for:    CBC RESULTS:  Recent Labs   Lab Test  03/09/18   2300  03/09/18   0441  03/08/18   1325  03/08/18   0806   WBC  12.1*  13.7*  10.3  7.5   HGB  12.4  12.5  13.0  14.5   PLT  287  258  269  257        BMP RESULTS:  Recent Labs   Lab Test  03/09/18   2300  03/09/18   0441  03/08/18   1325  02/13/18   0856   NA  132*  135  139  137   POTASSIUM  3.8  4.6  4.5  4.4   CHLORIDE  100  101  108  105   CO2  25  27  24  26   ANIONGAP  7  7  7  6   GLC  105*  110*  94  95   BUN  14  13  11  15   CR  0.94  0.86  0.86  0.89   GFRESTIMATED  74  81  82  78 "   GFRESTBLACK  89  >90  >90  >90   VALERIANO  8.8  8.5  8.6  8.8       UA RESULTS:   Recent Labs   Lab Test  03/09/18   2330  02/13/18   0904  10/12/17   0210   SG  1.003  1.023  1.012   URINEPH  7.0  6.0  6.0   NITRITE  Negative  Negative  Negative   RBCU  13*  >182*  5*   WBCU  6*  0  17*     Color Urine (no units)   Date Value   03/20/2018 Straw     Appearance Urine (no units)   Date Value   03/20/2018 Clear     Glucose Urine (mg/dL)   Date Value   03/20/2018 Negative     Bilirubin Urine (no units)   Date Value   03/20/2018 Negative     Ketones Urine (mg/dL)   Date Value   03/20/2018 Negative     Specific Gravity Urine (no units)   Date Value   03/20/2018 1.004     pH Urine (pH)   Date Value   03/20/2018 8.0 (H)     Protein Albumin Urine (mg/dL)   Date Value   03/20/2018 Negative     Urobilinogen Urine (EU/dL)   Date Value   09/03/2012 0.2     Nitrite Urine (no units)   Date Value   03/20/2018 Negative     Leukocyte Esterase Urine (no units)   Date Value   03/20/2018 Negative                Assessment/Plan   22 year old female w/ hx of 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  - Repeat formal 24hr urine analysis on urocitK  - Continue on potassium citrate, urine pH is higher on UA today  - BMP today to check K  - Follow up in 2 months with Renal US and for litholink review  - Continue to see if can get thiola approval  -High fluid, low salt, low animal protein diet         Past Medical History:     Past Medical History:   Diagnosis Date     Complication of anesthesia      Cystinuria (H)      Kidney stones      PONV (postoperative nausea and vomiting)             Past Surgical History:     Past Surgical History:   Procedure Laterality Date     APPENDECTOMY  9/2011     COMBINED CYSTOSCOPY, RETROGRADES, URETEROSCOPY, INSERT STENT Left 10/12/2017    Procedure: COMBINED CYSTOSCOPY, RETROGRADES, URETEROSCOPY, INSERT STENT;  COMBINED CYSTOSCOPY, RETROGRADES, URETEROSCOPY, LASER HOLMIUM STANDBY,  INSERT STENT LEFT URETER;   Surgeon: Luis Banda MD;  Location: [facility]     CYSTOSCOPY       LASER HOLMIUM LITHOTRIPSY URETER(S), INSERT STENT, COMBINED  9/4/2012    Procedure: COMBINED CYSTOSCOPY, URETEROSCOPY, LASER HOLMIUM LITHOTRIPSY URETER(S), INSERT STENT;  Holmium Laser Lithotripsy.  Right Stent Placement;  Surgeon: Cathi Hernandez MD;  [facility]     PERCUTANEOUS NEPHROLITHOTOMY Right 3/8/2018    Procedure: PERCUTANEOUS NEPHROLITHOTOMY;  Right Percutaneous Nephrolithotomy;  Surgeon: Jayy Koehler MD;  [facility]     Ureteral stent placement with subsequent removal.  2017            Medications     Current Outpatient Prescriptions   Medication     Tiopronin (THIOLA) 100 MG TABS tablet     ondansetron (ZOFRAN) 4 MG tablet     potassium citrate (UROCIT-K) 10 MEQ (1080 MG) CR tablet     oxyCODONE IR (ROXICODONE) 5 MG tablet     acetaminophen (TYLENOL) 325 MG tablet     senna (SENOKOT) 8.6 MG tablet     acetaminophen (TYLENOL) 325 MG tablet     oxyCODONE IR (ROXICODONE) 5 MG tablet     senna-docusate (SENOKOT-S;PERICOLACE) 8.6-50 MG per tablet     JUNEL FE 1.5/30 1.5-30 MG-MCG per tablet     LORazepam (ATIVAN) 1 MG tablet     brimonidine (ALPHAGAN) 0.2 % ophthalmic solution     ondansetron (ZOFRAN-ODT) 4 MG ODT tab     tolterodine (DETROL) 2 MG tablet     traMADol (ULTRAM) 50 MG tablet     Norethindrone Acet-Ethinyl Est (JUNEL 1.5/30 PO)     No current facility-administered medications for this visit.             Family History:     Family History   Problem Relation Age of Onset     Genetic Disorder Sister      Cystinuria            Social History:     Social History     Social History     Marital status: Single     Spouse name: N/A     Number of children: N/A     Years of education: N/A     Occupational History     Not on file.     Social History Main Topics     Smoking status: Never Smoker     Smokeless tobacco: Never Used     Alcohol use Yes     Drug use: No     Sexual activity: Not on file     Other  Topics Concern     Not on file     Social History Narrative    Shelly lives with both parents and 4 siblings in [place]. She is due to start senior year at [employer] this year. 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    She denies any ETOH, cigarette, illicit drug use. She denies any sexual activity.            Allergies:   Review of patient's allergies indicates no known allergies.         Review of Systems:  From intake questionnaire   Negative 14 system review except as noted on HPI, nurse's note.    Scribe Disclosure:   ILee, am serving as a scribe; to document services personally performed by Jayy Koehler MD based on data collection and the provider's statements to me.     IJayy saw and evaluated this patient and agree with the plan as stated above.  I personally performed all listed procedures.    CC:  [facility]      >15 minutes were spent face to face with patient over half of which was spent providing medical counseling regarding cystinuria/kidney stone prevention.

## 2018-04-09 ENCOUNTER — TELEPHONE (OUTPATIENT)
Dept: UROLOGY | Facility: CLINIC | Age: 23
End: 2018-04-09

## 2018-04-09 NOTE — TELEPHONE ENCOUNTER
Medication Appeal Initiation    We have initiated an appeal for the requested medication:  Medication: Tiopronin (THIOLA) 100 MG TABS tablet DENIED; APPEAL INITIATED  Appeal Start Date:  4/9/2018  Insurance Company: [employer] - Phone [phone] Fax [phone]  Comments:  Manually faxed appeal along with letter of medical necessity to [facility] at fax# [phone]; ph: [phone]

## 2018-04-10 NOTE — TELEPHONE ENCOUNTER
Called Anastacia from [facility] back she stated that a few things addressed in the denial were not added with the appeal, that they did not receive any documentation that patient has had an adverse reaction to d-penicillamine or that other treatment with conservative measures were previously done such as high fluid intake, or alkali and diet modification. Her direct phone is [phone] and fax# [phone]. Please note the litholink showing her urinary cystine as 1150 was faxed.  Was unable to find any notes of failure of d-penicillamine, routing back to clinic to advise.

## 2018-04-10 NOTE — TELEPHONE ENCOUNTER
Anastacia from [facility] called for additional info. Please fax or provide info if patient has a urinay cystine greater than 500mg/day, who are resistant to treatment of high fluid intake, alkali and diet modification or who have adverse reactions to d-penicillamine. Also, if the provider is really ok with 800mg or should the PA still be tried for 900mg per day?- I stated to try for the 900mg per day. (800mg is the max FDA will approve)    [employer]  Fax: [phone]  Phone:[phone]

## 2018-04-12 NOTE — TELEPHONE ENCOUNTER
MEDICATION APPEAL DENIED    Second level appeals will be managed by the clinic staff and provider. Please contact the [employer] Prior Authorization Team if additional information about the denial is needed.    Medication: Tiopronin (THIOLA) 100 MG TABS tablet DENIED; APPEAL DENIED    Denial Date: 4/12/2018    Denial Rational: Denied as patient does not have the required diagnosis and has not tried and failed conservative treatments alone with not having tried and failed captopril:                    Second Level Appeal Information:         Second level appeals will be managed by the clinic staff and provider. Please contact the [employer] Prior Authorization Team if additional information about the denial is needed.

## 2018-04-12 NOTE — TELEPHONE ENCOUNTER
Central Prior Authorization Team   Phone: [phone]    2nd level appeals would have to go through clinic. Thanks.    [facility] [employer] stated the appeal has been denied due to no records were sent relating to conservative measures.   The denial is faxed to PA Team and we should receive it shortly. Will document denial when receive.     See telephone encounter 3/12/2018 for more notes, thanks.

## 2018-04-14 ENCOUNTER — TRANSFERRED RECORDS (OUTPATIENT)
Dept: HEALTH INFORMATION MANAGEMENT | Facility: CLINIC | Age: 23
End: 2018-04-14

## 2018-05-07 NOTE — TELEPHONE ENCOUNTER
[facility] Call Center    Phone Message    May a detailed message be left on voicemail: no    Reason for Call: Other: Pts pharamcy requesting that the denial be faxed to them at [phone]. Thanks     Action Taken: Message routed to:  [facility] Uro

## 2018-05-10 ENCOUNTER — PRE VISIT (OUTPATIENT)
Dept: UROLOGY | Facility: CLINIC | Age: 23
End: 2018-05-10

## 2018-05-10 NOTE — TELEPHONE ENCOUNTER
2 month follow up with imaging prior.  [facility] which is available in Epic.

## 2018-05-14 DIAGNOSIS — N20.0 CALCULUS OF KIDNEY: Primary | ICD-10-CM

## 2018-05-15 ENCOUNTER — PRE VISIT (OUTPATIENT)
Dept: UROLOGY | Facility: CLINIC | Age: 23
End: 2018-05-15

## 2018-05-15 NOTE — TELEPHONE ENCOUNTER
Dr. Koehler ordered cystine litholink yesterday, I am sending order today. Left voicemail for patient requesting immediate completion and return once she receives kit.    Will need to move imaging and clinic visit out if new Litholink not received on time.

## 2018-05-21 ENCOUNTER — TELEPHONE (OUTPATIENT)
Dept: UROLOGY | Facility: CLINIC | Age: 23
End: 2018-05-21

## 2018-05-21 NOTE — TELEPHONE ENCOUNTER
[facility] Call Center    Phone Message    May a detailed message be left on voicemail: yes    Reason for Call: Medication Question or concern regarding medication   Prescription Clarification  Name of Medication: Tiopronin (THIOLA) 100 MG TABS tablet  Prescribing Provider: Dr Koehler   What on the order needs clarification? Pt has recently stopped taking Thiola because she had too many side effects. Pt is requesting a call back from a nurse to discuss if she should be taking something else instead or what she should be doing. Please call back to discuss. Thanks          Action Taken: Message routed to:  [facility] Uro

## 2018-05-29 ENCOUNTER — OFFICE VISIT (OUTPATIENT)
Dept: UROLOGY | Facility: CLINIC | Age: 23
End: 2018-05-29
Payer: COMMERCIAL

## 2018-05-29 ENCOUNTER — RADIANT APPOINTMENT (OUTPATIENT)
Dept: ULTRASOUND IMAGING | Facility: CLINIC | Age: 23
End: 2018-05-29
Attending: UROLOGY
Payer: COMMERCIAL

## 2018-05-29 VITALS
BODY MASS INDEX: 33.4 KG/M2 | SYSTOLIC BLOOD PRESSURE: 126 MMHG | HEART RATE: 103 BPM | DIASTOLIC BLOOD PRESSURE: 75 MMHG | HEIGHT: 63 IN | WEIGHT: 188.5 LBS

## 2018-05-29 DIAGNOSIS — E72.01 CYSTINURIA (H): Primary | ICD-10-CM

## 2018-05-29 DIAGNOSIS — E72.09 CYSTINE STONES (H): ICD-10-CM

## 2018-05-29 RX ORDER — NORGESTIMATE AND ETHINYL ESTRADIOL 0.25-0.035
1 KIT ORAL DAILY
Refills: 0 | COMMUNITY
Start: 2018-04-30 | End: 2018-05-29

## 2018-05-29 ASSESSMENT — PAIN SCALES - GENERAL: PAINLEVEL: NO PAIN (0)

## 2018-05-29 NOTE — NURSING NOTE
Chief Complaint   Patient presents with     RECHECK     kidney stone management     Damian Mijares LPN

## 2018-05-29 NOTE — LETTER
"5/29/2018       RE: Shelly Cerda  [address] [address] [facility] [address]     Dear Colleague,    Thank you for referring your patient, Shelly Cerda, to the [facility] UROLOGY AND INST FOR PROSTATE AND UROLOGIC CANCERS at [facility]. Please see a copy of my visit note below.    UROLOGY FOLLOW-UP NOTE          Chief Complaint:   Today I had the pleasure of seeing Ms. Shelly Cerda in follow-up for a chief complaint of kidney stones and cystinuria.          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 Her stone activity had been relatively quiet off medication until 10/2017 when she had a large left ureteral stone treated with staged ureteroscopy. Approximately 3 months ago, she was found to have a new right renal stone. This was treated with R PCNL on 3/8/2018.     At last visit we had started thiola    Today notes:     She had an allergic reaction (rash, fever, chills) to thiola one week ago. She subsequently discontinued this.    She states she is feeling fine today. Renal US from today demonstrates no stones. She is continuing to take potassium citrate.     She denies any new stone episodes or hematuria since last being seen.          Physical Exam:   Patient is a 22 year old  female   Vitals: Blood pressure 126/75, pulse 103, height 1.6 m (5' 3\"), weight 85.5 kg (188 lb 8 oz), not currently breastfeeding.  Notable Findings on Exam well-nourished female in no apparent distress.   No CVAT bilaterally      Labs and Pathology:    I personally reviewed all applicable laboratory data and went over findings with patient  Significant for:    CBC RESULTS:  Recent Labs   Lab Test  03/09/18   2300  03/09/18   0441  03/08/18   1325  03/08/18   0806   WBC  12.1*  13.7*  10.3  7.5   HGB  12.4  12.5  13.0  14.5   PLT  287  258  269  257        BMP " RESULTS:  Recent Labs   Lab Test  03/20/18   1222  03/09/18   2300  03/09/18   0441  03/08/18   1325   NA  137  132*  135  139   POTASSIUM  4.0  3.8  4.6  4.5   CHLORIDE  103  100  101  108   CO2  26  25  27  24   ANIONGAP  8  7  7  7   GLC  88  105*  110*  94   BUN  10  14  13  11   CR  0.86  0.94  0.86  0.86   GFRESTIMATED  82  74  81  82   GFRESTBLACK  >90  89  >90  >90   VALERIANO  9.3  8.8  8.5  8.6       UA RESULTS:   Recent Labs   Lab Test  03/20/18   1222  03/09/18   2330  02/13/18   0904   SG  1.004  1.003  1.023   URINEPH  8.0*  7.0  6.0   NITRITE  Negative  Negative  Negative   RBCU  <1  13*  >182*   WBCU  1  6*  0         Imaging:    I personally reviewed all applicable imaging and went over the below findings with patient.    Results for orders placed or performed in visit on 05/29/18   Renal US    Narrative    EXAMINATION: US RENAL COMPLETE, 5/29/2018 2:49 PM     COMPARISON: CT abdomen pelvis 3/3/2018.    HISTORY: Right renal calculus, status post removal.    FINDINGS:    Right kidney: Measures 11.8 cm in length. Parenchyma is of normal  thickness and echogenicity. No focal mass. No hydronephrosis.    Left kidney: Measures 11.9 cm in length. Parenchyma is of normal  thickness and echogenicity. No focal mass. No hydronephrosis.     Bladder: Unremarkable.        Impression    IMPRESSION:  Normal renal ultrasound exam. In particular, there is no renal  calculus suspected.    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 If these are normal, recommend obtaining Renal US and XR KUB every 6 months.            Past Medical History:     Past Medical History:   Diagnosis Date     Complication of  anesthesia      Cystinuria (H)      Kidney stones      PONV (postoperative nausea and vomiting)             Past Surgical History:     Past Surgical History:   Procedure Laterality Date     APPENDECTOMY  9/2011     COMBINED CYSTOSCOPY, RETROGRADES, URETEROSCOPY, INSERT STENT Left 10/12/2017    Procedure: COMBINED CYSTOSCOPY, RETROGRADES, URETEROSCOPY, INSERT STENT;  COMBINED CYSTOSCOPY, RETROGRADES, URETEROSCOPY, LASER HOLMIUM STANDBY,  INSERT STENT LEFT URETER;  Surgeon: Luis Banda MD;  Location: [facility]     CYSTOSCOPY       LASER HOLMIUM LITHOTRIPSY URETER(S), INSERT STENT, COMBINED  9/4/2012    Procedure: COMBINED CYSTOSCOPY, URETEROSCOPY, LASER HOLMIUM LITHOTRIPSY URETER(S), INSERT STENT;  Holmium Laser Lithotripsy.  Right Stent Placement;  Surgeon: Cathi Hernandez MD;  [facility]     PERCUTANEOUS NEPHROLITHOTOMY Right 3/8/2018    Procedure: PERCUTANEOUS NEPHROLITHOTOMY;  Right Percutaneous Nephrolithotomy;  Surgeon: Jayy Koehler MD;  Location[facility]     Ureteral stent placement with subsequent removal.  2017            Medications     Current Outpatient Prescriptions   Medication     potassium citrate (UROCIT-K) 10 MEQ (1080 MG) CR tablet     No current facility-administered medications for this visit.             Family History:     Family History   Problem Relation Age of Onset     Genetic Disorder Sister      Cystinuria            Social History:     Social History     Social History     Marital status: Single     Spouse name: N/A     Number of children: N/A     Years of education: N/A     Occupational History     Not on file.     Social History Main Topics     Smoking status: Never Smoker     Smokeless tobacco: Never Used     Alcohol use Yes     Drug use: No     Sexual activity: Not on file     Other Topics Concern     Not on file     Social History Narrative    Shelly lives with both parents and 4 siblings in [place]. She is due to start senior year at [employer] this year. 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    She denies any ETOH, cigarette, illicit drug use. She denies any sexual activity.            Allergies:   Review of patient's allergies indicates no known allergies.         Review of Systems:  From intake questionnaire   Negative 14 system review except as noted on HPI, nurse's note.    Scribe Disclosure:   I,Lee Kerr, am serving as a scribe; to document services personally performed by Jayy Koehler MD based on data collection and the provider's statements to me.     I, Jayy Koehler saw and evaluated this patient and agree with the plan as stated above.  I personally performed all listed procedures.        >15 minutes were spent face to face with patient over half of which was spent providing medical counseling regarding cystinuria/kidney stone prevention    Again, thank you for allowing me to participate in the care of your patient.      Sincerely,    Jayy Koehler MD    CC:  [facility]

## 2018-05-29 NOTE — PATIENT INSTRUCTIONS
Schedule telephone follow up in one week with Dr. Nichole      Schedule 3 month follow up with imaging(xray and renal ultrasound) prior to appointment.    It was a pleasure meeting with you today.  Thank you for allowing me and my team the privilege of caring for you today.  YOU are the reason we are here, and I truly hope we provided you with the excellent service you deserve.  Please let us know if there is anything else we can do for you so that we can be sure you are leaving completely satisfied with your care experience.        Jake Montalvo MA

## 2018-05-29 NOTE — MR AVS SNAPSHOT
After Visit Summary   5/29/2018    Shelly Cerda    MRN: [id number]           Patient Information     Date Of Birth          1995        Visit Information        Provider Department      5/29/2018 3:40 PM Jayy Koehler MD [facility] Urology and [facility] for Prostate and Urologic Cancers        Today's Diagnoses     Cystinuria (H)    -  1      Care Instructions    Schedule telephone follow up in one week with Dr. Nichole      Schedule 3 month follow up with imaging(xray and renal ultrasound) prior to appointment.    It was a pleasure meeting with you today.  Thank you for allowing me and my team the privilege of caring for you today.  YOU are the reason we are here, and I truly hope we provided you with the excellent service you deserve.  Please let us know if there is anything else we can do for you so that we can be sure you are leaving completely satisfied with your care experience.        Jake Montalvo MA           Follow-ups after your visit        Your next 10 appointments already scheduled     Jun 12, 2018  7:20 AM CDT   Telephone Call with Jayy Koehler MD   [facility] Urology and [facility] for Prostate and Urologic Cancers ([facility])    [address] [place] [address] [phone]           Note: this is not an onsite visit; there is no need to come to the facility.  Thank you for choosing [facility] for your health care needs. Below is some information for patients who are interested in having their follow-up visit with a physician by telephone. In some cases, a telephone visit can be an effective and convenient way to manage your follow-up care. Choosing a telephone visit rather than a face to face visit for your follow-up care is a decision that you and your physician can make together to ensure it meets all of your needs.  A face to face visit is always an available option, if you choose to do so.   We want to make sure you have all of the information you need about the telephone visit option and answer all of your questions before you decide to schedule a telephone follow-up visit. If you have any questions, you may talk to a staff member or our financial counselor at [phone].  1. General overview Our clinic sees patients for a variety of conditions and concerns. A face to face visit with your doctor is required for any new concerns or for your initial visit. If you and your doctor decide that a follow up visit by telephone is appropriate, you may decide to opt for a telephone visit.   2. Billing and insurance coverage There is a charge for telephone visits, similar to the charge for an in-person visit. Your bill is based on the amount of time you and your physician are on the phone. We will bill each visit to your insurance company (just like your other medical visits), and you will be responsible for any costs not paid by your insurance company. The charge may be denied by your insurance company, in which case you will be responsible for the entire amount billed. The decision to cover the cost is determined by your insurance company. If you want to know what your insurance company will cover, we encourage you to contact them to determine your coverage. The codes below are the codes we use when billing for telephone visits and the associated charges. This may help you work with your insurance company to determine your benefits.   Billing CPT codes for Telephone visits  66592 ($30), 52706 ($35), 79403 ($40)            Sep 04, 2018  2:15 PM CDT   US RENAL COMPLETE with [email or web address]   [facility]  ([facility])    [address] [address] [phone]           Please bring a list of your medicines (including vitamins, minerals and over-the-counter drugs). Also, tell your doctor about any allergies you may have. Wear comfortable clothes and  leave your valuables at home.  You do not need to do anything special to prepare for your exam.  Please call the Imaging Department at your exam site with any questions.            Sep 04, 2018  3:00 PM CDT   XR KUB with [email or web address]   [facility] X[facility] ([facility])    [address] [facility]  [address] [facility] [id number] [phone]           Please bring a list of your current medicines to your exam. (Include vitamins, minerals and over-thecounter medicines.) Leave your valuables at home.  Tell your doctor if there is a chance you may be pregnant.  You do not need to do anything special for this exam.            Sep 04, 2018  3:20 PM CDT   (Arrive by 3:05 PM)   Return Visit with Jayy Koehler MD   [facility]rology and [facility] for Prostate and Urologic Cancers ([facility])    [address] [place] [address]  [address] [facility] [id number] [phone]              Future tests that were ordered for you today     Open Future Orders        Priority Expected Expires Ordered    Renal US Routine  5/29/2019 5/29/2018    X-Ray KUB Routine 5/29/2018 5/29/2019 5/29/2018            Who to contact     Please call your clinic at [phone] to:    Ask questions about your health    Make or cancel appointments    Discuss your medicines    Learn about your test results    Speak to your doctor            Additional Information About Your Visit        Assmblyhart Information     The Scene gives you secure access to your electronic health record. If you see a primary care provider, you can also send messages to your care team and make appointments. If you have questions, please call your primary care clinic.  If you do not have a primary care provider, please call [phone] and they will assist you.      The Scene is an electronic gateway that provides easy, online access to your medical records. With The [facility], you can request a clinic appointment, read your test results,  "renew a prescription or communicate with your care team.     To access your existing account, please contact your [facility] or call [phone] for assistance.        Care EveryWhere ID     This is your Care EveryWhere ID. This could be used by other organizations to access your [facility] medical records  [id number]        Your Vitals Were     Pulse Height BMI (Body Mass Index)             103 1.6 m (5' 3\") 33.39 kg/m2          Blood Pressure from Last 3 Encounters:   05/29/18 126/75   03/20/18 129/77   03/10/18 119/76    Weight from Last 3 Encounters:   05/29/18 85.5 kg (188 lb 8 oz)   03/20/18 83.2 kg (183 lb 8 oz)   03/08/18 83.8 kg (184 lb 11.9 oz)               Primary Care Provider Fax #    [facility] [phone] [address]  [facility] [address]        Equal Access to Services     BON HONEYCUTT AH: Hadii aad ku hadasho Soishmael, waaxda luqadaha, qaybta kaalmada adeegyada, carly summers . So [facility] [phone].    ATENCIÓN: Si habla español, tiene a mckeon disposición servicios gratuitos de asistencia lingüística. Llame al [phone].    We comply with applicable federal civil rights laws and [place] laws. We do not discriminate on the basis of race, color, national origin, age, disability, sex, sexual orientation, or gender identity.            Thank you!     Thank you for choosing [facility] UROLOGY AND [facility] FOR PROSTATE AND UROLOGIC CANCERS  for your care. Our goal is always to provide you with excellent care. Hearing back from our patients is one way we can continue to improve our services. Please take a few minutes to complete the written survey that you may receive in the mail after your visit with us. Thank you!             Your Updated Medication List - Protect others around you: Learn how to safely use, store and throw away your medicines at www.disposemymeds.org.          This list is accurate as of 5/29/18  4:19 " PM.  Always use your most recent med list.                   Brand Name Dispense Instructions for use Diagnosis    potassium citrate 10 MEQ (1080 MG) CR tablet    UROCIT-K    90 tablet    Take 1 tablet (10 mEq) by mouth 3 times daily (with meals)    Ureterolithiasis

## 2018-05-29 NOTE — PROGRESS NOTES
"UROLOGY FOLLOW-UP NOTE          Chief Complaint:   Today I had the pleasure of seeing Ms. Shelly Cerda in follow-up for a chief complaint of kidney stones and cystinuria.          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 Her stone activity had been relatively quiet off medication until 10/2017 when she had a large left ureteral stone treated with staged ureteroscopy. Approximately 3 months ago, she was found to have a new right renal stone. This was treated with R PCNL on 3/8/2018.     At last visit we had started thiola    Today notes:     She had an allergic reaction (rash, fever, chills) to thiola one week ago. She subsequently discontinued this.    She states she is feeling fine today. Renal US from today demonstrates no stones. She is continuing to take potassium citrate.     She denies any new stone episodes or hematuria since last being seen.          Physical Exam:   Patient is a 22 year old  female   Vitals: Blood pressure 126/75, pulse 103, height 1.6 m (5' 3\"), weight 85.5 kg (188 lb 8 oz), not currently breastfeeding.  Notable Findings on Exam well-nourished female in no apparent distress.   No CVAT bilaterally      Labs and Pathology:    I personally reviewed all applicable laboratory data and went over findings with patient  Significant for:    CBC RESULTS:  Recent Labs   Lab Test  03/09/18   2300  03/09/18   0441  03/08/18   1325  03/08/18   0806   WBC  12.1*  13.7*  10.3  7.5   HGB  12.4  12.5  13.0  14.5   PLT  287  258  269  257        BMP RESULTS:  Recent Labs   Lab Test  03/20/18   1222  03/09/18   2300  03/09/18   0441  03/08/18   1325   NA  137  132*  135  139   POTASSIUM  4.0  3.8  4.6  4.5   CHLORIDE  103  100  101  108   CO2  26  25  27  24   ANIONGAP  8  7  7  7   GLC  88  105*  110*  94   BUN  10  14  13  11   CR  0.86  0.94  0.86  0.86   GFRESTIMATED  82  74  81 "  82   GFRESTBLACK  >90  89  >90  >90   VALERIANO  9.3  8.8  8.5  8.6       UA RESULTS:   Recent Labs   Lab Test  03/20/18   1222  03/09/18   2330  02/13/18   0904   SG  1.004  1.003  1.023   URINEPH  8.0*  7.0  6.0   NITRITE  Negative  Negative  Negative   RBCU  <1  13*  >182*   WBCU  1  6*  0         Imaging:    I personally reviewed all applicable imaging and went over the below findings with patient.    Results for orders placed or performed in visit on 05/29/18   Renal US    Narrative    EXAMINATION: US RENAL COMPLETE, 5/29/2018 2:49 PM     COMPARISON: CT abdomen pelvis 3/3/2018.    HISTORY: Right renal calculus, status post removal.    FINDINGS:    Right kidney: Measures 11.8 cm in length. Parenchyma is of normal  thickness and echogenicity. No focal mass. No hydronephrosis.    Left kidney: Measures 11.9 cm in length. Parenchyma is of normal  thickness and echogenicity. No focal mass. No hydronephrosis.     Bladder: Unremarkable.        Impression    IMPRESSION:  Normal renal ultrasound exam. In particular, there is no renal  calculus suspected.    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 If these are normal, recommend obtaining Renal US and XR KUB every 6 months.            Past Medical History:     Past Medical History:   Diagnosis Date     Complication of anesthesia      Cystinuria (H)      Kidney stones      PONV (postoperative nausea and vomiting)             Past Surgical History:     Past Surgical History:   Procedure Laterality Date     APPENDECTOMY  9/2011     COMBINED CYSTOSCOPY, RETROGRADES, URETEROSCOPY, INSERT STENT Left 10/12/2017    Procedure: COMBINED CYSTOSCOPY, RETROGRADES,  URETEROSCOPY, INSERT STENT;  COMBINED CYSTOSCOPY, RETROGRADES, URETEROSCOPY, LASER HOLMIUM STANDBY,  INSERT STENT LEFT URETER;  Surgeon: Luis Banda MD;  Location: [facility]     CYSTOSCOPY       LASER HOLMIUM LITHOTRIPSY URETER(S), INSERT STENT, COMBINED  9/4/2012    Procedure: COMBINED CYSTOSCOPY, URETEROSCOPY, LASER HOLMIUM LITHOTRIPSY URETER(S), INSERT STENT;  Holmium Laser Lithotripsy.  Right Stent Placement;  Surgeon: Cathi Hernandez MD;  [facility]     PERCUTANEOUS NEPHROLITHOTOMY Right 3/8/2018    Procedure: PERCUTANEOUS NEPHROLITHOTOMY;  Right Percutaneous Nephrolithotomy;  Surgeon: Jayy Koehler MD;  [facility]     Ureteral stent placement with subsequent removal.  2017            Medications     Current Outpatient Prescriptions   Medication     potassium citrate (UROCIT-K) 10 MEQ (1080 MG) CR tablet     No current facility-administered medications for this visit.             Family History:     Family History   Problem Relation Age of Onset     Genetic Disorder Sister      Cystinuria            Social History:     Social History     Social History     Marital status: Single     Spouse name: N/A     Number of children: N/A     Years of education: N/A     Occupational History     Not on file.     Social History Main Topics     Smoking status: Never Smoker     Smokeless tobacco: Never Used     Alcohol use Yes     Drug use: No     Sexual activity: Not on file     Other Topics Concern     Not on file     Social History Narrative    Shelly lives with both parents and 4 siblings in [place]. She is due to start senior year at [employer] this year. 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    She denies any ETOH, cigarette, illicit drug use. She denies any sexual activity.            Allergies:   Review of patient's allergies indicates no known  allergies.         Review of Systems:  From intake questionnaire   Negative 14 system review except as noted on HPI, nurse's note.    Scribe Disclosure:   I,Lee Kerr, am serving as a scribe; to document services personally performed by Jayy Koehler MD based on data collection and the provider's statements to me.     I, Jayy Koehler saw and evaluated this patient and agree with the plan as stated above.  I personally performed all listed procedures.    CC:  [place], [facility]      >15 minutes were spent face to face with patient over half of which was spent providing medical counseling regarding cystinuria/kidney stone prevention

## 2018-05-31 ENCOUNTER — PRE VISIT (OUTPATIENT)
Dept: UROLOGY | Facility: CLINIC | Age: 23
End: 2018-05-31

## 2018-06-06 ENCOUNTER — TELEPHONE (OUTPATIENT)
Dept: UROLOGY | Facility: CLINIC | Age: 23
End: 2018-06-06

## 2018-06-06 NOTE — TELEPHONE ENCOUNTER
[facility]    Phone Message    May a detailed message be left on voicemail: yes    Reason for Call: Other: Please call Kelsey re: the reaction PT had with this Rx and what they should do with it going forward.     Action Taken: Message routed to:  [facility] ([facility] Urology

## 2018-06-07 NOTE — TELEPHONE ENCOUNTER
[facility]    Phone Message    May a detailed message be left on voicemail: yes    Reason for Call: Other: Pt missed Layla's call. Please give her a call back.      Action Taken: Message routed to:  [facility] Urology

## 2018-06-08 NOTE — TELEPHONE ENCOUNTER
[facility]    Phone Message    May a detailed message be left on voicemail: yes    Reason for Call: Other: Please FU with Kelsey re: the PT's Rx Thiola nd what to do going forward due to side affects.     Action Taken: Message routed to:  [facility] Urology

## 2018-06-11 ENCOUNTER — TRANSFERRED RECORDS (OUTPATIENT)
Dept: HEALTH INFORMATION MANAGEMENT | Facility: CLINIC | Age: 23
End: 2018-06-11

## 2018-06-26 ENCOUNTER — VIRTUAL VISIT (OUTPATIENT)
Dept: UROLOGY | Facility: CLINIC | Age: 23
End: 2018-06-26
Payer: COMMERCIAL

## 2018-06-26 DIAGNOSIS — N20.1 URETEROLITHIASIS: ICD-10-CM

## 2018-06-26 RX ORDER — POTASSIUM CITRATE 10 MEQ/1
20 TABLET, EXTENDED RELEASE ORAL
Qty: 120 TABLET | Refills: 3 | Status: SHIPPED | OUTPATIENT
Start: 2018-06-26 | End: 2018-11-16

## 2018-06-26 NOTE — PROGRESS NOTES
Telephone Follow-Up     I had the pleasure of speaking to Shelly CONNOLLY Zaida over the phone to follow-up on recent 24 hour urine testing for stone prevention.    To review she is a very active cystine stone former  Currently feeling well without complaint  No hematuria, dysuria or UTI    She has been on thiola but stopped due to allergy  She is on potassium citrate 10 TID as well as a high fluid, low sodium, low protein diet    Since last visit they has had repeat 24 hour urines done     New 24 hour urine studies (performed 6/11) are reviewed and notable for the following:            Urine volume remains below goal - encouraged aggressively increasing to goal 3L/day  PH borderline low for where we would like it, she says she has trouble remembering mid day dose.  Will increase to 20 meq BID  Discussed captopril, she does not want to add new medication at this time    Will plan to update imaging in 2 months and f/u, can image earlier if develops symptoms    Jayy Koehler      15 minutes were spent on the telephone discussing stone prevention

## 2018-06-26 NOTE — MR AVS SNAPSHOT
After Visit Summary   6/26/2018    Shelly Cerda    MRN: [id number]           Patient Information     Date Of Birth          1995        Visit Information        Provider Department      6/26/2018 7:00 AM Jayy Koehler MD [facility]        Today's Diagnoses     Ureterolithiasis           Follow-ups after your visit        Your next 10 appointments already scheduled     Sep 04, 2018  2:15 PM CDT   US RENAL COMPLETE with [email or web address]   [facility] ([facility])    [address] [place] [address] [id number] [phone]           Please bring a list of your medicines (including vitamins, minerals and over-the-counter drugs). Also, tell your doctor about any allergies you may have. Wear comfortable clothes and leave your valuables at home.  You do not need to do anything special to prepare for your exam.  Please call the Imaging Department at your exam site with any questions.            Sep 04, 2018  3:00 PM CDT   XR KUB with UCXR1   [facility] X[facility] ([facility])    [address] [place] [address] [id number] [phone]           Please bring a list of your current medicines to your exam. (Include vitamins, minerals and over-thecounter medicines.) Leave your valuables at home.  Tell your doctor if there is a chance you may be pregnant.  You do not need to do anything special for this exam.            Sep 04, 2018  3:20 PM CDT   (Arrive by 3:05 PM)   Return Visit with Jayy Koehler MD   [facility] ([facility])    [address] [place] [address] [phone] [phone]              Who to contact     Please call your clinic at [phone] to:    Ask questions about your health    Make or cancel appointments    Discuss your medicines    Learn  about your test results    Speak to your doctor            Additional Information About Your Visit        [employer] Information     [employer] gives you secure access to your electronic health record. If you see a primary care provider, you can also send messages to your care team and make appointments. If you have questions, please call your primary care clinic.  If you do not have a primary care provider, please call [phone] and they will assist you.      [employer] is an electronic gateway that provides easy, online access to your medical records. With [employer], you can request a clinic appointment, read your test results, renew a prescription or communicate with your care team.     To access your existing account, please contact your [facility] or call [phone] for assistance.        Care EveryWhere ID     This is your Care EveryWhere ID. This could be used by other organizations to access your [facility] medical records  [id number]         Blood Pressure from Last 3 Encounters:   05/29/18 126/75   03/20/18 129/77   03/10/18 119/76    Weight from Last 3 Encounters:   05/29/18 85.5 kg (188 lb 8 oz)   03/20/18 83.2 kg (183 lb 8 oz)   03/08/18 83.8 kg (184 lb 11.9 oz)              Today, you had the following     No orders found for display         Today's Medication Changes          These changes are accurate as of 6/26/18  7:23 AM.  If you have any questions, ask your nurse or doctor.               These medicines have changed or have updated prescriptions.        Dose/Directions    potassium citrate 10 MEQ (1080 MG) CR tablet   Commonly known as:  UROCIT-K   This may have changed:    - how much to take  - when to take this   Used for:  Ureterolithiasis        Dose:  20 mEq   Take 2 tablets (20 mEq) by mouth 2 times daily   Quantity:  120 tablet   Refills:  3            Where to get your medicines      These medications were sent to [facility] [address] [facility] [address]  [address]  [address], [facility] [address]     Phone:  [phone]     potassium citrate 10 MEQ (1080 MG) CR tablet                Primary Care Provider Fax #    [facility] [phone] [address]  [facility] [address]        Equal Access to Services     [place] : Hadii aad ku hadasho Soomaali, waaxda luqadaha, qaybta kaalmada adeegyada, waxay rehanain haycaitien aderoseanna khjakestevie alejo. So [facility] [phone].    ATENCIÓN: Si habla español, tiene a mckeon disposición servicios gratuitos de asistencia lingüística. Llame al [phone].    We comply with applicable federal civil rights laws and [place] laws. We do not discriminate on the basis of race, color, national origin, age, disability, sex, sexual orientation, or gender identity.            Thank you!     Thank you for choosing [facility]OLOGY AND [facility] FOR PROSTATE AND UROLOGIC CANCERS  for your care. Our goal is always to provide you with excellent care. Hearing back from our patients is one way we can continue to improve our services. Please take a few minutes to complete the written survey that you may receive in the mail after your visit with us. Thank you!             Your Updated Medication List - Protect others around you: Learn how to safely use, store and throw away your medicines at www.disposemymeds.org.          This list is accurate as of 6/26/18  7:23 AM.  Always use your most recent med list.                   Brand Name Dispense Instructions for use Diagnosis    potassium citrate 10 MEQ (1080 MG) CR tablet    UROCIT-K    120 tablet    Take 2 tablets (20 mEq) by mouth 2 times daily    Ureterolithiasis

## 2018-07-02 ENCOUNTER — TELEPHONE (OUTPATIENT)
Dept: UROLOGY | Facility: CLINIC | Age: 23
End: 2018-07-02

## 2018-07-02 NOTE — TELEPHONE ENCOUNTER
Health Call Center    Phone Message    May a detailed message be left on voicemail: yes    Reason for Call: Medication Question or concern regarding medication   Prescription Clarification  Name of Medication: Potassium Citrate   Prescribing Provider: Dr. Koehler   Pharmacy: [facility]   What on the order needs clarification? Pharmacist needs clarification on dosage. Says that two providers (Dr. Koehler and Dr. Pathak) prescribed the medication and both prescribed different dosages. Please call and speak with Pharmacist to verify the correct Rx.            Action Taken: Message routed to:  [facility] ([facility] Urology

## 2018-07-18 ENCOUNTER — PRE VISIT (OUTPATIENT)
Dept: UROLOGY | Facility: CLINIC | Age: 23
End: 2018-07-18

## 2018-09-04 ENCOUNTER — OFFICE VISIT (OUTPATIENT)
Dept: UROLOGY | Facility: CLINIC | Age: 23
End: 2018-09-04
Payer: COMMERCIAL

## 2018-09-04 ENCOUNTER — RADIANT APPOINTMENT (OUTPATIENT)
Dept: ULTRASOUND IMAGING | Facility: CLINIC | Age: 23
End: 2018-09-04
Attending: UROLOGY
Payer: COMMERCIAL

## 2018-09-04 ENCOUNTER — RADIANT APPOINTMENT (OUTPATIENT)
Dept: GENERAL RADIOLOGY | Facility: CLINIC | Age: 23
End: 2018-09-04
Attending: UROLOGY
Payer: COMMERCIAL

## 2018-09-04 VITALS
HEIGHT: 63 IN | SYSTOLIC BLOOD PRESSURE: 110 MMHG | WEIGHT: 192.7 LBS | HEART RATE: 89 BPM | DIASTOLIC BLOOD PRESSURE: 72 MMHG | BODY MASS INDEX: 34.14 KG/M2

## 2018-09-04 DIAGNOSIS — E72.01 CYSTINURIA (H): ICD-10-CM

## 2018-09-04 DIAGNOSIS — N20.0 CALCULUS OF KIDNEY: Primary | ICD-10-CM

## 2018-09-04 RX ORDER — LEVOCETIRIZINE DIHYDROCHLORIDE 5 MG/1
TABLET, FILM COATED ORAL
COMMUNITY
Start: 2018-08-28 | End: 2024-02-16

## 2018-09-04 RX ORDER — AZELASTINE HCL 205.5 UG/1
SPRAY NASAL
COMMUNITY
Start: 2018-06-28

## 2018-09-04 RX ORDER — LEVONORGESTREL AND ETHINYL ESTRADIOL 0.1-0.02MG
KIT ORAL
COMMUNITY
Start: 2018-07-06 | End: 2022-06-28

## 2018-09-04 RX ORDER — MONTELUKAST SODIUM 10 MG/1
TABLET ORAL
COMMUNITY
Start: 2018-08-28 | End: 2022-06-28

## 2018-09-04 ASSESSMENT — PAIN SCALES - GENERAL: PAINLEVEL: NO PAIN (0)

## 2018-09-04 NOTE — LETTER
Shelly Cerda  [address] [facility] [address]      September 4, 2018    To whom it may concern    This letter is being written on behalf of Shelly Cerda who suffers from recurrent kidney stones and is under medical care with strict medical need to stay very well hydrated.  We have advised her free access to fluids and restrooms and recommend she use a camelback while working to ensure she has unrestricted access to fluid in an effort to prevent stones.    Please do not hesitate to contact me with any questions or concerns.    Jayy Koehler  [phone]      MRN:   [id number]

## 2018-09-04 NOTE — LETTER
"9/4/2018       RE: Shelly Cerda  [address] [facility] [address]     Dear Colleague,    Thank you for referring your patient, Shelly Cerda, to the [facility] UROLOGY AND INST FOR PROSTATE AND UROLOGIC CANCERS at [facility]. Please see a copy of my visit note below.      UROLOGY FOLLOW-UP NOTE          Chief Complaint:   Today I had the pleasure of seeing Ms. Shelly Cerda in follow-up for a chief complaint of kidney stones and cystinuria.          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 Her stone activity had been relatively quiet off medication until 10/2017 when she had a large left ureteral stone treated with staged ureteroscopy. Approximately 7 months ago, she was found to have a new right renal stone. This was treated percutaneously. She has not been able to tolerate thiola for allergic reaction.  Defers captopril.  Mainly managing with high fluid and potassium citrate.    Today notes:   She has been doing well.  Intermittent mild pain, mostly on right flank but not very bothersome. Does not feel she currently has any stones. No hematuria or dysuria.  Has admittedly not been doing a great job with high fluid intake owing to work responsibilities.          Physical Exam:   Patient is a 23 year old  female   Vitals: Blood pressure 110/72, pulse 89, height 1.6 m (5' 3\"), weight 87.4 kg (192 lb 11.2 oz), not currently breastfeeding.  Notable Findings on Exam well-nourished female in no apparent distress   No CVAT bilaterally        Labs and Pathology:    I personally reviewed all applicable laboratory data and went over findings with patient  Significant for:    CBC RESULTS:  Recent Labs   Lab Test  03/09/18   2300  03/09/18   0441  03/08/18   1325  03/08/18   0806   WBC  12.1*  13.7*  10.3  7.5   HGB  12.4  12.5  13.0  14.5   PLT  287  " 258  269  257        BMP RESULTS:  Recent Labs   Lab Test  03/20/18   1222  03/09/18   2300  03/09/18   0441  03/08/18   1325   NA  137  132*  135  139   POTASSIUM  4.0  3.8  4.6  4.5   CHLORIDE  103  100  101  108   CO2  26  25  27  24   ANIONGAP  8  7  7  7   GLC  88  105*  110*  94   BUN  10  14  13  11   CR  0.86  0.94  0.86  0.86   GFRESTIMATED  82  74  81  82   GFRESTBLACK  >90  89  >90  >90   VALERIANO  9.3  8.8  8.5  8.6       UA RESULTS:   Recent Labs   Lab Test  03/20/18   1222  03/09/18   2330  02/13/18   0904   SG  1.004  1.003  1.023   URINEPH  8.0*  7.0  6.0   NITRITE  Negative  Negative  Negative   RBCU  <1  13*  >182*   WBCU  1  6*  0           Imaging:    I personally reviewed all applicable imaging and went over the below findings with patient.    Results for orders placed or performed in visit on 09/04/18   Renal US    Narrative    EXAMINATION: US RENAL COMPLETE, 9/4/2018 2:31 PM     COMPARISON: Renal ultrasound 5/29/2018.    HISTORY: History of cystinuria    FINDINGS:    Right kidney: Measures 9.5 cm in length. Parenchyma is of normal  thickness and echogenicity. No focal mass. No hydronephrosis.  Echogenic focus in the right inferior renal pole without associated  shadowing or technical artifact on color Doppler.    Left kidney: Measures 10.7 cm in length. Parenchyma is of normal  thickness and echogenicity. No focal mass. No hydronephrosis.     Bladder: Partially distended and unremarkable.        Impression    IMPRESSION:  Echogenic nonshadowing focus in the inferior right kidney possibly  representing a renal stone versus focal fat. No hydronephrosis.    I have personally reviewed the examination and initial interpretation  and I agree with the findings.    GENNA WITT MD       KUB  IMPRESSION:   1. No nephrolithiasis identified.  2. Nonobstructive bowel gas pattern.            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  - Obtain low-dose CT in 3 months  or sooner if symptoms present   - Encouraged increasing fluid intake, note written to use camelback during work hours  - Follow up in 3 months   - cysteine litholink to coincide with next visit           Past Medical History:     Past Medical History:   Diagnosis Date     Complication of anesthesia      Cystinuria (H)      Kidney stones      PONV (postoperative nausea and vomiting)             Past Surgical History:     Past Surgical History:   Procedure Laterality Date     APPENDECTOMY  9/2011     COMBINED CYSTOSCOPY, RETROGRADES, URETEROSCOPY, INSERT STENT Left 10/12/2017    Procedure: COMBINED CYSTOSCOPY, RETROGRADES, URETEROSCOPY, INSERT STENT;  COMBINED CYSTOSCOPY, RETROGRADES, URETEROSCOPY, LASER HOLMIUM STANDBY,  INSERT STENT LEFT URETER;  Surgeon: Luis Banda MD;  Location: [facility]     CYSTOSCOPY       LASER HOLMIUM LITHOTRIPSY URETER(S), INSERT STENT, COMBINED  9/4/2012    Procedure: COMBINED CYSTOSCOPY, URETEROSCOPY, LASER HOLMIUM LITHOTRIPSY URETER(S), INSERT STENT;  Holmium Laser Lithotripsy.  Right Stent Placement;  Surgeon: Cathi Hernandez MD;  [facility]     PERCUTANEOUS NEPHROLITHOTOMY Right 3/8/2018    Procedure: PERCUTANEOUS NEPHROLITHOTOMY;  Right Percutaneous Nephrolithotomy;  Surgeon: Jayy Koehler MD;  Location[facility]     Ureteral stent placement with subsequent removal.  2017            Medications     Current Outpatient Prescriptions   Medication     azelastine (ASTEPRO) 0.15 % nasal spray     LARISSIA 0.1-20 MG-MCG per tablet     levocetirizine (XYZAL) 5 MG tablet     montelukast (SINGULAIR) 10 MG tablet     potassium citrate (UROCIT-K) 10 MEQ (1080 MG) CR tablet     No current facility-administered medications for this visit.             Family History:     Family History   Problem Relation Age of Onset     Genetic Disorder Sister      Cystinuria            Social History:     Social History     Social History     Marital status: Single     Spouse name: N/A      Number of children: N/A     Years of education: N/A     Occupational History     Not on file.     Social History Main Topics     Smoking status: Never Smoker     Smokeless tobacco: Never Used     Alcohol use Yes     Drug use: No     Sexual activity: Not on file     Other Topics Concern     Not on file     Social History Narrative    Shelly lives with both parents and 4 siblings in [place]. She is due to start senior year at [employer] this year. 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    She denies any ETOH, cigarette, illicit drug use. She denies any sexual activity.            Allergies:   Thiola [tiopronin]         Review of Systems:  From intake questionnaire   Negative 14 system review except as noted on HPI, nurse's note.    Scribe Disclosure:   ILee, am serving as a scribe; to document services personally performed by Jayy Koehler MD based on data collection and the provider's statements to me.     IJayy saw and evaluated this patient and agree with the plan as stated above.  I personally performed all listed procedures.    CC:  [facility]      >15 minutes were spent face to face with patient over half of which was spent providing medical counseling regarding kidney stone prevention.     Again, thank you for allowing me to participate in the care of your patient.      Sincerely,    Jayy Koehler MD

## 2018-09-04 NOTE — LETTER
"9/4/2018      RE: Shelly Cerda  [address] [facility] [address]         UROLOGY FOLLOW-UP NOTE          Chief Complaint:   Today I had the pleasure of seeing Ms. Shelly Cerda in follow-up for a chief complaint of kidney stones and cystinuria.          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 Her stone activity had been relatively quiet off medication until 10/2017 when she had a large left ureteral stone treated with staged ureteroscopy. Approximately 7 months ago, she was found to have a new right renal stone. This was treated percutaneously. She has not been able to tolerate thiola for allergic reaction.  Defers captopril.  Mainly managing with high fluid and potassium citrate.    Today notes:   She has been doing well.  Intermittent mild pain, mostly on right flank but not very bothersome. Does not feel she currently has any stones. No hematuria or dysuria.  Has admittedly not been doing a great job with high fluid intake owing to work responsibilities.          Physical Exam:   Patient is a 23 year old  female   Vitals: Blood pressure 110/72, pulse 89, height 1.6 m (5' 3\"), weight 87.4 kg (192 lb 11.2 oz), not currently breastfeeding.  Notable Findings on Exam well-nourished female in no apparent distress   No CVAT bilaterally        Labs and Pathology:    I personally reviewed all applicable laboratory data and went over findings with patient  Significant for:    CBC RESULTS:  Recent Labs   Lab Test  03/09/18   2300  03/09/18   0441  03/08/18   1325  03/08/18   0806   WBC  12.1*  13.7*  10.3  7.5   HGB  12.4  12.5  13.0  14.5   PLT  287  258  269  257        BMP RESULTS:  Recent Labs   Lab Test  03/20/18   1222  03/09/18   2300  03/09/18   0441  03/08/18   1325   NA  137  132*  135  139   POTASSIUM  4.0  3.8  4.6  4.5   CHLORIDE  103  100  101  108   CO2  26  25  27  " 24   ANIONGAP  8  7  7  7   GLC  88  105*  110*  94   BUN  10  14  13  11   CR  0.86  0.94  0.86  0.86   GFRESTIMATED  82  74  81  82   GFRESTBLACK  >90  89  >90  >90   VALERIANO  9.3  8.8  8.5  8.6       UA RESULTS:   Recent Labs   Lab Test  03/20/18   1222  03/09/18   2330  02/13/18   0904   SG  1.004  1.003  1.023   URINEPH  8.0*  7.0  6.0   NITRITE  Negative  Negative  Negative   RBCU  <1  13*  >182*   WBCU  1  6*  0           Imaging:    I personally reviewed all applicable imaging and went over the below findings with patient.    Results for orders placed or performed in visit on 09/04/18   Renal US    Narrative    EXAMINATION: US RENAL COMPLETE, 9/4/2018 2:31 PM     COMPARISON: Renal ultrasound 5/29/2018.    HISTORY: History of cystinuria    FINDINGS:    Right kidney: Measures 9.5 cm in length. Parenchyma is of normal  thickness and echogenicity. No focal mass. No hydronephrosis.  Echogenic focus in the right inferior renal pole without associated  shadowing or technical artifact on color Doppler.    Left kidney: Measures 10.7 cm in length. Parenchyma is of normal  thickness and echogenicity. No focal mass. No hydronephrosis.     Bladder: Partially distended and unremarkable.        Impression    IMPRESSION:  Echogenic nonshadowing focus in the inferior right kidney possibly  representing a renal stone versus focal fat. No hydronephrosis.    I have personally reviewed the examination and initial interpretation  and I agree with the findings.    GENNA WITT MD       KUB  IMPRESSION:   1. No nephrolithiasis identified.  2. Nonobstructive bowel gas pattern.            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  - Obtain low-dose CT in 3 months or sooner if symptoms present   - Encouraged increasing fluid intake, note written to use camelback during work hours  - Follow up in 3 months   - cysteine litholink to coincide with next visit           Past Medical History:     Past  Medical History:   Diagnosis Date     Complication of anesthesia      Cystinuria (H)      Kidney stones      PONV (postoperative nausea and vomiting)             Past Surgical History:     Past Surgical History:   Procedure Laterality Date     APPENDECTOMY  9/2011     COMBINED CYSTOSCOPY, RETROGRADES, URETEROSCOPY, INSERT STENT Left 10/12/2017    Procedure: COMBINED CYSTOSCOPY, RETROGRADES, URETEROSCOPY, INSERT STENT;  COMBINED CYSTOSCOPY, RETROGRADES, URETEROSCOPY, LASER HOLMIUM STANDBY,  INSERT STENT LEFT URETER;  Surgeon: Luis Banda MD;  Location: [facility]     CYSTOSCOPY       LASER HOLMIUM LITHOTRIPSY URETER(S), INSERT STENT, COMBINED  9/4/2012    Procedure: COMBINED CYSTOSCOPY, URETEROSCOPY, LASER HOLMIUM LITHOTRIPSY URETER(S), INSERT STENT;  Holmium Laser Lithotripsy.  Right Stent Placement;  Surgeon: Cathi Hernandez MD;  [facility]     PERCUTANEOUS NEPHROLITHOTOMY Right 3/8/2018    Procedure: PERCUTANEOUS NEPHROLITHOTOMY;  Right Percutaneous Nephrolithotomy;  Surgeon: Jayy Koehler MD;  [facility]     Ureteral stent placement with subsequent removal.  2017            Medications     Current Outpatient Prescriptions   Medication     azelastine (ASTEPRO) 0.15 % nasal spray     LARISSIA 0.1-20 MG-MCG per tablet     levocetirizine (XYZAL) 5 MG tablet     montelukast (SINGULAIR) 10 MG tablet     potassium citrate (UROCIT-K) 10 MEQ (1080 MG) CR tablet     No current facility-administered medications for this visit.             Family History:     Family History   Problem Relation Age of Onset     Genetic Disorder Sister      Cystinuria            Social History:     Social History     Social History     Marital status: Single     Spouse name: N/A     Number of children: N/A     Years of education: N/A     Occupational History     Not on file.     Social History Main Topics     Smoking status: Never Smoker     Smokeless tobacco: Never Used     Alcohol use Yes     Drug use: No      Sexual activity: Not on file     Other Topics Concern     Not on file     Social History Narrative    Shelly lives with both parents and 4 siblings in [place]. She is due to start senior year at [employer] this year. 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    She denies any ETOH, cigarette, illicit drug use. She denies any sexual activity.            Allergies:   Thiola [tiopronin]         Review of Systems:  From intake questionnaire   Negative 14 system review except as noted on HPI, nurse's note.    Scribe Disclosure:   ILee, am serving as a scribe; to document services personally performed by Jayy Koehler MD based on data collection and the provider's statements to me.     IJayy saw and evaluated this patient and agree with the plan as stated above.  I personally performed all listed procedures.    >15 minutes were spent face to face with patient over half of which was spent providing medical counseling regarding kidney stone prevention.       Jayy Koehler MD      CC:  [facility]

## 2018-09-04 NOTE — NURSING NOTE
"Chief Complaint   Patient presents with     RECHECK     kidney stone follow up       Blood pressure 110/72, pulse 89, height 1.6 m (5' 3\"), weight 87.4 kg (192 lb 11.2 oz), not currently breastfeeding. Body mass index is 34.14 kg/(m^2).    Patient Active Problem List   Diagnosis     Cystinuria (H)     Nausea with vomiting     Ureterolithiasis     Acute nontraumatic kidney injury (H)     Acute abdominal pain in right flank     Constipation due to pain medication     Group B streptococcal infection     Ureteral stone     Urolithiasis     Postoperative pain       Allergies   Allergen Reactions     Thiola [Tiopronin] Rash       Current Outpatient Prescriptions   Medication Sig Dispense Refill     azelastine (ASTEPRO) 0.15 % nasal spray        LARISSIA 0.1-20 MG-MCG per tablet        levocetirizine (XYZAL) 5 MG tablet        montelukast (SINGULAIR) 10 MG tablet        potassium citrate (UROCIT-K) 10 MEQ (1080 MG) CR tablet Take 2 tablets (20 mEq) by mouth 2 times daily 120 tablet 3       Social History   Substance Use Topics     Smoking status: Never Smoker     Smokeless tobacco: Never Used     Alcohol use Yes       LIYA Scott  9/4/2018  3:32 PM       "

## 2018-09-04 NOTE — MR AVS SNAPSHOT
After Visit Summary   9/4/2018    Shelly Cerda    MRN: [id number]           Patient Information     Date Of Birth          1995        Visit Information        Provider Department      9/4/2018 3:20 PM Jayy Koehler MD [facility] Urology and [facility] for Prostate and Urologic Cancers        Today's Diagnoses     Calculus of kidney    -  1    Cystinuria (H)          Care Instructions    Litholink x 1.    Return in 3 months with a CT.    It was a pleasure meeting with you today.  Thank you for allowing me and my team the privilege of caring for you today.  YOU are the reason we are here, and I truly hope we provided you with the excellent service you deserve.  Please let us know if there is anything else we can do for you so that we can be sure you are leaving completely satisfied with your care experience.                  Follow-ups after your visit        Your next 10 appointments already scheduled     Dec 04, 2018  3:00 PM CST   CT ABDOMEN PELVIS W/O CONTRAST with UCCT2   [facility] CT ([facility])    [address] [address] [phone]           How do I prepare for my exam? (Food and drink instructions) To prepare: Do not eat or drink for 2 hours before your exam. If you need to take medicine, you may take it with small sips of water. (We may ask you to take liquid medicine as well.)  How do I prepare for my exam? (Other instructions) Please arrive 30 minutes early for your CT.  Once in the department you might be asked to drink water 15-20 minutes prior to your exam.  If indicated you may be asked to drink an oral contrast in advance of your CT.  If this is the case, the imaging team will let you know or be in contact with you prior to your appointment  Patients over 70 or patients with diabetes or kidney problems: If you haven t had a blood test (creatinine test) within the last 30 days, the  Cardiologist/Radiologist may require you to get this test prior to your exam.  If you have diabetes:  Continue to take your metformin medication on the day of your exam  What should I wear: Please wear loose clothing, such as a sweat suit or jogging clothes. Avoid snaps, zippers and other metal. We may ask you to undress and put on a hospital gown.  How long does the exam take: Most scans take less than 20 minutes.  What should I bring: Please bring any scans or X-rays taken at other hospitals, if similar tests were done. Also bring a list of your medicines, including vitamins, minerals and over-the-counter drugs. It is safest to leave personal items at home.  Do I need a : No  is needed.  What do I need to tell my doctor? Be sure to tell your doctor: * If you have any allergies. * If there s any chance you are pregnant. * If you are breastfeeding.  What should I do after the exam: No restrictions, You may resume normal activities.  What is this test: A CT (computed tomography) scan is a series of pictures that allows us to look inside your body. The scanner creates images of the body in cross sections, much like slices of bread. This helps us see any problems more clearly. You may receive contrast (X-ray dye) before or during your scan. You will be asked to drink the contrast.  Who should I call with questions: If you have any questions, please call the Imaging Department where you will have your exam. Directions, parking instructions, and other information is available on our website, [email or web address].            Dec 04, 2018  3:20 PM CST   (Arrive by 3:05 PM)   Return Visit with Jayy Koehler MD   [facility] and [facility] for Prostate and Urologic Cancers ([facility])    [address] [address] [phone]              Who to contact     Please call your clinic at [phone] to:    Ask questions about your health    Make or  "cancel appointments    Discuss your medicines    Learn about your test results    Speak to your doctor            Additional Information About Your Visit        CloudAccessharDealised Information     [employer] gives you secure access to your electronic health record. If you see a primary care provider, you can also send messages to your care team and make appointments. If you have questions, please call your primary care clinic.  If you do not have a primary care provider, please call [phone] and they will assist you.      [employer] is an electronic gateway that provides easy, online access to your medical records. With [employer], you can request a clinic appointment, read your test results, renew a prescription or communicate with your care team.     To access your existing account, please contact your [facility] or call [phone] for assistance.        Care EveryWhere ID     This is your Care EveryWhere ID. This could be used by other organizations to access your [facility] medical records  [id number]        Your Vitals Were     Pulse Height BMI (Body Mass Index)             89 1.6 m (5' 3\") 34.14 kg/m2          Blood Pressure from Last 3 Encounters:   09/04/18 110/72   05/29/18 126/75   03/20/18 129/77    Weight from Last 3 Encounters:   09/04/18 87.4 kg (192 lb 11.2 oz)   05/29/18 85.5 kg (188 lb 8 oz)   03/20/18 83.2 kg (183 lb 8 oz)              We Performed the Following     Litholink: Clinic Lab Order        Primary Care Provider Fax #    [facility] [phone] [id number]  [facility] [address]        Equal Access to Services     BON HONEYCUTT AH: Hadii aad ku hadasho Soomaali, waaxda luqadaha, qaybta kaalmada adeegyada, carly alejo. So [facility] [phone].    ATENCIÓN: Si habla español, tiene a mckeon disposición servicios gratuitos de asistencia lingüística. Llame al [phone].    We comply with applicable federal civil rights laws " and [place] laws. We do not discriminate on the basis of race, color, national origin, age, disability, sex, sexual orientation, or gender identity.            Thank you!     Thank you for choosing [facility]OLOGY AND [facility] FOR PROSTATE AND UROLOGIC CANCERS  for your care. Our goal is always to provide you with excellent care. Hearing back from our patients is one way we can continue to improve our services. Please take a few minutes to complete the written survey that you may receive in the mail after your visit with us. Thank you!             Your Updated Medication List - Protect others around you: Learn how to safely use, store and throw away your medicines at www.disposemymeds.org.          This list is accurate as of 9/4/18 11:59 PM.  Always use your most recent med list.                   Brand Name Dispense Instructions for use Diagnosis    azelastine 0.15 % nasal spray    ASTEPRO          LARISSIA 0.1-20 MG-MCG per tablet   Generic drug:  levonorgestrel-ethinyl estradiol           levocetirizine 5 MG tablet    XYZAL          montelukast 10 MG tablet    SINGULAIR          potassium citrate 10 MEQ (1080 MG) CR tablet    UROCIT-K    120 tablet    Take 2 tablets (20 mEq) by mouth 2 times daily    Ureterolithiasis

## 2018-09-04 NOTE — PROGRESS NOTES
"  UROLOGY FOLLOW-UP NOTE          Chief Complaint:   Today I had the pleasure of seeing Ms. Shelly Cerda in follow-up for a chief complaint of kidney stones and cystinuria.          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 Her stone activity had been relatively quiet off medication until 10/2017 when she had a large left ureteral stone treated with staged ureteroscopy. Approximately 7 months ago, she was found to have a new right renal stone. This was treated percutaneously. She has not been able to tolerate thiola for allergic reaction.  Defers captopril.  Mainly managing with high fluid and potassium citrate.    Today notes:   She has been doing well.  Intermittent mild pain, mostly on right flank but not very bothersome. Does not feel she currently has any stones. No hematuria or dysuria.  Has admittedly not been doing a great job with high fluid intake owing to work responsibilities.          Physical Exam:   Patient is a 23 year old  female   Vitals: Blood pressure 110/72, pulse 89, height 1.6 m (5' 3\"), weight 87.4 kg (192 lb 11.2 oz), not currently breastfeeding.  Notable Findings on Exam well-nourished female in no apparent distress   No CVAT bilaterally        Labs and Pathology:    I personally reviewed all applicable laboratory data and went over findings with patient  Significant for:    CBC RESULTS:  Recent Labs   Lab Test  03/09/18   2300  03/09/18   0441  03/08/18   1325  03/08/18   0806   WBC  12.1*  13.7*  10.3  7.5   HGB  12.4  12.5  13.0  14.5   PLT  287  258  269  257        BMP RESULTS:  Recent Labs   Lab Test  03/20/18   1222  03/09/18   2300  03/09/18   0441  03/08/18   1325   NA  137  132*  135  139   POTASSIUM  4.0  3.8  4.6  4.5   CHLORIDE  103  100  101  108   CO2  26  25  27  24   ANIONGAP  8  7  7  7   GLC  88  105*  110*  94   BUN  10  14  13  11   CR  0.86  " 0.94  0.86  0.86   GFRESTIMATED  82  74  81  82   GFRESTBLACK  >90  89  >90  >90   VALERIANO  9.3  8.8  8.5  8.6       UA RESULTS:   Recent Labs   Lab Test  03/20/18   1222  03/09/18   2330  02/13/18   0904   SG  1.004  1.003  1.023   URINEPH  8.0*  7.0  6.0   NITRITE  Negative  Negative  Negative   RBCU  <1  13*  >182*   WBCU  1  6*  0           Imaging:    I personally reviewed all applicable imaging and went over the below findings with patient.    Results for orders placed or performed in visit on 09/04/18   Renal US    Narrative    EXAMINATION: US RENAL COMPLETE, 9/4/2018 2:31 PM     COMPARISON: Renal ultrasound 5/29/2018.    HISTORY: History of cystinuria    FINDINGS:    Right kidney: Measures 9.5 cm in length. Parenchyma is of normal  thickness and echogenicity. No focal mass. No hydronephrosis.  Echogenic focus in the right inferior renal pole without associated  shadowing or technical artifact on color Doppler.    Left kidney: Measures 10.7 cm in length. Parenchyma is of normal  thickness and echogenicity. No focal mass. No hydronephrosis.     Bladder: Partially distended and unremarkable.        Impression    IMPRESSION:  Echogenic nonshadowing focus in the inferior right kidney possibly  representing a renal stone versus focal fat. No hydronephrosis.    I have personally reviewed the examination and initial interpretation  and I agree with the findings.    GENNA WITT MD       KUB  IMPRESSION:   1. No nephrolithiasis identified.  2. Nonobstructive bowel gas pattern.            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  - Obtain low-dose CT in 3 months or sooner if symptoms present   - Encouraged increasing fluid intake, note written to use camelback during work hours  - Follow up in 3 months   - cysteine litholink to coincide with next visit           Past Medical History:     Past Medical History:   Diagnosis Date     Complication of anesthesia      Cystinuria (H)       Kidney stones      PONV (postoperative nausea and vomiting)             Past Surgical History:     Past Surgical History:   Procedure Laterality Date     APPENDECTOMY  9/2011     COMBINED CYSTOSCOPY, RETROGRADES, URETEROSCOPY, INSERT STENT Left 10/12/2017    Procedure: COMBINED CYSTOSCOPY, RETROGRADES, URETEROSCOPY, INSERT STENT;  COMBINED CYSTOSCOPY, RETROGRADES, URETEROSCOPY, LASER HOLMIUM STANDBY,  INSERT STENT LEFT URETER;  Surgeon: Luis Banda MD;  Location: [facility] OR     CYSTOSCOPY       LASER HOLMIUM LITHOTRIPSY URETER(S), INSERT STENT, COMBINED  9/4/2012    Procedure: COMBINED CYSTOSCOPY, URETEROSCOPY, LASER HOLMIUM LITHOTRIPSY URETER(S), INSERT STENT;  Holmium Laser Lithotripsy.  Right Stent Placement;  Surgeon: Cathi Hernandez MD;  [facility]: [facility] OR     PERCUTANEOUS NEPHROLITHOTOMY Right 3/8/2018    Procedure: PERCUTANEOUS NEPHROLITHOTOMY;  Right Percutaneous Nephrolithotomy;  Surgeon: Jayy Koehler MD;  Location: [facility] OR     Ureteral stent placement with subsequent removal.  2017            Medications     Current Outpatient Prescriptions   Medication     azelastine (ASTEPRO) 0.15 % nasal spray     LARISSIA 0.1-20 MG-MCG per tablet     levocetirizine (XYZAL) 5 MG tablet     montelukast (SINGULAIR) 10 MG tablet     potassium citrate (UROCIT-K) 10 MEQ (1080 MG) CR tablet     No current facility-administered medications for this visit.             Family History:     Family History   Problem Relation Age of Onset     Genetic Disorder Sister      Cystinuria            Social History:     Social History     Social History     Marital status: Single     Spouse name: N/A     Number of children: N/A     Years of education: N/A     Occupational History     Not on file.     Social History Main Topics     Smoking status: Never Smoker     Smokeless tobacco: Never Used     Alcohol use Yes     Drug use: No     Sexual activity: Not on file     Other Topics Concern     Not on file     Social  History Katya Bravo lives with both parents and 4 siblings in [place]. She is due to start senior year at [place] [employer] this year. She has aspirations to be a nurse and plans to attend [employer] next year. 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    She denies any ETOH, cigarette, illicit drug use. She denies any sexual activity.            Allergies:   Thiola [tiopronin]         Review of Systems:  From intake questionnaire   Negative 14 system review except as noted on HPI, nurse's note.    Scribe Disclosure:   Lee RAPP, am serving as a scribe; to document services personally performed by Jayy Koehelr MD based on data collection and the provider's statements to me.     IJayy saw and evaluated this patient and agree with the plan as stated above.  I personally performed all listed procedures.    CC:  [place] [place] [facility]      >15 minutes were spent face to face with patient over half of which was spent providing medical counseling regarding kidney stone prevention.

## 2018-09-04 NOTE — PATIENT INSTRUCTIONS
Elma linares 1.    Return in 3 months with a CT.    It was a pleasure meeting with you today.  Thank you for allowing me and my team the privilege of caring for you today.  YOU are the reason we are here, and I truly hope we provided you with the excellent service you deserve.  Please let us know if there is anything else we can do for you so that we can be sure you are leaving completely satisfied with your care experience.

## 2018-11-03 ENCOUNTER — TRANSFERRED RECORDS (OUTPATIENT)
Dept: HEALTH INFORMATION MANAGEMENT | Facility: CLINIC | Age: 23
End: 2018-11-03

## 2018-11-16 DIAGNOSIS — N20.1 URETEROLITHIASIS: ICD-10-CM

## 2018-11-18 RX ORDER — POTASSIUM CITRATE 10 MEQ/1
20 TABLET, EXTENDED RELEASE ORAL
Qty: 120 TABLET | Refills: 9 | Status: SHIPPED | OUTPATIENT
Start: 2018-11-18 | End: 2020-03-09

## 2018-12-04 ENCOUNTER — OFFICE VISIT (OUTPATIENT)
Dept: UROLOGY | Facility: CLINIC | Age: 23
End: 2018-12-04
Payer: COMMERCIAL

## 2018-12-04 ENCOUNTER — RADIANT APPOINTMENT (OUTPATIENT)
Dept: CT IMAGING | Facility: CLINIC | Age: 23
End: 2018-12-04
Attending: UROLOGY
Payer: COMMERCIAL

## 2018-12-04 VITALS
DIASTOLIC BLOOD PRESSURE: 75 MMHG | WEIGHT: 180 LBS | BODY MASS INDEX: 31.89 KG/M2 | HEART RATE: 87 BPM | SYSTOLIC BLOOD PRESSURE: 113 MMHG | HEIGHT: 63 IN

## 2018-12-04 DIAGNOSIS — N20.0 CALCULUS OF KIDNEY: ICD-10-CM

## 2018-12-04 DIAGNOSIS — E72.01 CYSTINURIA (H): ICD-10-CM

## 2018-12-04 DIAGNOSIS — E72.01 CYSTINURIA (H): Primary | ICD-10-CM

## 2018-12-04 ASSESSMENT — PAIN SCALES - GENERAL: PAINLEVEL: NO PAIN (0)

## 2018-12-04 NOTE — PATIENT INSTRUCTIONS
Follow up with Dr. Koehler in 6 months with imaging prior to appointment.    It was a pleasure meeting with you today.  Thank you for allowing me and my team the privilege of caring for you today.  YOU are the reason we are here, and I truly hope we provided you with the excellent service you deserve.  Please let us know if there is anything else we can do for you so that we can be sure you are leaving completely satisfied with your care experience.        LIYA Farnsworth

## 2018-12-04 NOTE — PROGRESS NOTES
"    UROLOGY FOLLOW-UP NOTE          Chief Complaint:   Today I had the pleasure of seeing Ms. Shelly Cerda in follow-up for a chief complaint of kidney stones and cystinuria.           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 Her stone activity had been relatively quiet off medication until 10/2017 when she had a large left ureteral stone treated with staged ureteroscopy. Approximately 7 months ago, she was found to have a new right renal stone. This was treated percutaneously. She has not been able to tolerate thiola for allergic reaction. Defers captopril. Mainly managing with high fluid and potassium citrate.     Today notes:   She has been feeling well. She denies any new issues with flank pain stones or hematuria. CT scan today is normal, no stones.  She notes that she has been doing a better job with a high fluid, low salt low protein diet.         Physical Exam:   Patient is a 23 year old  female   Vitals: Blood pressure 113/75, pulse 87, height 1.6 m (5' 3\"), weight 81.6 kg (180 lb), not currently breastfeeding.  Notable Findings on Exam well-nourished female in no apparent distress  No CVAT bilaterally         Labs and Pathology:    I personally reviewed all applicable laboratory data and went over findings with patient  Significant for:    LITHOLINK:        CBC RESULTS:  Recent Labs   Lab Test  03/09/18   2300  03/09/18   0441  03/08/18   1325  03/08/18   0806   WBC  12.1*  13.7*  10.3  7.5   HGB  12.4  12.5  13.0  14.5   PLT  287  258  269  257        BMP RESULTS:  Recent Labs   Lab Test  03/20/18   1222  03/09/18   2300  03/09/18   0441  03/08/18   1325   NA  137  132*  135  139   POTASSIUM  4.0  3.8  4.6  4.5   CHLORIDE  103  100  101  108   CO2  26  25  27  24   ANIONGAP  8  7  7  7   GLC  88  105*  110*  94   BUN  10  14  13  11   CR  0.86  0.94  0.86  0.86   GFRESTIMATED  " 82  74  81  82   GFRESTBLACK  >90  89  >90  >90   VALERIANO  9.3  8.8  8.5  8.6       UA RESULTS:   Recent Labs   Lab Test  03/20/18   1222  03/09/18   2330  02/13/18   0904   SG  1.004  1.003  1.023   URINEPH  8.0*  7.0  6.0   NITRITE  Negative  Negative  Negative   RBCU  <1  13*  >182*   WBCU  1  6*  0           Imaging:    I personally reviewed all applicable imaging and went over the below findings with patient.    Results for orders placed or performed in visit on 12/04/18   CT Abdomen pelvis w/o contrast    Narrative    EXAMINATION: CT ABDOMEN PELVIS W/O CONTRAST, 12/4/2018 2:57 PM    TECHNIQUE:  Helical CT images from the lung bases through the pubic  symphysis were obtained without IV contrast.     COMPARISON: 3/8/2018 CT abdomen/pelvis    HISTORY: low dose renal protocol for stones; Calculus of kidney;  Cystinuria (H)    FINDINGS:    Abdomen and pelvis: Resolution of the previously seen right renal  pelvis stone. Currently no urinary tract stone identified. No  hydronephrosis or hydroureter. Noncontrast evaluation of the renal  parenchyma is unremarkable. The bladder is decompressed.    The liver, gallbladder, spleen, pancreas, and adrenal glands are  unremarkable. The uterus and ovaries are within normal limits. No free  fluid or free air. No bowel obstruction or inflammation. No abdominal  or pelvic lymphadenopathy.    Lung bases:  Clear.    Bones and soft tissues: No acute or aggressive osseous abnormality.      Impression    IMPRESSION: Resolved right renal pelvis stone. No new urinary tract  stone.     I have personally reviewed the examination and initial interpretation  and I agree with the findings.    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  - Follow up in 6 months with XR KUB and Renal US   - She will continue on high fluid, low sodium diet with potassium citrate  -Will call and let us know earlier if becomes symptomatic            Past Medical History:     Past Medical History:   Diagnosis Date     Complication of anesthesia      Cystinuria (H)      Kidney stones      PONV (postoperative nausea and vomiting)             Past Surgical History:     Past Surgical History:   Procedure Laterality Date     APPENDECTOMY  9/2011     COMBINED CYSTOSCOPY, RETROGRADES, URETEROSCOPY, INSERT STENT Left 10/12/2017    Procedure: COMBINED CYSTOSCOPY, RETROGRADES, URETEROSCOPY, INSERT STENT;  COMBINED CYSTOSCOPY, RETROGRADES, URETEROSCOPY, LASER HOLMIUM STANDBY,  INSERT STENT LEFT URETER;  Surgeon: Luis Banda MD;  Location: [facility] OR     CYSTOSCOPY       LASER HOLMIUM LITHOTRIPSY URETER(S), INSERT STENT, COMBINED  9/4/2012    Procedure: COMBINED CYSTOSCOPY, URETEROSCOPY, LASER HOLMIUM LITHOTRIPSY URETER(S), INSERT STENT;  Holmium Laser Lithotripsy.  Right Stent Placement;  Surgeon: Cathi Hernandez MD;  [facility]     PERCUTANEOUS NEPHROLITHOTOMY Right 3/8/2018    Procedure: PERCUTANEOUS NEPHROLITHOTOMY;  Right Percutaneous Nephrolithotomy;  Surgeon: Jayy Koehler MD;  [facility]     Ureteral stent placement with subsequent removal.  2017            Medications     Current Outpatient Prescriptions   Medication     azelastine (ASTEPRO) 0.15 % nasal spray     LARISSIA 0.1-20 MG-MCG per tablet     levocetirizine (XYZAL) 5 MG tablet     montelukast (SINGULAIR) 10 MG tablet     potassium citrate (UROCIT-K) 10 MEQ (1080 MG) CR tablet     No current facility-administered medications for this visit.             Family History:     Family History   Problem Relation Age of Onset     Genetic Disorder Sister      Cystinuria            Social History:     Social History     Social History     Marital status: Single     Spouse name: N/A     Number of children: N/A     Years of education: N/A     Occupational History     Not on file.     Social History Main Topics     Smoking status: Never Smoker     Smokeless tobacco: Never Used      Alcohol use Yes     Drug use: No     Sexual activity: Not on file     Other Topics Concern     Not on file     Social History Narrative    Shelly lives with both parents and 4 siblings in [place]. She is due to start senior year at [employer] [employer] this year. 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    She denies any ETOH, cigarette, illicit drug use. She denies any sexual activity.            Allergies:   Thiola [tiopronin]         Review of Systems:  From intake questionnaire   Negative 14 system review except as noted on HPI, nurse's note.    Scribe Disclosure:   I,Lee Kerr, am serving as a scribe; to document services personally performed by Jayy Koehler MD based on data collection and the provider's statements to me.     IJayy saw and evaluated this patient and agree with the plan as stated above.  I personally performed all listed procedures.    CC:  [place] [place] [facility]      >15 minutes were spent face to face with patient over half of which was spent providing medical counseling regarding kidney stone prevention.

## 2018-12-04 NOTE — MR AVS SNAPSHOT
After Visit Summary   12/4/2018    Shelly Cerda    MRN: [id number]           Patient Information     Date Of Birth          1995        Visit Information        Provider Department      12/4/2018 3:20 PM Jayy Koehler MD [facility] for Prostate and Urologic Cancers        Today's Diagnoses     Cystinuria (H)    -  1      Care Instructions    Follow up with Dr. Koehler in 6 months with imaging prior to appointment.    It was a pleasure meeting with you today.  Thank you for allowing me and my team the privilege of caring for you today.  YOU are the reason we are here, and I truly hope we provided you with the excellent service you deserve.  Please let us know if there is anything else we can do for you so that we can be sure you are leaving completely satisfied with your care experience.        LIYA Farnsworth          Follow-ups after your visit        Who to contact     Please call your clinic at [phone] to:    Ask questions about your health    Make or cancel appointments    Discuss your medicines    Learn about your test results    Speak to your doctor            Additional Information About Your Visit        CensorNetharNowThis News Information     Apartment List gives you secure access to your electronic health record. If you see a primary care provider, you can also send messages to your care team and make appointments. If you have questions, please call your primary care clinic.  If you do not have a primary care provider, please call [phone] and they will assist you.      Apartment List is an electronic gateway that provides easy, online access to your medical records. With [employer], you can request a clinic appointment, read your test results, renew a prescription or communicate with your care team.     To access your existing account, please contact your [facility] or call [phone] for assistance.        Care EveryWhere ID     This is your Care  "EveryWhere ID. This could be used by other organizations to access your [facility] medical records  [id number]        Your Vitals Were     Pulse Height BMI (Body Mass Index)             87 1.6 m (5' 3\") 31.89 kg/m2          Blood Pressure from Last 3 Encounters:   12/04/18 113/75   09/04/18 110/72   05/29/18 126/75    Weight from Last 3 Encounters:   12/04/18 81.6 kg (180 lb)   09/04/18 87.4 kg (192 lb 11.2 oz)   05/29/18 85.5 kg (188 lb 8 oz)               Primary Care Provider Fax #    [facility] [phone] [address]  [facility] [address]        Equal Access to Services     BON [place] : Festus arteagao Soishmael, waaxda luqadaha, qaybta kaalmada adeegyada, carly alejo. So [facility] [phone].    ATENCIÓN: Si habla español, tiene a mckeon disposición servicios gratuitos de asistencia lingüística. [facility] [phone].    We comply with applicable federal civil rights laws and [place] laws. We do not discriminate on the basis of race, color, national origin, age, disability, sex, sexual orientation, or gender identity.            Thank you!     Thank you for choosing [facility] AND [facility] FOR PROSTATE AND UROLOGIC CANCERS  for your care. Our goal is always to provide you with excellent care. Hearing back from our patients is one way we can continue to improve our services. Please take a few minutes to complete the written survey that you may receive in the mail after your visit with us. Thank you!             Your Updated Medication List - Protect others around you: Learn how to safely use, store and throw away your medicines at www.disposemymeds.org.          This list is accurate as of 12/4/18 11:59 PM.  Always use your most recent med list.                   Brand Name Dispense Instructions for use Diagnosis    azelastine 0.15 % nasal spray    ASTEPRO          LARISSIA 0.1-20 MG-MCG tablet   Generic drug:  levonorgestrel-ethinyl estradiol      "      levocetirizine 5 MG tablet    XYZAL          montelukast 10 MG tablet    SINGULAIR          potassium citrate 10 MEQ (1080 MG) CR tablet    UROCIT-K    120 tablet    Take 2 tablets (20 mEq) by mouth 2 times daily    Ureterolithiasis

## 2018-12-04 NOTE — LETTER
"12/4/2018       RE: Shelly Cerda  [address] [facility] [address]     Dear Colleague,    Thank you for referring your patient, Shelly Cerda, to the [facility]OLOG[facility] AND [facility]ST FOR PROSTATE AND UROLOGIC CANCERS at [facility]. Please see a copy of my visit note below.    UROLOGY FOLLOW-UP NOTE          Chief Complaint:   Today I had the pleasure of seeing Ms. Shelly Cerda in follow-up for a chief complaint of kidney stones and cystinuria.           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 Her stone activity had been relatively quiet off medication until 10/2017 when she had a large left ureteral stone treated with staged ureteroscopy. Approximately 7 months ago, she was found to have a new right renal stone. This was treated percutaneously. She has not been able to tolerate thiola for allergic reaction. Defers captopril. Mainly managing with high fluid and potassium citrate.     Today notes:   She has been feeling well. She denies any new issues with flank pain stones or hematuria. CT scan today is normal, no stones.  She notes that she has been doing a better job with a high fluid, low salt low protein diet.         Physical Exam:   Patient is a 23 year old  female   Vitals: Blood pressure 113/75, pulse 87, height 1.6 m (5' 3\"), weight 81.6 kg (180 lb), not currently breastfeeding.  Notable Findings on Exam well-nourished female in no apparent distress  No CVAT bilaterally         Labs and Pathology:    I personally reviewed all applicable laboratory data and went over findings with patient  Significant for:    LITHOLINK:        CBC RESULTS:  Recent Labs   Lab Test  03/09/18   2300  03/09/18   0441  03/08/18   1325  03/08/18   0806   WBC  12.1*  13.7*  10.3  7.5   HGB  12.4  12.5  13.0  14.5   PLT  287  258  269  257     BMP " RESULTS:  Recent Labs   Lab Test  03/20/18   1222  03/09/18   2300  03/09/18   0441  03/08/18   1325   NA  137  132*  135  139   POTASSIUM  4.0  3.8  4.6  4.5   CHLORIDE  103  100  101  108   CO2  26  25  27  24   ANIONGAP  8  7  7  7   GLC  88  105*  110*  94   BUN  10  14  13  11   CR  0.86  0.94  0.86  0.86   GFRESTIMATED  82  74  81  82   GFRESTBLACK  >90  89  >90  >90   VALERIANO  9.3  8.8  8.5  8.6     UA RESULTS:   Recent Labs   Lab Test  03/20/18   1222  03/09/18   2330  02/13/18   0904   SG  1.004  1.003  1.023   URINEPH  8.0*  7.0  6.0   NITRITE  Negative  Negative  Negative   RBCU  <1  13*  >182*   WBCU  1  6*  0       Imaging:    I personally reviewed all applicable imaging and went over the below findings with patient.    Results for orders placed or performed in visit on 12/04/18   CT Abdomen pelvis w/o contrast    Narrative    EXAMINATION: CT ABDOMEN PELVIS W/O CONTRAST, 12/4/2018 2:57 PM    TECHNIQUE:  Helical CT images from the lung bases through the pubic  symphysis were obtained without IV contrast.     COMPARISON: 3/8/2018 CT abdomen/pelvis    HISTORY: low dose renal protocol for stones; Calculus of kidney;  Cystinuria (H)    FINDINGS:    Abdomen and pelvis: Resolution of the previously seen right renal  pelvis stone. Currently no urinary tract stone identified. No  hydronephrosis or hydroureter. Noncontrast evaluation of the renal  parenchyma is unremarkable. The bladder is decompressed.    The liver, gallbladder, spleen, pancreas, and adrenal glands are  unremarkable. The uterus and ovaries are within normal limits. No free  fluid or free air. No bowel obstruction or inflammation. No abdominal  or pelvic lymphadenopathy.    Lung bases:  Clear.    Bones and soft tissues: No acute or aggressive osseous abnormality.      Impression    IMPRESSION: Resolved right renal pelvis stone. No new urinary tract  stone.     I have personally reviewed the examination and initial interpretation  and I agree with the  findings.    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  - Follow up in 6 months with XR KUB and Renal US   - She will continue on high fluid, low sodium diet with potassium citrate  -Will call and let us know earlier if becomes symptomatic         Past Medical History:     Past Medical History:   Diagnosis Date     Complication of anesthesia      Cystinuria (H)      Kidney stones      PONV (postoperative nausea and vomiting)           Past Surgical History:     Past Surgical History:   Procedure Laterality Date     APPENDECTOMY  9/2011     COMBINED CYSTOSCOPY, RETROGRADES, URETEROSCOPY, INSERT STENT Left 10/12/2017    Procedure: COMBINED CYSTOSCOPY, RETROGRADES, URETEROSCOPY, INSERT STENT;  COMBINED CYSTOSCOPY, RETROGRADES, URETEROSCOPY, LASER HOLMIUM STANDBY,  INSERT STENT LEFT URETER;  Surgeon: Luis Banda MD;  Location: [facility] OR     CYSTOSCOPY       LASER HOLMIUM LITHOTRIPSY URETER(S), INSERT STENT, COMBINED  9/4/2012    Procedure: COMBINED CYSTOSCOPY, URETEROSCOPY, LASER HOLMIUM LITHOTRIPSY URETER(S), INSERT STENT;  Holmium Laser Lithotripsy.  Right Stent Placement;  Surgeon: Cathi Hernandez MD;  [facility]     PERCUTANEOUS NEPHROLITHOTOMY Right 3/8/2018    Procedure: PERCUTANEOUS NEPHROLITHOTOMY;  Right Percutaneous Nephrolithotomy;  Surgeon: Jayy Koehler MD;  [facility]     Ureteral stent placement with subsequent removal.  2017            Medications     Current Outpatient Prescriptions   Medication     azelastine (ASTEPRO) 0.15 % nasal spray     LARISSIA 0.1-20 MG-MCG per tablet     levocetirizine (XYZAL) 5 MG tablet     montelukast (SINGULAIR) 10 MG tablet     potassium citrate (UROCIT-K) 10 MEQ (1080 MG) CR tablet     No current facility-administered medications for this visit.             Family History:     Family History   Problem Relation Age of Onset     Genetic Disorder Sister      Cystinuria             Social History:     Social History     Social History     Marital status: Single     Spouse name: N/A     Number of children: N/A     Years of education: N/A     Occupational History     Not on file.     Social History Main Topics     Smoking status: Never Smoker     Smokeless tobacco: Never Used     Alcohol use Yes     Drug use: No     Sexual activity: Not on file     Other Topics Concern     Not on file     Social History Narrative    Shelly lives with both parents and 4 siblings in [place]. She is due to start senior year at [employer] this year. 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    She denies any ETOH, cigarette, illicit drug use. She denies any sexual activity.            Allergies:   Thiola [tiopronin]    Scribe Disclosure:   ILee, am serving as a scribe; to document services personally performed by Jayy Koehler MD based on data collection and the provider's statements to me.     IJayy saw and evaluated this patient and agree with the plan as stated above.  I personally performed all listed procedures.    CC:  [facility]      >15 minutes were spent face to face with patient over half of which was spent providing medical counseling regarding kidney stone prevention.    Again, thank you for allowing me to participate in the care of your patient.      Sincerely,    Jayy Koehler MD

## 2019-04-24 ENCOUNTER — APPOINTMENT (OUTPATIENT)
Age: 24
Setting detail: DERMATOLOGY
End: 2019-04-25

## 2019-04-24 VITALS — HEIGHT: 63 IN | WEIGHT: 195 LBS

## 2019-04-24 DIAGNOSIS — D18.0 HEMANGIOMA: ICD-10-CM

## 2019-04-24 DIAGNOSIS — L23.9 ALLERGIC CONTACT DERMATITIS, UNSPECIFIED CAUSE: ICD-10-CM

## 2019-04-24 DIAGNOSIS — D22 MELANOCYTIC NEVI: ICD-10-CM

## 2019-04-24 PROBLEM — D18.01 HEMANGIOMA OF SKIN AND SUBCUTANEOUS TISSUE: Status: ACTIVE | Noted: 2019-04-24

## 2019-04-24 PROBLEM — D22.5 MELANOCYTIC NEVI OF TRUNK: Status: ACTIVE | Noted: 2019-04-24

## 2019-04-24 PROBLEM — D48.5 NEOPLASM OF UNCERTAIN BEHAVIOR OF SKIN: Status: ACTIVE | Noted: 2019-04-24

## 2019-04-24 PROCEDURE — OTHER COUNSELING: OTHER

## 2019-04-24 PROCEDURE — 99203 OFFICE O/P NEW LOW 30 MIN: CPT | Mod: 25

## 2019-04-24 PROCEDURE — OTHER BIOPSY BY SHAVE METHOD: OTHER

## 2019-04-24 PROCEDURE — 11103 TANGNTL BX SKIN EA SEP/ADDL: CPT

## 2019-04-24 PROCEDURE — 11102 TANGNTL BX SKIN SINGLE LES: CPT

## 2019-04-24 ASSESSMENT — LOCATION DETAILED DESCRIPTION DERM
LOCATION DETAILED: LEFT ANTERIOR SHOULDER
LOCATION DETAILED: RIGHT SUPERIOR MEDIAL UPPER BACK
LOCATION DETAILED: 3RD WEB SPACE RIGHT HAND
LOCATION DETAILED: LEFT PROXIMAL PRETIBIAL REGION
LOCATION DETAILED: EPIGASTRIC SKIN

## 2019-04-24 ASSESSMENT — LOCATION SIMPLE DESCRIPTION DERM
LOCATION SIMPLE: ABDOMEN
LOCATION SIMPLE: LEFT SHOULDER
LOCATION SIMPLE: RIGHT UPPER BACK
LOCATION SIMPLE: RIGHT HAND
LOCATION SIMPLE: LEFT PRETIBIAL REGION

## 2019-04-24 ASSESSMENT — LOCATION ZONE DERM
LOCATION ZONE: HAND
LOCATION ZONE: ARM
LOCATION ZONE: LEG
LOCATION ZONE: TRUNK

## 2019-04-24 NOTE — PROCEDURE: BIOPSY BY SHAVE METHOD
Curettage Text: The wound bed was treated with curettage after the biopsy was performed.
Anesthesia Type: 1% Xylocaine with epinephrine
Was A Bandage Applied: Yes
Render In Bullet Format When Appropriate: No
Wound Care: Petrolatum
Biopsy Type: H and E
Detail Level: Detailed
Silver Nitrate Text: The wound bed was treated with silver nitrate after the biopsy was performed.
Electrodesiccation And Curettage Text: The wound bed was treated with electrodesiccation and curettage after the biopsy was performed.
Anesthesia Volume In Cc (Will Not Render If 0): 0.5
Notification Instructions: Patient will be notified of biopsy results. However, patient instructed to call the office if not contacted within 2 weeks.
Hemostasis: Drysol
Biopsy Method: Dermablade
Electrodesiccation Text: The wound bed was treated with electrodesiccation after the biopsy was performed.
X Size Of Lesion In Cm: 0
Anesthesia Volume In Cc (Will Not Render If 0): 0.2
Depth Of Biopsy: dermis
Dressing: Band-Aid
Cryotherapy Text: The wound bed was treated with cryotherapy after the biopsy was performed.
Type Of Destruction Used: Curettage
Consent: Written consent was obtained and risks were reviewed including but not limited to scarring, infection, bleeding, scabbing, incomplete removal, nerve damage and allergy to anesthesia.
Billing Type: Third-Party Bill
Post-Care Instructions: I reviewed with the patient in detail post-care instructions. Patient is to keep the biopsy site moist, apply aquaphor or vaseline daily and cover with band-aid until healed.

## 2019-05-01 ENCOUNTER — APPOINTMENT (OUTPATIENT)
Age: 24
Setting detail: DERMATOLOGY
End: 2019-05-01

## 2019-05-01 VITALS — HEIGHT: 63 IN | WEIGHT: 195 LBS

## 2019-05-01 DIAGNOSIS — Z48.817 ENCOUNTER FOR SURGICAL AFTERCARE FOLLOWING SURGERY ON THE SKIN AND SUBCUTANEOUS TISSUE: ICD-10-CM

## 2019-05-01 PROCEDURE — OTHER PRESCRIPTION: OTHER

## 2019-05-01 PROCEDURE — OTHER COUNSELING: OTHER

## 2019-05-01 PROCEDURE — 99213 OFFICE O/P EST LOW 20 MIN: CPT

## 2019-05-01 RX ORDER — CEPHALEXIN 500 MG/1
500 MG CAPSULE ORAL BID
Qty: 10 | Refills: 0 | Status: ERX | COMMUNITY
Start: 2019-05-01

## 2019-05-01 RX ORDER — MUPIROCIN 20 MG/G
OINTMENT TOPICAL
Qty: 22 | Refills: 0 | Status: ERX | COMMUNITY
Start: 2019-05-01

## 2019-05-01 ASSESSMENT — LOCATION SIMPLE DESCRIPTION DERM: LOCATION SIMPLE: LEFT PRETIBIAL REGION

## 2019-05-01 ASSESSMENT — LOCATION ZONE DERM: LOCATION ZONE: LEG

## 2019-05-01 ASSESSMENT — LOCATION DETAILED DESCRIPTION DERM: LOCATION DETAILED: LEFT LATERAL PROXIMAL PRETIBIAL REGION

## 2019-09-13 DIAGNOSIS — N20.0 KIDNEY STONE: Primary | ICD-10-CM

## 2019-09-14 ENCOUNTER — TRANSFERRED RECORDS (OUTPATIENT)
Dept: HEALTH INFORMATION MANAGEMENT | Facility: CLINIC | Age: 24
End: 2019-09-14

## 2019-09-15 ENCOUNTER — PATIENT OUTREACH (OUTPATIENT)
Dept: CARE COORDINATION | Facility: CLINIC | Age: 24
End: 2019-09-15

## 2019-09-15 ENCOUNTER — ANESTHESIA (OUTPATIENT)
Dept: SURGERY | Facility: CLINIC | Age: 24
End: 2019-09-15
Payer: COMMERCIAL

## 2019-09-15 ENCOUNTER — HOSPITAL ENCOUNTER (OUTPATIENT)
Facility: CLINIC | Age: 24
Setting detail: OBSERVATION
Discharge: HOME | End: 2019-09-15
Attending: UROLOGY | Admitting: UROLOGY
Payer: COMMERCIAL

## 2019-09-15 ENCOUNTER — APPOINTMENT (OUTPATIENT)
Dept: GENERAL RADIOLOGY | Facility: CLINIC | Age: 24
End: 2019-09-15
Attending: UROLOGY
Payer: COMMERCIAL

## 2019-09-15 ENCOUNTER — ANESTHESIA EVENT (OUTPATIENT)
Dept: SURGERY | Facility: CLINIC | Age: 24
End: 2019-09-15
Payer: COMMERCIAL

## 2019-09-15 VITALS
RESPIRATION RATE: 16 BRPM | SYSTOLIC BLOOD PRESSURE: 102 MMHG | DIASTOLIC BLOOD PRESSURE: 60 MMHG | WEIGHT: 199 LBS | HEIGHT: 63 IN | OXYGEN SATURATION: 97 % | BODY MASS INDEX: 35.26 KG/M2 | TEMPERATURE: 96.9 F | HEART RATE: 76 BPM

## 2019-09-15 DIAGNOSIS — N20.1 URETERAL STONE: Primary | ICD-10-CM

## 2019-09-15 LAB
GLUCOSE BLDC GLUCOMTR-MCNC: 81 MG/DL (ref 70–99)
HCG UR QL: NEGATIVE

## 2019-09-15 PROCEDURE — 27210794 ZZH OR GENERAL SUPPLY STERILE: Performed by: UROLOGY

## 2019-09-15 PROCEDURE — 25000128 H RX IP 250 OP 636: Performed by: STUDENT IN AN ORGANIZED HEALTH CARE EDUCATION/TRAINING PROGRAM

## 2019-09-15 PROCEDURE — 40000170 ZZH STATISTIC PRE-PROCEDURE ASSESSMENT II: Performed by: UROLOGY

## 2019-09-15 PROCEDURE — 40000277 XR SURGERY CARM FLUORO LESS THAN 5 MIN W STILLS: Mod: TC

## 2019-09-15 PROCEDURE — 25800030 ZZH RX IP 258 OP 636: Performed by: STUDENT IN AN ORGANIZED HEALTH CARE EDUCATION/TRAINING PROGRAM

## 2019-09-15 PROCEDURE — 25000128 H RX IP 250 OP 636: Performed by: ANESTHESIOLOGY

## 2019-09-15 PROCEDURE — 37000009 ZZH ANESTHESIA TECHNICAL FEE, EACH ADDTL 15 MIN: Performed by: UROLOGY

## 2019-09-15 PROCEDURE — C2617 STENT, NON-COR, TEM W/O DEL: HCPCS | Performed by: UROLOGY

## 2019-09-15 PROCEDURE — 71000014 ZZH RECOVERY PHASE 1 LEVEL 2 FIRST HR: Performed by: UROLOGY

## 2019-09-15 PROCEDURE — 25000125 ZZHC RX 250: Performed by: STUDENT IN AN ORGANIZED HEALTH CARE EDUCATION/TRAINING PROGRAM

## 2019-09-15 PROCEDURE — 36000053 ZZH SURGERY LEVEL 2 EA 15 ADDTL MIN - UMMC: Performed by: UROLOGY

## 2019-09-15 PROCEDURE — 36000055 ZZH SURGERY LEVEL 2 W FLUORO 1ST 30 MIN - UMMC: Performed by: UROLOGY

## 2019-09-15 PROCEDURE — G0378 HOSPITAL OBSERVATION PER HR: HCPCS

## 2019-09-15 PROCEDURE — 81025 URINE PREGNANCY TEST: CPT | Performed by: ANESTHESIOLOGY

## 2019-09-15 PROCEDURE — 25000132 ZZH RX MED GY IP 250 OP 250 PS 637: Performed by: STUDENT IN AN ORGANIZED HEALTH CARE EDUCATION/TRAINING PROGRAM

## 2019-09-15 PROCEDURE — C1769 GUIDE WIRE: HCPCS | Performed by: UROLOGY

## 2019-09-15 PROCEDURE — 00000146 ZZHCL STATISTIC GLUCOSE BY METER IP

## 2019-09-15 PROCEDURE — 37000008 ZZH ANESTHESIA TECHNICAL FEE, 1ST 30 MIN: Performed by: UROLOGY

## 2019-09-15 PROCEDURE — 25500064 ZZH RX 255 OP 636: Performed by: UROLOGY

## 2019-09-15 DEVICE — STENT URETERAL STRETCH VL FLEXIMA 6FRX22/30CM M0061851560: Type: IMPLANTABLE DEVICE | Site: URETER | Status: FUNCTIONAL

## 2019-09-15 RX ORDER — ONDANSETRON 4 MG/1
4 TABLET, ORALLY DISINTEGRATING ORAL EVERY 30 MIN PRN
Status: DISCONTINUED | OUTPATIENT
Start: 2019-09-15 | End: 2019-09-15 | Stop reason: HOSPADM

## 2019-09-15 RX ORDER — OXYCODONE HYDROCHLORIDE 5 MG/1
5-10 TABLET ORAL
Status: DISCONTINUED | OUTPATIENT
Start: 2019-09-15 | End: 2019-09-15 | Stop reason: HOSPADM

## 2019-09-15 RX ORDER — ONDANSETRON 2 MG/ML
4 INJECTION INTRAMUSCULAR; INTRAVENOUS EVERY 30 MIN PRN
Status: DISCONTINUED | OUTPATIENT
Start: 2019-09-15 | End: 2019-09-15 | Stop reason: HOSPADM

## 2019-09-15 RX ORDER — TAMSULOSIN HYDROCHLORIDE 0.4 MG/1
0.4 CAPSULE ORAL DAILY
Status: DISCONTINUED | OUTPATIENT
Start: 2019-09-15 | End: 2019-09-15 | Stop reason: HOSPADM

## 2019-09-15 RX ORDER — PROPOFOL 10 MG/ML
INJECTION, EMULSION INTRAVENOUS PRN
Status: DISCONTINUED | OUTPATIENT
Start: 2019-09-15 | End: 2019-09-15

## 2019-09-15 RX ORDER — SODIUM CHLORIDE, SODIUM LACTATE, POTASSIUM CHLORIDE, CALCIUM CHLORIDE 600; 310; 30; 20 MG/100ML; MG/100ML; MG/100ML; MG/100ML
INJECTION, SOLUTION INTRAVENOUS CONTINUOUS
Status: DISCONTINUED | OUTPATIENT
Start: 2019-09-15 | End: 2019-09-15 | Stop reason: HOSPADM

## 2019-09-15 RX ORDER — OXYBUTYNIN CHLORIDE 5 MG/1
5 TABLET ORAL 3 TIMES DAILY PRN
Qty: 60 TABLET | Refills: 1 | Status: SHIPPED | OUTPATIENT
Start: 2019-09-15 | End: 2020-06-23

## 2019-09-15 RX ORDER — ONDANSETRON 2 MG/ML
4 INJECTION INTRAMUSCULAR; INTRAVENOUS EVERY 6 HOURS PRN
Status: DISCONTINUED | OUTPATIENT
Start: 2019-09-15 | End: 2019-09-15 | Stop reason: HOSPADM

## 2019-09-15 RX ORDER — ONDANSETRON 2 MG/ML
INJECTION INTRAMUSCULAR; INTRAVENOUS PRN
Status: DISCONTINUED | OUTPATIENT
Start: 2019-09-15 | End: 2019-09-15

## 2019-09-15 RX ORDER — NALOXONE HYDROCHLORIDE 0.4 MG/ML
.1-.4 INJECTION, SOLUTION INTRAMUSCULAR; INTRAVENOUS; SUBCUTANEOUS
Status: DISCONTINUED | OUTPATIENT
Start: 2019-09-15 | End: 2019-09-15

## 2019-09-15 RX ORDER — HYDROMORPHONE HYDROCHLORIDE 1 MG/ML
.3-.5 INJECTION, SOLUTION INTRAMUSCULAR; INTRAVENOUS; SUBCUTANEOUS
Status: DISCONTINUED | OUTPATIENT
Start: 2019-09-15 | End: 2019-09-15 | Stop reason: HOSPADM

## 2019-09-15 RX ORDER — SODIUM CHLORIDE 9 MG/ML
INJECTION, SOLUTION INTRAVENOUS CONTINUOUS PRN
Status: DISCONTINUED | OUTPATIENT
Start: 2019-09-15 | End: 2019-09-15

## 2019-09-15 RX ORDER — HYDROMORPHONE HYDROCHLORIDE 1 MG/ML
.3-.5 INJECTION, SOLUTION INTRAMUSCULAR; INTRAVENOUS; SUBCUTANEOUS EVERY 5 MIN PRN
Status: DISCONTINUED | OUTPATIENT
Start: 2019-09-15 | End: 2019-09-15 | Stop reason: HOSPADM

## 2019-09-15 RX ORDER — OXYCODONE HYDROCHLORIDE 5 MG/1
5-10 TABLET ORAL EVERY 6 HOURS PRN
Qty: 10 TABLET | Refills: 0 | Status: SHIPPED | OUTPATIENT
Start: 2019-09-15 | End: 2019-09-20

## 2019-09-15 RX ORDER — LIDOCAINE HYDROCHLORIDE 20 MG/ML
INJECTION, SOLUTION INFILTRATION; PERINEURAL PRN
Status: DISCONTINUED | OUTPATIENT
Start: 2019-09-15 | End: 2019-09-15

## 2019-09-15 RX ORDER — LEVONORGESTREL/ETHIN.ESTRADIOL 0.1-0.02MG
1 TABLET ORAL DAILY
Status: DISCONTINUED | OUTPATIENT
Start: 2019-09-15 | End: 2019-09-15 | Stop reason: HOSPADM

## 2019-09-15 RX ORDER — AMOXICILLIN 250 MG
2 CAPSULE ORAL 2 TIMES DAILY PRN
Qty: 30 TABLET | Refills: 0 | Status: SHIPPED | OUTPATIENT
Start: 2019-09-15 | End: 2020-06-23

## 2019-09-15 RX ORDER — SODIUM CHLORIDE 9 MG/ML
INJECTION, SOLUTION INTRAVENOUS CONTINUOUS
Status: DISCONTINUED | OUTPATIENT
Start: 2019-09-15 | End: 2019-09-15 | Stop reason: HOSPADM

## 2019-09-15 RX ORDER — AMOXICILLIN 250 MG
1 CAPSULE ORAL 2 TIMES DAILY
Status: DISCONTINUED | OUTPATIENT
Start: 2019-09-15 | End: 2019-09-15 | Stop reason: HOSPADM

## 2019-09-15 RX ORDER — LABETALOL 20 MG/4 ML (5 MG/ML) INTRAVENOUS SYRINGE
10
Status: DISCONTINUED | OUTPATIENT
Start: 2019-09-15 | End: 2019-09-15 | Stop reason: HOSPADM

## 2019-09-15 RX ORDER — ACETAMINOPHEN 325 MG/1
650 TABLET ORAL EVERY 4 HOURS PRN
Qty: 100 TABLET | Refills: 0 | Status: SHIPPED | OUTPATIENT
Start: 2019-09-15 | End: 2020-06-23

## 2019-09-15 RX ORDER — AMOXICILLIN 250 MG
2 CAPSULE ORAL 2 TIMES DAILY
Status: DISCONTINUED | OUTPATIENT
Start: 2019-09-15 | End: 2019-09-15 | Stop reason: HOSPADM

## 2019-09-15 RX ORDER — FENTANYL CITRATE 50 UG/ML
INJECTION, SOLUTION INTRAMUSCULAR; INTRAVENOUS PRN
Status: DISCONTINUED | OUTPATIENT
Start: 2019-09-15 | End: 2019-09-15

## 2019-09-15 RX ORDER — CEFAZOLIN SODIUM 2 G/100ML
2 INJECTION, SOLUTION INTRAVENOUS
Status: COMPLETED | OUTPATIENT
Start: 2019-09-15 | End: 2019-09-15

## 2019-09-15 RX ORDER — ACETAMINOPHEN 325 MG/1
650 TABLET ORAL EVERY 4 HOURS PRN
Status: DISCONTINUED | OUTPATIENT
Start: 2019-09-15 | End: 2019-09-15 | Stop reason: HOSPADM

## 2019-09-15 RX ORDER — ONDANSETRON 4 MG/1
4 TABLET, ORALLY DISINTEGRATING ORAL EVERY 6 HOURS PRN
Status: DISCONTINUED | OUTPATIENT
Start: 2019-09-15 | End: 2019-09-15 | Stop reason: HOSPADM

## 2019-09-15 RX ORDER — PROPOFOL 10 MG/ML
INJECTION, EMULSION INTRAVENOUS CONTINUOUS PRN
Status: DISCONTINUED | OUTPATIENT
Start: 2019-09-15 | End: 2019-09-15

## 2019-09-15 RX ORDER — NALOXONE HYDROCHLORIDE 0.4 MG/ML
.1-.4 INJECTION, SOLUTION INTRAMUSCULAR; INTRAVENOUS; SUBCUTANEOUS
Status: DISCONTINUED | OUTPATIENT
Start: 2019-09-15 | End: 2019-09-15 | Stop reason: HOSPADM

## 2019-09-15 RX ORDER — TAMSULOSIN HYDROCHLORIDE 0.4 MG/1
0.4 CAPSULE ORAL DAILY
Qty: 30 CAPSULE | Refills: 1 | Status: SHIPPED | OUTPATIENT
Start: 2019-09-15 | End: 2020-06-23

## 2019-09-15 RX ORDER — CEFAZOLIN SODIUM 1 G/3ML
1 INJECTION, POWDER, FOR SOLUTION INTRAMUSCULAR; INTRAVENOUS SEE ADMIN INSTRUCTIONS
Status: DISCONTINUED | OUTPATIENT
Start: 2019-09-15 | End: 2019-09-15 | Stop reason: HOSPADM

## 2019-09-15 RX ORDER — POTASSIUM CITRATE 10 MEQ/1
20 TABLET, EXTENDED RELEASE ORAL
Status: DISCONTINUED | OUTPATIENT
Start: 2019-09-15 | End: 2019-09-15 | Stop reason: HOSPADM

## 2019-09-15 RX ORDER — LIDOCAINE 40 MG/G
CREAM TOPICAL
Status: DISCONTINUED | OUTPATIENT
Start: 2019-09-15 | End: 2019-09-15 | Stop reason: HOSPADM

## 2019-09-15 RX ORDER — FENTANYL CITRATE 50 UG/ML
25-50 INJECTION, SOLUTION INTRAMUSCULAR; INTRAVENOUS
Status: DISCONTINUED | OUTPATIENT
Start: 2019-09-15 | End: 2019-09-15 | Stop reason: HOSPADM

## 2019-09-15 RX ADMIN — SODIUM CHLORIDE: 9 INJECTION, SOLUTION INTRAVENOUS at 05:12

## 2019-09-15 RX ADMIN — FENTANYL CITRATE 25 MCG: 50 INJECTION, SOLUTION INTRAMUSCULAR; INTRAVENOUS at 10:43

## 2019-09-15 RX ADMIN — ACETAMINOPHEN 650 MG: 325 TABLET, FILM COATED ORAL at 06:56

## 2019-09-15 RX ADMIN — HYDROMORPHONE HYDROCHLORIDE 0.5 MG: 1 INJECTION, SOLUTION INTRAMUSCULAR; INTRAVENOUS; SUBCUTANEOUS at 07:43

## 2019-09-15 RX ADMIN — PROPOFOL 50 MG: 10 INJECTION, EMULSION INTRAVENOUS at 10:05

## 2019-09-15 RX ADMIN — LIDOCAINE HYDROCHLORIDE 5 ML: 20 INJECTION, SOLUTION INFILTRATION; PERINEURAL at 10:05

## 2019-09-15 RX ADMIN — MIDAZOLAM 2 MG: 1 INJECTION INTRAMUSCULAR; INTRAVENOUS at 10:00

## 2019-09-15 RX ADMIN — CEFAZOLIN SODIUM 2 G: 2 INJECTION, SOLUTION INTRAVENOUS at 10:11

## 2019-09-15 RX ADMIN — FENTANYL CITRATE 25 MCG: 50 INJECTION, SOLUTION INTRAMUSCULAR; INTRAVENOUS at 10:12

## 2019-09-15 RX ADMIN — PROPOFOL 10 MG: 10 INJECTION, EMULSION INTRAVENOUS at 10:30

## 2019-09-15 RX ADMIN — HYDROMORPHONE HYDROCHLORIDE 0.5 MG: 1 INJECTION, SOLUTION INTRAMUSCULAR; INTRAVENOUS; SUBCUTANEOUS at 05:11

## 2019-09-15 RX ADMIN — PROPOFOL 100 MCG/KG/MIN: 10 INJECTION, EMULSION INTRAVENOUS at 10:06

## 2019-09-15 RX ADMIN — ONDANSETRON 4 MG: 2 INJECTION INTRAMUSCULAR; INTRAVENOUS at 05:17

## 2019-09-15 RX ADMIN — ONDANSETRON 4 MG: 2 INJECTION INTRAMUSCULAR; INTRAVENOUS at 10:50

## 2019-09-15 RX ADMIN — FENTANYL CITRATE 25 MCG: 50 INJECTION, SOLUTION INTRAMUSCULAR; INTRAVENOUS at 10:30

## 2019-09-15 RX ADMIN — SODIUM CHLORIDE: 900 INJECTION INTRAVENOUS at 10:02

## 2019-09-15 RX ADMIN — FENTANYL CITRATE 25 MCG: 50 INJECTION, SOLUTION INTRAMUSCULAR; INTRAVENOUS at 10:34

## 2019-09-15 RX ADMIN — FENTANYL CITRATE 25 MCG: 50 INJECTION, SOLUTION INTRAMUSCULAR; INTRAVENOUS at 10:21

## 2019-09-15 RX ADMIN — OXYCODONE HYDROCHLORIDE 5 MG: 5 TABLET ORAL at 06:42

## 2019-09-15 RX ADMIN — FENTANYL CITRATE 50 MCG: 50 INJECTION INTRAMUSCULAR; INTRAVENOUS at 11:13

## 2019-09-15 ASSESSMENT — ACTIVITIES OF DAILY LIVING (ADL)
COGNITION: 0 - NO COGNITION ISSUES REPORTED
FALL_HISTORY_WITHIN_LAST_SIX_MONTHS: NO
AMBULATION: 0-->INDEPENDENT
RETIRED_EATING: 0-->INDEPENDENT
TRANSFERRING: 0-->INDEPENDENT
TOILETING: 0-->INDEPENDENT
ADLS_ACUITY_SCORE: 13
SWALLOWING: 0-->SWALLOWS FOODS/LIQUIDS WITHOUT DIFFICULTY
DRESS: 0-->INDEPENDENT
BATHING: 0-->INDEPENDENT
RETIRED_COMMUNICATION: 0-->UNDERSTANDS/COMMUNICATES WITHOUT DIFFICULTY

## 2019-09-15 ASSESSMENT — MIFFLIN-ST. JEOR: SCORE: 1621.79

## 2019-09-15 ASSESSMENT — PAIN DESCRIPTION - DESCRIPTORS
DESCRIPTORS: STABBING

## 2019-09-15 NOTE — ANESTHESIA POSTPROCEDURE EVALUATION
Anesthesia POST Procedure Evaluation    Patient: Shelly Cerda   MRN:     [id number] Gender:   female   Age:    24 year old :      1995        Preoperative Diagnosis: Right Ureteral Stone   Procedure(s):  CYSTOSCOPY, WITH RIGHT RETROGRADE PYELOGRAM AND RIGHT URETERAL STENT PLACEMENT   Postop Comments: No value filed.       Anesthesia Type:  Not documented  General    Reportable Event: NO     PAIN: Uncomplicated   Sign Out status: Comfortable, Well controlled pain     PONV: No PONV   Sign Out status:  No Nausea or Vomiting     Neuro/Psych: Uneventful perioperative course   Sign Out Status: Preoperative baseline; Age appropriate mentation     Airway/Resp.: Uneventful perioperative course   Sign Out Status: Non labored breathing, age appropriate RR     CV: Uneventful perioperative course   Sign Out status: Appropriate BP and perfusion indices; Appropriate HR/Rhythm     Disposition:   Sign Out in:  PACU  Disposition:  Phase II; Home  Recovery Course: Uneventful  Follow-Up: Not required           Last Anesthesia Record Vitals:  CRNA VITALS  9/15/2019 1031 - 9/15/2019 1112      9/15/2019             Resp Rate (observed):  3  (Abnormal)           Last PACU Vitals:  Vitals Value Taken Time   /73 9/15/2019 11:10 AM   Temp     Pulse 68 9/15/2019 11:10 AM   Resp     SpO2 100 % 9/15/2019 11:11 AM   Temp src     NIBP     Pulse     SpO2     Resp     Temp     Ht Rate     Temp 2     Vitals shown include unvalidated device data.      Electronically Signed By: Brian Arauz MD, September 15, 2019, 11:12 AM

## 2019-09-15 NOTE — PLAN OF CARE
Writer assumed care of patient from [id number].    AVSS on RA. A&O x4, up ad deepthi. Pt received pre-procedure shower and and CIARA scrub. AM meds held per pharmacy recs for surgery this AM. Patient voided prior to leaving unit. Bilateral flank pain adequately managed w/ PRN IV Dilaudid 0.5mg. Mother at bedside and very supportive.     Patient left unit for procedure @ 0900.    Charisma Ro RN on 9/15/2019 at 9:29 AM

## 2019-09-15 NOTE — ANESTHESIA PREPROCEDURE EVALUATION
Anesthesia Pre-Procedure Evaluation    Patient: Shelly Cerda   MRN:     [id number] Gender:   female   Age:    24 year old :      1995        Preoperative Diagnosis: Right Ureteral Stone   Procedure(s):  CYSTOSCOPY, WITH RETROGRADE PYELOGRAM AND URETERAL STENT REPLACEMENT     Past Medical History:   Diagnosis Date     Complication of anesthesia      Cystinuria (H)      Kidney stones      PONV (postoperative nausea and vomiting)       Past Surgical History:   Procedure Laterality Date     APPENDECTOMY  2011     COMBINED CYSTOSCOPY, RETROGRADES, URETEROSCOPY, INSERT STENT Left 10/12/2017    Procedure: COMBINED CYSTOSCOPY, RETROGRADES, URETEROSCOPY, INSERT STENT;  COMBINED CYSTOSCOPY, RETROGRADES, URETEROSCOPY, LASER HOLMIUM STANDBY,  INSERT STENT LEFT URETER;  Surgeon: Luis Banda MD;  Location: [facility]     CYSTOSCOPY       LASER HOLMIUM LITHOTRIPSY URETER(S), INSERT STENT, COMBINED  2012    Procedure: COMBINED CYSTOSCOPY, URETEROSCOPY, LASER HOLMIUM LITHOTRIPSY URETER(S), INSERT STENT;  Holmium Laser Lithotripsy.  Right Stent Placement;  Surgeon: Cathi Hernandez MD;  [facility]     PERCUTANEOUS NEPHROLITHOTOMY Right 3/8/2018    Procedure: PERCUTANEOUS NEPHROLITHOTOMY;  Right Percutaneous Nephrolithotomy;  Surgeon: Jayy Koehler MD;  [facility]     Ureteral stent placement with subsequent removal.  2017          Anesthesia Evaluation     . Pt has had prior anesthetic.     History of anesthetic complications   - PONV        ROS/MED HX    ENT/Pulmonary:  - neg pulmonary ROS     Neurologic:  - neg neurologic ROS     Cardiovascular:  - neg cardiovascular ROS       METS/Exercise Tolerance:     Hematologic:  - neg hematologic  ROS       Musculoskeletal:  - neg musculoskeletal ROS       GI/Hepatic:  - neg GI/hepatic ROS       Renal/Genitourinary:     (+) Nephrolithiasis ,       Endo:  - neg endo ROS       Psychiatric:  - neg psychiatric ROS       Infectious Disease:  - neg  infectious disease ROS       Malignancy:      - no malignancy   Other:                         PHYSICAL EXAM:   Mental Status/Neuro: A/A/O   Airway: Facies: Feasible  Mallampati: II  Mouth/Opening: Full  TM distance: > 6 cm  Neck ROM: Full   Respiratory: Auscultation: CTAB     Resp. Rate: Normal     Resp. Effort: Normal      CV: Rhythm: Regular  Rate: Age appropriate  Heart: Normal Sounds  Edema: None   Comments:      Dental: Normal Dentition                LABS:  CBC:   Lab Results   Component Value Date    WBC 12.1 (H) 03/09/2018    WBC 13.7 (H) 03/09/2018    HGB 12.4 03/09/2018    HGB 12.5 03/09/2018    HCT 35.8 03/09/2018    HCT 36.1 03/09/2018     03/09/2018     03/09/2018     BMP:   Lab Results   Component Value Date     03/20/2018     (L) 03/09/2018    POTASSIUM 4.0 03/20/2018    POTASSIUM 3.8 03/09/2018    CHLORIDE 103 03/20/2018    CHLORIDE 100 03/09/2018    CO2 26 03/20/2018    CO2 25 03/09/2018    BUN 10 03/20/2018    BUN 14 03/09/2018    CR 0.86 03/20/2018    CR 0.94 03/09/2018    GLC 88 03/20/2018     (H) 03/09/2018     COAGS: No results found for: PTT, INR, FIBR  POC:   Lab Results   Component Value Date    BGM 81 03/08/2018    HCG Negative 03/08/2018     OTHER:   Lab Results   Component Value Date    LACT 0.8 03/10/2018    VALERIANO 9.3 03/20/2018    ALBUMIN 4.2 12/19/2017    PROTTOTAL 7.9 12/19/2017    ALT 19 12/19/2017    AST 16 12/19/2017    ALKPHOS 52 12/19/2017    BILITOTAL 0.6 12/19/2017    LIPASE 158 08/06/2017        Preop Vitals    BP Readings from Last 3 Encounters:   09/15/19 107/58   12/04/18 113/75   09/04/18 110/72    Pulse Readings from Last 3 Encounters:   09/15/19 68   12/04/18 87   09/04/18 89      Resp Readings from Last 3 Encounters:   09/15/19 18   03/09/18 16   10/12/17 18    SpO2 Readings from Last 3 Encounters:   09/15/19 99%   03/10/18 97%   03/09/18 100%      Temp Readings from Last 1 Encounters:   09/15/19 36.3  C (97.4  F) (Oral)    Ht Readings  "from Last 1 Encounters:   09/15/19 1.6 m (5' 3\")      Wt Readings from Last 1 Encounters:   09/15/19 90.3 kg (199 lb)    Estimated body mass index is 35.25 kg/m  as calculated from the following:    Height as of this encounter: 1.6 m (5' 3\").    Weight as of this encounter: 90.3 kg (199 lb).     LDA:  Peripheral IV 08/06/17 Right (Active)   Number of days: 770       Peripheral IV 09/15/19 Right Upper forearm (Active)   Site Assessment WDL 9/15/2019  5:00 AM   Line Status Infusing 9/15/2019  5:00 AM   Phlebitis Scale 0-->no symptoms 9/15/2019  5:00 AM   Infiltration Scale 0 9/15/2019  5:00 AM   Infiltration Site Treatment Method  None 9/15/2019  5:00 AM   Extravasation? No 9/15/2019  5:00 AM   Number of days: 0       Nephrostomy 1 Right (Active)   Number of days: 556       Ureteral Drain/Stent Right ureter 6 fr (Active)   Number of days: 2567        Assessment:   ASA SCORE: 2      Smoking Status:  Non-Smoker/Unknown        Plan:   Anes. Type:  General   Pre-Medication: None   Induction:  IV (Standard)   Airway: ETT; Oral   Access/Monitoring: PIV   Maintenance: Balanced     Postop Plan:   Postop Pain: Opioids  Postop Sedation/Airway: Not planned     PONV Management:   Adult Risk Factors: Female, H/o PONV or Motion Sickness, Non-Smoker, Postop Opioids   Prevention: Ondansetron, Dexamethasone     CONSENT: Direct conversation   Plan and risks discussed with: Patient                      Brian Arauz MD  "

## 2019-09-15 NOTE — BRIEF OP NOTE
[facility], [place]    Brief Operative Note    Pre-operative diagnosis: Right Ureteral Stone  Post-operative diagnosis * No post-op diagnosis entered *  Procedure: Procedure(s):  CYSTOSCOPY, WITH RIGHT RETROGRADE PYELOGRAM AND RIGHT URETERAL STENT PLACEMENT  Surgeon: Surgeon(s) and Role:     * Kasi Wade MD - Primary     * Micah oJhnson MD - Resident - Assisting  Anesthesia: General   Estimated blood loss: Minimal  Drains: 6 Fr x 22/30cm (multi-length) double J stent  Specimens: * No specimens in log *  Findings:   RPG demonstrated hydro on right. Good stent curl in renal pelvis and bladder noted..  Complications: None.  Implants:    Implant Name Type Inv. Item Serial No.  Lot No. LRB No. Used   STENT URETERAL STRETCH VL FLEXIMA 6FRX22/[id number] Stent STENT URETERAL STRETCH VL FLEXIMA 6FRX22/[id number]  [employer] [id number] Right 1        Post-op plan:  -Transfer to PACU  - Discharge home once PACU criteria are met  - Follow up in next 2-3 weeks with Dr. Koehler for definitive stone management    Micah Johnson,  Urology, PGY-2

## 2019-09-15 NOTE — OP NOTE
OPERATIVE REPORT    PREOPERATIVE DIAGNOSIS: Right distal ureteral stone    POSTOPERATIVE DIAGNOSIS:  Same    PROCEDURES PERFORMED:   1. Cystourethroscopy with right retrograde pyelography  2. Placement of right ureteral stent.  3. Intraoperative interpretation of fluoroscopic imaging.     STAFF SURGEON: Kasi Wade MD    RESIDENT: Micah Johnson MD    ANESTHESIA: MAC    ESTIMATED BLOOD LOSS: 0 mL.     DRAINS:  6 Fr x 22/30cm double J multi-length stent       OPERATIVE INDICATIONS:   Shelly Cerda is a 24 year old female who presented with a right obstructing ureteral stone and associated right flank pain. Her labs were notable for a increased Cr at 1.78 (From baseline of about 0.8). The patient was counseled on the alternatives, risks, and benefits and elected to proceed with the above stated procedure.    DESCRIPTION OF PROCEDURE:    After informed consent was obtained, the patient was taken to the operating room, and moved to the operating table.  After adequate anesthesia was induced, the patient was repositioned in dorsal lithotomy position and prepped and draped in the usual sterile fashion. A timeout was taken to confirm correct patient, procedure and laterality.     A 22-Turkmen cystoscope was inserted into a well lubricated urethra. The urethra was unremarkable.  The bladder was free of tumors, stones or diverticuli.  The media was mildly turbid with some hematuria noted.  Bilateral ureteral orifices were orthotopic.  A Sensor guidewire was advanced into the right renal pelvis with the aid of a 5-Turkmen open ended ureteral catheter.  A retrograde pyelogram demonstrated moderate right hydronephrosis but no filling defects.  The ureter was measured at 30cm.  The wire was replaced and a 6 Fr x 22/30 cm multi-length double J stent was advanced over the guidewire under fluoroscopic guidance with a good curl in the renal pelvis and bladder. Bladder curl was directly visualized and urine noted to be draining  from the stent..  The stent passed up easily with no appreciable resistance.  The bladder was then drained.    The patient tolerated the procedure well.  There were no complications.         PLAN:   Post-op plan:  -Transfer to PACU  - Discharge home once PACU criteria are met  - Follow up in next 2-3 weeks with Dr. Koehler for definitive stone management      Micah Johnson MD  Urology, PGY2    I was present and scrubbed for the entire procedure.  Kasi Wade MD  Urology Staff

## 2019-09-15 NOTE — PLAN OF CARE
A&OX4.VSS on room. Patient arrive on 7C around 0500AM from [place]. Admitted with recurrent kidney stones pain. Pain controlled with IV dilaudid, PO oxy. NPO. Nausea relieved with zofran. Patient stated last BM was yesterday. Patient hasnt voided yet since arrival. Currently resting. PIV in right infusing MIVF. Mother at bedside support of  Care.  Independent. Continue with plan of care and maintain pain control.

## 2019-09-15 NOTE — H&P
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 Her last stone episode was 3/2018 when she received underwent PCNL for a 2.4cm right renal stone. She presented to an OSH ED yesterday evening with severe right flank pain, similar to previous stone episodes. She states the pain has been present for approximately 8 days and she has been attempting to pass the stone at home by increasing her fluid intake. She had been prescribed potassium citrate, BID but states she has not taken this in 3 months as she does not like to take medications. Yesterday evening she was the maid of honor in her sister's wedding in [place] and the pain became unbearable during the reception, prompting her visit to the ED. She denies hematuria, dysuria, fevers, chills, nausea, or vomiting.     In the ED, vital signs were normal and laboratory studies were remarkable for a Cr of 1.78 (baseline 0.8-0.9). All other laboratory studies were within normal limits and UA was not suggestive of infection. CT demonstrated a 6mm stone in the right mid ureter with associated moderate hydronephrosis. Given her degree of TAWNYA, immediate decompression with stent placement was advised. The patient was then admitted directly to the [facility] urology service for planned right ureteral stent placement with possible endoscopic treatment of the stone.    On admission the patient's vital signs are normal and she is resting comfortably. She does complain of some right flank pain that is well controlled with medications. Her last meal was around 6pm.          Past Medical History:     Past Medical History:   Diagnosis Date     Complication of anesthesia       Cystinuria (H)      Kidney stones      PONV (postoperative nausea and vomiting)             Past Surgical History:     Past Surgical History:   Procedure Laterality Date     APPENDECTOMY  9/2011     COMBINED CYSTOSCOPY, RETROGRADES, URETEROSCOPY, INSERT STENT Left 10/12/2017    Procedure: COMBINED CYSTOSCOPY, RETROGRADES, URETEROSCOPY, INSERT STENT;  COMBINED CYSTOSCOPY, RETROGRADES, URETEROSCOPY, LASER HOLMIUM STANDBY,  INSERT STENT LEFT URETER;  Surgeon: Luis Banda MD;  Location: [facility]     CYSTOSCOPY       LASER HOLMIUM LITHOTRIPSY URETER(S), INSERT STENT, COMBINED  9/4/2012    Procedure: COMBINED CYSTOSCOPY, URETEROSCOPY, LASER HOLMIUM LITHOTRIPSY URETER(S), INSERT STENT;  Holmium Laser Lithotripsy.  Right Stent Placement;  Surgeon: Cathi Hernandez MD;  Location[facility]     PERCUTANEOUS NEPHROLITHOTOMY Right 3/8/2018    Procedure: PERCUTANEOUS NEPHROLITHOTOMY;  Right Percutaneous Nephrolithotomy;  Surgeon: Jayy Koehler MD;  Location[facility]     Ureteral stent placement with subsequent removal.  2017            Social History:     Social History     Tobacco Use     Smoking status: Never Smoker     Smokeless tobacco: Never Used   Substance Use Topics     Alcohol use: Yes            Family History:     Family History   Problem Relation Age of Onset     Genetic Disorder Sister         Cystinuria            Allergies:     Allergies   Allergen Reactions     Thiola [Tiopronin] Rash            Medications:     Current Facility-Administered Medications   Medication     acetaminophen (TYLENOL) tablet 650 mg     HYDROmorphone (PF) (DILAUDID) injection 0.3-0.5 mg     levonorgestrel-ethinyl estradiol (AVIANE/ALESSE/LESSINA) 0.1-20 MG-MCG per tablet 1 tablet     naloxone (NARCAN) injection 0.1-0.4 mg     ondansetron (ZOFRAN-ODT) ODT tab 4 mg    Or     ondansetron (ZOFRAN) injection 4 mg     oxyCODONE (ROXICODONE) tablet 5-10 mg     potassium citrate (UROCIT-K) CR tablet 20 mEq     senna-docusate  (SENOKOT-S/PERICOLACE) 8.6-50 MG per tablet 1 tablet    Or     senna-docusate (SENOKOT-S/PERICOLACE) 8.6-50 MG per tablet 2 tablet     sodium chloride 0.9% infusion     tamsulosin (FLOMAX) capsule 0.4 mg             Review of Systems:    ROS: 10 point ROS neg other than the symptoms noted above in the HPI           Physical Exam:   VS:  T: 97.4    HR: 68    BP: 107/58    RR: 18   GEN:  AOx3, very pleasant, overweight, NAD, resting comfortably  CV:  RRR, well perfused  LUNGS: Non-labored breathing on room air  BACK:  +R CVAT, no left CVAT  ABD:  Soft.  NT.  ND.  No rebound or guarding.  No masses.  EXT:  Warm, well perfused.  No edema.  SKIN:  Warm.  Dry.  No rashes.  NEURO:  CN grossly intact.            Data:   All laboratory data reviewed:    CBC 9/14/19:  WBC: 9.8  Hb: 13.2  Plt: 290    BMP 9/14/19:  Cr 1.78    CT scan of the abdomen 9'/14/19:   1. 6mm obstructing right mid ureteral stone with moderate hydronephrosis  2. Tiny bilateral renal calculi            Impression and Plan:   Impression:   Shelly Cerda is a 24 year old female with PMH of cystinuria and recurrent renal stones who was admitted directly from an OSH ED with an obstructing 6mm right mid ureteral stone and TAWNYA (Cr 1.78 from a baseline of 0.8-0.9). There is no evidence of infection and the patient's pain is well controlled. Urinary decompression is recommended given her degree of kidney injury. The patient and her mom are both comfortably with the plan moving forward.       Plan:     - OR 9/16/19 for cysto, right ureteral stent placement, possible URS/LL/basket stone extraction    - NPO, IVF @ 100cc/hr  - Pain control with Tylenol, oxy, and dilaudid for breakthrough (NSAIDS contraindicated due to TAWNYA)    - The OSH has been contacted to push her CT images into our PACS system         This patient's exam findings, labs, and imaging discussed with urology staff surgeon Dr. Wade, who developed the treatment plan.    Sonya Madrid MD  PGY-2  Urology  Pager [phone]

## 2019-09-15 NOTE — PLAN OF CARE
Writer assumed care of patient from [phone]    [facility] on RA. A&O x4, up ad deepthi. Denies N/V. Tolerating clear liquids and crackers. Voiding adequately. Pain adequately managed without intervention.     All discharge education completed, PIV removed.     Patient left Unit 7C via wheelchair @ 1445 to be discharged to home w/ mother.    Charisma Ro RN on 9/15/2019 at 2:41 PM

## 2019-09-16 ENCOUNTER — TELEPHONE (OUTPATIENT)
Dept: UROLOGY | Facility: CLINIC | Age: 24
End: 2019-09-16

## 2019-09-16 NOTE — PROGRESS NOTES
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 Her last stone episode was 3/2018 when she received underwent PCNL for a 2.4cm right renal stone. She presented to an OSH ED yesterday evening with severe right flank pain, similar to previous stone episodes. She states the pain has been present for approximately 8 days and she has been attempting to pass the stone at home by increasing her fluid intake. She had been prescribed potassium citrate, BID but states she has not taken this in 3 months as she does not like to take medications. Yesterday evening she was the maid of honor in her sister's wedding in [place] and the pain became unbearable during the reception, prompting her visit to the ED. She denies hematuria, dysuria, fevers, chills, nausea, or vomiting.      In the ED, vital signs were normal and laboratory studies were remarkable for a Cr of 1.78 (baseline 0.8-0.9). All other laboratory studies were within normal limits and UA was not suggestive of infection. CT demonstrated a 6mm stone in the right mid ureter with associated moderate hydronephrosis. Given her degree of TAWNYA, immediate decompression with stent placement was advised. The patient was then admitted directly to the [facility] urology service for planned right ureteral stent placement with possible endoscopic treatment of the stone.     On admission the patient's vital signs are normal and she is resting comfortably. She does complain of some right flank pain that is well controlled  with medications. Her last meal was around 6pm.    ASSESSMENT      Discharge Assessment  Patient reports symptoms are: Unchanged(Patient reports some mild discomfort from the stent but otherwise is doing well. )  Does the patient have all of their medications?: Yes  Does patient know what their new medications are for?: Yes  Does patient have a follow-up appointment scheduled?: No  Does patient have any other questions or concerns?: No    Post-op  Did the patient have surgery or a procedure: Yes  Fever: No  Chills: No  Eating & Drinking: eating and drinking without complaints/concerns  PO Intake: regular diet  Bowel Function: normal  Urinary Status: voiding without complaint/concerns(Patient reports she sometimes has some trouble getting started when urinating but she is able to go. )    PLAN                                                      Outpatient Plan:      Follow up with Dr. Koehler in 2-3 weeks for definitive stone treatment.    No future appointments.        Tish Helm, CMA

## 2019-09-17 DIAGNOSIS — N20.1 URETERAL STONE: Primary | ICD-10-CM

## 2019-09-17 NOTE — TELEPHONE ENCOUNTER
Hi Dr. Wade,     Patient states that she was told that she needs to schedule a second surgery within the next 2 days with Dr. Jayy Koehler. The discharge note states that she needs to consult with Dr. Jayy Koehler in 2-3 weeks. Please advise       Thanks, Jake Montalvo MA

## 2019-09-17 NOTE — TELEPHONE ENCOUNTER
I left a phone message with Dr. Koehler just now.  She certainly does not need a procedure in 2 days.   She can probably be scheduled for ureteroscopy with Dr. Koehler without having to come to clinic.  Sariah can you coordinate a ureteroscopy with Dr. Koehler for her?  She got a stent over the weekend for a mid ureteral stone.     Thank You   Naseem ACEVEDO

## 2019-09-18 ENCOUNTER — TELEPHONE (OUTPATIENT)
Dept: UROLOGY | Facility: CLINIC | Age: 24
End: 2019-09-18

## 2019-09-18 NOTE — TELEPHONE ENCOUNTER
Patient is scheduled for surgery with Dr. Koehler      Spoke or left message with: Shelly    Date of Surgery: 9/20/19    Location[facility]    Informed patient they will need an adult  yes    Pre-op with surgeon (if applicable): n/a    H&P: Scheduled with pcp    Additional imaging/appointments: n/a    Surgery packet: n/a     Additional comments: n/a

## 2019-09-19 ENCOUNTER — ANESTHESIA EVENT (OUTPATIENT)
Dept: SURGERY | Facility: AMBULATORY SURGERY CENTER | Age: 24
End: 2019-09-19

## 2019-09-20 ENCOUNTER — ANESTHESIA (OUTPATIENT)
Dept: SURGERY | Facility: AMBULATORY SURGERY CENTER | Age: 24
End: 2019-09-20

## 2019-09-20 ENCOUNTER — HOSPITAL ENCOUNTER (OUTPATIENT)
Facility: AMBULATORY SURGERY CENTER | Age: 24
End: 2019-09-20
Attending: UROLOGY
Payer: COMMERCIAL

## 2019-09-20 ENCOUNTER — ANCILLARY PROCEDURE (OUTPATIENT)
Dept: RADIOLOGY | Facility: AMBULATORY SURGERY CENTER | Age: 24
End: 2019-09-20
Attending: UROLOGY
Payer: COMMERCIAL

## 2019-09-20 VITALS
RESPIRATION RATE: 23 BRPM | HEART RATE: 94 BPM | SYSTOLIC BLOOD PRESSURE: 111 MMHG | HEIGHT: 63 IN | OXYGEN SATURATION: 99 % | WEIGHT: 195 LBS | BODY MASS INDEX: 34.55 KG/M2 | TEMPERATURE: 98 F | DIASTOLIC BLOOD PRESSURE: 66 MMHG

## 2019-09-20 DIAGNOSIS — N20.1 URETERAL STONE: ICD-10-CM

## 2019-09-20 LAB
HCG UR QL: NEGATIVE
INTERNAL QC OK POCT: YES

## 2019-09-20 RX ORDER — NALOXONE HYDROCHLORIDE 0.4 MG/ML
.1-.4 INJECTION, SOLUTION INTRAMUSCULAR; INTRAVENOUS; SUBCUTANEOUS
Status: DISCONTINUED | OUTPATIENT
Start: 2019-09-20 | End: 2019-09-21 | Stop reason: HOSPADM

## 2019-09-20 RX ORDER — ONDANSETRON 2 MG/ML
4 INJECTION INTRAMUSCULAR; INTRAVENOUS EVERY 30 MIN PRN
Status: DISCONTINUED | OUTPATIENT
Start: 2019-09-20 | End: 2019-09-21 | Stop reason: HOSPADM

## 2019-09-20 RX ORDER — FENTANYL CITRATE 50 UG/ML
25-50 INJECTION, SOLUTION INTRAMUSCULAR; INTRAVENOUS
Status: DISCONTINUED | OUTPATIENT
Start: 2019-09-20 | End: 2019-09-20 | Stop reason: HOSPADM

## 2019-09-20 RX ORDER — SODIUM CHLORIDE, SODIUM LACTATE, POTASSIUM CHLORIDE, CALCIUM CHLORIDE 600; 310; 30; 20 MG/100ML; MG/100ML; MG/100ML; MG/100ML
INJECTION, SOLUTION INTRAVENOUS CONTINUOUS
Status: DISCONTINUED | OUTPATIENT
Start: 2019-09-20 | End: 2019-09-21 | Stop reason: HOSPADM

## 2019-09-20 RX ORDER — HYDROCODONE BITARTRATE AND ACETAMINOPHEN 5; 325 MG/1; MG/1
1 TABLET ORAL ONCE
Status: DISCONTINUED | OUTPATIENT
Start: 2019-09-20 | End: 2019-09-21 | Stop reason: HOSPADM

## 2019-09-20 RX ORDER — LIDOCAINE HYDROCHLORIDE 20 MG/ML
INJECTION, SOLUTION INFILTRATION; PERINEURAL PRN
Status: DISCONTINUED | OUTPATIENT
Start: 2019-09-20 | End: 2019-09-20

## 2019-09-20 RX ORDER — SODIUM CHLORIDE, SODIUM LACTATE, POTASSIUM CHLORIDE, CALCIUM CHLORIDE 600; 310; 30; 20 MG/100ML; MG/100ML; MG/100ML; MG/100ML
INJECTION, SOLUTION INTRAVENOUS CONTINUOUS
Status: DISCONTINUED | OUTPATIENT
Start: 2019-09-20 | End: 2019-09-20 | Stop reason: HOSPADM

## 2019-09-20 RX ORDER — PROPOFOL 10 MG/ML
INJECTION, EMULSION INTRAVENOUS PRN
Status: DISCONTINUED | OUTPATIENT
Start: 2019-09-20 | End: 2019-09-20

## 2019-09-20 RX ORDER — OXYCODONE HYDROCHLORIDE 5 MG/1
5 TABLET ORAL EVERY 4 HOURS PRN
Status: DISCONTINUED | OUTPATIENT
Start: 2019-09-20 | End: 2019-09-21 | Stop reason: HOSPADM

## 2019-09-20 RX ORDER — DEXAMETHASONE SODIUM PHOSPHATE 4 MG/ML
INJECTION, SOLUTION INTRA-ARTICULAR; INTRALESIONAL; INTRAMUSCULAR; INTRAVENOUS; SOFT TISSUE PRN
Status: DISCONTINUED | OUTPATIENT
Start: 2019-09-20 | End: 2019-09-20

## 2019-09-20 RX ORDER — ONDANSETRON 4 MG/1
4 TABLET, ORALLY DISINTEGRATING ORAL EVERY 30 MIN PRN
Status: DISCONTINUED | OUTPATIENT
Start: 2019-09-20 | End: 2019-09-21 | Stop reason: HOSPADM

## 2019-09-20 RX ORDER — MEPERIDINE HYDROCHLORIDE 25 MG/ML
12.5 INJECTION INTRAMUSCULAR; INTRAVENOUS; SUBCUTANEOUS
Status: DISCONTINUED | OUTPATIENT
Start: 2019-09-20 | End: 2019-09-21 | Stop reason: HOSPADM

## 2019-09-20 RX ORDER — LIDOCAINE 40 MG/G
CREAM TOPICAL
Status: DISCONTINUED | OUTPATIENT
Start: 2019-09-20 | End: 2019-09-20 | Stop reason: HOSPADM

## 2019-09-20 RX ORDER — ACETAMINOPHEN 325 MG/1
975 TABLET ORAL ONCE
Status: COMPLETED | OUTPATIENT
Start: 2019-09-20 | End: 2019-09-20

## 2019-09-20 RX ORDER — PROPOFOL 10 MG/ML
INJECTION, EMULSION INTRAVENOUS CONTINUOUS PRN
Status: DISCONTINUED | OUTPATIENT
Start: 2019-09-20 | End: 2019-09-20

## 2019-09-20 RX ORDER — CEFAZOLIN SODIUM 2 G/50ML
2 SOLUTION INTRAVENOUS
Status: DISCONTINUED | OUTPATIENT
Start: 2019-09-20 | End: 2019-09-20 | Stop reason: HOSPADM

## 2019-09-20 RX ORDER — CEFAZOLIN SODIUM 1 G/50ML
1 SOLUTION INTRAVENOUS SEE ADMIN INSTRUCTIONS
Status: DISCONTINUED | OUTPATIENT
Start: 2019-09-20 | End: 2019-09-20 | Stop reason: HOSPADM

## 2019-09-20 RX ORDER — FENTANYL CITRATE 50 UG/ML
INJECTION, SOLUTION INTRAMUSCULAR; INTRAVENOUS PRN
Status: DISCONTINUED | OUTPATIENT
Start: 2019-09-20 | End: 2019-09-20

## 2019-09-20 RX ORDER — ONDANSETRON 2 MG/ML
INJECTION INTRAMUSCULAR; INTRAVENOUS PRN
Status: DISCONTINUED | OUTPATIENT
Start: 2019-09-20 | End: 2019-09-20

## 2019-09-20 RX ORDER — OXYCODONE HYDROCHLORIDE 5 MG/1
5-10 TABLET ORAL EVERY 6 HOURS PRN
Qty: 10 TABLET | Refills: 0 | Status: SHIPPED | OUTPATIENT
Start: 2019-09-20 | End: 2020-06-23

## 2019-09-20 RX ORDER — KETOROLAC TROMETHAMINE 30 MG/ML
INJECTION, SOLUTION INTRAMUSCULAR; INTRAVENOUS PRN
Status: DISCONTINUED | OUTPATIENT
Start: 2019-09-20 | End: 2019-09-20

## 2019-09-20 RX ADMIN — LIDOCAINE HYDROCHLORIDE 100 MG: 20 INJECTION, SOLUTION INFILTRATION; PERINEURAL at 14:07

## 2019-09-20 RX ADMIN — FENTANYL CITRATE 50 MCG: 50 INJECTION, SOLUTION INTRAMUSCULAR; INTRAVENOUS at 14:12

## 2019-09-20 RX ADMIN — FENTANYL CITRATE 50 MCG: 50 INJECTION, SOLUTION INTRAMUSCULAR; INTRAVENOUS at 14:37

## 2019-09-20 RX ADMIN — ONDANSETRON 4 MG: 2 INJECTION INTRAMUSCULAR; INTRAVENOUS at 14:08

## 2019-09-20 RX ADMIN — DEXAMETHASONE SODIUM PHOSPHATE 4 MG: 4 INJECTION, SOLUTION INTRA-ARTICULAR; INTRALESIONAL; INTRAMUSCULAR; INTRAVENOUS; SOFT TISSUE at 14:08

## 2019-09-20 RX ADMIN — ACETAMINOPHEN 975 MG: 325 TABLET ORAL at 12:39

## 2019-09-20 RX ADMIN — SODIUM CHLORIDE, SODIUM LACTATE, POTASSIUM CHLORIDE, CALCIUM CHLORIDE: 600; 310; 30; 20 INJECTION, SOLUTION INTRAVENOUS at 12:42

## 2019-09-20 RX ADMIN — PROPOFOL 50 MG: 10 INJECTION, EMULSION INTRAVENOUS at 14:10

## 2019-09-20 RX ADMIN — KETOROLAC TROMETHAMINE 30 MG: 30 INJECTION, SOLUTION INTRAMUSCULAR; INTRAVENOUS at 14:49

## 2019-09-20 RX ADMIN — PROPOFOL 150 MCG/KG/MIN: 10 INJECTION, EMULSION INTRAVENOUS at 14:46

## 2019-09-20 RX ADMIN — PROPOFOL 200 MCG/KG/MIN: 10 INJECTION, EMULSION INTRAVENOUS at 14:08

## 2019-09-20 RX ADMIN — PROPOFOL 200 MG: 10 INJECTION, EMULSION INTRAVENOUS at 14:07

## 2019-09-20 ASSESSMENT — MIFFLIN-ST. JEOR: SCORE: 1603.64

## 2019-09-20 NOTE — OP NOTE
OPERATIVE REPORT    PREOPERATIVE DIAGNOSIS:  Right ureteral stone(s)    POSTOPERATIVE DIAGNOSIS: Same    PROCEDURES PERFORMED:   -Cystourethroscopy  -Right retrograde pyelogram with intraoperative interpretation of images  -Right ureteroscopy  -Right holmium laser lithotripsy  -Right basket stone retrieval  -Right ureteral stent removal    STAFF SURGEON: Jayy Koehler MD    ANESTHESIA: General  ESTIMATED BLOOD LOSS: 1 ml  COMPLICATIONS: None.   SPECIMEN: Stone for analysis   URETERAL STENT(S): None    SIGNIFICANT FINDINGS:   -Stone free with no fragments > 2mm on the right  -Unremarkable right RPG    BRIEF OPERATIVE INDICATIONS: Shelly Cerda is a(n) 24 year old female who presented with cystinuria and a right ureteral stone, underwent stent placement one week ago.  After a discussion of all risks, benefits, and alternatives, the patient elected to proceed with definitive stone management. The patient understands the potential need for more than one procedure to eliminate all stone burden.      DESCRIPTION OF PROCEDURE:  After informed consent was obtained, the patient was transported to the operating room & placed supine on the table. After adequate anesthesia was induced, the patient was placed in lithotomy and prepped and draped in the usual sterile fashion. A timeout was taken to confirm correct patient, procedure and laterality. Pre-operative IV antibiotics were administered.     A 22-Yoruba rigid cystoscope was inserted into a well-lubricated urethra. The urethra was unremarkable without stricture.     The existing right indwelling ureteral stent was identified and removed with the flexible stent grasper to the level of the urethral meatus. A sensor wire was advanced up to the renal pelvis under fluoroscopic guidance and the previous stent was removed.    A flexible ureteroscope was introduced without an access sheath revealing an 8 mm right proximal ureteral stone.  This was pushed up into a depoendent  calyx in the kidney where we performed holmium laser lithotripsy using a 200 micron laser at a setting of 0.6J and 6Hz.  All pieces were then removed with a stone basket.  Several trips were made up and down the widely dilated ureter to do this.  An access sheath was avoided to minimize trauma to the ureter based on the patients preference to not have a stent replaced after stone removal.  final inspection of the kidney revealed no stone fragments greater than 2 mm.  Final retrograde pyelogram showed no extravasation.  Pullback ureteroscopy was unremarkable with no stones or ureteral injuries seen...     In the absence of significant residual stone burden or plans for secondary ipsilateral ureteroscopy, we opted to leave the patient without a stent given adequate renal function, the presence of an anatomically normal contralateral kidney and the absence of ipsilateral ureteral narrowing, injury or other impediement to stone fragment passage.  The bladder was drained.    The patient tolerated the procedure well and there were no apparent complications. The patient  was transported to the postanesthesia care unit in stable condition.     POSTOP PLAN:  Renal US in 6 weeks

## 2019-09-20 NOTE — ANESTHESIA PREPROCEDURE EVALUATION
Anesthesia Pre-Procedure Evaluation    Patient: Shelly Cerda   MRN:     [id number] Gender:   female   Age:    24 year old :      1995        Preoperative Diagnosis: Ureteral Stone   Procedure(s):  Cystoscopy, Right Ureteroscopy, Laser Lithotripsy, Stent Placement     Past Medical History:   Diagnosis Date     Complication of anesthesia      Cystinuria (H)      Kidney stones      PONV (postoperative nausea and vomiting)       Past Surgical History:   Procedure Laterality Date     APPENDECTOMY  2011     COMBINED CYSTOSCOPY, RETROGRADES, EXCHANGE STENT URETER(S) Right 9/15/2019    Procedure: CYSTOSCOPY, WITH RIGHT RETROGRADE PYELOGRAM AND RIGHT URETERAL STENT PLACEMENT;  Surgeon: Kasi Wade MD;  [facility]     COMBINED CYSTOSCOPY, RETROGRADES, URETEROSCOPY, INSERT STENT Left 10/12/2017    Procedure: COMBINED CYSTOSCOPY, RETROGRADES, URETEROSCOPY, INSERT STENT;  COMBINED CYSTOSCOPY, RETROGRADES, URETEROSCOPY, LASER HOLMIUM STANDBY,  INSERT STENT LEFT URETER;  Surgeon: Luis Banda MD;  Location: [facility] OR     CYSTOSCOPY       LASER HOLMIUM LITHOTRIPSY URETER(S), INSERT STENT, COMBINED  2012    Procedure: COMBINED CYSTOSCOPY, URETEROSCOPY, LASER HOLMIUM LITHOTRIPSY URETER(S), INSERT STENT;  Holmium Laser Lithotripsy.  Right Stent Placement;  Surgeon: Cathi Hernandez MD;  [facility]     PERCUTANEOUS NEPHROLITHOTOMY Right 3/8/2018    Procedure: PERCUTANEOUS NEPHROLITHOTOMY;  Right Percutaneous Nephrolithotomy;  Surgeon: Jayy Koehler MD;  [facility]     Ureteral stent placement with subsequent removal.  2017          Anesthesia Evaluation     . Pt has had prior anesthetic. Type: General    History of anesthetic complications   - PONV        ROS/MED HX    ENT/Pulmonary:  - neg pulmonary ROS     Neurologic:  - neg neurologic ROS     Cardiovascular:  - neg cardiovascular ROS       METS/Exercise Tolerance:  4 - Raking leaves, gardening   Hematologic:  - neg  hematologic  ROS       Musculoskeletal:  - neg musculoskeletal ROS       GI/Hepatic:  - neg GI/hepatic ROS       Renal/Genitourinary:     (+) chronic renal disease,       Endo:  - neg endo ROS       Psychiatric:  - neg psychiatric ROS       Infectious Disease:  - neg infectious disease ROS       Malignancy:      - no malignancy   Other:                         PHYSICAL EXAM:   Mental Status/Neuro: A/A/O   Airway: Facies: Feasible  Mallampati: I  Mouth/Opening: Full  TM distance: > 6 cm  Neck ROM: Full   Respiratory: Auscultation: CTAB     Resp. Rate: Normal     Resp. Effort: Normal      CV: Rhythm: Regular  Rate: Age appropriate  Heart: Normal Sounds  Edema: None   Comments:      Dental: Normal Dentition                LABS:  CBC:   Lab Results   Component Value Date    WBC 12.1 (H) 03/09/2018    WBC 13.7 (H) 03/09/2018    HGB 12.4 03/09/2018    HGB 12.5 03/09/2018    HCT 35.8 03/09/2018    HCT 36.1 03/09/2018     03/09/2018     03/09/2018     BMP:   Lab Results   Component Value Date     03/20/2018     (L) 03/09/2018    POTASSIUM 4.0 03/20/2018    POTASSIUM 3.8 03/09/2018    CHLORIDE 103 03/20/2018    CHLORIDE 100 03/09/2018    CO2 26 03/20/2018    CO2 25 03/09/2018    BUN 10 03/20/2018    BUN 14 03/09/2018    CR 0.86 03/20/2018    CR 0.94 03/09/2018    GLC 88 03/20/2018     (H) 03/09/2018     COAGS: No results found for: PTT, INR, FIBR  POC:   Lab Results   Component Value Date    BGM 81 09/15/2019    HCG Negative 09/15/2019     OTHER:   Lab Results   Component Value Date    LACT 0.8 03/10/2018    VALERIANO 9.3 03/20/2018    ALBUMIN 4.2 12/19/2017    PROTTOTAL 7.9 12/19/2017    ALT 19 12/19/2017    AST 16 12/19/2017    ALKPHOS 52 12/19/2017    BILITOTAL 0.6 12/19/2017    LIPASE 158 08/06/2017        Preop Vitals    BP Readings from Last 3 Encounters:   09/20/19 114/76   09/15/19 102/60   12/04/18 113/75    Pulse Readings from Last 3 Encounters:   09/20/19 80   09/15/19 76   12/04/18 87  "     Resp Readings from Last 3 Encounters:   09/20/19 16   09/15/19 16   03/09/18 16    SpO2 Readings from Last 3 Encounters:   09/20/19 97%   09/15/19 97%   03/10/18 97%      Temp Readings from Last 1 Encounters:   09/20/19 36.9  C (98.5  F) (Oral)    Ht Readings from Last 1 Encounters:   09/20/19 1.6 m (5' 3\")      Wt Readings from Last 1 Encounters:   09/20/19 88.5 kg (195 lb)    Estimated body mass index is 34.54 kg/m  as calculated from the following:    Height as of this encounter: 1.6 m (5' 3\").    Weight as of this encounter: 88.5 kg (195 lb).     LDA:  Peripheral IV 09/20/19 Left Hand (Active)   Site Assessment WDL 9/20/2019 12:33 PM   Line Status Infusing 9/20/2019 12:33 PM   Phlebitis Scale 0-->no symptoms 9/20/2019 12:33 PM   Infiltration Scale 0 9/20/2019 12:33 PM   Extravasation? No 9/20/2019 12:33 PM   Dressing Intervention New dressing  9/20/2019 12:33 PM   Number of days: 0       Airway - Adult/Peds mask (Active)   Number of days: 5       Nephrostomy 1 Right (Active)   Number of days: 561       Ureteral Drain/Stent Right ureter 6 fr (Active)   Number of days: 2572        Assessment:   ASA SCORE: 2    H&P: History and physical reviewed and following examination; no interval change.   Smoking Status:  Non-Smoker/Unknown   NPO Status: NPO Appropriate     Plan:   Anes. Type:  General   Pre-Medication: None   Induction:  IV (Standard)   Airway: LMA   Access/Monitoring: PIV   Maintenance: TIVA     Postop Plan:   Postop Pain: Opioids  Postop Sedation/Airway: Not planned  Disposition: Outpatient     PONV Management:   Adult Risk Factors: Female, H/o PONV or Motion Sickness, Non-Smoker, Postop Opioids   Prevention: Ondansetron, Dexamethasone, No Volatiles     CONSENT: Direct conversation   Plan and risks discussed with: Patient                      Brown Chamorro MD, MD  "

## 2019-09-20 NOTE — ANESTHESIA POSTPROCEDURE EVALUATION
Anesthesia POST Procedure Evaluation    Patient: Shelly Cerda   MRN:     [id number] Gender:   female   Age:    24 year old :      1995        Preoperative Diagnosis: Ureteral Stone   Procedure(s):  Cystoscopy, Right Ureteroscopy, Laser Lithotripsy,  no ureteral stent placed.   Postop Comments: No value filed.       Anesthesia Type:  Not documented  General    Reportable Event: NO     PAIN: Uncomplicated   Sign Out status: Comfortable, Well controlled pain     PONV: No PONV   Sign Out status:  No Nausea or Vomiting     Neuro/Psych: Uneventful perioperative course   Sign Out Status: Preoperative baseline; Age appropriate mentation     Airway/Resp.: Uneventful perioperative course   Sign Out Status: Non labored breathing, age appropriate RR; Resp. Status within EXPECTED Parameters     CV: Uneventful perioperative course   Sign Out status: Appropriate BP and perfusion indices; Appropriate HR/Rhythm     Disposition:   Sign Out in:  PACU  Disposition:  Phase II; Home  Recovery Course: Uneventful  Follow-Up: Not required           Last Anesthesia Record Vitals:  CRNA VITALS  2019 1426 - 2019 1512      2019             Pulse:  78    SpO2:  98 %    Resp Rate (observed):  13    Resp Rate (set):  10          Last PACU Vitals:  Vitals Value Taken Time   /66 2019  3:00 PM   Temp     Pulse 94 2019  3:00 PM   Resp 22 2019  3:04 PM   SpO2 97 % 2019  3:04 PM   Temp src     NIBP     Pulse     SpO2     Resp     Temp     Ht Rate     Temp 2     Vitals shown include unvalidated device data.      Electronically Signed By: Brown Chamorro MD, MD, 2019, 3:12 PM

## 2019-09-20 NOTE — ANESTHESIA CARE TRANSFER NOTE
Patient: Shelly Cerda    Procedure(s):  Cystoscopy, Right Ureteroscopy, Laser Lithotripsy, Stent Placement    Diagnosis: Ureteral Stone  Diagnosis Additional Information: No value filed.    Anesthesia Type:   General     Note:  Airway :Face Mask  Patient transferred to:PACU  Comments: VSS and WNL, comfortable, no PONV, report to Candy MGHandoff Report: Identifed the Patient, Identified the Reponsible Provider, Reviewed the pertinent medical history, Discussed the surgical course, Reviewed Intra-OP anesthesia mangement and issues during anesthesia, Set expectations for post-procedure period and Allowed opportunity for questions and acknowledgement of understanding      Vitals: (Last set prior to Anesthesia Care Transfer)    CRNA VITALS  9/20/2019 1426 - 9/20/2019 1501      9/20/2019             Pulse:  78    SpO2:  98 %    Resp Rate (observed):  13    Resp Rate (set):  10                Electronically Signed By: BRITTNEE Simon CRNA  September 20, 2019  3:01 PM

## 2019-09-20 NOTE — DISCHARGE INSTRUCTIONS
[facility]  Home Care Following Anesthesia  For 24 hours after surgery:  1. Get plenty of rest.  A responsible adult must stay with you for at least 24 hours after you leave the surgery center.  2. Do not drive or use heavy equipment.  If you have weakness or tingling, don't drive or use heavy equipment until this feeling goes away.   3. Do not drink alcohol.   4. Avoid strenuous or risky activities.  Ask for help when climbing stairs.  5. You may feel lightheaded.  IF so, sit for a few minutes before standing.  Have someone help you get up.   6. If you have nausea (feel sick to your stomach): Drink only clear liquids such as apple juice, ginger ale, broth or 7-Up.  Rest may also help.  Be sure to drink enough fluids.  Move to a regular diet as you feel able.   7. You may have a slight fever.  Call the doctor if your fever is over 100 F (37.7 C) (taken under the tongue) or lasts longer than 24 hours.  8. You may have a dry mouth, a sore throat, muscle aches or trouble sleeping. These should go away after 24 hours.  9. Do not make important or legal decisions.               Tips for taking pain medications  To get the best pain relief possible, remember these points:    Take pain medications as directed, before pain becomes severe.    Pain medication can upset your stomach: taking it with food may help.    Constipation is a common side effect of pain medication. Drink plenty of  fluids.    Eat foods high in fiber. Take a stool softener if recommended by your doctor or pharmacist.    Do not drink alcohol, drive or operate machinery while taking pain medications.    Ask about other ways to control pain, such as with heat, ice or relaxation.    Tylenol/Acetaminophen Consumption  To help encourage the safe use of acetaminophen, the makers of TYLENOL  have lowered the maximum daily dose for single-ingredient Extra Strength TYLENOL  (acetaminophen) products sold in the [place] from 8  pills per day (4,000 mg) to 6 pills per day (3,000 mg). The dosing interval has also changed from 2 pills every 4-6 hours to 2 pills every 6 hours.    If you feel your pain relief is insufficient, you may take Tylenol/Acetaminophen in addition to your narcotic pain medication.     Be careful not to exceed 3,000 mg of Tylenol/Acetaminophen in a 24 hour period from all sources.    If you are taking extra strength Tylenol/acetaminophen (500 mg), the maximum dose is 6 tablets in 24 hours.    If you are taking regular strength acetaminophen (325 mg), the maximum dose is 9 tablets in 24 hours.    Call a doctor for any of the followin. Signs of infection (fever, growing tenderness at the surgery site, a large amount of drainage or bleeding, severe pain, foul-smelling drainage, redness, swelling).  2. It has been over 8 to 10 hours since surgery and you are still not able to urinate (pass water).  3. Headache for over 24 hours.    Your doctor is:  Dr. Jayy Koehler, Prostate and Urology: [phone]                    Or dial [phone] and ask for the resident on call for:  Prostate Urology  For emergency care, call the:  [facility]:  [phone] (TTY for hearing impaired: [phone])

## 2019-09-23 ENCOUNTER — PATIENT OUTREACH (OUTPATIENT)
Dept: UROLOGY | Facility: CLINIC | Age: 24
End: 2019-09-23

## 2019-09-23 DIAGNOSIS — N20.0 KIDNEY STONE: Primary | ICD-10-CM

## 2019-09-24 LAB
APPEARANCE STONE: NORMAL
COMPN STONE: NORMAL
NUMBER STONE: 4
SIZE STONE: NORMAL MM
WT STONE: 41 MG

## 2019-09-25 NOTE — PROGRESS NOTES
Shelly Cerda,    How are you doing after your surgical procedure with Dr. Koehler    Any fever, chills, nausea or vomiting? no  How is your appetite? good  Are you drinking enough fluids?yes  Have you had a bowel movement since you have been home? yes  Are you having any difficulties with urination? no  Any problems with your catheter? NA  Is your urine clear yellow? yes  How does your incision look? NA  How is your pain? A little bit of bilateral flank pain. Using OTC analgesics to control      Do you have any questions or concerns? no    We have your post-operative appointment scheduled for renal ultrasound and appointment with Dr. Koehler in early November.    Please feel free to reply via [employer] or contact the clinic.  [phone], option #3 to speak to the nurse    Gabrielle Sanchez RN

## 2019-09-29 ENCOUNTER — HEALTH MAINTENANCE LETTER (OUTPATIENT)
Age: 24
End: 2019-09-29

## 2019-10-22 ENCOUNTER — PRE VISIT (OUTPATIENT)
Dept: UROLOGY | Facility: CLINIC | Age: 24
End: 2019-10-22

## 2019-10-22 NOTE — TELEPHONE ENCOUNTER
Reason for Visit: 6 week post-op follow up, procedure 9/20    Diagnosis: Right ureteral stone    Orders/Procedures/Records: Records available    Contact Patient: N/A    Rooming Requirements: Jesus Mcgregor  10/22/19  11:06 AM

## 2019-11-05 ENCOUNTER — OFFICE VISIT (OUTPATIENT)
Dept: UROLOGY | Facility: CLINIC | Age: 24
End: 2019-11-05
Payer: COMMERCIAL

## 2019-11-05 ENCOUNTER — ANCILLARY PROCEDURE (OUTPATIENT)
Dept: ULTRASOUND IMAGING | Facility: CLINIC | Age: 24
End: 2019-11-05
Attending: UROLOGY
Payer: COMMERCIAL

## 2019-11-05 VITALS
HEIGHT: 63 IN | BODY MASS INDEX: 34.55 KG/M2 | WEIGHT: 195 LBS | SYSTOLIC BLOOD PRESSURE: 127 MMHG | DIASTOLIC BLOOD PRESSURE: 84 MMHG

## 2019-11-05 DIAGNOSIS — N20.0 KIDNEY STONE: ICD-10-CM

## 2019-11-05 DIAGNOSIS — E72.01 CYSTINURIA (H): Primary | ICD-10-CM

## 2019-11-05 ASSESSMENT — MIFFLIN-ST. JEOR: SCORE: 1603.64

## 2019-11-05 ASSESSMENT — PAIN SCALES - GENERAL: PAINLEVEL: NO PAIN (0)

## 2019-11-05 NOTE — NURSING NOTE
"Chief Complaint   Patient presents with     Follow Up     6 week post-op follow up, procedure 9/20       Blood pressure 127/84, height 1.6 m (5' 3\"), weight 88.5 kg (195 lb), not currently breastfeeding. Body mass index is 34.54 kg/m .    Patient Active Problem List   Diagnosis     Cystinuria (H)     Nausea with vomiting     Ureterolithiasis     Acute nontraumatic kidney injury (H)     Acute abdominal pain in right flank     Constipation due to pain medication     Group B streptococcal infection     Ureteral stone     Urolithiasis     Postoperative pain     Right ureteral stone     Sebaceous cyst     Vertigo       Allergies   Allergen Reactions     Thiola [Tiopronin] Rash       Current Outpatient Medications   Medication Sig Dispense Refill     azelastine (ASTEPRO) 0.15 % nasal spray        LARISSIA 0.1-20 MG-MCG per tablet        levocetirizine (XYZAL) 5 MG tablet        montelukast (SINGULAIR) 10 MG tablet        potassium citrate (UROCIT-K) 10 MEQ (1080 MG) CR tablet Take 2 tablets (20 mEq) by mouth 2 times daily 120 tablet 9     acetaminophen (TYLENOL) 325 MG tablet Take 2 tablets (650 mg) by mouth every 4 hours as needed for mild pain 100 tablet 0     oxybutynin (DITROPAN) 5 MG tablet Take 1 tablet (5 mg) by mouth 3 times daily as needed for bladder spasms (or stent discomfort) 60 tablet 1     oxyCODONE (ROXICODONE) 5 MG tablet Take 1-2 tablets (5-10 mg) by mouth every 6 hours as needed for severe pain 10 tablet 0     senna-docusate (SENOKOT-S/PERICOLACE) 8.6-50 MG tablet Take 2 tablets by mouth 2 times daily as needed for constipation (Take while taking narcotic pain medications) 30 tablet 0     tamsulosin (FLOMAX) 0.4 MG capsule Take 1 capsule (0.4 mg) by mouth daily 30 capsule 1       Social History     Tobacco Use     Smoking status: Never Smoker     Smokeless tobacco: Never Used   Substance Use Topics     Alcohol use: Yes     Drug use: No       JAZ Bacon  11/5/2019  3:06 PM     "

## 2019-11-05 NOTE — LETTER
2019       RE: Shelly Cerda  [address] [facility] [facility] [address]     Dear Colleague,    Thank you for referring your patient, Shelly Cerda, to the [facility]OLOG[facility] AND [facility] at [facility]. Please see a copy of my visit note below.      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 Most recently, her previous right renal stone became obstructing and she underwent stent placement with Dr. Wade on 09/15/2019 and right ureteroscopy with holmium laser lithotripsy with me on 2019. Presents today for a follow up. Currently taking 2 potassium citrate 20mEq BID, on a low salt diet and increased fluid intake. US was normal today. Has failed thiola in past (Allergic reaction)    Plan:  -Complete new Litholink x2 and follow up with both Dr. Gonzalez and me in three months (on a Tuesday, same day) with renal US prior.   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 Her stone activity remained relatively quiet off medication until 10/2017 when she had a large left ureteral stone treated with staged ureteroscopy. In 2018, she developed a new right renal stone and underwent PCNL. In 2019, the stone became obstructing and she developed right flank pain. Subsequently she underwent stent placement with Dr. Wade on 09/15/2019 and right ureteroscopy with holmium laser lithotripsy with me on 2019. She presents today for follow up.     Reports she is taking 2 potassium citrate 20mEq BID, low salt intake and increased fluid intake.  "    Review of Systems:   Pertinent items are noted in HPI or as below, remainder of complete ROS is negative.      Physical Exam:   Patient is a 24 year old  female   Vitals: Blood pressure 127/84, height 1.6 m (5' 3\"), weight 88.5 kg (195 lb), not currently breastfeeding.  Notable Findings on Exam: Well-nourished female in no apparent distress     Laboratory:   I personally reviewed all applicable laboratory data and went over findings with patient  Significant for:    CBC RESULTS:  Recent Labs   Lab Test 03/09/18  2300 03/09/18  0441 03/08/18  1325 03/08/18  0806   WBC 12.1* 13.7* 10.3 7.5   HGB 12.4 12.5 13.0 14.5    258 269 257        BMP RESULTS:  Recent Labs   Lab Test 03/20/18  1222 03/09/18  2300 03/09/18  0441 03/08/18  1325    132* 135 139   POTASSIUM 4.0 3.8 4.6 4.5   CHLORIDE 103 100 101 108   CO2 26 25 27 24   ANIONGAP 8 7 7 7   GLC 88 105* 110* 94   BUN 10 14 13 11   CR 0.86 0.94 0.86 0.86   GFRESTIMATED 82 74 81 82   GFRESTBLACK >90 89 >90 >90   VALERIANO 9.3 8.8 8.5 8.6       UA RESULTS:   Recent Labs   Lab Test 03/20/18  1222 03/09/18  2330 02/13/18  0904   SG 1.004 1.003 1.023   URINEPH 8.0* 7.0 6.0   NITRITE Negative Negative Negative   RBCU <1 13* >182*   WBCU 1 6* 0       CALCIUM RESULTS  Lab Results   Component Value Date    VALERIANO 9.3 03/20/2018    VALERIANO 8.8 03/09/2018    VALERIANO 8.5 03/09/2018     Imaging:   I personally reviewed all applicable imaging and went over the below findings with patient.    EXAMINATION: US RENAL COMPLETE, 11/5/2019 2:26 PM      COMPARISON: None.     HISTORY: Kidney stone     TECHNIQUE: The kidneys and bladder were scanned in the standard  fashion with specialized ultrasound transducer(s) using both gray  scale and limited color/spectral Doppler techniques.     FINDINGS:     Right kidney: Measures 11.1 cm in length. Parenchyma is of normal  thickness and echogenicity. No focal mass. No hydronephrosis.     Left kidney: Measures 11.7 cm in length. Parenchyma is of " normal  thickness and echogenicity. No focal mass. No hydronephrosis.      Bladder: Normal.                                                                      IMPRESSION: Normal renal ultrasonography.     I have personally reviewed the examination and initial interpretation  and I agree with the findings.     GERSON FRY MD    Scribe Disclosure:  I, Cristine Milner, am serving as a scribe to document services personally performed by Jayy Koehler MD at this visit, based upon the provider's statements to me. All documentation has been reviewed by the aforementioned provider prior to being entered into the official medical record.     ICristine, a scribe, prepared the chart for today's encounter.       Again, thank you for allowing me to participate in the care of your patient.      Sincerely,    Jayy Koehler MD

## 2019-11-05 NOTE — PATIENT INSTRUCTIONS
Please follow up with Dr. Koehler and Dr. Gnozalez in three months (on a Tuesday, same day), with your imaging.    It was a pleasure meeting with you today.  Thank you for allowing me and my team the privilege of caring for you today.  YOU are the reason we are here, and I truly hope we provided you with the excellent service you deserve.  Please let us know if there is anything else we can do for you so that we can be sure you are leaving completely satisfied with your care experience.        Renny Sanchez, EMT

## 2019-11-05 NOTE — LETTER
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 Most recently, her previous right renal stone became obstructing and she underwent stent placement with Dr. Wade on 09/15/2019 and right ureteroscopy with holmium laser lithotripsy with me on 2019. Presents today for a follow up. Currently taking 2 potassium citrate 20mEq BID, on a low salt diet and increased fluid intake. US was normal today. Has failed thiola in past (Allergic reaction)    Plan:  -Complete new Litholink x2 and follow up with both Dr. Gonzalez and me in three months (on a Tuesday, same day) with renal US prior.   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 Her stone activity remained relatively quiet off medication until 10/2017 when she had a large left ureteral stone treated with staged ureteroscopy. In 2018, she developed a new right renal stone and underwent PCNL. In 2019, the stone became obstructing and she developed right flank pain. Subsequently she underwent stent placement with Dr. Wade on 09/15/2019 and right ureteroscopy with holmium laser lithotripsy with me on 2019. She presents today for follow up.     Reports she is taking 2 potassium citrate 20mEq BID, low salt intake and increased fluid intake.     Review of Systems:   Pertinent items are noted in HPI or as below, remainder of complete ROS is negative.      Physical Exam:   Patient is a 24 year old  female   Vitals: Blood pressure 127/84, height 1.6 m (5' 3\"), weight 88.5 kg (195 " lb), not currently breastfeeding.  Notable Findings on Exam: Well-nourished female in no apparent distress     Laboratory:   I personally reviewed all applicable laboratory data and went over findings with patient  Significant for:    CBC RESULTS:  Recent Labs   Lab Test 03/09/18  2300 03/09/18  0441 03/08/18  1325 03/08/18  0806   WBC 12.1* 13.7* 10.3 7.5   HGB 12.4 12.5 13.0 14.5    258 269 257        BMP RESULTS:  Recent Labs   Lab Test 03/20/18  1222 03/09/18  2300 03/09/18  0441 03/08/18  1325    132* 135 139   POTASSIUM 4.0 3.8 4.6 4.5   CHLORIDE 103 100 101 108   CO2 26 25 27 24   ANIONGAP 8 7 7 7   GLC 88 105* 110* 94   BUN 10 14 13 11   CR 0.86 0.94 0.86 0.86   GFRESTIMATED 82 74 81 82   GFRESTBLACK >90 89 >90 >90   VALERIANO 9.3 8.8 8.5 8.6       UA RESULTS:   Recent Labs   Lab Test 03/20/18  1222 03/09/18  2330 02/13/18  0904   SG 1.004 1.003 1.023   URINEPH 8.0* 7.0 6.0   NITRITE Negative Negative Negative   RBCU <1 13* >182*   WBCU 1 6* 0       CALCIUM RESULTS  Lab Results   Component Value Date    VALERIANO 9.3 03/20/2018    VALERIANO 8.8 03/09/2018    VALERIANO 8.5 03/09/2018     Imaging:   I personally reviewed all applicable imaging and went over the below findings with patient.    EXAMINATION: US RENAL COMPLETE, 11/5/2019 2:26 PM      COMPARISON: None.     HISTORY: Kidney stone     TECHNIQUE: The kidneys and bladder were scanned in the standard  fashion with specialized ultrasound transducer(s) using both gray  scale and limited color/spectral Doppler techniques.     FINDINGS:     Right kidney: Measures 11.1 cm in length. Parenchyma is of normal  thickness and echogenicity. No focal mass. No hydronephrosis.     Left kidney: Measures 11.7 cm in length. Parenchyma is of normal  thickness and echogenicity. No focal mass. No hydronephrosis.      Bladder: Normal.                                                                      IMPRESSION: Normal renal ultrasonography.     I have personally reviewed the  examination and initial interpretation  and I agree with the findings.     GERSON FRY MD    Scribe Disclosure:  I, Cristine Milner, am serving as a scribe to document services personally performed by Jayy Koehlre MD at this visit, based upon the provider's statements to me. All documentation has been reviewed by the aforementioned provider prior to being entered into the official medical record.     I, Cristine Milner, a scribe, prepared the chart for today's encounter.       Jayy Koehler MD

## 2020-01-13 ENCOUNTER — TELEPHONE (OUTPATIENT)
Dept: UROLOGY | Facility: CLINIC | Age: 25
End: 2020-01-13

## 2020-01-13 NOTE — TELEPHONE ENCOUNTER
[facility] Call Center    Phone Message    May a detailed message be left on voicemail: yes    Reason for Call: Other: Pt needs to see Dr Koehler and Dr Gonzalez at the same time.  I thought I had it all set for 4/7 back to back at 3:40 and 4:20 but now I only see the one appt and searching does not match up to that 4/7 date I needed.  Can you take a look and see if it can be made to work for 4/7 and if not please schedule the two Drs together in April or earlier as is possible.  Please update the Pt if we have to make a change.     Action Taken: Message routed to:  [facility] ([facility] urology

## 2020-01-15 NOTE — TELEPHONE ENCOUNTER
Left message to call back to assist in scheduling appt with Dr. Vera and Dr. Gonzalez on the same day Sara Rubin January 15, 2020 11:50 AM

## 2020-02-06 ENCOUNTER — TELEPHONE (OUTPATIENT)
Dept: UROLOGY | Facility: CLINIC | Age: 25
End: 2020-02-06

## 2020-02-06 DIAGNOSIS — N20.0 KIDNEY STONE: Primary | ICD-10-CM

## 2020-02-06 NOTE — TELEPHONE ENCOUNTER
Hi ladies,      Please call patient to coordinate a radiology appointment ASAP (per Dr. Jayy Koehler). Renal Ultrasound order placed in the patient's chart.    Thanks, Jake Montalvo MA

## 2020-02-06 NOTE — TELEPHONE ENCOUNTER
Hal Koehler,      Patient states that he is having R side pain. She states that she is taking Flomax to help passing the stone. She would like to have imaging done to see if the stone has passed. Would you like for her any imaging to see if the stone has passed or still present? Please advise    Thanks, Jake Montalvo, CMA

## 2020-02-06 NOTE — TELEPHONE ENCOUNTER
Patient notified that Dr. Jayy Koehler wants her to have a renal ultrasound done ASAP. Patient agreed with the plan.      Jake Montalvo MA

## 2020-02-07 ENCOUNTER — HOSPITAL ENCOUNTER (OUTPATIENT)
Dept: ULTRASOUND IMAGING | Facility: CLINIC | Age: 25
Discharge: HOME | End: 2020-02-07
Attending: UROLOGY | Admitting: UROLOGY
Payer: COMMERCIAL

## 2020-02-07 DIAGNOSIS — N20.0 KIDNEY STONE: ICD-10-CM

## 2020-02-07 PROCEDURE — 76770 US EXAM ABDO BACK WALL COMP: CPT

## 2020-02-07 NOTE — TELEPHONE ENCOUNTER
Called and left a detailed VM with the number to radiology for the patient to call and schedule the ultrasound.    Continuity of Care Form    Patient Name: Jhon Campbell   :  1956  MRN:  [id number]    Admit date:  2019  Discharge date:  ***    Code Status Order: Full Code   Advance Directives:   Advance Care Flowsheet Documentation     Date/Time Healthcare Directive Type of Healthcare Directive Copy in [address] Agent's Name Healthcare Agent's Phone Number    19 1119  No, patient does not have an advance directive for healthcare treatment -- -- -- -- --          Admitting Physician:  Ruthie Badillo MD  PCP: Amanda Walker MD    Discharging Nurse: [facility] Unit/Room#: 9764/8950-W  Discharging Unit Phone Number: ***    Emergency Contact:   Extended Emergency Contact Information  Primary Emergency Contact:  [facility] of [address] [place] [address] Phone: [phone]  Relation: Brother/Sister    Past Surgical History:  Past Surgical History:   Procedure Laterality Date    COLOSTOMY N/A 2018    DIVERTING COLOSTOMY PLACEMENT LAPAROSCOPIC performed by Ruthie Badillo MD at [place] [address] Bilateral early 2010's    hips    NM INSJ PRPH CTR VAD W/SUBQ PORT AGE 5 YR/> N/A 2018    PORT INSERTION performed by Ruthie Badillo MD at [facility] 2019    [facility] performed by Ruthie Badillo MD at [facility] N/A 3/13/2019    BOWEL RESECTION LOW ANTERIOR LAPAROSCOPIC ROBOTIC WITH DIVERTING LOOP ILEOSTOMY performed by Ruthie Badillo MD at [facility] N/A 2019    COLOSTOMY ILEOSTOMY TAKEDOWN/REVERSAL performed by Ruthie Badillo MD at [facility] OR       Immunization History: There is no immunization history on file for this patient.     Active Problems:  Patient Active Problem List   Diagnosis Code    PVD (peripheral vascular disease) ([facility] I73.9    Chest pain R07.9    Rectal tumor D49.0    Essential hypertension I10    Colostomy present ([facility] Z93.3    Rectal cancer (HCC) C20    Severe malnutrition ([facility] E43    Att[facility] Z43.2       Isolation/Infection:   Isolation          No Isolation            Nurse Assessment:  Last Vital Signs: /77   Pulse 75   Temp 98.1 °F (36.7 °C) (Oral)   Resp 16   Ht 5' 11\" (1.803 m)   Wt 134 lb 1.6 oz (60.8 kg)   SpO2 95%   BMI 18.70 kg/m²     Last documented pain score (0-10 scale): Pain Level: 8  Last Weight:   Wt Readings from Last 1 Encounters:   05/22/19 134 lb 1.6 oz (60.8 kg)     Mental Status:  {IP PT MENTAL STATUS:20030}    IV Access:  { MICHELLE IV ACCESS:527863641}    Nursing Mobility/ADLs:  Walking   {CHP DME XYDB:643772450}  Transfer  {CHP DME GFZS:837098680}  Bathing  {CHP DME TGSP:384976881}  Dressing  {CHP DME KHEK:812154904}  Toileting  {CHP DME WHWR:879280805}  Feeding  {CHP DME GNML:130080732}  Med Admin  {P DME DEIX:700684856}  Med Delivery   { MICHELLE MED Delivery:460993348}    Wound Care Documentation and Therapy:        Elimination:  Continence:   · Bowel: {YES / IS:67668}  · Bladder: {YES / SN:76480}  Urinary Catheter: {Urinary Catheter:200653112}   Colostomy/Ileostomy/Ileal Conduit: {YES / OP:85101}       Date of Last BM: ***    Intake/Output Summary (Last 24 hours) at 5/25/2019 1058  Last data filed at 5/25/2019 0809  Gross per 24 hour   Intake 1040 ml   Output --   Net 1040 ml     I/O last 3 completed shifts:   In: 36 [P.O.:920]  Out: 200 [Urine:200]    Safety Concerns:     [employer] Safety Concerns:071742006}    Impairments/Disabilities:      [employer] Impairments/Disabilities:995087796}    Nutrition Therapy:  Current Nutrition Therapy:   [employer] Diet List:170402640}    Routes of Feeding: {CHP DME Other Feedings:707876900}  Liquids: {Slp liquid thickness:24827}  Daily Fluid Restriction: {CHP DME Yes amt example:829784695}  Last Modified Barium Swallow with Video (Video Swallowing Test): {Done Not Done IGFQ:677930569}    Treatments at the Time of Hospital Discharge: Respiratory Treatments: ***  Oxygen Therapy:  {Therapy; copd oxygen:30209}  Ventilator:    {MH CC Vent PUFP:628942905}    Rehab Therapies: {THERAPEUTIC INTERVENTION:8199944044}  Weight Bearing Status/Restrictions: 50Carla CANTRELL Weight Bearin}  Other Medical Equipment (for information only, NOT a DME order):  {EQUIPMENT:934160202}  Other Treatments: ***    Patient's personal belongings (please select all that are sent with patient):  {CHP DME Belongings:372690087}    RN SIGNATURE:  {Esignature:982129609}    CASE MANAGEMENT/SOCIAL WORK SECTION    Inpatient Status Date: ***    Readmission Risk Assessment Score:  Readmission Risk              Risk of Unplanned Readmission:        11           Discharging to Facility/ Agency   · Name:   · Address:  · Phone:  · Fax:    Dialysis Facility (if applicable)   · Name:  · Address:  · Dialysis Schedule:  · Phone:  · Fax:    / signature: {Esignature:850862338}    PHYSICIAN SECTION    Prognosis: {Prognosis:7247270944}    Condition at Discharge: 50Carla Babin Patient Condition:289381675}    Rehab Potential (if transferring to Rehab): {Prognosis:0550569243}    Recommended Labs or Other Treatments After Discharge: ***    Physician Certification: I certify the above information and transfer of Dianna Briggs  is necessary for the continuing treatment of the diagnosis listed and that he requires {Admit to Appropriate Level of Care:43435} for {GREATER/LESS:139584371} 30 days.      Update Admission H&P: {CHP DME Changes in ZDYCK:348992676}    PHYSICIAN SIGNATURE:  {Esignature:994444766}

## 2020-02-10 ENCOUNTER — TELEPHONE (OUTPATIENT)
Dept: UROLOGY | Facility: CLINIC | Age: 25
End: 2020-02-10

## 2020-02-10 NOTE — TELEPHONE ENCOUNTER
----- Message from Gabrielle Sanchez RN sent at 2/10/2020 12:50 PM CST -----  Hi Layla,    Do you mind helping me out with this?  ----- Message -----  From: Jayy Koehler MD  Sent: 2/10/2020   8:09 AM CST  To: Gabrielle Sanchez RN    Hi - Got a call from [place] last week that she was having flank pain, worried about a stone.  Mind checking on her?  Her renal US which we got last week was normal.    Thanks  George

## 2020-03-09 ENCOUNTER — TELEPHONE (OUTPATIENT)
Dept: UROLOGY | Facility: CLINIC | Age: 25
End: 2020-03-09

## 2020-03-09 DIAGNOSIS — N20.1 URETEROLITHIASIS: ICD-10-CM

## 2020-03-09 RX ORDER — POTASSIUM CITRATE 10 MEQ/1
20 TABLET, EXTENDED RELEASE ORAL
Qty: 120 TABLET | Refills: 0 | Status: SHIPPED | OUTPATIENT
Start: 2020-03-09 | End: 2020-03-31

## 2020-03-09 NOTE — TELEPHONE ENCOUNTER
[facility] Call Center    Phone Message    May a detailed message be left on voicemail: yes     Reason for Call: Medication Refill Request    Has the patient contacted the pharmacy for the refill? Yes   Name of medication being requested: potassium citrate (UROCIT-K) 10 MEQ (1080 MG) CR tablet  Provider who prescribed the medication: Rodo  Pharmacy:   [place] [id number]    Date medication is needed: ASAP      Action Taken: Message routed to:  [facility] U[facility]    Travel Screening: Not Applicable

## 2020-03-09 NOTE — TELEPHONE ENCOUNTER
potassium citrate (UROCIT-K) 10 MEQ (1080 MG) CR tablet    Last Written Prescription Date:  11/18/18  Last Fill Quantity:120   # refills: 9  Last Office Visit :11/5/19  Future Office visit:  4/7/20    Routing refill request to provider for review/approval because: not on protocol

## 2020-03-14 ENCOUNTER — TRANSFERRED RECORDS (OUTPATIENT)
Dept: HEALTH INFORMATION MANAGEMENT | Facility: CLINIC | Age: 25
End: 2020-03-14

## 2020-03-31 DIAGNOSIS — N20.1 URETEROLITHIASIS: ICD-10-CM

## 2020-03-31 RX ORDER — POTASSIUM CITRATE 10 MEQ/1
20 TABLET, EXTENDED RELEASE ORAL
Qty: 120 TABLET | Refills: 2 | Status: SHIPPED | OUTPATIENT
Start: 2020-03-31 | End: 2020-07-26

## 2020-03-31 NOTE — TELEPHONE ENCOUNTER
Last Clinic Visit: 11/5/2019  [facility]    Future Visit: 6/23/2020

## 2020-06-19 ENCOUNTER — PRE VISIT (OUTPATIENT)
Dept: NEPHROLOGY | Facility: CLINIC | Age: 25
End: 2020-06-19

## 2020-06-19 NOTE — TELEPHONE ENCOUNTER
Reason for Visit: Stone Prevention    Diagnosis: recurrent kidney stone    Orders/Procedures/Records: Elma Mckenzie LPN  06/19/20  9:33 AM

## 2020-06-23 ENCOUNTER — VIRTUAL VISIT (OUTPATIENT)
Dept: NEPHROLOGY | Facility: CLINIC | Age: 25
End: 2020-06-23
Payer: COMMERCIAL

## 2020-06-23 DIAGNOSIS — N20.0 RECURRENT KIDNEY STONES: ICD-10-CM

## 2020-06-23 DIAGNOSIS — E72.09 CYSTINE STONES (H): ICD-10-CM

## 2020-06-23 DIAGNOSIS — E72.01 CYSTINURIA (H): Primary | ICD-10-CM

## 2020-06-23 RX ORDER — CAPTOPRIL 25 MG/1
25 TABLET ORAL 3 TIMES DAILY
Qty: 90 TABLET | Refills: 11 | Status: SHIPPED | OUTPATIENT
Start: 2020-06-23 | End: 2021-07-19

## 2020-06-23 ASSESSMENT — PAIN SCALES - GENERAL: PAINLEVEL: NO PAIN (0)

## 2020-06-23 NOTE — PROGRESS NOTES
"Shelly Cerda is a 24 year old female who is being evaluated via a billable telephone visit.      The patient has been notified of following:     \"This telephone visit will be conducted via a call between you and your physician/provider. We have found that certain health care needs can be provided without the need for a physical exam.  This service lets us provide the care you need with a short phone conversation.  If a prescription is necessary we can send it directly to your pharmacy.  If lab work is needed we can place an order for that and you can then stop by our lab to have the test done at a later time.    Telephone visits are billed at different rates depending on your insurance coverage. During this emergency period, for some insurers they may be billed the same as an in-person visit.  Please reach out to your insurance provider with any questions.    If during the course of the call the physician/provider feels a telephone visit is not appropriate, you will not be charged for this service.\"    Patient has given verbal consent for Telephone visit?  Yes    What phone number would you like to be contacted at? [phone]    How would you like to obtain your AVS? Quintin Lei, EMT        "

## 2020-06-23 NOTE — PATIENT INSTRUCTIONS
Please try to further lower your sodium intake    Captopril 25 mg three times a day  - let me know if you get light headed or dizzy  - blood test in 2-3 weeks    Elma in about one month    Follow up in 4-8 weeks to discuss elma Gonzalez MD  [facility]  Department of Medicine  Division of Renal Disease and Hypertension

## 2020-06-23 NOTE — LETTER
6/23/2020       RE: Shelly Cerda  [address]  [place] [place] [facility] [address]     Dear Colleague,    Thank you for referring your patient, Shelly Cerda, to the [facility] UROLOGY AND INST FOR PROSTATE AND UROLOGIC CANCERS at [facility]. Please see a copy of my visit note below.    [facility]  06/23/2020     Shelly Cerda MRN:[id number] YOB: 1995  Primary care provider: [facility]  Requesting physician: Dr. Jayy Koehler    ASSESSMENT AND RECOMMENDATIONS:   Shelly Cerda is a 24 year old female presenting for nephrolithiasis.  # Recurrent kidney stones: stone type cystine with multiple surgeries - capacity not at goal.  Urine sodium high with hypercalciuria.  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  Managed with high fluid, potassium citrate 20 meq bid, she had allergic reaction with thiola, hives, chills, fever, swollen lymph nodes and throat was swollen.  She is really motivated to decrease kidney stone recurrence as the last 5 years have been really hard.  She was disappointed that she had reaction to thiola.  She does not recall seeing nephrologist in the past.    She has had close to 9 surgeries.  I  reviewed records including from [facility] in [place].  She has lithotripsy there but stone fragments were not removed so she required subsequent procedure.  Right stone surgery , Left sided stone surgery 2017, right PCNL 3/2018 - was stone free after that procedure then presented 2019 with right obstructing kidney stone and had URS and lithotripsy for that stone.  She was again stone free after that operation.    Last imagin2020 - no kidney stones  Last Surgery: 19 (I reviewed op report)  Stone Free on right after above surgery    Shelly Cerda's diet consists of 3 meals per day.  Tries to stay away from fish/red meat, salt, fried food, does not drink milk or orange juice.  Uses essential lemon oil and [place] grape (from her Aunt who is an herbalist).  1st meal protein pancake (brand kodiak) with water  2nd meal salad with grilled chicken or tortilla chips and cheese (this is rare)  3rd meal veggie, meat and starch like grilled chicken, rice and peas/zucchini   Snacks dark chocolate, kodiak brownie, apple with almond butter, nuts, trail mix    Fluid intake includes - 1 cup coffee am, water, no soda  1-2 beer or wine per week  Changed jobs to position where she has more ability to drink water and take bathroom breaks    Family history is positive for kidney stones - her sister has cystine kidney stones    I reviewed 24 hour urine chemstries:          PAST MEDICAL HISTORY:  Reviewed  Past Medical History:   Diagnosis Date     Complication of anesthesia      Cystinuria (H)      Kidney stones      PONV (postoperative nausea and vomiting)        PSH:  Reviewed  Past Surgical History:   Procedure Laterality Date     APPENDECTOMY  2011     COMBINED CYSTOSCOPY, RETROGRADES, EXCHANGE STENT URETER(S) Right 9/15/2019    Procedure: CYSTOSCOPY, WITH RIGHT RETROGRADE PYELOGRAM AND RIGHT URETERAL STENT PLACEMENT;  Surgeon: Kasi Wade MD;  Location: [facility] OR     COMBINED CYSTOSCOPY,  RETROGRADES, URETEROSCOPY, INSERT STENT Left 10/12/2017    Procedure: COMBINED CYSTOSCOPY, RETROGRADES, URETEROSCOPY, INSERT STENT;  COMBINED CYSTOSCOPY, RETROGRADES, URETEROSCOPY, LASER HOLMIUM STANDBY,  INSERT STENT LEFT URETER;  Surgeon: Luis Banda MD;  Location: [facility] OR     CYSTOSCOPY       LASER HOLMIUM LITHOTRIPSY URETER(S), INSERT STENT, COMBINED  9/4/2012    Procedure: COMBINED CYSTOSCOPY, URETEROSCOPY, LASER HOLMIUM LITHOTRIPSY URETER(S), INSERT STENT;  Holmium Laser Lithotripsy.  Right Stent Placement;  Surgeon: Cathi Hernandez MD;  Location: [facility] OR     LASER HOLMIUM LITHOTRIPSY URETER(S), INSERT STENT, COMBINED Right 9/20/2019    Procedure: Cystoscopy, Right Ureteroscopy, Laser Lithotripsy,  no ureteral stent placed.;  Surgeon: Jayy Koehler MD;  Location[facility] OR     PERCUTANEOUS NEPHROLITHOTOMY Right 3/8/2018    Procedure: PERCUTANEOUS NEPHROLITHOTOMY;  Right Percutaneous Nephrolithotomy;  Surgeon: Jayy Koehler MD;  Location: [facility] OR     Ureteral stent placement with subsequent removal.  2017        MEDICATIONS:  Current Outpatient Medications   Medication Sig Dispense Refill     acetaminophen (TYLENOL) 325 MG tablet Take 2 tablets (650 mg) by mouth every 4 hours as needed for mild pain 100 tablet 0     azelastine (ASTEPRO) 0.15 % nasal spray        LARISSIA 0.1-20 MG-MCG per tablet        levocetirizine (XYZAL) 5 MG tablet        montelukast (SINGULAIR) 10 MG tablet        oxybutynin (DITROPAN) 5 MG tablet Take 1 tablet (5 mg) by mouth 3 times daily as needed for bladder spasms (or stent discomfort) 60 tablet 1     oxyCODONE (ROXICODONE) 5 MG tablet Take 1-2 tablets (5-10 mg) by mouth every 6 hours as needed for severe pain 10 tablet 0     potassium citrate (UROCIT-K) 10 MEQ (1080 MG) CR tablet Take 2 tablets (20 mEq) by mouth 2 times daily 120 tablet 2     senna-docusate (SENOKOT-S/PERICOLACE) 8.6-50 MG tablet Take 2 tablets by mouth 2 times daily as needed for  constipation (Take while taking narcotic pain medications) 30 tablet 0     tamsulosin (FLOMAX) 0.4 MG capsule Take 1 capsule (0.4 mg) by mouth daily 30 capsule 1        ALLERGIES:    Allergies   Allergen Reactions     Thiola [Tiopronin] Rash       REVIEW OF SYSTEMS:  A 10 point review of systems was negative except as noted above.    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 [employer]  Social History     Socioeconomic History     Marital status: Single     Spouse name: Not on file     Number of children: Not on file     Years of education: Not on file     Highest education level: Not on file   Occupational History     Not on file   Social Needs     Financial resource strain: Not on file     Food insecurity     Worry: Not on file     Inability: Not on file     Transportation needs     Medical: Not on file     Non-medical: Not on file   Tobacco Use     Smoking status: Never Smoker     Smokeless tobacco: Never Used   Substance and Sexual Activity     Alcohol use: Yes     Drug use: No     Sexual activity: Not on file   Lifestyle     Physical activity     Days per week: Not on file     Minutes per session: Not on file     Stress: Not on file   Relationships     Social connections     Talks on phone: Not on file     Gets together: Not on file     Attends Faith service: Not on file     Active member of club or organization: Not on file     Attends meetings of clubs or organizations: Not on file     Relationship status: Not on file     Intimate partner violence     Fear of current or ex partner: Not on file     Emotionally abused: Not on file     Physically abused: Not on file     Forced sexual activity: Not on file   Other Topics Concern     Parent/sibling w/ CABG, MI or angioplasty before 65F 55M? Not Asked   Social History Narrative    Shelly lives with both parents in [place]. Working as .  Hoping to buy house in 2021.    Sexually active with men and  "dating.   works out 5 days per week    FAMILY MEDICAL HISTORY:   Family History   Problem Relation Age of Onset     Genetic Disorder Sister         Cystinuria       PHYSICAL EXAM:   There were no vitals taken for this visit.   Per what I heard on telephone  GENERAL APPEARANCE: alert and no distress  RESP: no conversational dyspnea  Psych: pleasant, normal mental status    LABS:   I reviewed:  Electrolytes/Renal -   Recent Labs   Lab Test 03/20/18  1222 03/09/18  2300 03/09/18  0441    132* 135   POTASSIUM 4.0 3.8 4.6   CHLORIDE 103 100 101   CO2 26 25 27   BUN 10 14 13   CR 0.86 0.94 0.86   GLC 88 105* 110*   VALERIANO 9.3 8.8 8.5       CBC -   Recent Labs   Lab Test 03/09/18  2300 03/09/18  0441 03/08/18  1325   WBC 12.1* 13.7* 10.3   HGB 12.4 12.5 13.0    258 269       LFTs -   Recent Labs   Lab Test 12/19/17  1122 08/06/17  0502   ALKPHOS 52 61   BILITOTAL 0.6 0.3   ALT 19 31   AST 16 31   PROTTOTAL 7.9 7.7   ALBUMIN 4.2 4.2       Coags - No lab results found.    Iron Panel - No lab results found.    Endocrine - No lab results found.    IMAGING:  See HPI    Alis Gonzalez MD         Shelly Cerda is a 24 year old female who is being evaluated via a billable telephone visit.      The patient has been notified of following:     \"This telephone visit will be conducted via a call between you and your physician/provider. We have found that certain health care needs can be provided without the need for a physical exam.  This service lets us provide the care you need with a short phone conversation.  If a prescription is necessary we can send it directly to your pharmacy.  If lab work is needed we can place an order for that and you can then stop by our lab to have the test done at a later time.    Telephone visits are billed at different rates depending on your insurance coverage. During this emergency period, for some insurers they may be billed the same as an in-person visit.  Please reach out to your " "insurance provider with any questions.    If during the course of the call the physician/provider feels a telephone visit is not appropriate, you will not be charged for this service.\"    Patient has given verbal consent for Telephone visit?  Yes    What phone number would you like to be contacted at? [phone]    How would you like to obtain your AVS? Quintin Lei, EMT          Again, thank you for allowing me to participate in the care of your patient.      Sincerely,    Alis Gonzalez MD      "

## 2020-06-23 NOTE — PROGRESS NOTES
[facility]  06/23/2020     Shelly Cerda MRN:[id number] YOB: 1995  Primary care provider: [facility]  Requesting physician: Dr. Jayy Koehler    ASSESSMENT AND RECOMMENDATIONS:   Shelly Cerda is a 24 year old female presenting for nephrolithiasis.  # Recurrent kidney stones: stone type cystine with multiple surgeries - capacity not at goal.  Urine sodium high with hypercalciuria.  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  Managed with high fluid, potassium citrate 20 meq bid, she had allergic reaction with thiola, hives, chills, fever, swollen lymph nodes and throat was swollen.  She is really motivated to decrease kidney stone recurrence as the last 5 years have been really hard.  She was disappointed that she had reaction to thiola.  She does not recall seeing nephrologist in the past.    She has had close to 9 surgeries.  I reviewed records including from [facility] in [place].  She has lithotripsy there but stone fragments were not removed so she required subsequent procedure.  Right stone surgery 2012, Left sided stone surgery October 2017, right PCNL 3/2018 - was stone free after that procedure then presented September 2019 with right  obstructing kidney stone and had URS and lithotripsy for that stone.  She was again stone free after that operation.    Last imagin2020 - no kidney stones  Last Surgery: 19 (I reviewed op report)  Stone Free on right after above surgery    Shelly Cerda's diet consists of 3 meals per day.  Tries to stay away from fish/red meat, salt, fried food, does not drink milk or orange juice.  Uses essential lemon oil and [place] grape (from her Aunt who is an herbalist).  1st meal protein pancake (brand kodiak) with water  2nd meal salad with grilled chicken or tortilla chips and cheese (this is rare)  3rd meal veggie, meat and starch like grilled chicken, rice and peas/zucchini   Snacks dark chocolate, kodiak brownie, apple with almond butter, nuts, trail mix    Fluid intake includes - 1 cup coffee am, water, no soda  1-2 beer or wine per week  Changed jobs to position where she has more ability to drink water and take bathroom breaks    Family history is positive for kidney stones - her sister has cystine kidney stones    I reviewed 24 hour urine chemstries:          PAST MEDICAL HISTORY:  Reviewed  Past Medical History:   Diagnosis Date     Complication of anesthesia      Cystinuria (H)      Kidney stones      PONV (postoperative nausea and vomiting)        PSH:  Reviewed  Past Surgical History:   Procedure Laterality Date     APPENDECTOMY  2011     COMBINED CYSTOSCOPY, RETROGRADES, EXCHANGE STENT URETER(S) Right 9/15/2019    Procedure: CYSTOSCOPY, WITH RIGHT RETROGRADE PYELOGRAM AND RIGHT URETERAL STENT PLACEMENT;  Surgeon: Kasi Wade MD;  [facility]     COMBINED CYSTOSCOPY, RETROGRADES, URETEROSCOPY, INSERT STENT Left 10/12/2017    Procedure: COMBINED CYSTOSCOPY, RETROGRADES, URETEROSCOPY, INSERT STENT;  COMBINED CYSTOSCOPY, RETROGRADES, URETEROSCOPY, LASER HOLMIUM STANDBY,  INSERT STENT LEFT URETER;  Surgeon: Luis Banda MD;  Location:  OR     CYSTOSCOPY       LASER  HOLMIUM LITHOTRIPSY URETER(S), INSERT STENT, COMBINED  9/4/2012    Procedure: COMBINED CYSTOSCOPY, URETEROSCOPY, LASER HOLMIUM LITHOTRIPSY URETER(S), INSERT STENT;  Holmium Laser Lithotripsy.  Right Stent Placement;  Surgeon: Cathi Hernandez MD;  [facility] OR     LASER HOLMIUM LITHOTRIPSY URETER(S), INSERT STENT, COMBINED Right 9/20/2019    Procedure: Cystoscopy, Right Ureteroscopy, Laser Lithotripsy,  no ureteral stent placed.;  Surgeon: Jayy Koehler MD;  [facility] OR     PERCUTANEOUS NEPHROLITHOTOMY Right 3/8/2018    Procedure: PERCUTANEOUS NEPHROLITHOTOMY;  Right Percutaneous Nephrolithotomy;  Surgeon: Jayy Koehler MD;  [facility] OR     Ureteral stent placement with subsequent removal.  2017        MEDICATIONS:  Current Outpatient Medications   Medication Sig Dispense Refill     acetaminophen (TYLENOL) 325 MG tablet Take 2 tablets (650 mg) by mouth every 4 hours as needed for mild pain 100 tablet 0     azelastine (ASTEPRO) 0.15 % nasal spray        LARISSIA 0.1-20 MG-MCG per tablet        levocetirizine (XYZAL) 5 MG tablet        montelukast (SINGULAIR) 10 MG tablet        oxybutynin (DITROPAN) 5 MG tablet Take 1 tablet (5 mg) by mouth 3 times daily as needed for bladder spasms (or stent discomfort) 60 tablet 1     oxyCODONE (ROXICODONE) 5 MG tablet Take 1-2 tablets (5-10 mg) by mouth every 6 hours as needed for severe pain 10 tablet 0     potassium citrate (UROCIT-K) 10 MEQ (1080 MG) CR tablet Take 2 tablets (20 mEq) by mouth 2 times daily 120 tablet 2     senna-docusate (SENOKOT-S/PERICOLACE) 8.6-50 MG tablet Take 2 tablets by mouth 2 times daily as needed for constipation (Take while taking narcotic pain medications) 30 tablet 0     tamsulosin (FLOMAX) 0.4 MG capsule Take 1 capsule (0.4 mg) by mouth daily 30 capsule 1        ALLERGIES:    Allergies   Allergen Reactions     Thiola [Tiopronin] Rash       REVIEW OF SYSTEMS:  A 10 point review of systems was negative except as noted  above.    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 [employer]  Social History     Socioeconomic History     Marital status: Single     Spouse name: Not on file     Number of children: Not on file     Years of education: Not on file     Highest education level: Not on file   Occupational History     Not on file   Social Needs     Financial resource strain: Not on file     Food insecurity     Worry: Not on file     Inability: Not on file     Transportation needs     Medical: Not on file     Non-medical: Not on file   Tobacco Use     Smoking status: Never Smoker     Smokeless tobacco: Never Used   Substance and Sexual Activity     Alcohol use: Yes     Drug use: No     Sexual activity: Not on file   Lifestyle     Physical activity     Days per week: Not on file     Minutes per session: Not on file     Stress: Not on file   Relationships     Social connections     Talks on phone: Not on file     Gets together: Not on file     Attends Protestant service: Not on file     Active member of club or organization: Not on file     Attends meetings of clubs or organizations: Not on file     Relationship status: Not on file     Intimate partner violence     Fear of current or ex partner: Not on file     Emotionally abused: Not on file     Physically abused: Not on file     Forced sexual activity: Not on file   Other Topics Concern     Parent/sibling w/ CABG, MI or angioplasty before 65F 55M? Not Asked   Social History Narrative    Shelly lives with both parents in [place]. Working as .  Hoping to buy house in 2021.    Sexually active with men and dating.   works out 5 days per week    FAMILY MEDICAL HISTORY:   Family History   Problem Relation Age of Onset     Genetic Disorder Sister         Cystinuria       PHYSICAL EXAM:   There were no vitals taken for this visit.   Per what I heard on telephone  GENERAL APPEARANCE: alert and no distress  RESP: no conversational  dyspnea  Psych: pleasant, normal mental status    LABS:   I reviewed:  Electrolytes/Renal -   Recent Labs   Lab Test 03/20/18  1222 03/09/18  2300 03/09/18  0441    132* 135   POTASSIUM 4.0 3.8 4.6   CHLORIDE 103 100 101   CO2 26 25 27   BUN 10 14 13   CR 0.86 0.94 0.86   GLC 88 105* 110*   VALERIANO 9.3 8.8 8.5       CBC -   Recent Labs   Lab Test 03/09/18  2300 03/09/18  0441 03/08/18  1325   WBC 12.1* 13.7* 10.3   HGB 12.4 12.5 13.0    258 269       LFTs -   Recent Labs   Lab Test 12/19/17  1122 08/06/17  0502   ALKPHOS 52 61   BILITOTAL 0.6 0.3   ALT 19 31   AST 16 31   PROTTOTAL 7.9 7.7   ALBUMIN 4.2 4.2       Coags - No lab results found.    Iron Panel - No lab results found.    Endocrine - No lab results found.    IMAGING:  See HPI    Alis Gonzalez MD

## 2020-06-25 NOTE — NURSING NOTE
Chief Complaint   Patient presents with     RECHECK     Stone prevention       not currently breastfeeding. There is no height or weight on file to calculate BMI.    Patient Active Problem List   Diagnosis     Cystinuria (H)     Nausea with vomiting     Ureterolithiasis     Acute nontraumatic kidney injury (H)     Acute abdominal pain in right flank     Constipation due to pain medication     Group B streptococcal infection     Ureteral stone     Urolithiasis     Postoperative pain     Right ureteral stone     Sebaceous cyst     Vertigo       Allergies   Allergen Reactions     Thiola [Tiopronin] Rash     Fevers, lymphadenopathy, swelling in throat area       Current Outpatient Medications   Medication Sig Dispense Refill     captopril (CAPOTEN) 25 MG tablet Take 1 tablet (25 mg) by mouth 3 times daily 90 tablet 11     azelastine (ASTEPRO) 0.15 % nasal spray        LARISSIA 0.1-20 MG-MCG per tablet        levocetirizine (XYZAL) 5 MG tablet        montelukast (SINGULAIR) 10 MG tablet        potassium citrate (UROCIT-K) 10 MEQ (1080 MG) CR tablet Take 2 tablets (20 mEq) by mouth 2 times daily 120 tablet 2       Social History     Tobacco Use     Smoking status: Never Smoker     Smokeless tobacco: Never Used   Substance Use Topics     Alcohol use: Yes     Drug use: No       Vani Mckenzie LPN    [employer] order form filled out and faxed to [employer] at [phone].    Vani Mckenzie LPN  06/25/20  10:48 AM

## 2020-06-29 ENCOUNTER — PRE VISIT (OUTPATIENT)
Dept: UROLOGY | Facility: CLINIC | Age: 25
End: 2020-06-29

## 2020-06-30 ENCOUNTER — VIRTUAL VISIT (OUTPATIENT)
Dept: UROLOGY | Facility: CLINIC | Age: 25
End: 2020-06-30
Payer: COMMERCIAL

## 2020-06-30 DIAGNOSIS — N20.0 KIDNEY STONE: Primary | ICD-10-CM

## 2020-06-30 ASSESSMENT — PAIN SCALES - GENERAL: PAINLEVEL: NO PAIN (0)

## 2020-06-30 NOTE — PATIENT INSTRUCTIONS
EDEL and renal US ordered to coincide with next visit with Dr. Carlos Sanchez, EMT     It was a pleasure meeting with you today.  Thank you for allowing me and my team the privilege of caring for you today.  YOU are the reason we are here, and I truly hope we provided you with the excellent service you deserve.  Please let us know if there is anything else we can do for you so that we can be sure you are leaving completely satisfied with your care experience.

## 2020-06-30 NOTE — LETTER
6/30/2020       RE: Shelly Cerda  [id number] [facility] [id number]     Dear Colleague,    Thank you for referring your patient, Shelly Cerda, to the [facility] AND [facility] FOR PROSTATE AND UROLOGIC CANCERS at [facility]. Please see a copy of my visit note below.    Shelly Cerda is a 24 year old female who is being evaluated via a billable telephone visit.            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  Has had several prior stone procedures.  Established with Dr. Gonzalez (nephrology) last week which was a productive and useful visit that she enjoyed.  She is adding captopril which she started yesterday.  Is otherwise adhering to cystinuria favorable diet including high fluid, low animal protein, low salt.     Today notes: Has some intermittent flank discomfort, mild, chronic.  Denies UTI, fevers, hematuria.        Labs and Pathology:    I personally reviewed all applicable laboratory data and went over findings with patient  Significant for:    CBC RESULTS:  Recent Labs   Lab Test 03/09/18  2300 03/09/18  0441 03/08/18  1325 03/08/18  0806   WBC 12.1* 13.7* 10.3 7.5   HGB 12.4 12.5 13.0 14.5    258 269 257        BMP RESULTS:  Recent Labs   Lab Test 03/20/18  1222 03/09/18  2300 03/09/18  0441 03/08/18  1325    132* 135 139   POTASSIUM 4.0 3.8 4.6 4.5   CHLORIDE 103 100 101 108   CO2 26 25 27 24   ANIONGAP 8 7 7 7   GLC 88 105* 110* 94   BUN 10 14 13 11   CR 0.86 0.94 0.86 0.86   GFRESTIMATED 82 74 81 82   GFRESTBLACK >90 89 >90 >90   VALERIANO 9.3 8.8 8.5 8.6       UA RESULTS:   Recent Labs   Lab Test 03/20/18  1222 03/09/18  2330 02/13/18  0904   SG 1.004 1.003 1.023   URINEPH 8.0* 7.0 6.0   NITRITE Negative Negative Negative   RBCU <1 13* >182*    WBCU 1 6* 0         Imaging:    I personally reviewed all applicable imaging and went over the below findings with patient.    Results for orders placed or performed during the hospital encounter of 02/07/20   US Renal Complete    Narrative    US RENAL COMPLETE   2/7/2020 3:57 PM     HISTORY: Renal stones.    COMPARISON: None.    FINDINGS:     Right kidney measures 11.2 x 4.1 x 4.5 cm. Cortical thickness measures  1.5 cm AP. There is no hydronephrosis. No renal calculi or solid renal  masses are evident.     Left kidney measures 11.4 x 4.7 x 5.1 cm. Cortical thickness measures  1.6 cm AP. There is no hydronephrosis. No renal calculi or solid renal  masses are evident.     Limited images of the bladder are unremarkable.       Impression    IMPRESSION: Unremarkable renal ultrasound examination. No urinary  calculi are identified.    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 Gonzalez for metabolic stone prevention, has 24 hour urine pending to test efficacy of captopril  -Will arrange KUB and renal US to coincide with next visit with Dr. Gonzalez         Past Medical History:     Past Medical History:   Diagnosis Date     Complication of anesthesia      Cystinuria (H)      Kidney stones      PONV (postoperative nausea and vomiting)             Past Surgical History:     Past Surgical History:   Procedure Laterality Date     APPENDECTOMY  9/2011     COMBINED CYSTOSCOPY, RETROGRADES, EXCHANGE STENT URETER(S) Right 9/15/2019    Procedure: CYSTOSCOPY, WITH RIGHT RETROGRADE PYELOGRAM AND RIGHT URETERAL STENT PLACEMENT;  Surgeon: Kasi Wade MD;  [facility] OR     COMBINED CYSTOSCOPY, RETROGRADES, URETEROSCOPY, INSERT STENT Left 10/12/2017    Procedure: COMBINED CYSTOSCOPY, RETROGRADES, URETEROSCOPY, INSERT STENT;  COMBINED CYSTOSCOPY, RETROGRADES, URETEROSCOPY, LASER HOLMIUM STANDBY,  INSERT STENT LEFT URETER;   Surgeon: Luis Banda MD;  Location: [facility] OR     CYSTOSCOPY       LASER HOLMIUM LITHOTRIPSY URETER(S), INSERT STENT, COMBINED  9/4/2012    Procedure: COMBINED CYSTOSCOPY, URETEROSCOPY, LASER HOLMIUM LITHOTRIPSY URETER(S), INSERT STENT;  Holmium Laser Lithotripsy.  Right Stent Placement;  Surgeon: Cathi Hernandez MD;  [facility]     LASER HOLMIUM LITHOTRIPSY URETER(S), INSERT STENT, COMBINED Right 9/20/2019    Procedure: Cystoscopy, Right Ureteroscopy, Laser Lithotripsy,  no ureteral stent placed.;  Surgeon: Jayy Koehler MD;  [facility]     PERCUTANEOUS NEPHROLITHOTOMY Right 3/8/2018    Procedure: PERCUTANEOUS NEPHROLITHOTOMY;  Right Percutaneous Nephrolithotomy;  Surgeon: Jayy Koehler MD;  [facility]     Ureteral stent placement with subsequent removal.  2017            Medications     Current Outpatient Medications   Medication     azelastine (ASTEPRO) 0.15 % nasal spray     captopril (CAPOTEN) 25 MG tablet     LARISSIA 0.1-20 MG-MCG per tablet     levocetirizine (XYZAL) 5 MG tablet     montelukast (SINGULAIR) 10 MG tablet     potassium citrate (UROCIT-K) 10 MEQ (1080 MG) CR tablet     No current facility-administered medications for this visit.             Family History:     Family History   Problem Relation Age of Onset     Genetic Disorder Sister         Cystinuria            Social History:     Social History     Socioeconomic History     Marital status: Single     Spouse name: Not on file     Number of children: Not on file     Years of education: Not on file     Highest education level: Not on file   Occupational History     Not on file   Social Needs     Financial resource strain: Not on file     Food insecurity     Worry: Not on file     Inability: Not on file     Transportation needs     Medical: Not on file     Non-medical: Not on file   Tobacco Use     Smoking status: Never Smoker     Smokeless tobacco: Never Used   Substance and Sexual Activity      "Alcohol use: Yes     Drug use: No     Sexual activity: Not on file   Lifestyle     Physical activity     Days per week: Not on file     Minutes per session: Not on file     Stress: Not on file   Relationships     Social connections     Talks on phone: Not on file     Gets together: Not on file     Attends Jainism service: Not on file     Active member of club or organization: Not on file     Attends meetings of clubs or organizations: Not on file     Relationship status: Not on file     Intimate partner violence     Fear of current or ex partner: Not on file     Emotionally abused: Not on file     Physically abused: Not on file     Forced sexual activity: Not on file   Other Topics Concern     Parent/sibling w/ CABG, MI or angioplasty before 65F 55M? Not Asked   Social History Narrative    Shelly lives with both parents in [place]. Working as .  Hoping to buy house in 2021.    Sexually active with men and dating.            Allergies:   Thiola [tiopronin]         Review of Systems:  From intake questionnaire   Negative 14 system review except as noted on HPI, nurse's note.        CC:  [facility]      The patient has been notified of following:     \"This telephone visit will be conducted via a call between you and your physician/provider. We have found that certain health care needs can be provided without the need for a physical exam.  This service lets us provide the care you need with a short phone conversation.  If a prescription is necessary we can send it directly to your pharmacy.  If lab work is needed we can place an order for that and you can then stop by our lab to have the test done at a later time.    Telephone visits are billed at different rates depending on your insurance coverage. During this emergency period, for some insurers they may be billed the same as an in-person visit.  Please reach out to your insurance provider with any questions.    If during the " "course of the call the physician/provider feels a telephone visit is not appropriate, you will not be charged for this service.\"    Patient has given verbal consent for Telephone visit?  Yes    What phone number would you like to be contacted at? [phone]     How would you like to obtain your AVS? MyChart    Phone call duration: 8 minutes    Jayy Koehler MD          "

## 2020-06-30 NOTE — PROGRESS NOTES
Shelly Cerda is a 24 year old female who is being evaluated via a billable telephone visit.            UROLOGY TELEPHONE FOLLOW-UP NOTE           Chief Complaint:   Cystinuria         Interval Update    Shelly Cerda is a very pleasant 23 yo F with hx of cystinuria    Brief  History: Last seen by me 2/20 several months after a ureteroscopy.  Has had several prior stone procedures.  Established with Dr. Gonzalez (nephrology) last week which was a productive and useful visit that she enjoyed.  She is adding captopril which she started yesterday.  Is otherwise adhering to cystinuria favorable diet including high fluid, low animal protein, low salt.     Today notes: Has some intermittent flank discomfort, mild, chronic.  Denies UTI, fevers, hematuria.        Labs and Pathology:    I personally reviewed all applicable laboratory data and went over findings with patient  Significant for:    CBC RESULTS:  Recent Labs   Lab Test 03/09/18  2300 03/09/18  0441 03/08/18  1325 03/08/18  0806   WBC 12.1* 13.7* 10.3 7.5   HGB 12.4 12.5 13.0 14.5    258 269 257        BMP RESULTS:  Recent Labs   Lab Test 03/20/18  1222 03/09/18  2300 03/09/18  0441 03/08/18  1325    132* 135 139   POTASSIUM 4.0 3.8 4.6 4.5   CHLORIDE 103 100 101 108   CO2 26 25 27 24   ANIONGAP 8 7 7 7   GLC 88 105* 110* 94   BUN 10 14 13 11   CR 0.86 0.94 0.86 0.86   GFRESTIMATED 82 74 81 82   GFRESTBLACK >90 89 >90 >90   VALERIANO 9.3 8.8 8.5 8.6       UA RESULTS:   Recent Labs   Lab Test 03/20/18  1222 03/09/18  2330 02/13/18  0904   SG 1.004 1.003 1.023   URINEPH 8.0* 7.0 6.0   NITRITE Negative Negative Negative   RBCU <1 13* >182*   WBCU 1 6* 0         Imaging:    I personally reviewed all applicable imaging and went over the below findings with patient.    Results for orders placed or performed during the hospital encounter of 02/07/20   US Renal Complete    Narrative    US RENAL COMPLETE   2/7/2020 3:57 PM     HISTORY: Renal stones.    COMPARISON:  None.    FINDINGS:     Right kidney measures 11.2 x 4.1 x 4.5 cm. Cortical thickness measures  1.5 cm AP. There is no hydronephrosis. No renal calculi or solid renal  masses are evident.     Left kidney measures 11.4 x 4.7 x 5.1 cm. Cortical thickness measures  1.6 cm AP. There is no hydronephrosis. No renal calculi or solid renal  masses are evident.     Limited images of the bladder are unremarkable.       Impression    IMPRESSION: Unremarkable renal ultrasound examination. No urinary  calculi are identified.    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 Gonzalez for metabolic stone prevention, has 24 hour urine pending to test efficacy of captopril  -Will arrange KUB and renal US to coincide with next visit with Dr. Gonzalez         Past Medical History:     Past Medical History:   Diagnosis Date     Complication of anesthesia      Cystinuria (H)      Kidney stones      PONV (postoperative nausea and vomiting)             Past Surgical History:     Past Surgical History:   Procedure Laterality Date     APPENDECTOMY  9/2011     COMBINED CYSTOSCOPY, RETROGRADES, EXCHANGE STENT URETER(S) Right 9/15/2019    Procedure: CYSTOSCOPY, WITH RIGHT RETROGRADE PYELOGRAM AND RIGHT URETERAL STENT PLACEMENT;  Surgeon: Kasi Wade MD;  [facility]     COMBINED CYSTOSCOPY, RETROGRADES, URETEROSCOPY, INSERT STENT Left 10/12/2017    Procedure: COMBINED CYSTOSCOPY, RETROGRADES, URETEROSCOPY, INSERT STENT;  COMBINED CYSTOSCOPY, RETROGRADES, URETEROSCOPY, LASER HOLMIUM STANDBY,  INSERT STENT LEFT URETER;  Surgeon: Luis Banda MD;  Location: [facility]     CYSTOSCOPY       LASER HOLMIUM LITHOTRIPSY URETER(S), INSERT STENT, COMBINED  9/4/2012    Procedure: COMBINED CYSTOSCOPY, URETEROSCOPY, LASER HOLMIUM LITHOTRIPSY URETER(S), INSERT STENT;  Holmium Laser Lithotripsy.  Right Stent Placement;  Surgeon: Cathi Hernandez  MD Denisha;  [facility]     LASER HOLMIUM LITHOTRIPSY URETER(S), INSERT STENT, COMBINED Right 9/20/2019    Procedure: Cystoscopy, Right Ureteroscopy, Laser Lithotripsy,  no ureteral stent placed.;  Surgeon: Jayy Koehler MD;  [facility]     PERCUTANEOUS NEPHROLITHOTOMY Right 3/8/2018    Procedure: PERCUTANEOUS NEPHROLITHOTOMY;  Right Percutaneous Nephrolithotomy;  Surgeon: Jayy Koehler MD;  [facility]     Ureteral stent placement with subsequent removal.  2017            Medications     Current Outpatient Medications   Medication     azelastine (ASTEPRO) 0.15 % nasal spray     captopril (CAPOTEN) 25 MG tablet     LARISSIA 0.1-20 MG-MCG per tablet     levocetirizine (XYZAL) 5 MG tablet     montelukast (SINGULAIR) 10 MG tablet     potassium citrate (UROCIT-K) 10 MEQ (1080 MG) CR tablet     No current facility-administered medications for this visit.             Family History:     Family History   Problem Relation Age of Onset     Genetic Disorder Sister         Cystinuria            Social History:     Social History     Socioeconomic History     Marital status: Single     Spouse name: Not on file     Number of children: Not on file     Years of education: Not on file     Highest education level: Not on file   Occupational History     Not on file   Social Needs     Financial resource strain: Not on file     Food insecurity     Worry: Not on file     Inability: Not on file     Transportation needs     Medical: Not on file     Non-medical: Not on file   Tobacco Use     Smoking status: Never Smoker     Smokeless tobacco: Never Used   Substance and Sexual Activity     Alcohol use: Yes     Drug use: No     Sexual activity: Not on file   Lifestyle     Physical activity     Days per week: Not on file     Minutes per session: Not on file     Stress: Not on file   Relationships     Social connections     Talks on phone: Not on file     Gets together: Not on file     Attends Orthodoxy service: Not  "on file     Active member of club or organization: Not on file     Attends meetings of clubs or organizations: Not on file     Relationship status: Not on file     Intimate partner violence     Fear of current or ex partner: Not on file     Emotionally abused: Not on file     Physically abused: Not on file     Forced sexual activity: Not on file   Other Topics Concern     Parent/sibling w/ CABG, MI or angioplasty before 65F 55M? Not Asked   Social History Narrative    Shelly lives with both parents in [place]. Working as .  Hoping to buy house in 2021.    Sexually active with men and dating.            Allergies:   Thiola [tiopronin]         Review of Systems:  From intake questionnaire   Negative 14 system review except as noted on HPI, nurse's note.        CC:  [facility]      The patient has been notified of following:     \"This telephone visit will be conducted via a call between you and your physician/provider. We have found that certain health care needs can be provided without the need for a physical exam.  This service lets us provide the care you need with a short phone conversation.  If a prescription is necessary we can send it directly to your pharmacy.  If lab work is needed we can place an order for that and you can then stop by our lab to have the test done at a later time.    Telephone visits are billed at different rates depending on your insurance coverage. During this emergency period, for some insurers they may be billed the same as an in-person visit.  Please reach out to your insurance provider with any questions.    If during the course of the call the physician/provider feels a telephone visit is not appropriate, you will not be charged for this service.\"    Patient has given verbal consent for Telephone visit?  Yes    What phone number would you like to be contacted at? [phone]     How would you like to obtain your AVS? Agnitust    Phone call duration: " 8 minutes    Jayy Koehler MD

## 2020-07-01 ENCOUNTER — TELEPHONE (OUTPATIENT)
Dept: NEPHROLOGY | Facility: CLINIC | Age: 25
End: 2020-07-01

## 2020-07-26 ENCOUNTER — MYC REFILL (OUTPATIENT)
Dept: UROLOGY | Facility: CLINIC | Age: 25
End: 2020-07-26

## 2020-07-26 DIAGNOSIS — N20.1 URETEROLITHIASIS: ICD-10-CM

## 2020-07-27 RX ORDER — POTASSIUM CITRATE 10 MEQ/1
20 TABLET, EXTENDED RELEASE ORAL
Qty: 120 TABLET | Refills: 2 | Status: SHIPPED | OUTPATIENT
Start: 2020-07-27 | End: 2020-10-30

## 2020-08-15 ENCOUNTER — TRANSFERRED RECORDS (OUTPATIENT)
Dept: HEALTH INFORMATION MANAGEMENT | Facility: CLINIC | Age: 25
End: 2020-08-15

## 2020-08-18 ENCOUNTER — HOSPITAL ENCOUNTER (OUTPATIENT)
Dept: ULTRASOUND IMAGING | Facility: CLINIC | Age: 25
End: 2020-08-18
Attending: UROLOGY
Payer: COMMERCIAL

## 2020-08-18 ENCOUNTER — HOSPITAL ENCOUNTER (OUTPATIENT)
Dept: LAB | Facility: CLINIC | Age: 25
End: 2020-08-18
Attending: UROLOGY
Payer: COMMERCIAL

## 2020-08-18 DIAGNOSIS — E72.09 CYSTINE STONES (H): ICD-10-CM

## 2020-08-18 DIAGNOSIS — N20.0 CALCULUS OF KIDNEY: ICD-10-CM

## 2020-08-18 DIAGNOSIS — N20.0 KIDNEY STONE: ICD-10-CM

## 2020-08-18 DIAGNOSIS — N20.0 RECURRENT KIDNEY STONES: ICD-10-CM

## 2020-08-18 DIAGNOSIS — E72.01 CYSTINURIA (H): ICD-10-CM

## 2020-08-18 LAB
ALBUMIN SERPL-MCNC: 4 G/DL (ref 3.4–5)
ALP SERPL-CCNC: 57 U/L (ref 40–150)
ALT SERPL W P-5'-P-CCNC: 21 U/L (ref 0–50)
ANION GAP SERPL CALCULATED.3IONS-SCNC: 5 MMOL/L (ref 3–14)
AST SERPL W P-5'-P-CCNC: 18 U/L (ref 0–45)
BILIRUB SERPL-MCNC: 0.4 MG/DL (ref 0.2–1.3)
BUN SERPL-MCNC: 12 MG/DL (ref 7–30)
CALCIUM SERPL-MCNC: 9.1 MG/DL (ref 8.5–10.1)
CHLORIDE SERPL-SCNC: 107 MMOL/L (ref 94–109)
CO2 SERPL-SCNC: 26 MMOL/L (ref 20–32)
CREAT SERPL-MCNC: 0.96 MG/DL (ref 0.52–1.04)
ERYTHROCYTE [DISTWIDTH] IN BLOOD BY AUTOMATED COUNT: 11.9 % (ref 10–15)
GFR SERPL CREATININE-BSD FRML MDRD: 82 ML/MIN/{1.73_M2}
GLUCOSE SERPL-MCNC: 107 MG/DL (ref 70–99)
HCT VFR BLD AUTO: 42.5 % (ref 35–47)
HGB BLD-MCNC: 14.1 G/DL (ref 11.7–15.7)
MCH RBC QN AUTO: 29.5 PG (ref 26.5–33)
MCHC RBC AUTO-ENTMCNC: 33.2 G/DL (ref 31.5–36.5)
MCV RBC AUTO: 89 FL (ref 78–100)
PLATELET # BLD AUTO: 277 10E9/L (ref 150–450)
POTASSIUM SERPL-SCNC: 3.7 MMOL/L (ref 3.4–5.3)
PROT SERPL-MCNC: 7.9 G/DL (ref 6.8–8.8)
RBC # BLD AUTO: 4.78 10E12/L (ref 3.8–5.2)
SODIUM SERPL-SCNC: 138 MMOL/L (ref 133–144)
WBC # BLD AUTO: 6.7 10E9/L (ref 4–11)

## 2020-08-18 PROCEDURE — 85027 COMPLETE CBC AUTOMATED: CPT | Performed by: UROLOGY

## 2020-08-18 PROCEDURE — 80053 COMPREHEN METABOLIC PANEL: CPT | Performed by: UROLOGY

## 2020-08-18 PROCEDURE — 36415 COLL VENOUS BLD VENIPUNCTURE: CPT | Performed by: UROLOGY

## 2020-08-18 PROCEDURE — 76770 US EXAM ABDO BACK WALL COMP: CPT

## 2020-08-24 ENCOUNTER — PRE VISIT (OUTPATIENT)
Dept: NEPHROLOGY | Facility: CLINIC | Age: 25
End: 2020-08-24

## 2020-08-24 NOTE — TELEPHONE ENCOUNTER
Reason for Visit: Stone Prevention    Diagnosis: recurrent kidney stone    Orders/Procedures/Records: Lithhannah not yet available    Contact Patient: n/a    Rooming Requirements: Virtual, make sure Care Everywhere is updated      Vani Mckenzie LPN  08/24/20  12:55 PM

## 2020-09-01 ENCOUNTER — VIRTUAL VISIT (OUTPATIENT)
Dept: NEPHROLOGY | Facility: CLINIC | Age: 25
End: 2020-09-01
Payer: COMMERCIAL

## 2020-09-01 DIAGNOSIS — E72.01 CYSTINURIA (H): Primary | ICD-10-CM

## 2020-09-01 NOTE — PATIENT INSTRUCTIONS
Keep taking captopril 25 mg three times per day  Keep working on lowering salt in the diet - goal is ~2300 mg sodium intake per day  Increase fluid intake as able  Keep meat to one serving per day    Consider dietiticcandice Barnes or Kayla Alas who have more experience with kidney stones    I will refer you to Ivanna Silverman for 2.3 gm sodium diet and meat intake of less than 3 ounces per day    litholink in 3 months  Follow up in 4 months    Alis Gonzalez MD  [facility]  Department of Medicine  Division of Renal Disease and Hypertension  [phone]

## 2020-09-01 NOTE — PROGRESS NOTES
[facility]phrology [facility]  2020     Shelly Cerda MRN:[id number] YOB: 1995  Primary care provider: [facility]  Requesting physician: Dr. Jayy Koehler    ASSESSMENT AND RECOMMENDATIONS:   Shelly Cerda is a 25 year old female presenting for nephrolithiasis.  # Recurrent kidney stones: stone type cystine with multiple surgeries - capacity not at goal.  Urine sodium high with hypercalciuria.  - continue captopril 25 mg tid  - discussed decreasing meat intake to 3 ounces meat per day.    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  Managed with high fluid, potassium citrate 20 meq bid, she had allergic reaction with thiola, hives, chills, fever, swollen lymph nodes and throat was swollen.  She is really motivated to decrease kidney stone recurrence as the last 5 years have been really hard.  She was disappointed that she had reaction to thiola.  She has had close to 9 surgeries.  She has lithotripsy there but stone fragments were not removed so she required subsequent procedure.  Right stone surgery , Left sided stone surgery 2017, right PCNL 3/2018 - was stone free after that procedure then presented 2019 with right obstructing kidney stone and had URS and lithotripsy for that stone.  She was again stone free after that operation.    Last imagin2020 - no kidney stones  Last Surgery: 19 (I reviewed op report)  Stone Free on right after above surgery    Last visit  6/23/2020  Started captopril 1-2 months - has been going OK, sometimes gets a little light headed but hasn't really affected her.      In early August, she had some pain on her right side, this is now better and she is assuming that she passed the stones.  No hematuria or dysuria.  This pain had been preceded by vacation on [place] with lots of hiking and lower fluid intake.    CMP done did not show rise in creatinine or potassium    She is pleased with how long she has gone without needed a kidney stone surgery.  She continues to work from home with easy access to fluids.  She is drinking about 140 ounces per day.    Family history is positive for kidney stones - her sister has cystine kidney stones    Tries to eat more chicken instead of red meat or fish.  She has about 3-6 ounces meat per day.    I reviewed 24 hour urine chemstries:              PAST MEDICAL HISTORY:  Reviewed  Past Medical History:   Diagnosis Date     Complication of anesthesia      Cystinuria (H)      Kidney stones      PONV (postoperative nausea and vomiting)        PSH:  Reviewed  Past Surgical History:   Procedure Laterality Date     APPENDECTOMY  9/2011     COMBINED CYSTOSCOPY, RETROGRADES, EXCHANGE STENT URETER(S) Right 9/15/2019    Procedure: CYSTOSCOPY, WITH RIGHT RETROGRADE PYELOGRAM AND RIGHT URETERAL STENT PLACEMENT;  Surgeon: Kasi Wade MD;  [facility]     COMBINED CYSTOSCOPY, RETROGRADES, URETEROSCOPY, INSERT STENT Left 10/12/2017    Procedure: COMBINED CYSTOSCOPY, RETROGRADES, URETEROSCOPY, INSERT STENT;  COMBINED CYSTOSCOPY, RETROGRADES, URETEROSCOPY, LASER HOLMIUM STANDBY,  INSERT STENT LEFT URETER;  Surgeon: Luis Banda MD;  Location:  OR     CYSTOSCOPY       LASER HOLMIUM LITHOTRIPSY URETER(S), INSERT STENT, COMBINED  9/4/2012    Procedure: COMBINED CYSTOSCOPY, URETEROSCOPY, LASER HOLMIUM LITHOTRIPSY URETER(S), INSERT STENT;  Holmium Laser Lithotripsy.  Right Stent Placement;  Surgeon: David  Cathi Denny MD;  [facility]     LASER HOLMIUM LITHOTRIPSY URETER(S), INSERT STENT, COMBINED Right 9/20/2019    Procedure: Cystoscopy, Right Ureteroscopy, Laser Lithotripsy,  no ureteral stent placed.;  Surgeon: Jayy Koehler MD;  [facility]     PERCUTANEOUS NEPHROLITHOTOMY Right 3/8/2018    Procedure: PERCUTANEOUS NEPHROLITHOTOMY;  Right Percutaneous Nephrolithotomy;  Surgeon: Jayy Koehler MD;  [facility]     Ureteral stent placement with subsequent removal.  2017        MEDICATIONS:  Current Outpatient Medications   Medication Sig Dispense Refill     azelastine (ASTEPRO) 0.15 % nasal spray        captopril (CAPOTEN) 25 MG tablet Take 1 tablet (25 mg) by mouth 3 times daily 90 tablet 11     LARISSIA 0.1-20 MG-MCG per tablet        levocetirizine (XYZAL) 5 MG tablet        montelukast (SINGULAIR) 10 MG tablet        potassium citrate (UROCIT-K) 10 MEQ (1080 MG) CR tablet Take 2 tablets (20 mEq) by mouth 2 times daily 120 tablet 2        ALLERGIES:    Allergies   Allergen Reactions     Thiola [Tiopronin] Rash     Fevers, lymphadenopathy, swelling in throat area       REVIEW OF SYSTEMS:  A 10 point review of systems was negative except as noted above.    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 [employer]  Social History     Socioeconomic History     Marital status: Single     Spouse name: Not on file     Number of children: Not on file     Years of education: Not on file     Highest education level: Not on file   Occupational History     Not on file   Social Needs     Financial resource strain: Not on file     Food insecurity     Worry: Not on file     Inability: Not on file     Transportation needs     Medical: Not on file     Non-medical: Not on file   Tobacco Use     Smoking status: Never Smoker     Smokeless tobacco: Never Used   Substance and Sexual Activity     Alcohol use: Yes     Drug use: No     Sexual activity: Not on file    Lifestyle     Physical activity     Days per week: Not on file     Minutes per session: Not on file     Stress: Not on file   Relationships     Social connections     Talks on phone: Not on file     Gets together: Not on file     Attends Catholic service: Not on file     Active member of club or organization: Not on file     Attends meetings of clubs or organizations: Not on file     Relationship status: Not on file     Intimate partner violence     Fear of current or ex partner: Not on file     Emotionally abused: Not on file     Physically abused: Not on file     Forced sexual activity: Not on file   Other Topics Concern     Parent/sibling w/ CABG, MI or angioplasty before 65F 55M? Not Asked   Social History Narrative    Shelly lives with both parents in [place]. Working as .  Hoping to buy house in 2021.    Sexually active with men and dating.   works out 5 days per week    FAMILY MEDICAL HISTORY:   Family History   Problem Relation Age of Onset     Genetic Disorder Sister         Cystinuria       PHYSICAL EXAM:   There were no vitals taken for this visit.   Per what I heard on telephone  GENERAL APPEARANCE: alert and no distress  RESP: no conversational dyspnea  Psych: pleasant, normal mental status    LABS:   I reviewed:  Electrolytes/Renal -   Recent Labs   Lab Test 08/18/20  1524 03/20/18  1222 03/09/18  2300    137 132*   POTASSIUM 3.7 4.0 3.8   CHLORIDE 107 103 100   CO2 26 26 25   BUN 12 10 14   CR 0.96 0.86 0.94   * 88 105*   VALERIANO 9.1 9.3 8.8       CBC -   Recent Labs   Lab Test 08/18/20  1524 03/09/18  2300 03/09/18  0441   WBC 6.7 12.1* 13.7*   HGB 14.1 12.4 12.5    287 258       LFTs -   Recent Labs   Lab Test 08/18/20  1524 12/19/17  1122 08/06/17  0502   ALKPHOS 57 52 61   BILITOTAL 0.4 0.6 0.3   ALT 21 19 31   AST 18 16 31   PROTTOTAL 7.9 7.9 7.7   ALBUMIN 4.0 4.2 4.2       Coags - No lab results found.    Iron Panel - No lab results found.    Endocrine - No  lab results found.    IMAGING:  See HPI    Alis Gonzalez MD

## 2020-09-01 NOTE — PROGRESS NOTES
"Video Visit Technology for this patient: Gerda Video Visit- Patient was left in waiting room    Shelly Cerda is a 25 year old female who is being evaluated via a billable video visit.      The patient has been notified of following:     \"This video visit will be conducted via a call between you and your physician/provider. We have found that certain health care needs can be provided without the need for an in-person physical exam.  This service lets us provide the care you need with a video conversation.  If a prescription is necessary we can send it directly to your pharmacy.  If lab work is needed we can place an order for that and you can then stop by our lab to have the test done at a later time.    Video visits are billed at different rates depending on your insurance coverage.  Please reach out to your insurance provider with any questions.    If during the course of the call the physician/provider feels a video visit is not appropriate, you will not be charged for this service.\"    Patient has given verbal consent for Video visit? Yes  How would you like to obtain your AVS? MyChart  If you are dropped from the video visit, the video invite should be resent to: Send to e-mail at: [email or web address]  Will anyone else be joining your video visit? No        Video-Visit Details    Type of service:  Video Visit    Video Start Time: 3:40 pm  Video End Time: 4:20 pm    Originating Location (pt. Location): Home    Distant Location (provider location):  [facility] UROLOGY AND [facility] FOR PROSTATE AND UROLOGIC CANCERS     Platform used for Video Visit: Gerda Gonzalez MD        "

## 2020-09-01 NOTE — LETTER
9/1/2020       RE: Shelly Cerda  [address]  [facility] [id number]     Dear Colleague,    Thank you for referring your patient, Shelly Cerda, to the [facility] UROLOGY AND INST FOR PROSTATE AND UROLOGIC CANCERS at [facility]. Please see a copy of my visit note below.    [facility]  09/01/2020     Shelly Cerda MRN:[id number] YOB: 1995  Primary care provider: [facility]  Requesting physician: Dr. Jayy Koehler    ASSESSMENT AND RECOMMENDATIONS:   Shelly Cerda is a 25 year old female presenting for nephrolithiasis.  # Recurrent kidney stones: stone type cystine with multiple surgeries - capacity not at goal.  Urine sodium high with hypercalciuria.  - continue captopril 25 mg tid  - discussed decreasing meat intake to 3 ounces meat per day.    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  Managed with high fluid, potassium citrate 20 meq bid, she had allergic reaction with thiola, hives, chills, fever, swollen lymph nodes and throat was swollen.  She is really motivated to decrease kidney stone recurrence as the last 5 years have been really hard.  She was disappointed that she had reaction to thiola.  She has had close to 9 surgeries.  She has lithotripsy there but stone fragments were not removed so she required subsequent procedure.  Right stone surgery 2012, Left sided stone surgery October 2017, right PCNL 3/2018 - was stone free after that procedure  then presented 2019 with right obstructing kidney stone and had URS and lithotripsy for that stone.  She was again stone free after that operation.    Last imagin2020 - no kidney stones  Last Surgery: 19 (I reviewed op report)  Stone Free on right after above surgery    Last visit 2020  Started captopril 1-2 months - has been going OK, sometimes gets a little light headed but hasn't really affected her.      In early August, she had some pain on her right side, this is now better and she is assuming that she passed the stones.  No hematuria or dysuria.  This pain had been preceded by vacation on [place] with lots of hiking and lower fluid intake.    CMP done did not show rise in creatinine or potassium    She is pleased with how long she has gone without needed a kidney stone surgery.  She continues to work from home with easy access to fluids.  She is drinking about 140 ounces per day.    Family history is positive for kidney stones - her sister has cystine kidney stones    Tries to eat more chicken instead of red meat or fish.  She has about 3-6 ounces meat per day.    I reviewed 24 hour urine chemstries:              PAST MEDICAL HISTORY:  Reviewed  Past Medical History:   Diagnosis Date     Complication of anesthesia      Cystinuria (H)      Kidney stones      PONV (postoperative nausea and vomiting)        PSH:  Reviewed  Past Surgical History:   Procedure Laterality Date     APPENDECTOMY  2011     COMBINED CYSTOSCOPY, RETROGRADES, EXCHANGE STENT URETER(S) Right 9/15/2019    Procedure: CYSTOSCOPY, WITH RIGHT RETROGRADE PYELOGRAM AND RIGHT URETERAL STENT PLACEMENT;  Surgeon: Kasi Wade MD;  Location: [facility] OR     COMBINED CYSTOSCOPY, RETROGRADES, URETEROSCOPY, INSERT STENT Left 10/12/2017    Procedure: COMBINED CYSTOSCOPY, RETROGRADES, URETEROSCOPY, INSERT STENT;  COMBINED CYSTOSCOPY, RETROGRADES, URETEROSCOPY, LASER HOLMIUM STANDBY,  INSERT STENT LEFT URETER;  Surgeon:  Sool, Luis Vegas MD;  Location: [facility] OR     CYSTOSCOPY       LASER HOLMIUM LITHOTRIPSY URETER(S), INSERT STENT, COMBINED  9/4/2012    Procedure: COMBINED CYSTOSCOPY, URETEROSCOPY, LASER HOLMIUM LITHOTRIPSY URETER(S), INSERT STENT;  Holmium Laser Lithotripsy.  Right Stent Placement;  Surgeon: Cathi Hernandez MD;  Location: [facility] OR     LASER HOLMIUM LITHOTRIPSY URETER(S), INSERT STENT, COMBINED Right 9/20/2019    Procedure: Cystoscopy, Right Ureteroscopy, Laser Lithotripsy,  no ureteral stent placed.;  Surgeon: Jayy Koehler MD;  Location: [facility] OR     PERCUTANEOUS NEPHROLITHOTOMY Right 3/8/2018    Procedure: PERCUTANEOUS NEPHROLITHOTOMY;  Right Percutaneous Nephrolithotomy;  Surgeon: Jayy Koehler MD;  Location: [facility] OR     Ureteral stent placement with subsequent removal.  2017        MEDICATIONS:  Current Outpatient Medications   Medication Sig Dispense Refill     azelastine (ASTEPRO) 0.15 % nasal spray        captopril (CAPOTEN) 25 MG tablet Take 1 tablet (25 mg) by mouth 3 times daily 90 tablet 11     LARISSIA 0.1-20 MG-MCG per tablet        levocetirizine (XYZAL) 5 MG tablet        montelukast (SINGULAIR) 10 MG tablet        potassium citrate (UROCIT-K) 10 MEQ (1080 MG) CR tablet Take 2 tablets (20 mEq) by mouth 2 times daily 120 tablet 2        ALLERGIES:    Allergies   Allergen Reactions     Thiola [Tiopronin] Rash     Fevers, lymphadenopathy, swelling in throat area       REVIEW OF SYSTEMS:  A 10 point review of systems was negative except as noted above.    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 [employer]  Social History     Socioeconomic History     Marital status: Single     Spouse name: Not on file     Number of children: Not on file     Years of education: Not on file     Highest education level: Not on file   Occupational History     Not on file   Social Needs     Financial resource strain: Not on file     Food insecurity     Worry:  Not on file     Inability: Not on file     Transportation needs     Medical: Not on file     Non-medical: Not on file   Tobacco Use     Smoking status: Never Smoker     Smokeless tobacco: Never Used   Substance and Sexual Activity     Alcohol use: Yes     Drug use: No     Sexual activity: Not on file   Lifestyle     Physical activity     Days per week: Not on file     Minutes per session: Not on file     Stress: Not on file   Relationships     Social connections     Talks on phone: Not on file     Gets together: Not on file     Attends Shinto service: Not on file     Active member of club or organization: Not on file     Attends meetings of clubs or organizations: Not on file     Relationship status: Not on file     Intimate partner violence     Fear of current or ex partner: Not on file     Emotionally abused: Not on file     Physically abused: Not on file     Forced sexual activity: Not on file   Other Topics Concern     Parent/sibling w/ CABG, MI or angioplasty before 65F 55M? Not Asked   Social History Narrative    Shelly lives with both parents in [place]. Working as .  Hoping to buy house in 2021.    Sexually active with men and dating.   works out 5 days per week    FAMILY MEDICAL HISTORY:   Family History   Problem Relation Age of Onset     Genetic Disorder Sister         Cystinuria       PHYSICAL EXAM:   There were no vitals taken for this visit.   Per what I heard on telephone  GENERAL APPEARANCE: alert and no distress  RESP: no conversational dyspnea  Psych: pleasant, normal mental status    LABS:   I reviewed:  Electrolytes/Renal -   Recent Labs   Lab Test 08/18/20  1524 03/20/18  1222 03/09/18  2300    137 132*   POTASSIUM 3.7 4.0 3.8   CHLORIDE 107 103 100   CO2 26 26 25   BUN 12 10 14   CR 0.96 0.86 0.94   * 88 105*   VALERIANO 9.1 9.3 8.8       CBC -   Recent Labs   Lab Test 08/18/20  1524 03/09/18  2300 03/09/18  0441   WBC 6.7 12.1* 13.7*   HGB 14.1 12.4 12.5     "287 258       LFTs -   Recent Labs   Lab Test 08/18/20  1524 12/19/17  1122 08/06/17  0502   ALKPHOS 57 52 61   BILITOTAL 0.4 0.6 0.3   ALT 21 19 31   AST 18 16 31   PROTTOTAL 7.9 7.9 7.7   ALBUMIN 4.0 4.2 4.2       Coags - No lab results found.    Iron Panel - No lab results found.    Endocrine - No lab results found.    IMAGING:  See HPI    Alis Gonzalez MD         Video Visit Technology for this patient: [employer] Video Visit- Patient was left in waiting room    Shelly Cerda is a 25 year old female who is being evaluated via a billable video visit.      The patient has been notified of following:     \"This video visit will be conducted via a call between you and your physician/provider. We have found that certain health care needs can be provided without the need for an in-person physical exam.  This service lets us provide the care you need with a video conversation.  If a prescription is necessary we can send it directly to your pharmacy.  If lab work is needed we can place an order for that and you can then stop by our lab to have the test done at a later time.    Video visits are billed at different rates depending on your insurance coverage.  Please reach out to your insurance provider with any questions.    If during the course of the call the physician/provider feels a video visit is not appropriate, you will not be charged for this service.\"    Patient has given verbal consent for Video visit? Yes  How would you like to obtain your AVS? MyChart  If you are dropped from the video visit, the video invite should be resent to: Send to e-mail at: [email or web address]  Will anyone else be joining your video visit? No        Video-Visit Details    Type of service:  Video Visit    Video Start Time: 3:40 pm  Video End Time: 4:20 pm    Originating Location (pt. Location): Home    Distant Location (provider location):  [facility] UROLOGY AND [facility] FOR PROSTATE AND UROLOGIC CANCERS     Platform used for Video " Visit: Gerda Gonzalez MD

## 2020-09-01 NOTE — NURSING NOTE
Chief Complaint   Patient presents with     Follow Up     stones follow up       Patient Active Problem List   Diagnosis     Cystinuria (H)     Nausea with vomiting     Ureterolithiasis     Acute nontraumatic kidney injury (H)     Acute abdominal pain in right flank     Constipation due to pain medication     Group B streptococcal infection     Ureteral stone     Urolithiasis     Postoperative pain     Right ureteral stone     Sebaceous cyst     Vertigo       Allergies   Allergen Reactions     Thiola [Tiopronin] Rash     Fevers, lymphadenopathy, swelling in throat area       Current Outpatient Medications   Medication Sig Dispense Refill     azelastine (ASTEPRO) 0.15 % nasal spray        captopril (CAPOTEN) 25 MG tablet Take 1 tablet (25 mg) by mouth 3 times daily 90 tablet 11     LARISSIA 0.1-20 MG-MCG per tablet        levocetirizine (XYZAL) 5 MG tablet        potassium citrate (UROCIT-K) 10 MEQ (1080 MG) CR tablet Take 2 tablets (20 mEq) by mouth 2 times daily 120 tablet 2     montelukast (SINGULAIR) 10 MG tablet          Social History     Tobacco Use     Smoking status: Never Smoker     Smokeless tobacco: Never Used   Substance Use Topics     Alcohol use: Yes     Drug use: No       Vani Mckenzie LPN  9/1/2020  3:24 PM    [employer] order form filled out and faxed to [employer] at [phone].    Vani Mckenzie LPN  09/03/20  8:06 AM

## 2020-09-28 ENCOUNTER — ALLIED HEALTH/NURSE VISIT (OUTPATIENT)
Dept: TRANSPLANT | Facility: CLINIC | Age: 25
End: 2020-09-28
Attending: INTERNAL MEDICINE
Payer: COMMERCIAL

## 2020-09-28 DIAGNOSIS — E72.01 CYSTINURIA (H): Primary | ICD-10-CM

## 2020-09-28 PROCEDURE — 97802 MEDICAL NUTRITION INDIV IN: CPT | Mod: TEL,ZF | Performed by: DIETITIAN, REGISTERED

## 2020-09-29 VITALS — WEIGHT: 181 LBS | BODY MASS INDEX: 32.07 KG/M2 | HEIGHT: 63 IN

## 2020-09-29 ASSESSMENT — MIFFLIN-ST. JEOR: SCORE: 1535.14

## 2020-09-29 NOTE — PROGRESS NOTES
"Shelly Cerda is a 25 year old female who is being evaluated via a billable telephone visit.      The patient has been notified of following:     \"This telephone visit will be conducted via a call between you and your physician/provider. We have found that certain health care needs can be provided without the need for a physical exam.  This service lets us provide the care you need with a short phone conversation.  If a prescription is necessary we can send it directly to your pharmacy.  If lab work is needed we can place an order for that and you can then stop by our lab to have the test done at a later time.    Telephone visits are billed at different rates depending on your insurance coverage. During this emergency period, for some insurers they may be billed the same as an in-person visit.  Please reach out to your insurance provider with any questions.    If during the course of the call the physician/provider feels a telephone visit is not appropriate, you will not be charged for this service.\"    Patient has given verbal consent for Telephone visit? yes    Phone call duration: 15 minutes    Outpatient MNT     Time Spent: 15 minutes  Visit Type: Initial  Referring Physician: Carlos  Reason for RD Visit: MNT for cystine stones   Pt accompanied by: self     Medical dx associated with RD referral  - Nephrolithiasis     Nutrition Assessment  Pt met with Dr Gonzalez beginning of the month, who recommended (per pt), a 1000 mg/day sodium intake, increased fluid intake, reduced animal protein to 3 oz/day. Pt reports tracking Na via [employer]fitnesspal and that it has been difficult to remain <1000 mg/day. She feels that prior to modifications, she was consuming ~2000 mg/day. She does track her fluid intake and averages 100-150 oz/day.     Vitamins, Supplements, Pertinent Meds: K citrate   Herbal Medicines/Supplements: none now, but reports her aunt is an herbalist so has taken a few products in the past for immunity "     Diet Recall  Breakfast 1/2 bagel with CC + 2 fruit; previously was having [place] protein muffin/microwaveable    Lunch Lightly salted wheat thins with shredded cheese; previously was having leftovers    Dinner <3 oz meat + potato/rice, veggies; previously having larger portion of meat   Snacks LS popcorn, dark chocolate, some tortilla chips    Beverages Water, 1 cup coffee, some lower sugar OJ   Alcohol Occasional beer/wine    Dining out Has reduced to 2-3x/week (goal is 1x/week); prev 4-5x/week; does try to look at nutrition info online prior to dining out      Physical Activity  Active 5 days/week  Walks 3 mile loop/day  Some running and HIIT/strength training      Anthropometrics  Height:   63 in   BMI:    32    Weight Status:Obesity Grade I BMI 30-34.9   Weight:  181 lbs (self reported)            IBW (lb): 115  % IBW: 157    Wt Hx: Pt reports intentional 20 lb wt loss x 1 year by increased activity and overall eating healthier.     Adj/dosing BW: 132 lbs/60 kg       Malnutrition  % Intake: Decreased intake does not meet criteria for malnutrition   % Weight Loss: Weight loss does not meet criteria for malnutrition   Subcutaneous Fat Loss: Does not meet criteria   Muscle Loss: None noted  Fluid Accumulation/Edema: None noted  Malnutrition Diagnosis: Patient does not meet two of the above criteria necessary for diagnosing malnutrition     Estimated Nutrition Needs  Energy  2268-5460     (20-25 kcal/kg dosing BW for desired wt loss vs maintenance)       Protein  48-60    (0.8-1 g/kg for maintenance)         Fluid  1 ml/kcal or per MD     Nutrition Diagnosis  Food and nutrition-related knowledge deficit related to no previous MNT for kidney stones as evidenced by pt report, RD referral.     Nutrition Intervention  1. Clarified Na goal is ~2300 mg/day, also depicted in Dr Gonzalez's pt instructions.   2. Goal for total fluid intake 4 L/day or ~130 oz  3. Animal proteins should be limited to 3 oz or ~20 grams per  day. Remainder of her recommended protein intake should be from plant sources.   4. Continue with increased fruit and veggie intake to modify pH of urine    Emailed pt further resources. Per MD, repeat litholink in a few months. Pt committed to making lifestyle changes.     Patient Understanding: Pt verbalized understanding of education provided.  Expected Compliance: Good  Follow-Up Plans: PRN     Nutrition Goals  1. Na 2300 mg/day  2. 4 L fluid intake/day  3. Animal protein ~3 oz/day     Provided pt with contact info.   Ivanna Silverman, RD, LD, CCTD  Pgr [phone]

## 2020-10-30 ENCOUNTER — MYC MEDICAL ADVICE (OUTPATIENT)
Dept: NEPHROLOGY | Facility: CLINIC | Age: 25
End: 2020-10-30

## 2020-10-30 ENCOUNTER — MYC REFILL (OUTPATIENT)
Dept: UROLOGY | Facility: CLINIC | Age: 25
End: 2020-10-30

## 2020-10-30 DIAGNOSIS — N20.1 URETEROLITHIASIS: ICD-10-CM

## 2020-10-30 RX ORDER — POTASSIUM CITRATE 10 MEQ/1
20 TABLET, EXTENDED RELEASE ORAL
Qty: 360 TABLET | Refills: 3 | Status: SHIPPED | OUTPATIENT
Start: 2020-10-30 | End: 2021-07-19

## 2020-11-02 NOTE — PROGRESS NOTES
Hi all,     Is it ok to refill potassium citrate for this patient? Please advise.     Thanks, Jake Montalvo MA

## 2021-01-12 ENCOUNTER — TRANSFERRED RECORDS (OUTPATIENT)
Dept: HEALTH INFORMATION MANAGEMENT | Facility: CLINIC | Age: 26
End: 2021-01-12

## 2021-01-14 ENCOUNTER — HEALTH MAINTENANCE LETTER (OUTPATIENT)
Age: 26
End: 2021-01-14

## 2021-01-29 ENCOUNTER — PRE VISIT (OUTPATIENT)
Dept: NEPHROLOGY | Facility: CLINIC | Age: 26
End: 2021-01-29

## 2021-01-29 NOTE — TELEPHONE ENCOUNTER
Reason for Visit: Stone Prevention    Diagnosis: recurrent kidney stone    Orders/Procedures/Records: [facility] available    Contact Patient: n/a    Rooming Requirements: Virtual, make sure Care Everywhere is updated      Vani Mckenzie LPN  01/29/21  9:18 AM

## 2021-02-09 ENCOUNTER — VIRTUAL VISIT (OUTPATIENT)
Dept: NEPHROLOGY | Facility: CLINIC | Age: 26
End: 2021-02-09
Payer: COMMERCIAL

## 2021-02-09 DIAGNOSIS — E72.01 CYSTINURIA (H): ICD-10-CM

## 2021-02-09 DIAGNOSIS — N20.0 RECURRENT KIDNEY STONES: Primary | ICD-10-CM

## 2021-02-09 PROCEDURE — 99214 OFFICE O/P EST MOD 30 MIN: CPT | Mod: 95 | Performed by: INTERNAL MEDICINE

## 2021-02-09 NOTE — LETTER
2/9/2021       RE: Shelly eCrda  [address]  [facility] [address]     Dear Colleague,    Thank you for referring your patient, Shelly Cerda, to the [facility] NEPHROLOGY [facility] [place] at [facility]. Please see a copy of my visit note below.    [facility] Nephrology Comprehensive Stone Clinic  02/09/2021     Shelly Cerda MRN:[id number] YOB: 1995  Primary care provider: [facility]  Requesting physician: Dr. Jayy Koehler    ASSESSMENT AND RECOMMENDATIONS:   Shelly Cerda is a 25 year old female presenting for nephrolithiasis.  # Recurrent kidney stones: stone type cystine with multiple surgeries - capacity not at goal but is improved with decrease in meat intake  - continue captopril 25 mg tid (she prefers to not increase due to SE light headedness)  - continue potassium citrate    Repeat 24 hour chemistries approx 5 months - cystine litholink  Repeat imaging  - ultrasound ordered for August 2021 to assess stone burden  RTC 6 months to discuss litholink    30 minutes spent on chart review, meeting with Shelly and documentation    Alis Gonzalez MD MD  [facility]  Department of Medicine  Division of Renal Disease and Hypertension  [phone]       Reason for visit: nephrolithiasis    HISTORY OF PRESENT ILLNESS:  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  Managed with high fluid, potassium citrate 20 meq bid, she had allergic reaction with thiola, hives, chills, fever, swollen lymph nodes and throat was swollen.  She is really motivated to decrease kidney stone recurrence as the last 5 years have been really hard.  She was disappointed that she had reaction to thiola.  She has had close to 9 surgeries.  She has lithotripsy there but stone fragments were not removed so she required  subsequent procedure.  Right stone surgery , Left sided stone surgery 2017, right PCNL 3/2018 - was stone free after that procedure then presented 2019 with right obstructing kidney stone and had URS and lithotripsy for that stone.  She was again stone free after that operation.    Last imagin2020 - no kidney stones  Last Surgery: 19 (I reviewed op report)  Stone Free on right after above surgery    Last visit 2020  caterina on captopril 25 mg po tid since 2020 - every once in awhile has light headedness but it is manageable - worse with taking captopril just before working out.  Also continues to take potassium citrate  At last visit we discussed decreasing animal meat to 3 ounces per day and further restricting dietary sodium    Continues to work from home  fluid intake continues to be high since she is working from home  Meat/sodium intake - has been working on decreasing sodium and meat - has lost about 30 lbs over the last 1.5 years    ER visits or surgery since last visit - none.  Has had a few episodes of flank pain that self resolved.  Did not see any stones pass.    Family history is positive for kidney stones - her sister has cystine kidney stones    I reviewed 24 hour urine chemstries:                PAST MEDICAL HISTORY:  Reviewed  Past Medical History:   Diagnosis Date     Complication of anesthesia      Cystinuria (H)      Kidney stones      PONV (postoperative nausea and vomiting)        PSH:  Reviewed  Past Surgical History:   Procedure Laterality Date     APPENDECTOMY  2011     COMBINED CYSTOSCOPY, RETROGRADES, EXCHANGE STENT URETER(S) Right 9/15/2019    Procedure: CYSTOSCOPY, WITH RIGHT RETROGRADE PYELOGRAM AND RIGHT URETERAL STENT PLACEMENT;  Surgeon: Kasi Wade MD;  [facility]     COMBINED CYSTOSCOPY, RETROGRADES, URETEROSCOPY, INSERT STENT Left 10/12/2017    Procedure: COMBINED CYSTOSCOPY, RETROGRADES, URETEROSCOPY, INSERT STENT;   COMBINED CYSTOSCOPY, RETROGRADES, URETEROSCOPY, LASER HOLMIUM STANDBY,  INSERT STENT LEFT URETER;  Surgeon: Luis Banda MD;  Location: [facility] OR     CYSTOSCOPY       LASER HOLMIUM LITHOTRIPSY URETER(S), INSERT STENT, COMBINED  9/4/2012    Procedure: COMBINED CYSTOSCOPY, URETEROSCOPY, LASER HOLMIUM LITHOTRIPSY URETER(S), INSERT STENT;  Holmium Laser Lithotripsy.  Right Stent Placement;  Surgeon: Cathi Hernandez MD;  [facility] OR     LASER HOLMIUM LITHOTRIPSY URETER(S), INSERT STENT, COMBINED Right 9/20/2019    Procedure: Cystoscopy, Right Ureteroscopy, Laser Lithotripsy,  no ureteral stent placed.;  Surgeon: Jayy Koehler MD;  [facility]     PERCUTANEOUS NEPHROLITHOTOMY Right 3/8/2018    Procedure: PERCUTANEOUS NEPHROLITHOTOMY;  Right Percutaneous Nephrolithotomy;  Surgeon: Jayy Koehler MD;  [facility] OR     Ureteral stent placement with subsequent removal.  2017        MEDICATIONS:  Current Outpatient Medications   Medication Sig Dispense Refill     azelastine (ASTEPRO) 0.15 % nasal spray        captopril (CAPOTEN) 25 MG tablet Take 1 tablet (25 mg) by mouth 3 times daily 90 tablet 11     LARISSIA 0.1-20 MG-MCG per tablet        levocetirizine (XYZAL) 5 MG tablet        montelukast (SINGULAIR) 10 MG tablet        potassium citrate (UROCIT-K) 10 MEQ (1080 MG) CR tablet Take 2 tablets (20 mEq) by mouth 2 times daily 360 tablet 3        ALLERGIES:    Allergies   Allergen Reactions     Thiola [Tiopronin] Rash     Fevers, lymphadenopathy, swelling in throat area       REVIEW OF SYSTEMS:  A 10 point review of systems was negative except as noted above.    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 [employer]  Social History     Socioeconomic History     Marital status: Single     Spouse name: Not on file     Number of children: Not on file     Years of education: Not on file     Highest education level: Not on file   Occupational  History     Not on file   Social Needs     Financial resource strain: Not on file     Food insecurity     Worry: Not on file     Inability: Not on file     Transportation needs     Medical: Not on file     Non-medical: Not on file   Tobacco Use     Smoking status: Never Smoker     Smokeless tobacco: Never Used   Substance and Sexual Activity     Alcohol use: Yes     Drug use: No     Sexual activity: Not on file   Lifestyle     Physical activity     Days per week: Not on file     Minutes per session: Not on file     Stress: Not on file   Relationships     Social connections     Talks on phone: Not on file     Gets together: Not on file     Attends Mu-ism service: Not on file     Active member of club or organization: Not on file     Attends meetings of clubs or organizations: Not on file     Relationship status: Not on file     Intimate partner violence     Fear of current or ex partner: Not on file     Emotionally abused: Not on file     Physically abused: Not on file     Forced sexual activity: Not on file   Other Topics Concern     Parent/sibling w/ CABG, MI or angioplasty before 65F 55M? Not Asked   Social History Narrative    Shelly lives with both parents in [place]. Working as .  Hoping to buy house in 2021.    Sexually active with men and dating.   works out 5 days per week    FAMILY MEDICAL HISTORY:   Family History   Problem Relation Age of Onset     Genetic Disorder Sister         Cystinuria       PHYSICAL EXAM:   There were no vitals taken for this visit.   Per what I heard on telephone  GENERAL APPEARANCE: alert and no distress  RESP: no conversational dyspnea  Psych: pleasant, normal mental status    LABS:   I reviewed:  Electrolytes/Renal -   Recent Labs   Lab Test 08/18/20  1524 03/20/18  1222 03/09/18  2300    137 132*   POTASSIUM 3.7 4.0 3.8   CHLORIDE 107 103 100   CO2 26 26 25   BUN 12 10 14   CR 0.96 0.86 0.94   * 88 105*   VALERIANO 9.1 9.3 8.8       CBC -   Recent  Labs   Lab Test 08/18/20  1524 03/09/18  2300 03/09/18  0441   WBC 6.7 12.1* 13.7*   HGB 14.1 12.4 12.5    287 258       LFTs -   Recent Labs   Lab Test 08/18/20  1524 12/19/17  1122 08/06/17  0502   ALKPHOS 57 52 61   BILITOTAL 0.4 0.6 0.3   ALT 21 19 31   AST 18 16 31   PROTTOTAL 7.9 7.9 7.7   ALBUMIN 4.0 4.2 4.2       Coags - No lab results found.    Iron Panel - No lab results found.    Endocrine - No lab results found.    IMAGING:  See HPI    Alis Gonzalez MD         Shelly is a 25 year old who is being evaluated via a billable video visit.      How would you like to obtain your AVS? [employer]        Video Start Time: 12:54    Video-Visit Details    Type of service:  Video Visit    Video End Time:1:11 pm    Originating Location (pt. Location): Home    Distant Location (provider location):  [facility]PHROLOGY [facility] [place]     Platform used for Video Visit: [facility]      Again, thank you for allowing me to participate in the care of your patient.      Sincerely,    Alis Gonzalez MD

## 2021-02-09 NOTE — NURSING NOTE
Video Visit Technology for this patient: Gerda Video Visit- Patient was left in waiting room   Shelly is a 25 year old who is being evaluated via a billable video visit.      How would you like to obtain your AVS? MyChart  If the video visit is dropped, the invitation should be resent by: Send to e-mail at: [email or web address]  Will anyone else be joining your video visit? No

## 2021-02-09 NOTE — PATIENT INSTRUCTIONS
Cystine litholink in 5 months  Ultrasound in 6 months (before visit)  Follow up in 6 months    Alis Gonzalez MD  [facility]  Department of Medicine  Division of Renal Disease and Hypertension  [phone]

## 2021-02-09 NOTE — PROGRESS NOTES
Shelly is a 25 year old who is being evaluated via a billable video visit.      How would you like to obtain your AVS? [employer]        Video Start Time: 12:54    Video-Visit Details    Type of service:  Video Visit    Video End Time:1:11 pm    Originating Location (pt. Location): Home    Distant Location (provider location):  [facility] NEPHROLOGY [facility] [place]     Platform used for Video Visit: [employer]

## 2021-02-09 NOTE — PROGRESS NOTES
[facility] Nephrology Comprehensive Stone Clinic  02/09/2021     Shelly Cerda MRN:[id number] YOB: 1995  Primary care provider: [facility]  Requesting physician: Dr. Jayy Koehler    ASSESSMENT AND RECOMMENDATIONS:   Shelly Cerda is a 25 year old female presenting for nephrolithiasis.  # Recurrent kidney stones: stone type cystine with multiple surgeries - capacity not at goal but is improved with decrease in meat intake  - continue captopril 25 mg tid (she prefers to not increase due to SE light headedness)  - continue potassium citrate    Repeat 24 hour chemistries approx 5 months - cystine litholink  Repeat imaging  - ultrasound ordered for August 2021 to assess stone burden  RTC 6 months to discuss litholink    30 minutes spent on chart review, meeting with Shelly and documentation    Alis Gonzalez MD MD  [facility]  Department of Medicine  Division of Renal Disease and Hypertension  [phone]       Reason for visit: nephrolithiasis    HISTORY OF PRESENT ILLNESS:  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  Managed with high fluid, potassium citrate 20 meq bid, she had allergic reaction with thiola, hives, chills, fever, swollen lymph nodes and throat was swollen.  She is really motivated to decrease kidney stone recurrence as the last 5 years have been really hard.  She was disappointed that she had reaction to thiola.  She has had close to 9 surgeries.  She has lithotripsy there but stone fragments were not removed so she required subsequent procedure.  Right stone surgery 2012, Left sided stone surgery October 2017, right PCNL 3/2018 - was stone free after that procedure then presented September 2019 with right obstructing kidney stone and had URS and lithotripsy for that stone.  She was again stone free after that operation.    Last imaging:  2/7/2020 - no kidney stones  Last Surgery: 9/20/19 (I reviewed op report)  Stone Free on right after above surgery    Last visit 9/1/2020  caterina on captopril 25 mg po tid since June 2020 - every once in awhile has light headedness but it is manageable - worse with taking captopril just before working out.  Also continues to take potassium citrate  At last visit we discussed decreasing animal meat to 3 ounces per day and further restricting dietary sodium    Continues to work from home  fluid intake continues to be high since she is working from home  Meat/sodium intake - has been working on decreasing sodium and meat - has lost about 30 lbs over the last 1.5 years    ER visits or surgery since last visit - none.  Has had a few episodes of flank pain that self resolved.  Did not see any stones pass.    Family history is positive for kidney stones - her sister has cystine kidney stones    I reviewed 24 hour urine chemstries:                PAST MEDICAL HISTORY:  Reviewed  Past Medical History:   Diagnosis Date     Complication of anesthesia      Cystinuria (H)      Kidney stones      PONV (postoperative nausea and vomiting)        PSH:  Reviewed  Past Surgical History:   Procedure Laterality Date     APPENDECTOMY  9/2011     COMBINED CYSTOSCOPY, RETROGRADES, EXCHANGE STENT URETER(S) Right 9/15/2019    Procedure: CYSTOSCOPY, WITH RIGHT RETROGRADE PYELOGRAM AND RIGHT URETERAL STENT PLACEMENT;  Surgeon: Kasi Wade MD;  [facility]     COMBINED CYSTOSCOPY, RETROGRADES, URETEROSCOPY, INSERT STENT Left 10/12/2017    Procedure: COMBINED CYSTOSCOPY, RETROGRADES, URETEROSCOPY, INSERT STENT;  COMBINED CYSTOSCOPY, RETROGRADES, URETEROSCOPY, LASER HOLMIUM STANDBY,  INSERT STENT LEFT URETER;  Surgeon: Luis Banda MD;  Location:  OR     CYSTOSCOPY       LASER HOLMIUM LITHOTRIPSY URETER(S), INSERT STENT, COMBINED  9/4/2012    Procedure: COMBINED CYSTOSCOPY, URETEROSCOPY, LASER HOLMIUM LITHOTRIPSY  URETER(S), INSERT STENT;  Holmium Laser Lithotripsy.  Right Stent Placement;  Surgeon: Cathi Hernandez MD;  [facility]     LASER HOLMIUM LITHOTRIPSY URETER(S), INSERT STENT, COMBINED Right 9/20/2019    Procedure: Cystoscopy, Right Ureteroscopy, Laser Lithotripsy,  no ureteral stent placed.;  Surgeon: Jayy Koehler MD;  [facility]     PERCUTANEOUS NEPHROLITHOTOMY Right 3/8/2018    Procedure: PERCUTANEOUS NEPHROLITHOTOMY;  Right Percutaneous Nephrolithotomy;  Surgeon: Jayy Koehler MD;  [facility]     Ureteral stent placement with subsequent removal.  2017        MEDICATIONS:  Current Outpatient Medications   Medication Sig Dispense Refill     azelastine (ASTEPRO) 0.15 % nasal spray        captopril (CAPOTEN) 25 MG tablet Take 1 tablet (25 mg) by mouth 3 times daily 90 tablet 11     LARISSIA 0.1-20 MG-MCG per tablet        levocetirizine (XYZAL) 5 MG tablet        montelukast (SINGULAIR) 10 MG tablet        potassium citrate (UROCIT-K) 10 MEQ (1080 MG) CR tablet Take 2 tablets (20 mEq) by mouth 2 times daily 360 tablet 3        ALLERGIES:    Allergies   Allergen Reactions     Thiola [Tiopronin] Rash     Fevers, lymphadenopathy, swelling in throat area       REVIEW OF SYSTEMS:  A 10 point review of systems was negative except as noted above.    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 [employer]  Social History     Socioeconomic History     Marital status: Single     Spouse name: Not on file     Number of children: Not on file     Years of education: Not on file     Highest education level: Not on file   Occupational History     Not on file   Social Needs     Financial resource strain: Not on file     Food insecurity     Worry: Not on file     Inability: Not on file     Transportation needs     Medical: Not on file     Non-medical: Not on file   Tobacco Use     Smoking status: Never Smoker     Smokeless tobacco: Never Used   Substance and  Sexual Activity     Alcohol use: Yes     Drug use: No     Sexual activity: Not on file   Lifestyle     Physical activity     Days per week: Not on file     Minutes per session: Not on file     Stress: Not on file   Relationships     Social connections     Talks on phone: Not on file     Gets together: Not on file     Attends Pentecostal service: Not on file     Active member of club or organization: Not on file     Attends meetings of clubs or organizations: Not on file     Relationship status: Not on file     Intimate partner violence     Fear of current or ex partner: Not on file     Emotionally abused: Not on file     Physically abused: Not on file     Forced sexual activity: Not on file   Other Topics Concern     Parent/sibling w/ CABG, MI or angioplasty before 65F 55M? Not Asked   Social History Narrative    Shelly lives with both parents in [place]. Working as .  Hoping to buy house in 2021.    Sexually active with men and dating.   works out 5 days per week    FAMILY MEDICAL HISTORY:   Family History   Problem Relation Age of Onset     Genetic Disorder Sister         Cystinuria       PHYSICAL EXAM:   There were no vitals taken for this visit.   Per what I heard on telephone  GENERAL APPEARANCE: alert and no distress  RESP: no conversational dyspnea  Psych: pleasant, normal mental status    LABS:   I reviewed:  Electrolytes/Renal -   Recent Labs   Lab Test 08/18/20  1524 03/20/18  1222 03/09/18  2300    137 132*   POTASSIUM 3.7 4.0 3.8   CHLORIDE 107 103 100   CO2 26 26 25   BUN 12 10 14   CR 0.96 0.86 0.94   * 88 105*   VALERIANO 9.1 9.3 8.8       CBC -   Recent Labs   Lab Test 08/18/20  1524 03/09/18  2300 03/09/18  0441   WBC 6.7 12.1* 13.7*   HGB 14.1 12.4 12.5    287 258       LFTs -   Recent Labs   Lab Test 08/18/20  1524 12/19/17  1122 08/06/17  0502   ALKPHOS 57 52 61   BILITOTAL 0.4 0.6 0.3   ALT 21 19 31   AST 18 16 31   PROTTOTAL 7.9 7.9 7.7   ALBUMIN 4.0 4.2 4.2        Coags - No lab results found.    Iron Panel - No lab results found.    Endocrine - No lab results found.    IMAGING:  See HPI    Alis Gonzalez MD

## 2021-04-07 ENCOUNTER — IMMUNIZATION (OUTPATIENT)
Dept: LAB | Facility: CLINIC | Age: 26
End: 2021-04-07
Payer: COMMERCIAL

## 2021-04-19 LAB — PAP-ABSTRACT: NORMAL

## 2021-04-21 ENCOUNTER — TRANSFERRED RECORDS (OUTPATIENT)
Dept: MULTI SPECIALTY CLINIC | Facility: CLINIC | Age: 26
End: 2021-04-21

## 2021-04-21 LAB — PAP SMEAR - HIM PATIENT REPORTED: NEGATIVE

## 2021-04-22 DIAGNOSIS — N20.0 KIDNEY STONE: Primary | ICD-10-CM

## 2021-04-26 ENCOUNTER — HOSPITAL ENCOUNTER (OUTPATIENT)
Dept: ULTRASOUND IMAGING | Facility: CLINIC | Age: 26
Discharge: HOME | End: 2021-04-26
Attending: UROLOGY | Admitting: UROLOGY
Payer: COMMERCIAL

## 2021-04-26 DIAGNOSIS — N20.0 KIDNEY STONE: ICD-10-CM

## 2021-04-26 PROCEDURE — 76770 US EXAM ABDO BACK WALL COMP: CPT

## 2021-05-03 ENCOUNTER — PATIENT OUTREACH (OUTPATIENT)
Dept: UROLOGY | Facility: CLINIC | Age: 26
End: 2021-05-03

## 2021-05-03 ENCOUNTER — TELEPHONE (OUTPATIENT)
Dept: UROLOGY | Facility: CLINIC | Age: 26
End: 2021-05-03

## 2021-05-03 DIAGNOSIS — N20.0 KIDNEY STONE: Primary | ICD-10-CM

## 2021-05-03 NOTE — TELEPHONE ENCOUNTER
----- Message from Jayy Koehler MD sent at 4/27/2021 10:54 PM CDT -----  Hi - MInd checking in on Shelly.  Her renal US shows some mild right hydro.  If she is symptomatic still lets get a CT, if doing better lets repeat a renal US in two weeks and presume she is passing a ureteral stone.  Thanks

## 2021-05-03 NOTE — TELEPHONE ENCOUNTER
Patient responded via Mychart and we are going to move ahead with CT scan. She is still having pain.

## 2021-05-07 ENCOUNTER — HOSPITAL ENCOUNTER (OUTPATIENT)
Dept: CT IMAGING | Facility: CLINIC | Age: 26
Discharge: HOME | End: 2021-05-07
Attending: UROLOGY | Admitting: UROLOGY
Payer: COMMERCIAL

## 2021-05-07 DIAGNOSIS — N20.0 KIDNEY STONE: ICD-10-CM

## 2021-05-07 PROCEDURE — 74176 CT ABD & PELVIS W/O CONTRAST: CPT

## 2021-07-19 DIAGNOSIS — N20.0 RECURRENT KIDNEY STONES: ICD-10-CM

## 2021-07-19 DIAGNOSIS — N20.1 URETEROLITHIASIS: ICD-10-CM

## 2021-07-19 DIAGNOSIS — E72.01 CYSTINURIA (H): ICD-10-CM

## 2021-07-19 DIAGNOSIS — E72.09 CYSTINE STONES (H): ICD-10-CM

## 2021-07-19 RX ORDER — POTASSIUM CITRATE 10 MEQ/1
20 TABLET, EXTENDED RELEASE ORAL
Qty: 360 TABLET | Refills: 3 | Status: SHIPPED | OUTPATIENT
Start: 2021-07-19 | End: 2022-09-07

## 2021-07-19 RX ORDER — CAPTOPRIL 25 MG/1
25 TABLET ORAL 3 TIMES DAILY
Qty: 90 TABLET | Refills: 11 | Status: SHIPPED | OUTPATIENT
Start: 2021-07-19 | End: 2022-06-28

## 2021-07-26 ENCOUNTER — APPOINTMENT (OUTPATIENT)
Dept: URBAN - METROPOLITAN AREA CLINIC 256 | Age: 26
Setting detail: DERMATOLOGY
End: 2021-07-26

## 2021-07-26 VITALS — WEIGHT: 185 LBS | HEIGHT: 63 IN

## 2021-07-26 DIAGNOSIS — D18.0 HEMANGIOMA: ICD-10-CM

## 2021-07-26 DIAGNOSIS — Q828 OTHER SPECIFIED ANOMALIES OF SKIN: ICD-10-CM

## 2021-07-26 DIAGNOSIS — Q826 OTHER SPECIFIED ANOMALIES OF SKIN: ICD-10-CM

## 2021-07-26 DIAGNOSIS — D22 MELANOCYTIC NEVI: ICD-10-CM

## 2021-07-26 DIAGNOSIS — Q819 OTHER SPECIFIED ANOMALIES OF SKIN: ICD-10-CM

## 2021-07-26 DIAGNOSIS — L81.4 OTHER MELANIN HYPERPIGMENTATION: ICD-10-CM

## 2021-07-26 DIAGNOSIS — L91.0 HYPERTROPHIC SCAR: ICD-10-CM

## 2021-07-26 DIAGNOSIS — L57.8 OTHER SKIN CHANGES DUE TO CHRONIC EXPOSURE TO NONIONIZING RADIATION: ICD-10-CM

## 2021-07-26 PROBLEM — D22.5 MELANOCYTIC NEVI OF TRUNK: Status: ACTIVE | Noted: 2021-07-26

## 2021-07-26 PROBLEM — D22.39 MELANOCYTIC NEVI OF OTHER PARTS OF FACE: Status: ACTIVE | Noted: 2021-07-26

## 2021-07-26 PROBLEM — D18.01 HEMANGIOMA OF SKIN AND SUBCUTANEOUS TISSUE: Status: ACTIVE | Noted: 2021-07-26

## 2021-07-26 PROBLEM — D22.62 MELANOCYTIC NEVI OF LEFT UPPER LIMB, INCLUDING SHOULDER: Status: ACTIVE | Noted: 2021-07-26

## 2021-07-26 PROBLEM — L85.8 OTHER SPECIFIED EPIDERMAL THICKENING: Status: ACTIVE | Noted: 2021-07-26

## 2021-07-26 PROBLEM — D22.72 MELANOCYTIC NEVI OF LEFT LOWER LIMB, INCLUDING HIP: Status: ACTIVE | Noted: 2021-07-26

## 2021-07-26 PROCEDURE — 99213 OFFICE O/P EST LOW 20 MIN: CPT

## 2021-07-26 PROCEDURE — OTHER COUNSELING: OTHER

## 2021-07-26 ASSESSMENT — LOCATION SIMPLE DESCRIPTION DERM
LOCATION SIMPLE: RIGHT POSTERIOR UPPER ARM
LOCATION SIMPLE: LEFT FOREARM
LOCATION SIMPLE: LEFT CHEEK
LOCATION SIMPLE: LEFT POSTERIOR UPPER ARM
LOCATION SIMPLE: LEFT EYEBROW
LOCATION SIMPLE: RIGHT UPPER BACK
LOCATION SIMPLE: LEFT FOREHEAD
LOCATION SIMPLE: LEFT UPPER BACK
LOCATION SIMPLE: LEFT PRETIBIAL REGION
LOCATION SIMPLE: RIGHT BUTTOCK
LOCATION SIMPLE: LEFT BUTTOCK
LOCATION SIMPLE: ABDOMEN

## 2021-07-26 ASSESSMENT — LOCATION DETAILED DESCRIPTION DERM
LOCATION DETAILED: LEFT MID-UPPER BACK
LOCATION DETAILED: LEFT LATERAL PROXIMAL PRETIBIAL REGION
LOCATION DETAILED: LEFT SUPERIOR MEDIAL FOREHEAD
LOCATION DETAILED: RIGHT BUTTOCK
LOCATION DETAILED: LEFT PROXIMAL POSTERIOR UPPER ARM
LOCATION DETAILED: EPIGASTRIC SKIN
LOCATION DETAILED: RIGHT SUPERIOR MEDIAL UPPER BACK
LOCATION DETAILED: RIGHT PROXIMAL POSTERIOR UPPER ARM
LOCATION DETAILED: LEFT CENTRAL EYEBROW
LOCATION DETAILED: LEFT BUTTOCK
LOCATION DETAILED: LEFT PROXIMAL DORSAL FOREARM
LOCATION DETAILED: LEFT INFERIOR CENTRAL MALAR CHEEK
LOCATION DETAILED: LEFT SUPERIOR MEDIAL UPPER BACK

## 2021-07-26 ASSESSMENT — LOCATION ZONE DERM
LOCATION ZONE: TRUNK
LOCATION ZONE: LEG
LOCATION ZONE: FACE
LOCATION ZONE: ARM

## 2021-07-26 NOTE — PROCEDURE: COUNSELING
Patient Specific Counseling (Will Not Stick From Patient To Patient): If patient desires she can have lesion injected with intralesional Kenalog.
Detail Level: Generalized
Detail Level: Detailed
Detail Level: Zone
Patient Specific Counseling (Will Not Stick From Patient To Patient): Patient has had this lesion since birth and has had it shaved multiple times. Discussed the lesion May repigment after a deeper shave with liquid nitrogen as well.
Detail Level: Simple

## 2021-08-03 ENCOUNTER — HOSPITAL ENCOUNTER (OUTPATIENT)
Dept: ULTRASOUND IMAGING | Facility: CLINIC | Age: 26
Discharge: HOME | End: 2021-08-03
Attending: INTERNAL MEDICINE | Admitting: INTERNAL MEDICINE
Payer: COMMERCIAL

## 2021-08-03 DIAGNOSIS — E72.01 CYSTINURIA (H): ICD-10-CM

## 2021-08-03 DIAGNOSIS — N20.0 RECURRENT KIDNEY STONES: ICD-10-CM

## 2021-08-03 PROCEDURE — 76770 US EXAM ABDO BACK WALL COMP: CPT

## 2021-08-04 ENCOUNTER — MEDICAL CORRESPONDENCE (OUTPATIENT)
Dept: HEALTH INFORMATION MANAGEMENT | Facility: CLINIC | Age: 26
End: 2021-08-04

## 2021-08-10 ENCOUNTER — VIRTUAL VISIT (OUTPATIENT)
Dept: NEPHROLOGY | Facility: CLINIC | Age: 26
End: 2021-08-10
Payer: COMMERCIAL

## 2021-08-10 DIAGNOSIS — E72.01 CYSTINURIA (H): Primary | ICD-10-CM

## 2021-08-10 PROCEDURE — 99213 OFFICE O/P EST LOW 20 MIN: CPT | Mod: 95 | Performed by: INTERNAL MEDICINE

## 2021-08-10 RX ORDER — CAPTOPRIL 25 MG/1
25 TABLET ORAL
Qty: 180 TABLET | Refills: 11 | Status: SHIPPED | OUTPATIENT
Start: 2021-08-10 | End: 2022-06-24

## 2021-08-10 NOTE — PATIENT INSTRUCTIONS
Deamarlon Esposito were seen in the [facility] Comprehensive Kidney Stone Clinic.  Basic advice to avoid kidney stones  - Drink 100 ounces of fluid daily (urine volume should be 80 ounces per day)  - low sodium diet (less than 2400 mg sodium per day- 500-700 mg per meal)  - minimize processed foods  - Protein (meat) in moderation    Higher fruit and vegetable intake preferred  Some good options include: (higher alkali fruits and vegetables)  -apples, apricots, oranges (the pith contains oxalate so be mindful of portions), peaches, pears, raisins, strawberries, carrots, cauliflower, eggplant, lettuce, potatoes, tomatoes, and zucchini     [email or web address]    Today we discussed diet and fluid intake goals.    At your next visit I anticipate we will discuss results from your upcoming cystine litholink.      We are suggesting the following medications:  - No new medications today    Please get the following tests done:  - Cystine Litholink will be arriving    Please set up appointment with:  - Follow up in our clinic in December    It was a pleasure meeting with you today. Thank you for allowing me and my team the privilege of caring for you today. Please let us know if there is anything else we can do for you.    Take care!  Alis Gonzalez MD  Department of Medicine  Division of Renal Diseases and Hypertension  [facility]    Email: [email or web address]  You may reach a nurse by calling the urology clinic at [phone]

## 2021-08-10 NOTE — LETTER
8/10/2021       RE: Shelly Cerda  [address]  [place] [address]     Dear Colleague,    Thank you for referring your patient, Shelly Cerda, to the [facility] NEPHROLOGY CLINIC [place] at [facility]. Please see a copy of my visit note below.          [facility]  08/10/2021     Shelly Cerda MRN:[id number] YOB: 1995  Primary care provider: [facility]  Requesting physician: Dr. Jayy Koehler    ASSESSMENT AND RECOMMENDATIONS:   Shelly Cerda is a 26 year old female presenting for nephrolithiasis.  # Recurrent kidney stones: stone type cystine with multiple surgeries - capacity not at goal but is improved with decrease in meat intake  - Reinforced recommendation about diet and fluid goals  - Discussed possibility of genetic evaluation in trial at [place], she is not interested at this time but may wish to pursue this in the next few years  - Repeat cystine litholink  - Increase captopril up to 25 mg 6 times daily if tolerated, she will titrate to max tolerable dose  - continue potassium citrate    Repeat cystine litholink ordered but not yet performed. Will follow this up when available.    Has an appointment scheduled for 12/14/21.    Valerio Anderson MD  Patient seen and discussed with Dr. Gonzalez.    Video chat start 1256, Dr. Gonzalez joined 1314, ended 1334.       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  Managed with high fluid, potassium citrate 20 meq bid, she had allergic reaction with thiola, hives, chills, fever, swollen lymph nodes and throat was swollen.  She is really motivated to decrease kidney stone recurrence as the last 5 years have been really hard.  She was disappointed that she had  reaction to thiola.  She has had close to 9 surgeries.  She has lithotripsy there but stone fragments were not removed so she required subsequent procedure.  Right stone surgery , Left sided stone surgery 2017, right PCNL 3/2018 - was stone free after that procedure then presented 2019 with right obstructing kidney stone and had URS and lithotripsy for that stone.  She was again stone free after that operation.    Last imagin2020 - no kidney stones  Last Surgery: 19 (I reviewed op report)  Stone Free on right after above surgery    Last visit 21  She increased captopril to 25 mg QID, and has been tolerating well.    Also continues to take potassium citrate. Continues to restrict meat intake and restricting dietary sodium. Very strict Monday-Friday, more variable in terms of intake on weekends.    Working in person again, which has limited her ability to stay on her diet and keep fluid intake as high as she wants it.    Meat/sodium intake - has been working on decreasing sodium and meat - has lost about 30 lbs over the last 1.5 years    ER visits or surgery since last visit - none. Did have an episode in which renal ultrasound showed hydronephrosis and CT did not, possibly a stone passing.    Has increased her captopril to QID, states that she generally feels well with this, with minimal lightheadedness. On occasion she will have a headache and then take this medication TID instead.    Family history is positive for kidney stones - her sister has cystine kidney stones    New urine chemistries not yet performed, ordering today.    PAST MEDICAL HISTORY:  Reviewed  Past Medical History:   Diagnosis Date     Complication of anesthesia      Cystinuria (H)      Kidney stones      PONV (postoperative nausea and vomiting)        PSH:  Reviewed  Past Surgical History:   Procedure Laterality Date     APPENDECTOMY  2011     COMBINED CYSTOSCOPY, RETROGRADES, EXCHANGE STENT URETER(S) Right  9/15/2019    Procedure: CYSTOSCOPY, WITH RIGHT RETROGRADE PYELOGRAM AND RIGHT URETERAL STENT PLACEMENT;  Surgeon: Kasi Wade MD;  [facility]     COMBINED CYSTOSCOPY, RETROGRADES, URETEROSCOPY, INSERT STENT Left 10/12/2017    Procedure: COMBINED CYSTOSCOPY, RETROGRADES, URETEROSCOPY, INSERT STENT;  COMBINED CYSTOSCOPY, RETROGRADES, URETEROSCOPY, LASER HOLMIUM STANDBY,  INSERT STENT LEFT URETER;  Surgeon: Luis Banda MD;  Location: [facility] OR     CYSTOSCOPY       LASER HOLMIUM LITHOTRIPSY URETER(S), INSERT STENT, COMBINED  9/4/2012    Procedure: COMBINED CYSTOSCOPY, URETEROSCOPY, LASER HOLMIUM LITHOTRIPSY URETER(S), INSERT STENT;  Holmium Laser Lithotripsy.  Right Stent Placement;  Surgeon: Cathi Hernandez MD;  [facility]: [facility] OR     LASER HOLMIUM LITHOTRIPSY URETER(S), INSERT STENT, COMBINED Right 9/20/2019    Procedure: Cystoscopy, Right Ureteroscopy, Laser Lithotripsy,  no ureteral stent placed.;  Surgeon: Jayy Koehler MD;  [facility]     PERCUTANEOUS NEPHROLITHOTOMY Right 3/8/2018    Procedure: PERCUTANEOUS NEPHROLITHOTOMY;  Right Percutaneous Nephrolithotomy;  Surgeon: Jayy Koehler MD;  [facility]     Ureteral stent placement with subsequent removal.  2017        MEDICATIONS:  Current Outpatient Medications   Medication Sig Dispense Refill     azelastine (ASTEPRO) 0.15 % nasal spray        captopril (CAPOTEN) 25 MG tablet Take 1 tablet (25 mg) by mouth 3 times daily 90 tablet 11     LARISSIA 0.1-20 MG-MCG per tablet        levocetirizine (XYZAL) 5 MG tablet        montelukast (SINGULAIR) 10 MG tablet        potassium citrate (UROCIT-K) 10 MEQ (1080 MG) CR tablet Take 2 tablets (20 mEq) by mouth 2 times daily 360 tablet 3        ALLERGIES:    Allergies   Allergen Reactions     Thiola [Tiopronin] Rash     Fevers, lymphadenopathy, swelling in throat area       REVIEW OF SYSTEMS:  A 10 point review of systems was negative except as noted above.    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 Not Asked   Social History Narrative    Shelly lives with both parents in [place]. Working as .  Hoping to buy house in 2021.    Sexually active with men and dating.     Social Determinants of Health     Financial Resource Strain:      Difficulty of Paying Living Expenses:    Food Insecurity:      Worried About Running Out of Food in the Last Year:      Ran Out of Food in the Last Year:    Transportation Needs:      Lack of Transportation (Medical):      Lack of Transportation (Non-Medical):    Physical Activity:      Days of Exercise per Week:      Minutes of Exercise per Session:    Stress:      Feeling of Stress :    Social Connections:      Frequency of Communication with Friends and Family:      Frequency of Social Gatherings with Friends and Family:      Attends Nondenominational Services:      Active Member of Clubs or Organizations:      Attends Club or Organization Meetings:      Marital Status:    Intimate Partner Violence:      Fear of Current or Ex-Partner:      Emotionally Abused:      Physically Abused:      Sexually Abused:    works out 5 days per week    FAMILY MEDICAL HISTORY:   Family History   Problem Relation Age of Onset     Genetic Disorder Sister         Cystinuria       PHYSICAL EXAM:   There were no vitals taken for this visit.     Exam limited by video appointment. Within those  limitations:  GENERAL APPEARANCE: alert and no distress  RESP: no conversational dyspnea  Psych: pleasant, normal mental status    LABS:   I reviewed:  Electrolytes/Renal -   Recent Labs   Lab Test 08/18/20  1524 03/20/18  1222 03/09/18  2300    137 132*   POTASSIUM 3.7 4.0 3.8   CHLORIDE 107 103 100   CO2 26 26 25   BUN 12 10 14   CR 0.96 0.86 0.94   * 88 105*   VALERIANO 9.1 9.3 8.8       CBC -   Recent Labs   Lab Test 08/18/20  1524 03/09/18  2300 03/09/18  0441   WBC 6.7 12.1* 13.7*   HGB 14.1 12.4 12.5    287 258       LFTs -   Recent Labs   Lab Test 08/18/20  1524 12/19/17  1122 08/06/17  0502   ALKPHOS 57 52 61   BILITOTAL 0.4 0.6 0.3   ALT 21 19 31   AST 18 16 31   PROTTOTAL 7.9 7.9 7.7   ALBUMIN 4.0 4.2 4.2       Coags - No lab results found.    Iron Panel - No lab results found.    Endocrine - No lab results found.    IMAGING:  See HPI    Valerio Anderson MD         Attestation signed by Alis Gonzalez MD at 9/9/2021  8:56 PM:  Attestation:  Shelly Cerda has been evaluated and examined by me, Alis Gonzalez MD.  Discussed with the fellow or resident and agree with the findings and plan in this note.  I have reviewed Medications, Vital Signs, and Labs as well as provider notes.    Date of Service (when I saw the patient): 8/10/2021  I was on video from [id number].    Alis Gonzalez MD  [facility]  Department of Medicine  Division of Renal Disease and Hypertension  [facility]  [email or web address]   [facility] Web Console      Again, thank you for allowing me to participate in the care of your patient.      Sincerely,    Alis Gonzalez MD

## 2021-08-10 NOTE — PROGRESS NOTES
Shelly is a 26 year old who is being evaluated via a billable video visit.      How would you like to obtain your AVS? MyChart  If the video visit is dropped, the invitation should be resent by: Text to cell phone: [phone]  Will anyone else be joining your video visit? No      Video Start Time: 1318  Video-Visit Details    Type of service:  Video Visit    Video End Time: 1334    Originating Location (pt. Location): Home    Distant Location (provider location):  [facility] NEPHROLOGY [facility]     Platform used for Video Visit: Restor[employer]

## 2021-08-10 NOTE — PROGRESS NOTES
[facility]  08/10/2021     Shelly Cerda MRN:[id number] YOB: 1995  Primary care provider: [facility]  Requesting physician: Dr. Jayy Koehler    ASSESSMENT AND RECOMMENDATIONS:   Shelly Cerda is a 26 year old female presenting for nephrolithiasis.  # Recurrent kidney stones: stone type cystine with multiple surgeries - capacity not at goal but is improved with decrease in meat intake  - Reinforced recommendation about diet and fluid goals  - Discussed possibility of genetic evaluation in trial at [place], she is not interested at this time but may wish to pursue this in the next few years  - Repeat cystine litholink  - Increase captopril up to 25 mg 6 times daily if tolerated, she will titrate to max tolerable dose  - continue potassium citrate    Repeat cystine litholink ordered but not yet performed. Will follow this up when available.    Has an appointment scheduled for 12/14/21.    Valerio Anderson MD  Patient seen and discussed with Dr. Gonzalez.    Video chat start 1256, Dr. Gonzalez joined 1318, ended 1334.       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  Managed with high fluid, potassium citrate 20 meq bid, she had allergic reaction with thiola, hives, chills, fever, swollen lymph nodes and throat was swollen.  She is really motivated to decrease kidney stone recurrence as the last 5 years have been really hard.  She was disappointed that she had reaction to thiola.  She has had close to 9 surgeries.  She has lithotripsy there but stone fragments were not removed so she required subsequent procedure.  Right stone surgery 2012, Left sided stone surgery October 2017, right PCNL 3/2018 - was stone free after that procedure then presented September 2019 with right  obstructing kidney stone and had URS and lithotripsy for that stone.  She was again stone free after that operation.    Last imagin2020 - no kidney stones  Last Surgery: 19 (I reviewed op report)  Stone Free on right after above surgery    Last visit 21  She increased captopril to 25 mg QID, and has been tolerating well.    Also continues to take potassium citrate. Continues to restrict meat intake and restricting dietary sodium. Very strict Monday-Friday, more variable in terms of intake on weekends.    Working in person again, which has limited her ability to stay on her diet and keep fluid intake as high as she wants it.    Meat/sodium intake - has been working on decreasing sodium and meat - has lost about 30 lbs over the last 1.5 years    ER visits or surgery since last visit - none. Did have an episode in which renal ultrasound showed hydronephrosis and CT did not, possibly a stone passing.    Has increased her captopril to QID, states that she generally feels well with this, with minimal lightheadedness. On occasion she will have a headache and then take this medication TID instead.    Family history is positive for kidney stones - her sister has cystine kidney stones    New urine chemistries not yet performed, ordering today.    PAST MEDICAL HISTORY:  Reviewed  Past Medical History:   Diagnosis Date     Complication of anesthesia      Cystinuria (H)      Kidney stones      PONV (postoperative nausea and vomiting)        PSH:  Reviewed  Past Surgical History:   Procedure Laterality Date     APPENDECTOMY  2011     COMBINED CYSTOSCOPY, RETROGRADES, EXCHANGE STENT URETER(S) Right 9/15/2019    Procedure: CYSTOSCOPY, WITH RIGHT RETROGRADE PYELOGRAM AND RIGHT URETERAL STENT PLACEMENT;  Surgeon: Kasi Wade MD;  [facility]     COMBINED CYSTOSCOPY, RETROGRADES, URETEROSCOPY, INSERT STENT Left 10/12/2017    Procedure: COMBINED CYSTOSCOPY, RETROGRADES, URETEROSCOPY, INSERT STENT;   COMBINED CYSTOSCOPY, RETROGRADES, URETEROSCOPY, LASER HOLMIUM STANDBY,  INSERT STENT LEFT URETER;  Surgeon: Luis Banda MD;  Location: [facility] OR     CYSTOSCOPY       LASER HOLMIUM LITHOTRIPSY URETER(S), INSERT STENT, COMBINED  9/4/2012    Procedure: COMBINED CYSTOSCOPY, URETEROSCOPY, LASER HOLMIUM LITHOTRIPSY URETER(S), INSERT STENT;  Holmium Laser Lithotripsy.  Right Stent Placement;  Surgeon: Cathi Hernandez MD;  [facility]     LASER HOLMIUM LITHOTRIPSY URETER(S), INSERT STENT, COMBINED Right 9/20/2019    Procedure: Cystoscopy, Right Ureteroscopy, Laser Lithotripsy,  no ureteral stent placed.;  Surgeon: Jayy Koehler MD;  [facility]     PERCUTANEOUS NEPHROLITHOTOMY Right 3/8/2018    Procedure: PERCUTANEOUS NEPHROLITHOTOMY;  Right Percutaneous Nephrolithotomy;  Surgeon: Jayy Koehler MD;  [facility]     Ureteral stent placement with subsequent removal.  2017        MEDICATIONS:  Current Outpatient Medications   Medication Sig Dispense Refill     azelastine (ASTEPRO) 0.15 % nasal spray        captopril (CAPOTEN) 25 MG tablet Take 1 tablet (25 mg) by mouth 3 times daily 90 tablet 11     LARISSIA 0.1-20 MG-MCG per tablet        levocetirizine (XYZAL) 5 MG tablet        montelukast (SINGULAIR) 10 MG tablet        potassium citrate (UROCIT-K) 10 MEQ (1080 MG) CR tablet Take 2 tablets (20 mEq) by mouth 2 times daily 360 tablet 3        ALLERGIES:    Allergies   Allergen Reactions     Thiola [Tiopronin] Rash     Fevers, lymphadenopathy, swelling in throat area       REVIEW OF SYSTEMS:  A 10 point review of systems was negative except as noted above.    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 Not Asked   Social History Narrative    Shelly lives with both parents in [place]. Working as .  Hoping to buy house in 2021.    Sexually active with men and dating.     Social Determinants of Health     Financial Resource Strain:      Difficulty of Paying Living Expenses:    Food Insecurity:      Worried About Running Out of Food in the Last Year:      Ran Out of Food in the Last Year:    Transportation Needs:      Lack of Transportation (Medical):      Lack of Transportation (Non-Medical):    Physical Activity:      Days of Exercise per Week:      Minutes of Exercise per Session:    Stress:      Feeling of Stress :    Social Connections:      Frequency of Communication with Friends and Family:      Frequency of Social Gatherings with Friends and Family:      Attends Cheondoism Services:      Active Member of Clubs or Organizations:      Attends Club or Organization Meetings:      Marital Status:    Intimate Partner Violence:      Fear of Current or Ex-Partner:      Emotionally Abused:      Physically Abused:      Sexually Abused:    works out 5 days per week    FAMILY MEDICAL HISTORY:   Family History   Problem Relation Age of Onset     Genetic Disorder Sister         Cystinuria       PHYSICAL EXAM:   There were no vitals taken for this visit.     Exam limited by video appointment. Within those limitations:  GENERAL APPEARANCE: alert and no distress  RESP: no conversational dyspnea  Psych: pleasant, normal mental status    LABS:   I reviewed:  Electrolytes/Renal -   Recent Labs   Lab Test 08/18/20  1524 03/20/18  1222 03/09/18  2300    137 132*   POTASSIUM 3.7 4.0 3.8   CHLORIDE 107 103 100   CO2 26 26  25   BUN 12 10 14   CR 0.96 0.86 0.94   * 88 105*   VALERIANO 9.1 9.3 8.8       CBC -   Recent Labs   Lab Test 08/18/20  1524 03/09/18  2300 03/09/18  0441   WBC 6.7 12.1* 13.7*   HGB 14.1 12.4 12.5    287 258       LFTs -   Recent Labs   Lab Test 08/18/20  1524 12/19/17  1122 08/06/17  0502   ALKPHOS 57 52 61   BILITOTAL 0.4 0.6 0.3   ALT 21 19 31   AST 18 16 31   PROTTOTAL 7.9 7.9 7.7   ALBUMIN 4.0 4.2 4.2       Coags - No lab results found.    Iron Panel - No lab results found.    Endocrine - No lab results found.    IMAGING:  See HPI    Valerio Anderson MD

## 2021-08-17 ENCOUNTER — MEDICAL CORRESPONDENCE (OUTPATIENT)
Dept: HEALTH INFORMATION MANAGEMENT | Facility: CLINIC | Age: 26
End: 2021-08-17

## 2021-09-03 ENCOUNTER — TRANSFERRED RECORDS (OUTPATIENT)
Dept: HEALTH INFORMATION MANAGEMENT | Facility: CLINIC | Age: 26
End: 2021-09-03
Payer: COMMERCIAL

## 2021-10-24 ENCOUNTER — HEALTH MAINTENANCE LETTER (OUTPATIENT)
Age: 26
End: 2021-10-24

## 2021-11-15 ENCOUNTER — IMMUNIZATION (OUTPATIENT)
Dept: NURSING | Facility: CLINIC | Age: 26
End: 2021-11-15
Payer: COMMERCIAL

## 2021-11-15 PROCEDURE — 91306 COVID-19,PF,MODERNA (18+ YRS BOOSTER .25ML): CPT

## 2021-11-15 PROCEDURE — 0064A COVID-19,PF,MODERNA (18+ YRS BOOSTER .25ML): CPT

## 2021-11-16 ENCOUNTER — TRANSFERRED RECORDS (OUTPATIENT)
Dept: HEALTH INFORMATION MANAGEMENT | Facility: CLINIC | Age: 26
End: 2021-11-16
Payer: COMMERCIAL

## 2021-12-14 ENCOUNTER — MEDICAL CORRESPONDENCE (OUTPATIENT)
Dept: HEALTH INFORMATION MANAGEMENT | Facility: CLINIC | Age: 26
End: 2021-12-14

## 2021-12-14 ENCOUNTER — VIRTUAL VISIT (OUTPATIENT)
Dept: NEPHROLOGY | Facility: CLINIC | Age: 26
End: 2021-12-14
Payer: COMMERCIAL

## 2021-12-14 ENCOUNTER — MYC MEDICAL ADVICE (OUTPATIENT)
Dept: NEPHROLOGY | Facility: CLINIC | Age: 26
End: 2021-12-14

## 2021-12-14 DIAGNOSIS — Z51.81 MEDICATION MONITORING ENCOUNTER: ICD-10-CM

## 2021-12-14 DIAGNOSIS — N20.0 RECURRENT KIDNEY STONES: ICD-10-CM

## 2021-12-14 DIAGNOSIS — E72.01 CYSTINURIA (H): Primary | ICD-10-CM

## 2021-12-14 PROCEDURE — 99214 OFFICE O/P EST MOD 30 MIN: CPT | Mod: 95 | Performed by: INTERNAL MEDICINE

## 2021-12-14 RX ORDER — POTASSIUM CITRATE AND CITRIC ACID MONOHYDRATE 1100; 334 MG/5ML; MG/5ML
10 SOLUTION ORAL 2 TIMES DAILY WITH MEALS
Qty: 1892 ML | Refills: 3 | Status: SHIPPED | OUTPATIENT
Start: 2021-12-14 | End: 2022-06-28

## 2021-12-14 RX ORDER — LEVONORGESTREL AND ETHINYL ESTRADIOL 0.15-0.03
KIT ORAL
COMMUNITY
Start: 2021-12-10 | End: 2023-05-09

## 2021-12-14 NOTE — PROGRESS NOTES
Shelly is a 26 year old who is being evaluated via a billable video visit.      How would you like to obtain your AVS? MyChart  If the video visit is dropped, the invitation should be resent by: Text to cell phone: [phone]  Will anyone else be joining your video visit? No      Video Start Time: 1:03 PM  Video-Visit Details    Type of service:  Video Visit    Video End Time:1:35 PM    Originating Location (pt. Location): Home    Distant Location (provider location):  [facility] NEPHROLOGY [facility]     Platform used for Video Visit: memory lane syndications

## 2021-12-14 NOTE — PATIENT INSTRUCTIONS
Continue captopril 50 mg three times daily  Continue potassium citrate 20 meq twice daily - I sent liquid formulation order and you can see if the cost is different.    Keep working on high fluid intake, low sodium intake.    Send me copy of your labs on Cazoodle.     Cystine lithluisak in May 2022    Return in about 6 months (around 6/14/2022).     Alis Gonzalez MD  [facility]  Department of Medicine  Division of Renal Disease and Hypertension  [facility]  [employer] Web Console

## 2021-12-14 NOTE — LETTER
12/14/2021       RE: Shelly Cerda  [address]  [place] [address]     Dear Colleague,    Thank you for referring your patient, Shelly Cerda, to the [facility] NEPHROLOGY CLINIC [place] at [facility]. Please see a copy of my visit note below.    [facility] Nephrology Comprehensive Stone Clinic  12/14/2021     Shelly Cerda MRN:[id number] YOB: 1995  Primary care provider: [facility]  Requesting physician: Dr. Jayy Koehler    ASSESSMENT AND RECOMMENDATIONS:   Shelly Cerda is a 26 year old female presenting for nephrolithiasis.  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  Managed with high fluid, potassium citrate 20 meq bid, she had allergic reaction with thiola, hives, chills, fever, swollen lymph nodes and throat was swollen.      Stone surgical history  ~ 9 surgeries  Right stone surgery 2012, Left sided stone surgery October 2017, right PCNL 3/2018 - was stone free after that procedure then presented September 2019 with right  obstructing kidney stone and had URS and lithotripsy for that stone.  She was again stone free after that operation.  May have passed stone April 2021.    Last imaging: renal ultrasound 8/3/2021 - no kidney stones  Last Surgery: 9/20/19 (I reviewed op report)  Stone burden: 2 mm lower pole right kidney (CT 5/7/2021)  Stone free on left    Last visit 8/10/2021  We discussed an increase in captopril from 25 mg QID to 25 mg 6 times daily if tolerated (rather than taking 50 mg at one time as there are low BP concerns).  Remains on potassium citrate 20 mg BID  Reviewed that she has restricted meat and sodium intake and has lost 30 lbs with dietary changes.    Doing combination of working from home and in person.    Meat/sodium intake - has been working on decreasing sodium and meat - has lost about 30 lbs over the last 1.5 years.  Trying to find a balance with fluid intake, salt restriction and limiting meat.  Has gained some weight - about 20 lbs, so at 190 lbs. Has started going back to the gym and using [employer].    Taking captopril mostly as prescribed - 50 mg tid. Sometimes gets a dry cough especially if she is supine after taking captopril.  Taking potassium citrate 20 meq bid with breakfast and lunch. For the most part there are no issues with these meds.    Has trouble taking meds on weekends due to changes in schedule.    Had some pain just before Thanksgiving and guesses that she passed a stone. Pain self resolved. Usually right kidney.            PAST MEDICAL HISTORY:  Reviewed  Past Medical History:   Diagnosis Date     Complication of anesthesia      Cystinuria (H)      Kidney stones      PONV (postoperative nausea and vomiting)        PSH:  Reviewed  Past Surgical History:   Procedure Laterality Date     APPENDECTOMY  9/2011     COMBINED CYSTOSCOPY, RETROGRADES, EXCHANGE STENT URETER(S) Right 9/15/2019    Procedure: CYSTOSCOPY, WITH RIGHT RETROGRADE PYELOGRAM AND RIGHT URETERAL STENT PLACEMENT;   Surgeon: Kasi Wade MD;  [facility]     COMBINED CYSTOSCOPY, RETROGRADES, URETEROSCOPY, INSERT STENT Left 10/12/2017    Procedure: COMBINED CYSTOSCOPY, RETROGRADES, URETEROSCOPY, INSERT STENT;  COMBINED CYSTOSCOPY, RETROGRADES, URETEROSCOPY, LASER HOLMIUM STANDBY,  INSERT STENT LEFT URETER;  Surgeon: Luis Banda MD;  Location: [facility] OR     CYSTOSCOPY       LASER HOLMIUM LITHOTRIPSY URETER(S), INSERT STENT, COMBINED  9/4/2012    Procedure: COMBINED CYSTOSCOPY, URETEROSCOPY, LASER HOLMIUM LITHOTRIPSY URETER(S), INSERT STENT;  Holmium Laser Lithotripsy.  Right Stent Placement;  Surgeon: Cathi Hernandez MD;  [facility]     LASER HOLMIUM LITHOTRIPSY URETER(S), INSERT STENT, COMBINED Right 9/20/2019    Procedure: Cystoscopy, Right Ureteroscopy, Laser Lithotripsy,  no ureteral stent placed.;  Surgeon: Jayy Koehler MD;  [facility]     PERCUTANEOUS NEPHROLITHOTOMY Right 3/8/2018    Procedure: PERCUTANEOUS NEPHROLITHOTOMY;  Right Percutaneous Nephrolithotomy;  Surgeon: Jayy Koehler MD;  [facility]     Ureteral stent placement with subsequent removal.  2017        MEDICATIONS:  Current Outpatient Medications   Medication Sig Dispense Refill     azelastine (ASTEPRO) 0.15 % nasal spray        captopril (CAPOTEN) 25 MG tablet Take 1 tablet (25 mg) by mouth 6 times daily 180 tablet 11     captopril (CAPOTEN) 25 MG tablet Take 1 tablet (25 mg) by mouth 3 times daily 90 tablet 11     LARISSIA 0.1-20 MG-MCG per tablet        levocetirizine (XYZAL) 5 MG tablet        montelukast (SINGULAIR) 10 MG tablet        potassium citrate (UROCIT-K) 10 MEQ (1080 MG) CR tablet Take 2 tablets (20 mEq) by mouth 2 times daily 360 tablet 3        ALLERGIES:    Allergies   Allergen Reactions     Thiola [Tiopronin] Rash     Fevers, lymphadenopathy, swelling in throat area       REVIEW OF SYSTEMS:  A 10 point review of systems was negative except as noted above.    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 Not Asked   Social History Narrative    Shelly lives with both parents in [place]. Working as .  Hoping to buy house in 2021.    Sexually active with men and dating.     Social Determinants of Health     Financial Resource Strain: Not on file   Food Insecurity: Not on file   Transportation Needs: Not on file   Physical Activity: Not on file   Stress: Not on file   Social Connections: Not on file   Intimate Partner Violence: Not on file   Housing Stability: Not on file   works out 5 days per week    FAMILY MEDICAL HISTORY:   Family History   Problem Relation Age of Onset     Genetic Disorder Sister         Cystinuria       PHYSICAL EXAM:   There were no vitals taken for this visit.     Exam limited by video appointment. Within those limitations:  GENERAL APPEARANCE: alert and no distress  RESP: no conversational dyspnea  Psych: pleasant, normal mental status    LABS:   I reviewed:  Electrolytes/Renal -   Recent Labs   Lab Test 08/18/20  1524 03/20/18  1222 03/09/18  2300    137 132*   POTASSIUM 3.7 4.0 3.8   CHLORIDE 107 103 100   CO2 26 26 25   BUN 12 10 14   CR 0.96 0.86 0.94   * 88 105*   VALERIANO 9.1 9.3 8.8       CBC -   Recent Labs   Lab Test 08/18/20  1524 03/09/18  2300 03/09/18  0441   WBC 6.7 12.1* 13.7*   HGB 14.1 12.4 12.5    287 258       LFTs -   Recent Labs   Lab Test  08/18/20  1524 12/19/17  1122 08/06/17  0502   ALKPHOS 57 52 61   BILITOTAL 0.4 0.6 0.3   ALT 21 19 31   AST 18 16 31   PROTTOTAL 7.9 7.9 7.7   ALBUMIN 4.0 4.2 4.2       Coags - No lab results found.    Iron Panel - No lab results found.    Endocrine - No lab results found.    IMAGING:  See HPI    Alis Gonzalez MD

## 2021-12-14 NOTE — PROGRESS NOTES
[facility] Nephrology Comprehensive Stone Clinic  12/14/2021     Shelly Cerda MRN:[id number] YOB: 1995  Primary care provider: [facility]  Requesting physician: Dr. Jayy Koehler    ASSESSMENT AND RECOMMENDATIONS:   Shelly Cerda is a 26 year old female presenting for nephrolithiasis.  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  Managed with high fluid, potassium citrate 20 meq bid, she had allergic reaction with thiola, hives, chills, fever, swollen lymph nodes and throat was swollen.      Stone surgical history  ~ 9 surgeries  Right stone surgery 2012, Left sided stone surgery October 2017, right PCNL 3/2018 - was stone free after that procedure then presented September 2019 with right obstructing kidney stone and had URS and lithotripsy for that stone.  She was again stone free after that operation.  May have passed stone April 2021.    Last imaging: renal ultrasound 8/3/2021 - no kidney stones  Last Surgery: 9/20/19 (I reviewed op report)  Stone burden: 2 mm lower pole right kidney (CT 5/7/2021)  Stone free  on left    Last visit 8/10/2021  We discussed an increase in captopril from 25 mg QID to 25 mg 6 times daily if tolerated (rather than taking 50 mg at one time as there are low BP concerns).  Remains on potassium citrate 20 mg BID  Reviewed that she has restricted meat and sodium intake and has lost 30 lbs with dietary changes.    Doing combination of working from home and in person.    Meat/sodium intake - has been working on decreasing sodium and meat - has lost about 30 lbs over the last 1.5 years.  Trying to find a balance with fluid intake, salt restriction and limiting meat.  Has gained some weight - about 20 lbs, so at 190 lbs. Has started going back to the gym and using [employer].    Taking captopril mostly as prescribed - 50 mg tid. Sometimes gets a dry cough especially if she is supine after taking captopril.  Taking potassium citrate 20 meq bid with breakfast and lunch. For the most part there are no issues with these meds.    Has trouble taking meds on weekends due to changes in schedule.    Had some pain just before Thanksgiving and guesses that she passed a stone. Pain self resolved. Usually right kidney.            PAST MEDICAL HISTORY:  Reviewed  Past Medical History:   Diagnosis Date     Complication of anesthesia      Cystinuria (H)      Kidney stones      PONV (postoperative nausea and vomiting)        PSH:  Reviewed  Past Surgical History:   Procedure Laterality Date     APPENDECTOMY  9/2011     COMBINED CYSTOSCOPY, RETROGRADES, EXCHANGE STENT URETER(S) Right 9/15/2019    Procedure: CYSTOSCOPY, WITH RIGHT RETROGRADE PYELOGRAM AND RIGHT URETERAL STENT PLACEMENT;  Surgeon: Kasi Wade MD;  [facility]     COMBINED CYSTOSCOPY, RETROGRADES, URETEROSCOPY, INSERT STENT Left 10/12/2017    Procedure: COMBINED CYSTOSCOPY, RETROGRADES, URETEROSCOPY, INSERT STENT;  COMBINED CYSTOSCOPY, RETROGRADES, URETEROSCOPY, LASER HOLMIUM STANDBY,  INSERT STENT LEFT URETER;  Surgeon: Luis Banda  MD [employer];  Location: [facility] OR     CYSTOSCOPY       LASER HOLMIUM LITHOTRIPSY URETER(S), INSERT STENT, COMBINED  9/4/2012    Procedure: COMBINED CYSTOSCOPY, URETEROSCOPY, LASER HOLMIUM LITHOTRIPSY URETER(S), INSERT STENT;  Holmium Laser Lithotripsy.  Right Stent Placement;  Surgeon: Cathi Hernandez MD;  [facility]     LASER HOLMIUM LITHOTRIPSY URETER(S), INSERT STENT, COMBINED Right 9/20/2019    Procedure: Cystoscopy, Right Ureteroscopy, Laser Lithotripsy,  no ureteral stent placed.;  Surgeon: Jayy Koehler MD;  [facility]     PERCUTANEOUS NEPHROLITHOTOMY Right 3/8/2018    Procedure: PERCUTANEOUS NEPHROLITHOTOMY;  Right Percutaneous Nephrolithotomy;  Surgeon: Jayy Koehler MD;  [facility]     Ureteral stent placement with subsequent removal.  2017        MEDICATIONS:  Current Outpatient Medications   Medication Sig Dispense Refill     azelastine (ASTEPRO) 0.15 % nasal spray        captopril (CAPOTEN) 25 MG tablet Take 1 tablet (25 mg) by mouth 6 times daily 180 tablet 11     captopril (CAPOTEN) 25 MG tablet Take 1 tablet (25 mg) by mouth 3 times daily 90 tablet 11     LARISSIA 0.1-20 MG-MCG per tablet        levocetirizine (XYZAL) 5 MG tablet        montelukast (SINGULAIR) 10 MG tablet        potassium citrate (UROCIT-K) 10 MEQ (1080 MG) CR tablet Take 2 tablets (20 mEq) by mouth 2 times daily 360 tablet 3        ALLERGIES:    Allergies   Allergen Reactions     Thiola [Tiopronin] Rash     Fevers, lymphadenopathy, swelling in throat area       REVIEW OF SYSTEMS:  A 10 point review of systems was negative except as noted above.    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  Social History     Socioeconomic History     Marital status: Single     Spouse name: Not on file     Number of children: Not on file     Years of education: Not on file     Highest education level: Not on file   Occupational History     Not on file   Tobacco Use      Smoking status: Never Smoker     Smokeless tobacco: Never Used   Substance and Sexual Activity     Alcohol use: Yes     Drug use: No     Sexual activity: Not on file   Other Topics Concern     Parent/sibling w/ CABG, MI or angioplasty before 65F 55M? Not Asked   Social History Narrative    Shelly lives with both parents in [place]. Working as .  Hoping to buy house in 2021.    Sexually active with men and dating.     Social Determinants of Health     Financial Resource Strain: Not on file   Food Insecurity: Not on file   Transportation Needs: Not on file   Physical Activity: Not on file   Stress: Not on file   Social Connections: Not on file   Intimate Partner Violence: Not on file   Housing Stability: Not on file   works out 5 days per week    FAMILY MEDICAL HISTORY:   Family History   Problem Relation Age of Onset     Genetic Disorder Sister         Cystinuria       PHYSICAL EXAM:   There were no vitals taken for this visit.     Exam limited by video appointment. Within those limitations:  GENERAL APPEARANCE: alert and no distress  RESP: no conversational dyspnea  Psych: pleasant, normal mental status    LABS:   I reviewed:  Electrolytes/Renal -   Recent Labs   Lab Test 08/18/20  1524 03/20/18  1222 03/09/18  2300    137 132*   POTASSIUM 3.7 4.0 3.8   CHLORIDE 107 103 100   CO2 26 26 25   BUN 12 10 14   CR 0.96 0.86 0.94   * 88 105*   VALERIANO 9.1 9.3 8.8       CBC -   Recent Labs   Lab Test 08/18/20  1524 03/09/18  2300 03/09/18  0441   WBC 6.7 12.1* 13.7*   HGB 14.1 12.4 12.5    287 258       LFTs -   Recent Labs   Lab Test 08/18/20  1524 12/19/17  1122 08/06/17  0502   ALKPHOS 57 52 61   BILITOTAL 0.4 0.6 0.3   ALT 21 19 31   AST 18 16 31   PROTTOTAL 7.9 7.9 7.7   ALBUMIN 4.0 4.2 4.2       Coags - No lab results found.    Iron Panel - No lab results found.    Endocrine - No lab results found.    IMAGING:  See HPI    Alis Gonzalez MD

## 2021-12-15 NOTE — TELEPHONE ENCOUNTER
Elma mailed to patient and labs sent to scanning.  Vani Mckenzie LPN  Urology Clinic Service   [facility] Urology [facility]

## 2021-12-15 NOTE — TELEPHONE ENCOUNTER
Please arrange for labs to be added in as external results so they populate the results section.  Please arrange for litholink to be mailed to her.    Alis Gonzalez MD

## 2022-02-13 ENCOUNTER — HEALTH MAINTENANCE LETTER (OUTPATIENT)
Age: 27
End: 2022-02-13

## 2022-05-06 ENCOUNTER — TRANSFERRED RECORDS (OUTPATIENT)
Dept: HEALTH INFORMATION MANAGEMENT | Facility: CLINIC | Age: 27
End: 2022-05-06

## 2022-05-06 LAB
AST SERPL-CCNC: 20 IU/L (ref 0–40)
CHOLESTEROL (EXTERNAL): 200 MG/DL (ref 100–199)
CREATININE (EXTERNAL): 0.87 MG/DL (ref 0.57–1)
GFR ESTIMATED (EXTERNAL): 94 ML/MIN/1.73
GLUCOSE (EXTERNAL): 105 MG/DL (ref 65–99)
HDLC SERPL-MCNC: 78 MG/DL
HEP C HIM: NORMAL
HIV 1&2 EXT: NORMAL
LDL CHOLESTEROL CALCULATED (EXTERNAL): 102 MG/DL (ref 0–99)
POTASSIUM (EXTERNAL): 4 MMOL/L (ref 3.5–5.2)
TRIGLYCERIDES (EXTERNAL): 118 MG/DL (ref 0–149)
TSH SERPL-ACNC: 1.71 UIU/ML (ref 0.45–4.5)

## 2022-05-16 ENCOUNTER — MYC MEDICAL ADVICE (OUTPATIENT)
Dept: NEPHROLOGY | Facility: CLINIC | Age: 27
End: 2022-05-16
Payer: COMMERCIAL

## 2022-05-17 ENCOUNTER — TELEPHONE (OUTPATIENT)
Dept: MULTI SPECIALTY CLINIC | Facility: CLINIC | Age: 27
End: 2022-05-17
Payer: COMMERCIAL

## 2022-05-17 NOTE — TELEPHONE ENCOUNTER
Ebony Osborn RN  You; Alis Gonzalez MD; Vani Mckenzie LPN 59 minutes ago (9:57 AM)     AM    Alejandrina,   Yes, please initiate prior auth. [facility] is offering 3 month supply for 40 dollars so we can keep that in our back pocket as well if price comes back unaffordable yet. Thank you for doing so, would you keep me posted?     HARITHA Beck      Writer initiated a PA in another encounter.

## 2022-05-17 NOTE — TELEPHONE ENCOUNTER
potassium citrate-citric acid (CYTRA-K) 1100-334 MG/5ML solution    Prior Authorization Retail Medication Request    Medication/Dose: potassium citrate-citric acid (CYTRA-K) 1100-334 MG/5ML solution  ICD code (if different than what is on RX):    Previously Tried and Failed:    Rationale:      Insurance Name:    Insurance ID:        Pharmacy Information (if different than what is on RX)  Name:    Phone:

## 2022-05-18 NOTE — TELEPHONE ENCOUNTER
PA Initiation    Medication: potassium citrate-citric acid (CYTRA-K) 1100-334 MG/5ML solution TIER EXCEPTION  Insurance Company: [employer] ([facility]) - Phone [phone] Fax [phone]  Pharmacy Filling the Rx: [id number] - [place], [place] - [address] AT [address] [place] [address] & [facility]  Filling Pharmacy Phone: [phone]  Filling Pharmacy Fax: [phone]  Start Date: 5/18/2022

## 2022-05-19 NOTE — TELEPHONE ENCOUNTER
Attempted to call pt. Left detailed message to call clinic back at [phone].   To inform pt that she will need to pay out of pocket for medication.     Brooke Perry RN MSN

## 2022-05-19 NOTE — TELEPHONE ENCOUNTER
From pt: PA is supposed to be for tablets not solution.     Potassium Citrate Tablets and specify it is for a Tier Cost Reduction Exception

## 2022-05-19 NOTE — TELEPHONE ENCOUNTER
Prior Authorization Not Processed    Medication: potassium citrate-citric acid (CYTRA-K) 1100-334 MG/5ML solution TIER EXCEPTION  Insurance Company: [employer] ([facility]) - Phone [phone] Fax [phone]  Expected CoPay:      Pharmacy Filling the Rx: [id number] - [place], [place] - [address]  Pharmacy Notified:    Patient Notified:      Patient's plan doesn't offer tier exceptions. No reduction can be done for either tablets or solution.    Case was canceled by plan since they do not offer this for this plan.

## 2022-05-20 ENCOUNTER — PATIENT OUTREACH (OUTPATIENT)
Dept: UROLOGY | Facility: CLINIC | Age: 27
End: 2022-05-20
Payer: COMMERCIAL

## 2022-05-20 ENCOUNTER — HOSPITAL ENCOUNTER (OUTPATIENT)
Dept: ULTRASOUND IMAGING | Facility: CLINIC | Age: 27
Discharge: HOME | End: 2022-05-20
Attending: INTERNAL MEDICINE | Admitting: INTERNAL MEDICINE
Payer: COMMERCIAL

## 2022-05-20 DIAGNOSIS — N20.0 RECURRENT KIDNEY STONES: ICD-10-CM

## 2022-05-20 DIAGNOSIS — E72.01 CYSTINURIA (H): ICD-10-CM

## 2022-05-20 PROCEDURE — 76770 US EXAM ABDO BACK WALL COMP: CPT

## 2022-05-20 NOTE — TELEPHONE ENCOUNTER
Pt would like to wait for response from insurance prior to filling with [facility].   Will do so. Pt encouraged to check back in two weeks if not heard back    Pt agreeable to plan

## 2022-05-20 NOTE — TELEPHONE ENCOUNTER
Pt phoned with update. [employer] phoned [facility] leaving voicemail requesting call back for tier exception. Information provided for insurance team, who states could be up to 14 days for a response. Pt left detailed voicemail with this information but additionally the information that a previous note states that [employer] does not offer iter exceptions. Pt encouraged to call [facility] direct if she would like help navigating good rx and finding the best option/cost.    HARITHA Beck  Care Coordinator  [phone]

## 2022-05-20 NOTE — TELEPHONE ENCOUNTER
Delmis Willett 18 hours ago (5:10 PM)     AW    Correct the medication is covered but the copay is what it is.  There is nothing that we can do to lower it.  Only Medicare plans offer tier reductions to attempt to lower cost.  Case was canceled by insurance plan since this is not offered and the medication is covered. Different insurance plans have different copays and different deductibles.  Its up to each plan what the medication costs.

## 2022-05-25 DIAGNOSIS — N20.0 KIDNEY STONE: Primary | ICD-10-CM

## 2022-05-26 ENCOUNTER — LAB (OUTPATIENT)
Dept: LAB | Facility: CLINIC | Age: 27
End: 2022-05-26
Payer: COMMERCIAL

## 2022-05-26 ENCOUNTER — TELEPHONE (OUTPATIENT)
Dept: UROLOGY | Facility: CLINIC | Age: 27
End: 2022-05-26
Payer: COMMERCIAL

## 2022-05-26 DIAGNOSIS — N20.0 KIDNEY STONE: ICD-10-CM

## 2022-05-26 LAB
ALBUMIN UR-MCNC: NEGATIVE MG/DL
APPEARANCE UR: CLEAR
BILIRUB UR QL STRIP: NEGATIVE
COLOR UR AUTO: ABNORMAL
GLUCOSE UR STRIP-MCNC: NEGATIVE MG/DL
HGB UR QL STRIP: NEGATIVE
KETONES UR STRIP-MCNC: NEGATIVE MG/DL
LEUKOCYTE ESTERASE UR QL STRIP: NEGATIVE
MUCOUS THREADS #/AREA URNS LPF: PRESENT /LPF
NITRATE UR QL: NEGATIVE
PH UR STRIP: 7.5 [PH] (ref 5–7)
RBC URINE: <1 /HPF
SP GR UR STRIP: 1 (ref 1–1.03)
SQUAMOUS EPITHELIAL: <1 /HPF
UROBILINOGEN UR STRIP-MCNC: NORMAL MG/DL
WBC URINE: 2 /HPF

## 2022-05-26 PROCEDURE — 81001 URINALYSIS AUTO W/SCOPE: CPT

## 2022-05-26 PROCEDURE — 87086 URINE CULTURE/COLONY COUNT: CPT

## 2022-05-26 NOTE — TELEPHONE ENCOUNTER
[facility] Call Center    Phone Message    May a detailed message be left on voicemail: yes     Reason for Call: Other: . pt is calling, her kidney stone pain has gotten significantly worse, inability to sleep, pt had an US done on 5/20 ordered by , and was told  would also look at it to come up with a plan, please call Shelly, thank you!    Action Taken: Message routed to:  [facility] uro    Travel Screening: Not Applicable

## 2022-05-26 NOTE — TELEPHONE ENCOUNTER
Ebony Osborn, Brooke Person RN  Caller: Unspecified (Today,  8:21 AM)  I sent a note to Dr. CARMONA yesterday. She is just waiting to hear back. Please call her and let her know we are waiting for a response but if pain is unmanageable she should proceed to UC or ED. She was advised to start UC also, you can see if she did     Thanks   Ebony     Spoke to pt. Informed pt that provider received message and awaiting response. Advised pt to get UA/UC completed. She agrees. Provided lab phone number. Advised to go to UC or ED if pain is unmanageable. Pt confirmed that she is able to tolerate for now, but had difficulty sleeping and urinating last night. She feels better now. Pt will call clinic as needed. Will monitor for results of labs.     Brooke Perry, HARITHA MSN

## 2022-05-28 LAB — BACTERIA UR CULT: NORMAL

## 2022-06-02 ENCOUNTER — ANCILLARY PROCEDURE (OUTPATIENT)
Dept: CT IMAGING | Facility: CLINIC | Age: 27
End: 2022-06-02
Attending: UROLOGY
Payer: COMMERCIAL

## 2022-06-02 DIAGNOSIS — N20.0 RECURRENT KIDNEY STONES: ICD-10-CM

## 2022-06-02 PROCEDURE — 74176 CT ABD & PELVIS W/O CONTRAST: CPT

## 2022-06-13 ENCOUNTER — TRANSFERRED RECORDS (OUTPATIENT)
Dept: NEPHROLOGY | Facility: CLINIC | Age: 27
End: 2022-06-13

## 2022-06-13 ENCOUNTER — TRANSFERRED RECORDS (OUTPATIENT)
Dept: HEALTH INFORMATION MANAGEMENT | Facility: CLINIC | Age: 27
End: 2022-06-13

## 2022-06-23 DIAGNOSIS — E72.01 CYSTINURIA (H): ICD-10-CM

## 2022-06-24 RX ORDER — CAPTOPRIL 25 MG/1
25 TABLET ORAL
Qty: 180 TABLET | Refills: 11 | Status: SHIPPED | OUTPATIENT
Start: 2022-06-24 | End: 2022-06-28

## 2022-06-28 ENCOUNTER — VIRTUAL VISIT (OUTPATIENT)
Dept: NEPHROLOGY | Facility: CLINIC | Age: 27
End: 2022-06-28
Payer: COMMERCIAL

## 2022-06-28 ENCOUNTER — MEDICAL CORRESPONDENCE (OUTPATIENT)
Dept: HEALTH INFORMATION MANAGEMENT | Facility: CLINIC | Age: 27
End: 2022-06-28

## 2022-06-28 DIAGNOSIS — E72.01 CYSTINURIA (H): ICD-10-CM

## 2022-06-28 PROCEDURE — 99215 OFFICE O/P EST HI 40 MIN: CPT | Mod: 95 | Performed by: INTERNAL MEDICINE

## 2022-06-28 RX ORDER — CAPTOPRIL 50 MG/1
50 TABLET ORAL 3 TIMES DAILY
Qty: 270 TABLET | Refills: 3 | Status: SHIPPED | OUTPATIENT
Start: 2022-06-28 | End: 2022-12-27

## 2022-06-28 NOTE — PROGRESS NOTES
Shelly is a 26 year old who is being evaluated via a billable video visit.      How would you like to obtain your AVS? MyChart  If the video visit is dropped, the invitation should be resent by: Text to cell phone: [phone]  Will anyone else be joining your video visit? No        Video-Visit Details    Video Start Time: 3:21 PM    Type of service:  Video Visit    Video End Time:3:56 PM    Originating Location (pt. Location): Home    Distant Location (provider location):  [facility] NEPHROLOGY [facility]     Platform used for Video Visit: [employer]

## 2022-06-28 NOTE — PROGRESS NOTES
[facility] Nephrology Comprehensive Stone Clinic  06/28/2022     Shelly Cerda MRN:[id number] YOB: 1995  Primary care provider: [facility]  Requesting physician: Dr. Jayy Koehler    ASSESSMENT AND RECOMMENDATIONS:   Shelly Cerda is a 26 year old female presenting for nephrolithiasis.  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 She usually has nocturia x1 so will try to drink a glass of water when she wakes up to use the bathroom.    [facility] ordered dx medication monitoring (potassium citrate and captopril)  - had labs on 9/4/2021 - will request copy      Return in about 6 months (around 12/28/2022).     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  Managed with high fluid, potassium citrate 20 meq bid, she had allergic reaction with thiola, hives, chills, fever, swollen lymph nodes and throat was swollen.      Stone surgical history  ~ 9 surgeries  Right stone surgery 2012, Left sided stone surgery October 2017, right PCNL 3/2018 - was stone free after that procedure then presented September 2019 with right obstructing kidney stone and had URS and lithotripsy for that stone.  She was again stone free after that  operation.  May have passed stone April 2021.    Last imaging: renal ultrasound 8/3/2021 - no kidney stones  Last Surgery: 9/20/19 (I reviewed op report)  Stone burden: 2 mm lower pole right kidney (CT 5/7/2021)  Stone free on left    Last visit 12/14/2021  reviweed captopril dose of 50 mg tid and potassium citrate 20 meq bid with breakfast and lunch.  Also reviewed diet of low sodium and low protein.    Since that visit, approx May 27th 2022, she had pain in right flank. UA and urine culture were negative for infection. Ultrasound May 22 (prior to Shelly reporting pain) did not show hydro. CT June 2nd showed fews punctate stones in right kidney. No left stones, no ureteral stones.  Still having some pain, not as bad, sleeping better. Worse with lower water intake. Has not been taking pain medication but in past acetaminophen has helped.    Has been 3 years since last surgery. Has been on captopril since June 2020 and feels that kidney stone events have improved.    Doing well with drinking water - better on weekdays vs weekends. Has decreased meat intake, eats more on weekends. Exercising but gained weight. Eating out more with friends once per week.                PAST MEDICAL HISTORY:  Reviewed  Past Medical History:   Diagnosis Date     Complication of anesthesia      Cystinuria (H)      Kidney stones      PONV (postoperative nausea and vomiting)        PSH:  Reviewed  Past Surgical History:   Procedure Laterality Date     APPENDECTOMY  9/2011     COMBINED CYSTOSCOPY, RETROGRADES, EXCHANGE STENT URETER(S) Right 9/15/2019    Procedure: CYSTOSCOPY, WITH RIGHT RETROGRADE PYELOGRAM AND RIGHT URETERAL STENT PLACEMENT;  Surgeon: Kasi Wade MD;  [facility]     COMBINED CYSTOSCOPY, RETROGRADES, URETEROSCOPY, INSERT STENT Left 10/12/2017    Procedure: COMBINED CYSTOSCOPY, RETROGRADES, URETEROSCOPY, INSERT STENT;  COMBINED CYSTOSCOPY, RETROGRADES, URETEROSCOPY, LASER HOLMIUM STANDBY,  INSERT STENT LEFT  URETER;  Surgeon: Luis Banda MD;  Location: [facility] OR     CYSTOSCOPY       LASER HOLMIUM LITHOTRIPSY URETER(S), INSERT STENT, COMBINED  9/4/2012    Procedure: COMBINED CYSTOSCOPY, URETEROSCOPY, LASER HOLMIUM LITHOTRIPSY URETER(S), INSERT STENT;  Holmium Laser Lithotripsy.  Right Stent Placement;  Surgeon: Cathi Hernandez MD;  [facility] OR     LASER HOLMIUM LITHOTRIPSY URETER(S), INSERT STENT, COMBINED Right 9/20/2019    Procedure: Cystoscopy, Right Ureteroscopy, Laser Lithotripsy,  no ureteral stent placed.;  Surgeon: Jayy Koehler MD;  [facility]     PERCUTANEOUS NEPHROLITHOTOMY Right 3/8/2018    Procedure: PERCUTANEOUS NEPHROLITHOTOMY;  Right Percutaneous Nephrolithotomy;  Surgeon: Jayy Koehler MD;  [facility] OR     Ureteral stent placement with subsequent removal.  2017        MEDICATIONS:  Current Outpatient Medications   Medication Sig Dispense Refill     azelastine (ASTEPRO) 0.15 % nasal spray        captopril (CAPOTEN) 25 MG tablet Take 1 tablet (25 mg) by mouth 6 times daily 180 tablet 11     KURVELO 0.15-30 MG-MCG tablet        levocetirizine (XYZAL) 5 MG tablet  (Patient not taking: Reported on 12/14/2021)       potassium citrate (UROCIT-K) 10 MEQ (1080 MG) CR tablet Take 2 tablets (20 mEq) by mouth 2 times daily 360 tablet 3     tamsulosin (FLOMAX) 0.4 MG capsule Take 1 capsule (0.4 mg) by mouth daily 30 capsule 0        ALLERGIES:    Allergies   Allergen Reactions     Thiola [Tiopronin] Rash     Fevers, lymphadenopathy, swelling in throat area       REVIEW OF SYSTEMS:  A 10 point review of systems was negative except as noted above.    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 Not Asked   Social History Narrative    Shelly lives with both parents in [place]. Working as .  Hoping to buy house in 2021.    Sexually active with men and dating.     Social Determinants of Health     Financial Resource Strain: Not on file   Food Insecurity: Not on file   Transportation Needs: Not on file   Physical Activity: Not on file   Stress: Not on file   Social Connections: Not on file   Intimate Partner Violence: Not on file   Housing Stability: Not on file   works out 5 days per week    FAMILY MEDICAL HISTORY:   Family History   Problem Relation Age of Onset     Genetic Disorder Sister         Cystinuria       PHYSICAL EXAM:   There were no vitals taken for this visit.     Exam limited by video appointment. Within those limitations:  GENERAL APPEARANCE: alert and no distress  RESP: no conversational dyspnea  Psych: pleasant, normal mental status    LABS:   I reviewed:  Electrolytes/Renal -   Recent Labs   Lab Test 08/18/20  1524 03/20/18  1222 03/09/18  2300    137 132*   POTASSIUM 3.7 4.0 3.8   CHLORIDE 107 103 100   CO2 26 26 25   BUN 12 10 14   CR 0.96 0.86 0.94   * 88 105*   VALERIANO 9.1 9.3 8.8       CBC -   Recent Labs   Lab Test 08/18/20  1524 03/09/18  2300 03/09/18  0441   WBC 6.7 12.1* 13.7*   HGB 14.1 12.4 12.5    287 258       LFTs -   Recent Labs   Lab Test 08/18/20  1524 12/19/17  1122 08/06/17  0502   ALKPHOS 57 52 61   BILITOTAL 0.4 0.6 0.3   ALT 21 19 31   AST 18 16 31   PROTTOTAL 7.9 7.9 7.7   ALBUMIN 4.0 4.2 4.2       Coags - No lab results found.    Iron Panel - No lab results found.    Endocrine - No lab results found.    IMAGING:  See HPI    Alis Gonzalez,  MD

## 2022-06-28 NOTE — PATIENT INSTRUCTIONS
Keep up the good work!  More water, especially overnight, would help    Ultrasound Oct 2022 - will review on mychart    [facility] - cystine -  Nov 2022 - will send now and you can store the kit.    Return in about 6 months (around 12/28/2022).     Alis Gonzalez MD

## 2022-06-28 NOTE — LETTER
6/28/2022       RE: Shelly Cerda  [address]  [place] [address]     Dear Colleague,    Thank you for referring your patient, Shelly Cerda, to the [facility] NEPHROLOGY [facility] [place] at [facility]. Please see a copy of my visit note below.    [facility] Nephrology Comprehensive Stone Clinic  06/28/2022     Shelly Cerda MRN:[id number] YOB: 1995  Primary care provider: [facility]  Requesting physician: Dr. Jayy Koehler    ASSESSMENT AND RECOMMENDATIONS:   Shelly Cerda is a 26 year old female presenting for nephrolithiasis.  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 She usually has nocturia x1 so will try to drink a glass of water when she wakes up to use the bathroom.    [facility] ordered dx medication monitoring (potassium citrate and captopril)  - had labs on 9/4/2021 - will request copy      Return in about 6 months (around 12/28/2022).     40 minutes spent on visit, meeting with Shelly Cerda and in chart review and documentation on date of encounter, 06/28/22       Alis Gonzalez MD  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  Managed with high fluid, potassium citrate 20 meq bid, she had allergic reaction with thiola, hives, chills, fever, swollen lymph nodes and throat was  swollen.      Stone surgical history  ~ 9 surgeries  Right stone surgery 2012, Left sided stone surgery October 2017, right PCNL 3/2018 - was stone free after that procedure then presented September 2019 with right obstructing kidney stone and had URS and lithotripsy for that stone.  She was again stone free after that operation.  May have passed stone April 2021.    Last imaging: renal ultrasound 8/3/2021 - no kidney stones  Last Surgery: 9/20/19 (I reviewed op report)  Stone burden: 2 mm lower pole right kidney (CT 5/7/2021)  Stone free on left    Last visit 12/14/2021  reviweed captopril dose of 50 mg tid and potassium citrate 20 meq bid with breakfast and lunch.  Also reviewed diet of low sodium and low protein.    Since that visit, approx May 27th 2022, she had pain in right flank. UA and urine culture were negative for infection. Ultrasound May 22 (prior to Shelly reporting pain) did not show hydro. CT June 2nd showed fews punctate stones in right kidney. No left stones, no ureteral stones.  Still having some pain, not as bad, sleeping better. Worse with lower water intake. Has not been taking pain medication but in past acetaminophen has helped.    Has been 3 years since last surgery. Has been on captopril since June 2020 and feels that kidney stone events have improved.    Doing well with drinking water - better on weekdays vs weekends. Has decreased meat intake, eats more on weekends. Exercising but gained weight. Eating out more with friends once per week.                PAST MEDICAL HISTORY:  Reviewed  Past Medical History:   Diagnosis Date     Complication of anesthesia      Cystinuria (H)      Kidney stones      PONV (postoperative nausea and vomiting)        PSH:  Reviewed  Past Surgical History:   Procedure Laterality Date     APPENDECTOMY  9/2011     COMBINED CYSTOSCOPY, RETROGRADES, EXCHANGE STENT URETER(S) Right 9/15/2019    Procedure: CYSTOSCOPY, WITH RIGHT RETROGRADE PYELOGRAM AND RIGHT URETERAL  STENT PLACEMENT;  Surgeon: Kasi Wade MD;  [facility]     COMBINED CYSTOSCOPY, RETROGRADES, URETEROSCOPY, INSERT STENT Left 10/12/2017    Procedure: COMBINED CYSTOSCOPY, RETROGRADES, URETEROSCOPY, INSERT STENT;  COMBINED CYSTOSCOPY, RETROGRADES, URETEROSCOPY, LASER HOLMIUM STANDBY,  INSERT STENT LEFT URETER;  Surgeon: Luis Banda MD;  Location: [facility] OR     CYSTOSCOPY       LASER HOLMIUM LITHOTRIPSY URETER(S), INSERT STENT, COMBINED  9/4/2012    Procedure: COMBINED CYSTOSCOPY, URETEROSCOPY, LASER HOLMIUM LITHOTRIPSY URETER(S), INSERT STENT;  Holmium Laser Lithotripsy.  Right Stent Placement;  Surgeon: Cathi Hernandez MD;  [facility]     LASER HOLMIUM LITHOTRIPSY URETER(S), INSERT STENT, COMBINED Right 9/20/2019    Procedure: Cystoscopy, Right Ureteroscopy, Laser Lithotripsy,  no ureteral stent placed.;  Surgeon: Jayy Koehler MD;  [facility]     PERCUTANEOUS NEPHROLITHOTOMY Right 3/8/2018    Procedure: PERCUTANEOUS NEPHROLITHOTOMY;  Right Percutaneous Nephrolithotomy;  Surgeon: Jayy Koehler MD;  [facility]     Ureteral stent placement with subsequent removal.  2017        MEDICATIONS:  Current Outpatient Medications   Medication Sig Dispense Refill     azelastine (ASTEPRO) 0.15 % nasal spray        captopril (CAPOTEN) 25 MG tablet Take 1 tablet (25 mg) by mouth 6 times daily 180 tablet 11     KURVELO 0.15-30 MG-MCG tablet        levocetirizine (XYZAL) 5 MG tablet  (Patient not taking: Reported on 12/14/2021)       potassium citrate (UROCIT-K) 10 MEQ (1080 MG) CR tablet Take 2 tablets (20 mEq) by mouth 2 times daily 360 tablet 3     tamsulosin (FLOMAX) 0.4 MG capsule Take 1 capsule (0.4 mg) by mouth daily 30 capsule 0        ALLERGIES:    Allergies   Allergen Reactions     Thiola [Tiopronin] Rash     Fevers, lymphadenopathy, swelling in throat area       REVIEW OF SYSTEMS:  A 10 point review of systems was negative except as noted above.    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 Not Asked   Social History Narrative    Shelly lives with both parents in [place]. Working as .  Hoping to buy house in 2021.    Sexually active with men and dating.     Social Determinants of Health     Financial Resource Strain: Not on file   Food Insecurity: Not on file   Transportation Needs: Not on file   Physical Activity: Not on file   Stress: Not on file   Social Connections: Not on file   Intimate Partner Violence: Not on file   Housing Stability: Not on file   works out 5 days per week    FAMILY MEDICAL HISTORY:   Family History   Problem Relation Age of Onset     Genetic Disorder Sister         Cystinuria       PHYSICAL EXAM:   There were no vitals taken for this visit.     Exam limited by video appointment. Within those limitations:  GENERAL APPEARANCE: alert and no distress  RESP: no conversational dyspnea  Psych: pleasant, normal mental status    LABS:   I reviewed:  Electrolytes/Renal -   Recent Labs   Lab Test 08/18/20  1524 03/20/18  1222 03/09/18  2300    137 132*   POTASSIUM 3.7 4.0 3.8   CHLORIDE 107 103 100   CO2 26 26 25   BUN 12 10 14   CR 0.96 0.86 0.94   * 88 105*   VALERIANO 9.1 9.3 8.8       CBC -   Recent Labs   Lab Test 08/18/20  1524 03/09/18  2300 03/09/18  0441   WBC 6.7 12.1* 13.7*   HGB 14.1 12.4 12.5    287 258       LFTs -   Recent Labs   Lab Test  08/18/20  1524 12/19/17  1122 08/06/17  0502   ALKPHOS 57 52 61   BILITOTAL 0.4 0.6 0.3   ALT 21 19 31   AST 18 16 31   PROTTOTAL 7.9 7.9 7.7   ALBUMIN 4.0 4.2 4.2       Coags - No lab results found.    Iron Panel - No lab results found.    Endocrine - No lab results found.    IMAGING:  See HPI    Alis Gonzalez MD

## 2022-09-02 DIAGNOSIS — N20.1 URETEROLITHIASIS: ICD-10-CM

## 2022-09-07 RX ORDER — POTASSIUM CITRATE 10 MEQ/1
TABLET, EXTENDED RELEASE ORAL
Qty: 360 TABLET | Refills: 3 | Status: SHIPPED | OUTPATIENT
Start: 2022-09-07 | End: 2022-12-27

## 2022-09-26 ENCOUNTER — MYC MEDICAL ADVICE (OUTPATIENT)
Dept: NEPHROLOGY | Facility: CLINIC | Age: 27
End: 2022-09-26

## 2022-09-26 ENCOUNTER — HOSPITAL ENCOUNTER (OUTPATIENT)
Dept: ULTRASOUND IMAGING | Facility: CLINIC | Age: 27
Discharge: HOME | End: 2022-09-26
Attending: INTERNAL MEDICINE | Admitting: INTERNAL MEDICINE
Payer: COMMERCIAL

## 2022-09-26 DIAGNOSIS — E72.01 CYSTINURIA (H): ICD-10-CM

## 2022-09-26 PROCEDURE — 76770 US EXAM ABDO BACK WALL COMP: CPT

## 2022-09-27 ENCOUNTER — PATIENT OUTREACH (OUTPATIENT)
Dept: UROLOGY | Facility: CLINIC | Age: 27
End: 2022-09-27

## 2022-09-27 ENCOUNTER — HOSPITAL ENCOUNTER (OUTPATIENT)
Dept: CT IMAGING | Facility: CLINIC | Age: 27
Discharge: HOME | End: 2022-09-27
Attending: INTERNAL MEDICINE | Admitting: INTERNAL MEDICINE
Payer: COMMERCIAL

## 2022-09-27 DIAGNOSIS — N20.0 KIDNEY STONE: ICD-10-CM

## 2022-09-27 DIAGNOSIS — N20.0 KIDNEY STONE: Primary | ICD-10-CM

## 2022-09-27 PROCEDURE — 74176 CT ABD & PELVIS W/O CONTRAST: CPT

## 2022-09-27 NOTE — TELEPHONE ENCOUNTER
Per Dr. Gonzalez, New hydronephrosis on the right - no ureteral stone identified on ultrasound. Recommend CT scan      Pt agreeable to plan, imaging ordered. Will call to schedule for today or tomorrow

## 2022-09-28 ENCOUNTER — DOCUMENTATION ONLY (OUTPATIENT)
Dept: OTHER | Facility: CLINIC | Age: 27
End: 2022-09-28

## 2022-09-30 DIAGNOSIS — N20.0 KIDNEY STONE: Primary | ICD-10-CM

## 2022-09-30 NOTE — PROGRESS NOTES
CT reviewed - 1.4 cm right renal pelvis, mild symptoms of flank pain but no UTI symptoms.    We discussed the surgical treatment options for renal stones including shock wave lithotripsy, ureteroscopy, and percutaneous nephrolithotomy.  We discussed the risks and benefits of each approach in the context of the patient's current stone burden.  After consideration of each the decision was made to proceed with ureteroscopy     We discussed the risks of bleeding, infection, incomplete stone removal, damage to adjacent organs, and need for staged procedures.

## 2022-09-30 NOTE — H&P (VIEW-ONLY)
CT reviewed - 1.4 cm right renal pelvis, mild symptoms of flank pain but no UTI symptoms.    We discussed the surgical treatment options for renal stones including shock wave lithotripsy, ureteroscopy, and percutaneous nephrolithotomy.  We discussed the risks and benefits of each approach in the context of the patient's current stone burden.  After consideration of each the decision was made to proceed with ureteroscopy     We discussed the risks of bleeding, infection, incomplete stone removal, damage to adjacent organs, and need for staged procedures.       [Follow-up Visit ___] : a follow-up visit  for [unfilled]

## 2022-10-05 ENCOUNTER — PATIENT OUTREACH (OUTPATIENT)
Dept: UROLOGY | Facility: CLINIC | Age: 27
End: 2022-10-05

## 2022-10-05 ENCOUNTER — LAB (OUTPATIENT)
Dept: LAB | Facility: CLINIC | Age: 27
End: 2022-10-05
Payer: COMMERCIAL

## 2022-10-05 ENCOUNTER — TELEPHONE (OUTPATIENT)
Dept: UROLOGY | Facility: CLINIC | Age: 27
End: 2022-10-05

## 2022-10-05 DIAGNOSIS — N39.0 URINARY TRACT INFECTION: ICD-10-CM

## 2022-10-05 DIAGNOSIS — N20.0 KIDNEY STONE: ICD-10-CM

## 2022-10-05 DIAGNOSIS — N20.0 KIDNEY STONE: Primary | ICD-10-CM

## 2022-10-05 LAB
ALBUMIN UR-MCNC: NEGATIVE MG/DL
APPEARANCE UR: CLEAR
BILIRUB UR QL STRIP: NEGATIVE
COLOR UR AUTO: ABNORMAL
GLUCOSE UR STRIP-MCNC: NEGATIVE MG/DL
HGB UR QL STRIP: ABNORMAL
KETONES UR STRIP-MCNC: NEGATIVE MG/DL
LEUKOCYTE ESTERASE UR QL STRIP: NEGATIVE
MUCOUS THREADS #/AREA URNS LPF: PRESENT /LPF
NITRATE UR QL: NEGATIVE
PH UR STRIP: 7.5 [PH] (ref 5–7)
RBC URINE: 0 /HPF
SP GR UR STRIP: 1 (ref 1–1.03)
SQUAMOUS EPITHELIAL: <1 /HPF
UROBILINOGEN UR STRIP-MCNC: NORMAL MG/DL
WBC URINE: 0 /HPF

## 2022-10-05 PROCEDURE — 81001 URINALYSIS AUTO W/SCOPE: CPT

## 2022-10-05 PROCEDURE — 87086 URINE CULTURE/COLONY COUNT: CPT

## 2022-10-05 NOTE — TELEPHONE ENCOUNTER
Spoke with: Patient      Date of surgery: Wednesday Oct 12 2022      Location: [facility]       Informed patient they will need a adult : Yes       Pre op with provider: Dr Koehler will do DOS       H&P Scheduled in PAC- NA         Pre procedure covid : Patient knows to do a home covid test 1-2 days prior to surgery and take photo of Neg test and bring in with her day of surgery       Additional imaging: NA         Surgery Packet : Forward to Haydee WICK      Additional comments: Please call patient with surgery teaching. Patient would like to a nurse re: pain management

## 2022-10-05 NOTE — TELEPHONE ENCOUNTER
Pt states she had atypical urination this weekend- voiding small amounts and feels like she had troule starting stream at times. Pt agreeable to complete UA UC today to rule out infection. She states she does not have a history of UTIs with stones.     Pt states she had discomfort this weekend, a dull ache in kidney. It has much resolved with reduction in activity, she is taking tylenol and using heating pad. Pt advised to add in ibuprofen if not managed with tylenol.     Pre Op Teaching Flowsheet       Pre and Post op Patient Education  Relevant Diagnosis:  Renal stone  Surgical procedure:  Laser litho  Teaching Topic:  Pre and post op teaching  Person Involved in teaching: Yes    Motivation Level:  Asks Questions: Yes  Eager to Learn: Yes  Cooperative: Yes  Receptive (willing/able to accept information):  Yes    Patient demonstrates understanding of the following:  Date of surgery:  10/12  Location of surgery:  [facility] - 5th Floor  History and Physical and any other testing necessary prior to surgery: Yes  Required time line for completion of History and Physical and any pre-op testing: Yes    Patient demonstrates understanding of the following:  Pre-op bowel prep:  N/A  Pre-op showering/scrub information with PCMX Soap: Yes  Blood thinner medications discussed and when to stop (if applicable):  N/A  Discussed no visitor's at this time due to increase Covid-19 cases and how we need to make sure everyone stays safe.    Infection Prevention:   Patient demonstrates understanding of the following:  Surgical procedure site care taught: Yes  Signs and symptoms of infection taught: Yes      Post-op follow-up:  Discussed how to contact the hospital, nurse, and clinic scheduling staff if necessary. (See packet information)    Instructional materials used/given/mailed:  [facility] [facility] Packet, post op teaching sheet, Map, Soap, and with the arrival/location information to  come closer to the surgery date.    Surgical instructions packet given to patient in office:  N/A    Follow up: Discussed arranging for someone to drive you home. ( No public transportation)  Someone needed to stay the first twenty hours after surgery: Yes     referral: no     home:  yes    Care Giver:  yes    PCP:  Yes, Pt does not need pre-op, Dr. CARMONA will update DOS

## 2022-10-06 NOTE — TELEPHONE ENCOUNTER
Pt phoned for update. She is feeling okay, keeping activity low. Pt provided urine results, looks good, no signs of infection. Pt states reassurance    Will follow as planned

## 2022-10-07 LAB — BACTERIA UR CULT: NO GROWTH

## 2022-10-11 ENCOUNTER — ANESTHESIA EVENT (OUTPATIENT)
Dept: SURGERY | Facility: AMBULATORY SURGERY CENTER | Age: 27
End: 2022-10-11
Payer: COMMERCIAL

## 2022-10-12 ENCOUNTER — ANCILLARY PROCEDURE (OUTPATIENT)
Dept: RADIOLOGY | Facility: AMBULATORY SURGERY CENTER | Age: 27
End: 2022-10-12
Attending: UROLOGY
Payer: COMMERCIAL

## 2022-10-12 ENCOUNTER — HOSPITAL ENCOUNTER (OUTPATIENT)
Facility: AMBULATORY SURGERY CENTER | Age: 27
Discharge: HOME | End: 2022-10-12
Attending: UROLOGY
Payer: COMMERCIAL

## 2022-10-12 ENCOUNTER — ANESTHESIA (OUTPATIENT)
Dept: SURGERY | Facility: AMBULATORY SURGERY CENTER | Age: 27
End: 2022-10-12
Payer: COMMERCIAL

## 2022-10-12 ENCOUNTER — TELEPHONE (OUTPATIENT)
Dept: UROLOGY | Facility: CLINIC | Age: 27
End: 2022-10-12

## 2022-10-12 VITALS
TEMPERATURE: 97 F | BODY MASS INDEX: 32.07 KG/M2 | HEIGHT: 63 IN | SYSTOLIC BLOOD PRESSURE: 124 MMHG | WEIGHT: 181 LBS | RESPIRATION RATE: 16 BRPM | DIASTOLIC BLOOD PRESSURE: 72 MMHG | HEART RATE: 125 BPM | OXYGEN SATURATION: 96 %

## 2022-10-12 DIAGNOSIS — G89.18 POSTOPERATIVE PAIN: Primary | ICD-10-CM

## 2022-10-12 DIAGNOSIS — N20.0 KIDNEY STONE: Primary | ICD-10-CM

## 2022-10-12 DIAGNOSIS — R39.89 URINARY PROBLEM: ICD-10-CM

## 2022-10-12 PROCEDURE — 82365 CALCULUS SPECTROSCOPY: CPT | Mod: 90 | Performed by: PATHOLOGY

## 2022-10-12 PROCEDURE — 52356 CYSTO/URETERO W/LITHOTRIPSY: CPT | Mod: RT | Performed by: UROLOGY

## 2022-10-12 PROCEDURE — 74420 UROGRAPHY RTRGR +-KUB: CPT | Mod: 26 | Performed by: UROLOGY

## 2022-10-12 PROCEDURE — 52356 CYSTO/URETERO W/LITHOTRIPSY: CPT | Mod: RT

## 2022-10-12 PROCEDURE — 99000 SPECIMEN HANDLING OFFICE-LAB: CPT | Performed by: PATHOLOGY

## 2022-10-12 PROCEDURE — 74420 UROGRAPHY RTRGR +-KUB: CPT | Mod: TC

## 2022-10-12 DEVICE — IMPLANTABLE DEVICE: Type: IMPLANTABLE DEVICE | Site: ABDOMEN | Status: FUNCTIONAL

## 2022-10-12 RX ORDER — FENTANYL CITRATE 50 UG/ML
25 INJECTION, SOLUTION INTRAMUSCULAR; INTRAVENOUS EVERY 5 MIN PRN
Status: DISCONTINUED | OUTPATIENT
Start: 2022-10-12 | End: 2022-10-12 | Stop reason: HOSPADM

## 2022-10-12 RX ORDER — PHENAZOPYRIDINE HYDROCHLORIDE 200 MG/1
200 TABLET, FILM COATED ORAL 3 TIMES DAILY PRN
Qty: 21 TABLET | Refills: 0 | Status: SHIPPED | OUTPATIENT
Start: 2022-10-12 | End: 2022-10-19

## 2022-10-12 RX ORDER — DEXAMETHASONE SODIUM PHOSPHATE 4 MG/ML
INJECTION, SOLUTION INTRA-ARTICULAR; INTRALESIONAL; INTRAMUSCULAR; INTRAVENOUS; SOFT TISSUE PRN
Status: DISCONTINUED | OUTPATIENT
Start: 2022-10-12 | End: 2022-10-12

## 2022-10-12 RX ORDER — MEPERIDINE HYDROCHLORIDE 25 MG/ML
12.5 INJECTION INTRAMUSCULAR; INTRAVENOUS; SUBCUTANEOUS
Status: DISCONTINUED | OUTPATIENT
Start: 2022-10-12 | End: 2022-10-13 | Stop reason: HOSPADM

## 2022-10-12 RX ORDER — ONDANSETRON 2 MG/ML
4 INJECTION INTRAMUSCULAR; INTRAVENOUS EVERY 30 MIN PRN
Status: DISCONTINUED | OUTPATIENT
Start: 2022-10-12 | End: 2022-10-13 | Stop reason: HOSPADM

## 2022-10-12 RX ORDER — PROPOFOL 10 MG/ML
INJECTION, EMULSION INTRAVENOUS CONTINUOUS PRN
Status: DISCONTINUED | OUTPATIENT
Start: 2022-10-12 | End: 2022-10-12

## 2022-10-12 RX ORDER — SODIUM CHLORIDE, SODIUM LACTATE, POTASSIUM CHLORIDE, CALCIUM CHLORIDE 600; 310; 30; 20 MG/100ML; MG/100ML; MG/100ML; MG/100ML
INJECTION, SOLUTION INTRAVENOUS CONTINUOUS
Status: DISCONTINUED | OUTPATIENT
Start: 2022-10-12 | End: 2022-10-13 | Stop reason: HOSPADM

## 2022-10-12 RX ORDER — CEFAZOLIN SODIUM 2 G/50ML
2 SOLUTION INTRAVENOUS
Status: COMPLETED | OUTPATIENT
Start: 2022-10-12 | End: 2022-10-12

## 2022-10-12 RX ORDER — ONDANSETRON 4 MG/1
4 TABLET, ORALLY DISINTEGRATING ORAL EVERY 30 MIN PRN
Status: DISCONTINUED | OUTPATIENT
Start: 2022-10-12 | End: 2022-10-13 | Stop reason: HOSPADM

## 2022-10-12 RX ORDER — PROPOFOL 10 MG/ML
INJECTION, EMULSION INTRAVENOUS PRN
Status: DISCONTINUED | OUTPATIENT
Start: 2022-10-12 | End: 2022-10-12

## 2022-10-12 RX ORDER — ACETAMINOPHEN 325 MG/1
975 TABLET ORAL ONCE
Status: COMPLETED | OUTPATIENT
Start: 2022-10-12 | End: 2022-10-12

## 2022-10-12 RX ORDER — LIDOCAINE HYDROCHLORIDE 20 MG/ML
INJECTION, SOLUTION INFILTRATION; PERINEURAL PRN
Status: DISCONTINUED | OUTPATIENT
Start: 2022-10-12 | End: 2022-10-12

## 2022-10-12 RX ORDER — ONDANSETRON 2 MG/ML
INJECTION INTRAMUSCULAR; INTRAVENOUS PRN
Status: DISCONTINUED | OUTPATIENT
Start: 2022-10-12 | End: 2022-10-12

## 2022-10-12 RX ORDER — SCOLOPAMINE TRANSDERMAL SYSTEM 1 MG/1
1 PATCH, EXTENDED RELEASE TRANSDERMAL
Status: DISCONTINUED | OUTPATIENT
Start: 2022-10-12 | End: 2022-10-13 | Stop reason: HOSPADM

## 2022-10-12 RX ORDER — TAMSULOSIN HYDROCHLORIDE 0.4 MG/1
0.4 CAPSULE ORAL DAILY
Qty: 21 CAPSULE | Refills: 0 | Status: SHIPPED | OUTPATIENT
Start: 2022-10-12 | End: 2022-12-27

## 2022-10-12 RX ORDER — OXYBUTYNIN CHLORIDE 5 MG/1
5 TABLET ORAL 3 TIMES DAILY
Status: DISCONTINUED | OUTPATIENT
Start: 2022-10-12 | End: 2022-10-13 | Stop reason: HOSPADM

## 2022-10-12 RX ORDER — HYDROMORPHONE HYDROCHLORIDE 1 MG/ML
0.2 INJECTION, SOLUTION INTRAMUSCULAR; INTRAVENOUS; SUBCUTANEOUS EVERY 5 MIN PRN
Status: DISCONTINUED | OUTPATIENT
Start: 2022-10-12 | End: 2022-10-12 | Stop reason: HOSPADM

## 2022-10-12 RX ORDER — OXYBUTYNIN CHLORIDE 5 MG/1
5 TABLET ORAL 3 TIMES DAILY
Qty: 21 TABLET | Refills: 0 | Status: SHIPPED | OUTPATIENT
Start: 2022-10-12 | End: 2022-10-19

## 2022-10-12 RX ORDER — FENTANYL CITRATE 50 UG/ML
25 INJECTION, SOLUTION INTRAMUSCULAR; INTRAVENOUS
Status: DISCONTINUED | OUTPATIENT
Start: 2022-10-12 | End: 2022-10-13 | Stop reason: HOSPADM

## 2022-10-12 RX ORDER — OXYCODONE HYDROCHLORIDE 5 MG/1
5 TABLET ORAL EVERY 4 HOURS PRN
Status: DISCONTINUED | OUTPATIENT
Start: 2022-10-12 | End: 2022-10-13 | Stop reason: HOSPADM

## 2022-10-12 RX ORDER — LIDOCAINE 40 MG/G
CREAM TOPICAL
Status: DISCONTINUED | OUTPATIENT
Start: 2022-10-12 | End: 2022-10-12 | Stop reason: HOSPADM

## 2022-10-12 RX ORDER — SODIUM CHLORIDE, SODIUM LACTATE, POTASSIUM CHLORIDE, CALCIUM CHLORIDE 600; 310; 30; 20 MG/100ML; MG/100ML; MG/100ML; MG/100ML
INJECTION, SOLUTION INTRAVENOUS CONTINUOUS
Status: DISCONTINUED | OUTPATIENT
Start: 2022-10-12 | End: 2022-10-12 | Stop reason: HOSPADM

## 2022-10-12 RX ORDER — CEFAZOLIN SODIUM 2 G/50ML
2 SOLUTION INTRAVENOUS SEE ADMIN INSTRUCTIONS
Status: DISCONTINUED | OUTPATIENT
Start: 2022-10-12 | End: 2022-10-12 | Stop reason: HOSPADM

## 2022-10-12 RX ORDER — FENTANYL CITRATE 50 UG/ML
INJECTION, SOLUTION INTRAMUSCULAR; INTRAVENOUS PRN
Status: DISCONTINUED | OUTPATIENT
Start: 2022-10-12 | End: 2022-10-12

## 2022-10-12 RX ADMIN — DEXAMETHASONE SODIUM PHOSPHATE 8 MG: 4 INJECTION, SOLUTION INTRA-ARTICULAR; INTRALESIONAL; INTRAMUSCULAR; INTRAVENOUS; SOFT TISSUE at 10:42

## 2022-10-12 RX ADMIN — FENTANYL CITRATE 50 MCG: 50 INJECTION, SOLUTION INTRAMUSCULAR; INTRAVENOUS at 10:43

## 2022-10-12 RX ADMIN — OXYBUTYNIN CHLORIDE 5 MG: 5 TABLET ORAL at 12:40

## 2022-10-12 RX ADMIN — FENTANYL CITRATE 25 MCG: 50 INJECTION, SOLUTION INTRAMUSCULAR; INTRAVENOUS at 11:36

## 2022-10-12 RX ADMIN — PROPOFOL 200 MCG/KG/MIN: 10 INJECTION, EMULSION INTRAVENOUS at 11:12

## 2022-10-12 RX ADMIN — OXYCODONE HYDROCHLORIDE 5 MG: 5 TABLET ORAL at 12:39

## 2022-10-12 RX ADMIN — FENTANYL CITRATE 25 MCG: 50 INJECTION, SOLUTION INTRAMUSCULAR; INTRAVENOUS at 12:51

## 2022-10-12 RX ADMIN — SCOLOPAMINE TRANSDERMAL SYSTEM 1 PATCH: 1 PATCH, EXTENDED RELEASE TRANSDERMAL at 09:18

## 2022-10-12 RX ADMIN — FENTANYL CITRATE 25 MCG: 50 INJECTION, SOLUTION INTRAMUSCULAR; INTRAVENOUS at 12:57

## 2022-10-12 RX ADMIN — PROPOFOL 200 MG: 10 INJECTION, EMULSION INTRAVENOUS at 10:38

## 2022-10-12 RX ADMIN — LIDOCAINE HYDROCHLORIDE 100 MG: 20 INJECTION, SOLUTION INFILTRATION; PERINEURAL at 10:36

## 2022-10-12 RX ADMIN — FENTANYL CITRATE 25 MCG: 50 INJECTION, SOLUTION INTRAMUSCULAR; INTRAVENOUS at 13:02

## 2022-10-12 RX ADMIN — PROPOFOL 200 MG: 10 INJECTION, EMULSION INTRAVENOUS at 10:37

## 2022-10-12 RX ADMIN — ACETAMINOPHEN 975 MG: 325 TABLET ORAL at 09:19

## 2022-10-12 RX ADMIN — CEFAZOLIN SODIUM 2 G: 2 SOLUTION INTRAVENOUS at 10:28

## 2022-10-12 RX ADMIN — LIDOCAINE HYDROCHLORIDE 100 MG: 20 INJECTION, SOLUTION INFILTRATION; PERINEURAL at 10:37

## 2022-10-12 RX ADMIN — SODIUM CHLORIDE, SODIUM LACTATE, POTASSIUM CHLORIDE, CALCIUM CHLORIDE: 600; 310; 30; 20 INJECTION, SOLUTION INTRAVENOUS at 09:27

## 2022-10-12 RX ADMIN — PROPOFOL 200 MCG/KG/MIN: 10 INJECTION, EMULSION INTRAVENOUS at 10:37

## 2022-10-12 RX ADMIN — FENTANYL CITRATE 25 MCG: 50 INJECTION, SOLUTION INTRAMUSCULAR; INTRAVENOUS at 12:42

## 2022-10-12 RX ADMIN — ONDANSETRON 4 MG: 2 INJECTION INTRAMUSCULAR; INTRAVENOUS at 10:42

## 2022-10-12 RX ADMIN — FENTANYL CITRATE 25 MCG: 50 INJECTION, SOLUTION INTRAMUSCULAR; INTRAVENOUS at 10:37

## 2022-10-12 RX ADMIN — FENTANYL CITRATE 25 MCG: 50 INJECTION, SOLUTION INTRAMUSCULAR; INTRAVENOUS at 10:50

## 2022-10-12 NOTE — BRIEF OP NOTE
[facility]    Brief Operative Note    Pre-operative diagnosis: Kidney stone [N20.0]  Post-operative diagnosis Same as pre-operative diagnosis    Procedure: Procedure(s):  CYSTOURETEROSCOPY, WITH RIGHT RETROGRADE PYELOGRAM, HOLMIUM LASER LITHOTRIPSY, STENT INSERTION  Surgeon: Surgeon(s) and Role:     * Jayy Koehler MD - Primary     * German Horne MD - Resident - Assisting  Anesthesia: General   Estimated Blood Loss: Minimal    Drains: None  Specimens:   ID Type Source Tests Collected by Time Destination   A : Right Stone analysis Calculus/Stone Kidney, Right STONE ANALYSIS Jayy Koehler MD 10/12/2022 12:00 PM      Findings:   Right kidney stone free at the end of the case .  Complications: None.  Implants:   Implant Name Type Inv. Item Serial No.  Lot No. LRB No. Used Action   Wire guide with hydrophiic coating     [employer] [id number] Right 1 Wasted   [employer] WIre Guide with hydrophilic coating     [employer] [id number] Right 1 Implanted

## 2022-10-12 NOTE — OP NOTE
OPERATIVE REPORT    PREOPERATIVE DIAGNOSIS:  Nephrolithiasis     POSTOPERATIVE DIAGNOSIS:  Same    PROCEDURES PERFORMED:   -Cystourethroscopy  -Right retrograde pyelogram with intraoperative interpretation of images  -Right ureteroscopy  -Right holmium laser lithotripsy  -Right basket stone retrieval  -Right ureteral stent placement    STAFF SURGEON: Surgeon(s):  Jayy Koehlre MD    RESIDENT(S): German Horne MD    ANESTHESIA: General    ESTIMATED BLOOD LOSS: minimal    DRAINS/TUBES: 6 Belgian x 24cm silicone double-J ureteral stent     IV FLUIDS: Please see dictated anesthesia record  COMPLICATIONS: None.   SPECIMEN:   ID Type Source Tests Collected by Time Destination   A : Right Stone analysis Calculus/Stone Kidney, Right STONE ANALYSIS Jayy Koehler MD 10/12/2022 12:00 PM        SIGNIFICANT FINDINGS:   1. Patient is stone free in right upper tract  2. Proximal curl of the ureteral stent seen in the renal pelvis under fluoroscopy and distal curl seen in the bladder under direct vision.     BRIEF OPERATIVE INDICATIONS: Shelly Cerda is a(n) 27 year old female with history of cystine stones who was found to have a 1.4cm right renal pelvis stone..  After a discussion of all risks, benefits, and alternatives, the patient elected to proceed with definitive stone management. The patient understands the potential need for more than one procedure to eliminate all stone burden.     A 22-Belgian rigid cystoscope was inserted into a well-lubricated urethra. The urethra was unremarkable without stricture. The ureteral orifice was identified and cannulated with a sensor wire with the aid of a 5 Belgian open-ended catheter. The wire passed without resistance into the upper pole. The ureter was then subsequently dilated using a 8Fr inner component of a ureteral sheath, then the 10Fr outer sheath was advanced without resistance. The 8/10Fr was removed and the inner component of the 11/13 ureteral access sheath was  advanced over the wire and easily into the distal ureter. This was removed and the 36cm 11/13Fr ureteral access sheath was advanced into the proximal ureter to the proximal ureter with minimal resistance. The flexible ureteroscope was used to identify the stone(s). A 200 micron laser fiber was advanced through the scope and lithotripsy was performed. A Halo basket was used to remove all fragments greater than 1 mm.  A wire was then placed through the ureteroscope and into the upper pole. The ureteroscope and sheath were then subsequently removed over the wire.     A 6Fr x 24-cm silicone double-J stent was advanced over the wire, and a good proximal curl was seen in the renal pelvis on fluoroscopy and the distal curl was seen in the bladder. The bladder was drained.  Final retrograde delineated the anatomy and showed no extravasation    The patient tolerated the procedure well and there were no apparent complications. The patient  was transported to the postanesthesia care unit in stable condition.     POSTOP PLAN:  - PACU then home once meeting discharge requirements  - pyridium, oxybutynin, flomax for stent pain  - nursing visit 10/17/22 for stent removal (left on a string)  - Follow-up in 6 weeks with a CHRISTY with urology MOUSTAPHA RAPP, Jayy Koehler, was present and participatory for the entirety of the procedure

## 2022-10-12 NOTE — ANESTHESIA POSTPROCEDURE EVALUATION
Patient: Shelly Cerda    Procedure: Procedure(s):  CYSTOURETEROSCOPY, WITH RIGHT RETROGRADE PYELOGRAM, HOLMIUM LASER LITHOTRIPSY, STENT INSERTION       Anesthesia Type:  General    Note:  Disposition: Outpatient   Postop Pain Control: Uneventful            Sign Out: Well controlled pain   PONV: No   Neuro/Psych: Uneventful            Sign Out: Acceptable/Baseline neuro status   Airway/Respiratory: Uneventful            Sign Out: Acceptable/Baseline resp. status   CV/Hemodynamics: Uneventful            Sign Out: Acceptable CV status; No obvious hypovolemia; No obvious fluid overload   Other NRE: NONE   DID A NON-ROUTINE EVENT OCCUR? No           Last vitals:  Vitals Value Taken Time   /62 10/12/22 1207   Temp 36.2  C (97.1  F) 10/12/22 1207   Pulse 117 10/12/22 1220   Resp 20 10/12/22 1220   SpO2 97 % 10/12/22 1220   Vitals shown include unvalidated device data.    Electronically Signed By: Jorge A Graf MD, MD  October 12, 2022  12:48 PM

## 2022-10-12 NOTE — ANESTHESIA PREPROCEDURE EVALUATION
Anesthesia Pre-Procedure Evaluation    Patient: Shelly Cerda   MRN: [id number] : 1995        Procedure : Procedure(s):  CYSTOURETEROSCOPY, WITH RIGHT RETROGRADE PYELOGRAM, HOLMIUM LASER LITHOTRIPSY, STENT INSERTION          Past Medical History:   Diagnosis Date     Complication of anesthesia      Cystinuria (H)      Kidney stones      PONV (postoperative nausea and vomiting)       Past Surgical History:   Procedure Laterality Date     APPENDECTOMY  2011     COMBINED CYSTOSCOPY, RETROGRADES, EXCHANGE STENT URETER(S) Right 9/15/2019    Procedure: CYSTOSCOPY, WITH RIGHT RETROGRADE PYELOGRAM AND RIGHT URETERAL STENT PLACEMENT;  Surgeon: Kasi Wade MD;  [facility]     COMBINED CYSTOSCOPY, RETROGRADES, URETEROSCOPY, INSERT STENT Left 10/12/2017    Procedure: COMBINED CYSTOSCOPY, RETROGRADES, URETEROSCOPY, INSERT STENT;  COMBINED CYSTOSCOPY, RETROGRADES, URETEROSCOPY, LASER HOLMIUM STANDBY,  INSERT STENT LEFT URETER;  Surgeon: Luis Banda MD;  Location: [facility]     CYSTOSCOPY       LASER HOLMIUM LITHOTRIPSY URETER(S), INSERT STENT, COMBINED  2012    Procedure: COMBINED CYSTOSCOPY, URETEROSCOPY, LASER HOLMIUM LITHOTRIPSY URETER(S), INSERT STENT;  Holmium Laser Lithotripsy.  Right Stent Placement;  Surgeon: Cathi Hernandez MD;  [facility]     LASER HOLMIUM LITHOTRIPSY URETER(S), INSERT STENT, COMBINED Right 2019    Procedure: Cystoscopy, Right Ureteroscopy, Laser Lithotripsy,  no ureteral stent placed.;  Surgeon: Jayy Koehler MD;  [facility]     PERCUTANEOUS NEPHROLITHOTOMY Right 3/8/2018    Procedure: PERCUTANEOUS NEPHROLITHOTOMY;  Right Percutaneous Nephrolithotomy;  Surgeon: Jayy Koehler MD;  [facility]     Ureteral stent placement with subsequent removal.  2017      Allergies   Allergen Reactions     Thiola [Tiopronin] Rash     Fevers, lymphadenopathy, swelling in throat area      Social History     Tobacco Use     Smoking status: Never      Smokeless tobacco: Never   Substance Use Topics     Alcohol use: Yes      Wt Readings from Last 1 Encounters:   10/12/22 82.1 kg (181 lb)        Anesthesia Evaluation            ROS/MED HX  ENT/Pulmonary:  - neg pulmonary ROS     Neurologic:  - neg neurologic ROS     Cardiovascular:  - neg cardiovascular ROS     METS/Exercise Tolerance:     Hematologic:  - neg hematologic  ROS     Musculoskeletal:  - neg musculoskeletal ROS     GI/Hepatic:  - neg GI/hepatic ROS     Renal/Genitourinary:     (+) renal disease, type: CRI, Pt does not require dialysis, Nephrolithiasis ,     Endo:  - neg endo ROS     Psychiatric/Substance Use:  - neg psychiatric ROS     Infectious Disease:  - neg infectious disease ROS     Malignancy:       Other:               OUTSIDE LABS:  CBC:   Lab Results   Component Value Date    WBC 6.7 08/18/2020    WBC 12.1 (H) 03/09/2018    HGB 14.1 08/18/2020    HGB 12.4 03/09/2018    HCT 42.5 08/18/2020    HCT 35.8 03/09/2018     08/18/2020     03/09/2018     BMP:   Lab Results   Component Value Date     08/18/2020     03/20/2018    POTASSIUM 3.7 08/18/2020    POTASSIUM 4.0 03/20/2018    CHLORIDE 107 08/18/2020    CHLORIDE 103 03/20/2018    CO2 26 08/18/2020    CO2 26 03/20/2018    BUN 12 08/18/2020    BUN 10 03/20/2018    CR 0.96 08/18/2020    CR 0.86 03/20/2018     (H) 08/18/2020    GLC 88 03/20/2018     COAGS: No results found for: PTT, INR, FIBR  POC:   Lab Results   Component Value Date    BGM 81 09/15/2019    HCG Negative 09/20/2019     HEPATIC:   Lab Results   Component Value Date    ALBUMIN 4.0 08/18/2020    PROTTOTAL 7.9 08/18/2020    ALT 21 08/18/2020    AST 18 08/18/2020    ALKPHOS 57 08/18/2020    BILITOTAL 0.4 08/18/2020     OTHER:   Lab Results   Component Value Date    LACT 0.8 03/10/2018    VALERIANO 9.1 08/18/2020    LIPASE 158 08/06/2017       Anesthesia Plan    ASA Status:  2      Anesthesia Type: General.     - Airway: LMA              Consents    Anesthesia  Plan(s) and associated risks, benefits, and realistic alternatives discussed. Questions answered and patient/representative(s) expressed understanding.     - Discussed: Risks, Benefits and Alternatives for BOTH SEDATION and the PROCEDURE were discussed     - Discussed with:  Patient      - Extended Intubation/Ventilatory Support Discussed: No.      - Patient is DNR/DNI Status: No    Use of blood products discussed: No .     Postoperative Care    Pain management: IV analgesics, Oral pain medications.   PONV prophylaxis: Scopolamine patch     Comments:                Jorge A Graf MD, MD

## 2022-10-12 NOTE — INTERVAL H&P NOTE
"I have reviewed the surgical (or preoperative) H&P that is linked to this encounter, and examined the patient. There are no significant changes    Clinical Conditions Present on Arrival:  Clinically Significant Risk Factors Present on Admission                   # Obesity: Estimated body mass index is 32.06 kg/m  as calculated from the following:    Height as of this encounter: 1.6 m (5' 3\").    Weight as of this encounter: 82.1 kg (181 lb).       "

## 2022-10-12 NOTE — H&P
Chief Complaint:   Right Renal Stone         History of Present Illness:    Shelly Cerda is a very pleasant 27 year old female who presents with a history of cystinuria.  Recently has been having right flank pain.  CT shows rapid stone growth in right kidney with dominant 1.4 cm renal pelvis stone.  Urine is clear, no infectious symptoms         Past Medical History:     Past Medical History:   Diagnosis Date     Complication of anesthesia      Cystinuria (H)      Kidney stones      PONV (postoperative nausea and vomiting)             Past Surgical History:     Past Surgical History:   Procedure Laterality Date     APPENDECTOMY  9/2011     COMBINED CYSTOSCOPY, RETROGRADES, EXCHANGE STENT URETER(S) Right 9/15/2019    Procedure: CYSTOSCOPY, WITH RIGHT RETROGRADE PYELOGRAM AND RIGHT URETERAL STENT PLACEMENT;  Surgeon: Kasi Wade MD;  [facility]     COMBINED CYSTOSCOPY, RETROGRADES, URETEROSCOPY, INSERT STENT Left 10/12/2017    Procedure: COMBINED CYSTOSCOPY, RETROGRADES, URETEROSCOPY, INSERT STENT;  COMBINED CYSTOSCOPY, RETROGRADES, URETEROSCOPY, LASER HOLMIUM STANDBY,  INSERT STENT LEFT URETER;  Surgeon: Luis Banda MD;  Location: [facility]     CYSTOSCOPY       LASER HOLMIUM LITHOTRIPSY URETER(S), INSERT STENT, COMBINED  9/4/2012    Procedure: COMBINED CYSTOSCOPY, URETEROSCOPY, LASER HOLMIUM LITHOTRIPSY URETER(S), INSERT STENT;  Holmium Laser Lithotripsy.  Right Stent Placement;  Surgeon: Cathi Hernandez MD;  [facility]     LASER HOLMIUM LITHOTRIPSY URETER(S), INSERT STENT, COMBINED Right 9/20/2019    Procedure: Cystoscopy, Right Ureteroscopy, Laser Lithotripsy,  no ureteral stent placed.;  Surgeon: Jayy Koehler MD;  [facility]     PERCUTANEOUS NEPHROLITHOTOMY Right 3/8/2018    Procedure: PERCUTANEOUS NEPHROLITHOTOMY;  Right Percutaneous Nephrolithotomy;  Surgeon: Jayy Koehler MD;  [facility]     Ureteral stent placement with subsequent removal.   2017            Medications     Current Outpatient Medications   Medication     azelastine (ASTEPRO) 0.15 % nasal spray     captopril (CAPOTEN) 50 MG tablet     KURVELO 0.15-30 MG-MCG tablet     levocetirizine (XYZAL) 5 MG tablet     potassium citrate (UROCIT-K) 10 MEQ (1080 MG) CR tablet     tamsulosin (FLOMAX) 0.4 MG capsule     Current Facility-Administered Medications   Medication     ceFAZolin (ANCEF) intermittent infusion 2 g in 50 mL dextrose PREMIX     ceFAZolin (ANCEF) intermittent infusion 2 g in 50 mL dextrose PREMIX     lactated ringers infusion     lidocaine (LMX4) kit     lidocaine 1 % 0.1-1 mL     scopolamine (TRANSDERM) 72 hr patch 1 patch    And     scopolamine (TRANSDERM-SCOP) Patch in Place     sodium chloride (PF) 0.9% PF flush 3 mL     sodium chloride (PF) 0.9% PF flush 3 mL            Family History:     Family History   Problem Relation Age of Onset     Genetic Disorder Sister         Cystinuria            Social History:     Social History     Socioeconomic History     Marital status: Single     Spouse name: Not on file     Number of children: Not on file     Years of education: Not on file     Highest education level: Not on file   Occupational History     Not on file   Tobacco Use     Smoking status: Never     Smokeless tobacco: Never   Substance and Sexual Activity     Alcohol use: Yes     Drug use: No     Sexual activity: Not on file   Other Topics Concern     Parent/sibling w/ CABG, MI or angioplasty before 65F 55M? Not Asked   Social History Narrative    Shelly lives with both parents in [place]. Working as .  Hoping to buy house in 2021.    Sexually active with men and dating.     Social Determinants of Health     Financial Resource Strain: Not on file   Food Insecurity: Not on file   Transportation Needs: Not on file   Physical Activity: Not on file   Stress: Not on file   Social Connections: Not on file   Intimate Partner Violence: Not on file   Housing Stability:  "Not on file            Allergies:   Thiola [tiopronin]         Review of Systems:  From intake questionnaire   Negative 14 system review except as noted on HPI, nurse's note.         Physical Exam:   Patient is a 27 year old  female   Vitals: Blood pressure 130/81, pulse 100, temperature 97.1  F (36.2  C), temperature source Temporal, resp. rate 20, height 1.6 m (5' 3\"), weight 82.1 kg (181 lb), SpO2 100 %, not currently breastfeeding.  General Appearance Adult: Alert, no acute distress, oriented  HENT: throat/mouth:normal, good dentition  Neck: No adenopathy,masses or thyromegaly  Lungs: no respiratory distress, or pursed lip breathing  Heart: No obvious jugular venous distension present  Abdomen: soft, nontender, no organomegaly or masses, Body mass index is 32.06 kg/m .  Lymphatics: No cervical or supraclavicular adenopathy  Musculoskeltal: extremities normal, no peripheral edema  Skin: no suspicious lesions or rashes  Neuro: Alert, oriented, speech and mentation normal  Psych: affect and mood normal  Gait: Normal      Labs and Pathology:    I personally reviewed all applicable laboratory data and went over findings with patient  Significant for:    CBC RESULTS:  Recent Labs   Lab Test 08/18/20  1524 03/09/18  2300 03/09/18  0441 03/08/18  1325   WBC 6.7 12.1* 13.7* 10.3   HGB 14.1 12.4 12.5 13.0    287 258 269        BMP RESULTS:  Recent Labs   Lab Test 08/18/20  1524 03/20/18  1222 03/09/18  2300 03/09/18  0441    137 132* 135   POTASSIUM 3.7 4.0 3.8 4.6   CHLORIDE 107 103 100 101   CO2 26 26 25 27   ANIONGAP 5 8 7 7   * 88 105* 110*   BUN 12 10 14 13   CR 0.96 0.86 0.94 0.86   GFRESTIMATED 82 82 74 81   GFRESTBLACK >90 >90 89 >90   VALERIANO 9.1 9.3 8.8 8.5       UA RESULTS:   Recent Labs   Lab Test 10/05/22  1542 05/26/22  1611 03/20/18  1222   SG 1.005 1.005 1.004   URINEPH 7.5* 7.5* 8.0*   NITRITE Negative Negative Negative   RBCU 0 <1 <1   WBCU 0 2 1       CALCIUM RESULTS  Lab Results "   Component Value Date    VALERIANO 9.1 08/18/2020    VALERIANO 9.3 03/20/2018    VALERIANO 8.8 03/09/2018       PSA RESULTS  No results found for: PSA    INR  No results for input(s): INR in the last 64930 hours.        Imaging:    I personally reviewed all applicable imaging and went over the below findings with patient.    Results for orders placed or performed during the hospital encounter of 09/27/22   CT Abdomen pelvis w/o contrast    Narrative    EXAM: CT ABDOMEN PELVIS W/O CONTRAST  LOCATION: [facility]  DATE/TIME: 9/27/2022 6:09 PM    INDICATION: Low dose stone.  COMPARISON: None.  TECHNIQUE: CT scan of the abdomen and pelvis was performed without IV contrast. Multiplanar reformats were obtained. Dose reduction techniques were used.  CONTRAST: None.    FINDINGS:   LOWER CHEST: Normal.    HEPATOBILIARY: Normal.    PANCREAS: Normal.    SPLEEN: Normal.    ADRENAL GLANDS: Normal.    KIDNEYS/BLADDER: There is a new prominent stone within the right renal pelvis measuring 1.4 x 0.7 cm (series 3, image 76). No hydronephrosis identified. No ureter or bladder stone. No left hydronephrosis either. Stable nonobstructing stone lower right   kidney measuring 0.3 cm (image 82).    BOWEL: Normal.    LYMPH NODES: Normal.    VASCULATURE: Unremarkable.    PELVIC ORGANS: Normal.    MUSCULOSKELETAL: Normal.      Impression    IMPRESSION:   1.  New finding of a 1.4 cm right renal pelvis stone. No hydronephrosis.   2.  Stable nonobstructing stone at the lower right kidney.                Assessment and Plan:     Assessment:27 year old female     Plan:  We discussed the surgical treatment options for renal stones including shock wave lithotripsy, ureteroscopy, and percutaneous nephrolithotomy.  We discussed the risks and benefits of each approach in the context of the patient's current stone burden.  After consideration of each the decision was made to proceed with right ureteroscopic stone treatment    We discussed the  risks of bleeding, infection, incomplete stone removal, damage to adjacent organs, and need for staged procedures.        I, Jayy Koehler saw and evaluated this patient and agree with the plan as stated above.  I personally performed all listed procedures.

## 2022-10-12 NOTE — ANESTHESIA CARE TRANSFER NOTE
Patient: Shelly Cerda    Procedure: Procedure(s):  CYSTOURETEROSCOPY, WITH RIGHT RETROGRADE PYELOGRAM, HOLMIUM LASER LITHOTRIPSY, STENT INSERTION       Diagnosis: Kidney stone [N20.0]  Diagnosis Additional Information: No value filed.    Anesthesia Type:   General     Note:    Oropharynx: oropharynx clear of all foreign objects and spontaneously breathing  Level of Consciousness: drowsy and awake  Oxygen Supplementation: face mask  Level of Supplemental Oxygen (L/min / FiO2): 6  Independent Airway: airway patency satisfactory and stable  Dentition: dentition unchanged  Vital Signs Stable: post-procedure vital signs reviewed and stable  Report to RN Given: handoff report given  Patient transferred to: PACU    Handoff Report: Identifed the Patient, Identified the Reponsible Provider, Reviewed the pertinent medical history, Discussed the surgical course, Reviewed Intra-OP anesthesia mangement and issues during anesthesia, Set expectations for post-procedure period and Allowed opportunity for questions and acknowledgement of understanding      Vitals:  Vitals Value Taken Time   BP     Temp     Pulse     Resp     SpO2         Electronically Signed By: BRITTNEE Guadarrama CRNA  October 12, 2022  12:10 PM

## 2022-10-12 NOTE — DISCHARGE INSTRUCTIONS
[facility]  Home Care Following Anesthesia  For 24 hours after surgery:  Get plenty of rest.  A responsible adult must stay with you for at least 24 hours after you leave the surgery center.  Do not drive or use heavy equipment.  If you have weakness or tingling, don't drive or use heavy equipment until this feeling goes away.   Do not drink alcohol.   Avoid strenuous or risky activities.  Ask for help when climbing stairs.  You may feel lightheaded.  IF so, sit for a few minutes before standing.  Have someone help you get up.   If you have nausea (feel sick to your stomach): Drink only clear liquids such as apple juice, ginger ale, broth or 7-Up.  Rest may also help.  Be sure to drink enough fluids.  Move to a regular diet as you feel able.   You may have a slight fever.  Call the doctor if your fever is over 100 F (37.7 C) (taken under the tongue) or lasts longer than 24 hours.  You may have a dry mouth, a sore throat, muscle aches or trouble sleeping. These should go away after 24 hours.  Do not make important or legal decisions.   It is recommended to avoid smoking.               Tips for taking pain medications  To get the best pain relief possible, remember these points:  Take pain medications as directed, before pain becomes severe.  Pain medication can upset your stomach: taking it with food may help.  Constipation is a common side effect of pain medication. Drink plenty of  fluids.  Eat foods high in fiber. Take a stool softener if recommended by your doctor or pharmacist.  Do not drink alcohol, drive or operate machinery while taking pain medications.  Ask about other ways to control pain, such as with heat, ice or relaxation.    Tylenol/Acetaminophen Consumption  To help encourage the safe use of acetaminophen, the makers of TYLENOL  have lowered the maximum daily dose for single-ingredient Extra Strength TYLENOL  (acetaminophen) products sold in the [place] from 8 pills  per day (4,000 mg) to 6 pills per day (3,000 mg). The dosing interval has also changed from 2 pills every 4-6 hours to 2 pills every 6 hours.  If you feel your pain relief is insufficient, you may take Tylenol/Acetaminophen in addition to your narcotic pain medication.   Be careful not to exceed 3,000 mg of Tylenol/Acetaminophen in a 24 hour period from all sources.  If you are taking extra strength Tylenol/acetaminophen (500 mg), the maximum dose is 6 tablets in 24 hours.  If you are taking regular strength acetaminophen (325 mg), the maximum dose is 9 tablets in 24 hours.    Call a doctor for any of the following:  Signs of infection (fever, growing tenderness at the surgery site, a large amount of drainage or bleeding, severe pain, foul-smelling drainage, redness, swelling).  It has been over 8 to 10 hours since surgery and you are still not able to urinate (pass water).  Headache for over 24 hours.  Numbness, tingling or weakness the day after surgery (if you had spinal anesthesia).  Signs of Covid-19 infection (temperature over 100 degrees, shortness of breath, cough, loss of taste/smell, generalized body aches, persistent headache, chills, sore throat, nausea/vomiting/diarrhea)  Your doctor is:  Dr. Jayy Koehler, Prostate and Urology: [phone]                    Or dial [phone] and ask for the resident on call for:  Prostate Urology  For emergency care, call the:  [facility]:  [phone] (TTY for hearing impaired: [phone])

## 2022-10-12 NOTE — TELEPHONE ENCOUNTER
[facility] Call Center    Phone Message    May a detailed message be left on voicemail: yes     Reason for Call: Patient called stating she will be out of town on 11/22 and wanted to reschedule her appointment. Writer only had 12/30 as next available and patients mother I believe was on speaker in the background and stated that patient is going to be having a renal ultrasound on 11/15 and needs to be seen the week after. Writer did not cancel appointment on 11/22 sending TE in regards to next steps. Please contact this patient in regards to appointment. Thank you     Action Taken: Message routed to:  [facility] ([facility] Urology    Travel Screening: Not Applicable

## 2022-10-15 ENCOUNTER — HEALTH MAINTENANCE LETTER (OUTPATIENT)
Age: 27
End: 2022-10-15

## 2022-10-16 LAB
APPEARANCE STONE: NORMAL
COMPN STONE: NORMAL
SPECIMEN WT: 306 MG

## 2022-10-17 ENCOUNTER — OFFICE VISIT (OUTPATIENT)
Dept: UROLOGY | Facility: CLINIC | Age: 27
End: 2022-10-17
Payer: COMMERCIAL

## 2022-10-17 DIAGNOSIS — N20.0 KIDNEY STONE: Primary | ICD-10-CM

## 2022-10-17 PROCEDURE — 99211 OFF/OP EST MAY X REQ PHY/QHP: CPT

## 2022-10-17 RX ORDER — CIPROFLOXACIN 500 MG/1
500 TABLET, FILM COATED ORAL ONCE
Status: COMPLETED | OUTPATIENT
Start: 2022-10-17 | End: 2022-10-17

## 2022-10-17 RX ADMIN — CIPROFLOXACIN 500 MG: 500 TABLET, FILM COATED ORAL at 09:54

## 2022-10-17 NOTE — PROGRESS NOTES
Chief Complaint   Patient presents with     Allied Health Visit     Stent removal       Patient Active Problem List   Diagnosis     Cystinuria (H)     Nausea with vomiting     Ureterolithiasis     Acute nontraumatic kidney injury (H)     Acute abdominal pain in right flank     Constipation due to pain medication     Group B streptococcal infection     Ureteral stone     Urolithiasis     Postoperative pain     Right ureteral stone     Sebaceous cyst     Vertigo       Allergies   Allergen Reactions     Thiola [Tiopronin] Rash     Fevers, lymphadenopathy, swelling in throat area       Current Outpatient Medications   Medication Sig Dispense Refill     azelastine (ASTEPRO) 0.15 % nasal spray        captopril (CAPOTEN) 50 MG tablet Take 1 tablet (50 mg) by mouth 3 times daily 270 tablet 3     KURVELO 0.15-30 MG-MCG tablet        levocetirizine (XYZAL) 5 MG tablet        oxybutynin (DITROPAN) 5 MG tablet Take 1 tablet (5 mg) by mouth 3 times daily for 7 days 21 tablet 0     phenazopyridine (PYRIDIUM) 200 MG tablet Take 1 tablet (200 mg) by mouth 3 times daily as needed for irritation 21 tablet 0     potassium citrate (UROCIT-K) 10 MEQ (1080 MG) CR tablet TAKE 2 TABLETS BY MOUTH TWICE DAILY 360 tablet 3     tamsulosin (FLOMAX) 0.4 MG capsule Take 1 capsule (0.4 mg) by mouth daily 21 capsule 0     tamsulosin (FLOMAX) 0.4 MG capsule Take 1 capsule (0.4 mg) by mouth daily 30 capsule 0       Social History     Tobacco Use     Smoking status: Never     Smokeless tobacco: Never   Substance Use Topics     Alcohol use: Yes     Drug use: No       Shelly Cerda comes into clinic today at the request of Jayy Koehler for stent removal.    This service provided today was under the direct supervision of Dr. Low, who was available if needed.    Shelly Cerda presents to clinic for scheduled [Yes] stent removal.  Order has been verified: Yes.    Stent removed from urethral meatus without difficulty.    One Cipro 500 mg given  per protocol: Yes.   The following medication was given:     MEDICATION:  Ciprofloxacin  ROUTE: PO  SITE: Medication was given orally   DOSE: 500 mg  LOT #: [id number]  : [employer]  EXPIRATION DATE: 10/23  NDC#: 8925-3872-33   Was there drug waste? No    Prior to medication administration, verified patient identity using patient's name and date of birth.  Due to medication administration, patient instructed to remain in clinic for 15 minutes  afterwards, and to report any adverse reaction to me immediately.    Drug Amount Wasted:  None.  Single dose tablet    Patient did tolerate procedure well.    Patient instructed as to where to call or go for pain, fever, chills, back pain gets worse teresita after taking medicine, leakage, or decreased urine flow.     Jorge A Palomino EMT  10/17/2022  9:52 AM

## 2022-10-20 ENCOUNTER — PRE VISIT (OUTPATIENT)
Dept: UROLOGY | Facility: CLINIC | Age: 27
End: 2022-10-20

## 2022-10-20 NOTE — TELEPHONE ENCOUNTER
Reason for visit: post op      Dx/Hx/Sx: nephrolithiasis     Records/imaging/labs/orders: renal US in epic    At Rooming: video visit

## 2022-11-03 ENCOUNTER — IMMUNIZATION (OUTPATIENT)
Dept: NURSING | Facility: CLINIC | Age: 27
End: 2022-11-03
Payer: COMMERCIAL

## 2022-11-03 PROCEDURE — 0134A COVID-19,PF,MODERNA BIVALENT: CPT

## 2022-11-03 PROCEDURE — 90686 IIV4 VACC NO PRSV 0.5 ML IM: CPT

## 2022-11-03 PROCEDURE — 90471 IMMUNIZATION ADMIN: CPT

## 2022-11-03 PROCEDURE — 91313 COVID-19,PF,MODERNA BIVALENT: CPT

## 2022-11-15 ENCOUNTER — HOSPITAL ENCOUNTER (OUTPATIENT)
Dept: ULTRASOUND IMAGING | Facility: CLINIC | Age: 27
Discharge: HOME | End: 2022-11-15
Attending: UROLOGY | Admitting: UROLOGY
Payer: COMMERCIAL

## 2022-11-15 DIAGNOSIS — N20.0 KIDNEY STONE: ICD-10-CM

## 2022-11-15 PROCEDURE — 76770 US EXAM ABDO BACK WALL COMP: CPT

## 2022-11-18 ENCOUNTER — VIRTUAL VISIT (OUTPATIENT)
Dept: UROLOGY | Facility: CLINIC | Age: 27
End: 2022-11-18
Payer: COMMERCIAL

## 2022-11-18 DIAGNOSIS — E72.01 CYSTINURIA (H): ICD-10-CM

## 2022-11-18 DIAGNOSIS — N20.0 KIDNEY STONE: Primary | ICD-10-CM

## 2022-11-18 PROCEDURE — 99213 OFFICE O/P EST LOW 20 MIN: CPT | Mod: 95 | Performed by: NURSE PRACTITIONER

## 2022-11-18 NOTE — PROGRESS NOTES
Shelly is a 27 year old who is being evaluated via a billable video visit.      How would you like to obtain your AVS? MyChart  If the video visit is dropped, the invitation should be resent by: Text to cell phone: [phone]  Will anyone else be joining your video visit? No    Video-Visit Details    Video Start Time: 2:03 PM    Type of service:  Video Visit    Video End Time: 2:22 PM    Originating Location (pt. 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 Koehler one month ago. Has had some intermittent right flank pain since surgery and passed a stone about one week ago. Unclear if the symptoms she is having currently is from another stone passing, but we discussed the role of CT scan in this case to clarify. She is hesitant to pursue an additional CT now, as she has had so many. Ultimately, a shared decision was made to hold off on further imaging now and monitor symptoms. If symptoms persist for another month or begin to worsen, she will notify me and we will pursue a CT. Will otherwise plan on follow up with another CHRISTY in 6 months. She has a follow up visit with Dr. Gonzalez for stone prevention management coming up next month.     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 Staged ureteroscopy in 2017, and PCNL in 2018. Unable to tolerate Thiola due to allergic reaction. Is now managed on captopril and potassium citrate by Dr. Gonzalez.  More recently, she was found to have a 1.4 cm right renal pelvis stone on CT imaging in September, and is now s/p URS with Dr. Koehler on 10/12. Stone composed of cystine. Follow up CHRISTY earlier  this week showed no stones or hydronephrosis.     Today, she states that she has had intermittent right flank pain since her surgery. This usually happens every other day after she eats breakfast in the morning. This has not worsened or changed. Denies hematuria but states that she generally does not have this when she is passing stones. She did just pass a stone on Saturday.     Regarding her medications, she thinks she will be interested in starting a family in ~5 years and knows the captopril will need to be stopped during this time.       Objective:     Exam:   Patient is a 27 year old female   No vital signs obtained due to virtual visit.  General Appearance: Well groomed, hygenic  Respiratory: No cough, no respiratory distress or labored breathing  Musculoskeletal: Grossly normal with no gross deficits  Skin: No discoloration or apparent rashes  Neurologic: No tremors  Psychiatric: Alert and oriented  Further examination is deferred due to the nature of our visit.

## 2022-11-18 NOTE — PATIENT INSTRUCTIONS
UROLOGY CLINIC VISIT PATIENT INSTRUCTIONS    -Let me know if symptoms persist for another month and we could consider repeating a CT scan.   -Will otherwise plan to see you back in 6 months with another renal ultrasound done beforehand.     If you have any issues, questions or concerns in the meantime, do not hesitate to contact us at [phone] or via [employer]Five.     Caren Gilbert, CNP  Department of Urology

## 2022-11-18 NOTE — LETTER
11/18/2022       RE: Shelly Cerda  [address] [place] [id number]     Dear Colleague,    Thank you for referring your patient, Shelly Cerda, to the [facility] [place] [facility]. Please see a copy of my visit note below.    Shelly is a 27 year old who is being evaluated via a billable video visit.      How would you like to obtain your AVS? MyChart  If the video visit is dropped, the invitation should be resent by: Text to cell phone: [phone]  Will anyone else be joining your video visit? No    Video-Visit Details    Video Start Time: 2:03 PM    Type of service:  Video Visit    Video End Time: 2:22 PM    Originating Location (pt. 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 Koehler one month ago. Has had some intermittent right flank pain since surgery and passed a stone about one week ago. Unclear if the symptoms she is having currently is from another stone passing, but we discussed the role of CT scan in this case to clarify. She is hesitant to pursue an additional CT now, as she has had so many. Ultimately, a shared decision was made to hold off on further imaging now and monitor symptoms. If symptoms persist for another month or begin to worsen, she will notify me and we will pursue a CT. Will otherwise plan on follow up with another CHRISTY in 6 months. She has a follow up visit with Dr. Gonzalez for stone prevention management coming up next month.     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 Staged ureteroscopy in 2017, and PCNL in 2018.  Unable to tolerate Thiola due to allergic reaction. Is now managed on captopril and potassium citrate by Dr. Gonzalez.  More recently, she was found to have a 1.4 cm right renal pelvis stone on CT imaging in September, and is now s/p URS with Dr. Koehler on 10/12. Stone composed of cystine. Follow up CHRISTY earlier this week showed no stones or hydronephrosis.     Today, she states that she has had intermittent right flank pain since her surgery. This usually happens every other day after she eats breakfast in the morning. This has not worsened or changed. Denies hematuria but states that she generally does not have this when she is passing stones. She did just pass a stone on Saturday.     Regarding her medications, she thinks she will be interested in starting a family in ~5 years and knows the captopril will need to be stopped during this time.       Objective:     Exam:   Patient is a 27 year old female   No vital signs obtained due to virtual visit.  General Appearance: Well groomed, hygenic  Respiratory: No cough, no respiratory distress or labored breathing  Musculoskeletal: Grossly normal with no gross deficits  Skin: No discoloration or apparent rashes  Neurologic: No tremors  Psychiatric: Alert and oriented  Further examination is deferred due to the nature of our visit.

## 2022-12-06 ENCOUNTER — TRANSFERRED RECORDS (OUTPATIENT)
Dept: HEALTH INFORMATION MANAGEMENT | Facility: CLINIC | Age: 27
End: 2022-12-06

## 2022-12-19 ENCOUNTER — TELEPHONE (OUTPATIENT)
Dept: NEPHROLOGY | Facility: CLINIC | Age: 27
End: 2022-12-19

## 2022-12-19 NOTE — TELEPHONE ENCOUNTER
[facility] Call Center    Phone Message    May a detailed message be left on voicemail: yes     Reason for Call: Other: Clinic is calling wanting the 11/28/22 Litholink results sent to them fax number  [phone]. Call them with questions, thanks.     Action Taken: Message routed to:  [facility] ([facility]    Travel Screening: Not Applicable

## 2022-12-27 ENCOUNTER — VIRTUAL VISIT (OUTPATIENT)
Dept: NEPHROLOGY | Facility: CLINIC | Age: 27
End: 2022-12-27
Payer: COMMERCIAL

## 2022-12-27 ENCOUNTER — TELEPHONE (OUTPATIENT)
Dept: UROLOGY | Facility: CLINIC | Age: 27
End: 2022-12-27

## 2022-12-27 VITALS — WEIGHT: 210 LBS | BODY MASS INDEX: 37.2 KG/M2

## 2022-12-27 DIAGNOSIS — Z51.81 MEDICATION MONITORING ENCOUNTER: ICD-10-CM

## 2022-12-27 DIAGNOSIS — E72.01 CYSTINURIA (H): Primary | ICD-10-CM

## 2022-12-27 DIAGNOSIS — K21.9 GASTROESOPHAGEAL REFLUX DISEASE, UNSPECIFIED WHETHER ESOPHAGITIS PRESENT: ICD-10-CM

## 2022-12-27 DIAGNOSIS — N20.1 URETEROLITHIASIS: ICD-10-CM

## 2022-12-27 PROCEDURE — 99215 OFFICE O/P EST HI 40 MIN: CPT | Mod: 95 | Performed by: INTERNAL MEDICINE

## 2022-12-27 RX ORDER — CAPTOPRIL 50 MG/1
50 TABLET ORAL 3 TIMES DAILY
Qty: 270 TABLET | Refills: 3 | Status: SHIPPED | OUTPATIENT
Start: 2022-12-27 | End: 2023-09-19

## 2022-12-27 RX ORDER — POTASSIUM CITRATE 10 MEQ/1
20 TABLET, EXTENDED RELEASE ORAL 2 TIMES DAILY
Qty: 360 TABLET | Refills: 3 | Status: SHIPPED | OUTPATIENT
Start: 2022-12-27 | End: 2023-09-20

## 2022-12-27 ASSESSMENT — PAIN SCALES - GENERAL: PAINLEVEL: NO PAIN (0)

## 2022-12-27 NOTE — PATIENT INSTRUCTIONS
Start omeprazole 20 mg -  take 30 minutes before breakfast.  Can try famotidine with evening meal in addition to omeprazole    If pain gets worse, we can move ultrasound appointment up.    Cystine litholink in May 2023    Return in about 6 months (around 6/27/2023).     Thanks     Alis Gonzalez MD

## 2022-12-27 NOTE — PROGRESS NOTES
Shelly is a 27 year old who is being evaluated via a billable video visit.      How would you like to obtain your AVS? MyChart  If the video visit is dropped, the invitation should be resent by: Send to e-mail at: [email or web address]  Will anyone else be joining your video visit? No        Video-Visit Details    Type of service:  Video Visit     Start 1:01 PM   End 1:40 PM     Originating Location (pt. Location): Home    Distant Location (provider location):  Off-site  Platform used for Video Visit: Well          [facility]  12/27/2022     Shelly Cerda MRN:[id number] YOB: 1995  Primary care provider: [facility]  Requesting physician: Dr. Jayy Koehler    ASSESSMENT AND RECOMMENDATIONS:   Shelly Cerda is a 27 year old presenting for nephrolithiasis.  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  Can try famotidine with evening meal in addition to omeprazole    Family planning - on oral contraceptive. Interested in children in the next 5 years. May want to meet with genetic counselor in 2023.    Return in about 6 months (around 6/27/2023).     60 minutes spent on visit, meeting with Shelly Cerda and in chart review and documentation on date of encounter, 12/27/22        Alis Gonzalez MD  [facility]  Department of Medicine  Division of Renal Disease and Hypertension  [employer] Web Console        Reason for visit: follow up cystine nephrolithiasis    HISTORY OF  PRESENT ILLNESS:  Shelly Cerda is a 27 year old  with cystinuria and recurrent kidney stones since 2012 but was diagnosed with cystinuria when she was 10.  Managed with high fluid, potassium citrate 20 meq bid, she had allergic reaction with thiola, hives, chills, fever, swollen lymph nodes and throat was swollen.      Stone surgical history  ~ 9 surgeries  Right stone surgery 2012, Left sided stone surgery October 2017, right PCNL 3/2018 - was stone free after that procedure then presented September 2019 with right obstructing kidney stone and had URS and lithotripsy for that stone.  She was again stone free after that operation.  May have passed stone April 2021.    Last imaging: renal ultrasound 8/3/2021 - no kidney stones  Last Surgery: 10/12/2022 right ureteroscopy and laser lithotripsy (I reviewed op report)  Stone burden: 2 mm lower pole right kidney (CT 5/7/2021)  Stone free on left    Last visit June 2022. Reviewed episode of flank pain in May 2022. Also reviewed medical management of stone prevention which includes captopril 50 mg TID and potassium citrate 20 meq BID. Dietary management with high fluid intake, decreased meat.    10/12/2022 had right ureteroscopy, lithotripsy and stone basketing for 1.4 cm right renal pelvis stone that was identified on CT scan 9/27/2022 after ultrasound demonstrated hydronephrosis.  Had a lot of discomfort with stent and even after stent removal had spasms for 3-4 days. Analysis showed cystine stone (same as previous).   Had visit with Caren Gilbert 11/18/2022 and discussed intermittent right flank pain and opted for watchful waiting.  Continues to have some intermittent discomfort on right - some days worse than others.    Started new job working as  with [employer] Still working from home.    Water intake is pretty good, trying to increase and drink more at night. Continues to aim for low sodium diet but not as strictly as in previous  years.                  PAST MEDICAL HISTORY:  Reviewed  Past Medical History:   Diagnosis Date     Complication of anesthesia      Cystinuria (H)      Kidney stones      PONV (postoperative nausea and vomiting)        PSH:  Reviewed  Past Surgical History:   Procedure Laterality Date     APPENDECTOMY  9/2011     COMBINED CYSTOSCOPY, RETROGRADES, EXCHANGE STENT URETER(S) Right 9/15/2019    Procedure: CYSTOSCOPY, WITH RIGHT RETROGRADE PYELOGRAM AND RIGHT URETERAL STENT PLACEMENT;  Surgeon: Kasi Wade MD;  Location: [facility]     COMBINED CYSTOSCOPY, RETROGRADES, URETEROSCOPY, INSERT STENT Left 10/12/2017    Procedure: COMBINED CYSTOSCOPY, RETROGRADES, URETEROSCOPY, INSERT STENT;  COMBINED CYSTOSCOPY, RETROGRADES, URETEROSCOPY, LASER HOLMIUM STANDBY,  INSERT STENT LEFT URETER;  Surgeon: Luis Banda MD;  Location: [facility] OR     COMBINED CYSTOSCOPY, RETROGRADES, URETEROSCOPY, LASER HOLMIUM LITHOTRIPSY URETER(S), INSERT STENT Right 10/12/2022    Procedure: CYSTOURETEROSCOPY, WITH RIGHT RETROGRADE PYELOGRAM, HOLMIUM LASER LITHOTRIPSY, STENT INSERTION;  Surgeon: Jayy Koehler MD;  Location: [facility] OR     CYSTOSCOPY       LASER HOLMIUM LITHOTRIPSY URETER(S), INSERT STENT, COMBINED  9/4/2012    Procedure: COMBINED CYSTOSCOPY, URETEROSCOPY, LASER HOLMIUM LITHOTRIPSY URETER(S), INSERT STENT;  Holmium Laser Lithotripsy.  Right Stent Placement;  Surgeon: Cathi Hernandez MD;  Location: [facility] OR     LASER HOLMIUM LITHOTRIPSY URETER(S), INSERT STENT, COMBINED Right 9/20/2019    Procedure: Cystoscopy, Right Ureteroscopy, Laser Lithotripsy,  no ureteral stent placed.;  Surgeon: Jayy Koehler MD;  Location: [facility] OR     PERCUTANEOUS NEPHROLITHOTOMY Right 3/8/2018    Procedure: PERCUTANEOUS NEPHROLITHOTOMY;  Right Percutaneous Nephrolithotomy;  Surgeon: Jayy Koehler MD;  Location: [facility] OR     Ureteral stent placement with subsequent removal.  2017        MEDICATIONS:  Current Outpatient Medications    Medication Sig Dispense Refill     azelastine (ASTEPRO) 0.15 % nasal spray        captopril (CAPOTEN) 50 MG tablet Take 1 tablet (50 mg) by mouth 3 times daily 270 tablet 3     KURVELO 0.15-30 MG-MCG tablet        levocetirizine (XYZAL) 5 MG tablet        potassium citrate (UROCIT-K) 10 MEQ (1080 MG) CR tablet TAKE 2 TABLETS BY MOUTH TWICE DAILY 360 tablet 3     tamsulosin (FLOMAX) 0.4 MG capsule Take 1 capsule (0.4 mg) by mouth daily 21 capsule 0        ALLERGIES:    Allergies   Allergen Reactions     Thiola [Tiopronin] Rash     Fevers, lymphadenopathy, swelling in throat area       REVIEW OF SYSTEMS:  A 10 point review of systems was negative except as noted above.    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 Not Asked   Social History Narrative    Shelly lives with both parents in [place]. Working as .  Hoping to buy house in 2021.    Sexually active with men and dating.     Social Determinants of Health     Financial Resource Strain: Not on file   Food Insecurity: Not on file   Transportation Needs: Not on file   Physical Activity: Not on file   Stress: Not on file   Social Connections: Not on file   Intimate Partner Violence: Not on file   Housing Stability: Not on file   works out 5 days per week    FAMILY MEDICAL HISTORY:   Family History   Problem Relation Age of Onset     Genetic Disorder Sister         Cystinuria       PHYSICAL EXAM:    There were no vitals taken for this visit.     Exam limited by video appointment. Within those limitations:  GENERAL APPEARANCE: alert and no distress  RESP: no conversational dyspnea  Psych: pleasant, normal mental status    LABS:   I reviewed:  Electrolytes/Renal -   Recent Labs   Lab Test 08/18/20  1524 03/20/18  1222 03/09/18  2300    137 132*   POTASSIUM 3.7 4.0 3.8   CHLORIDE 107 103 100   CO2 26 26 25   BUN 12 10 14   CR 0.96 0.86 0.94   * 88 105*   VALERIANO 9.1 9.3 8.8       CBC -   Recent Labs   Lab Test 08/18/20  1524 03/09/18  2300 03/09/18  0441   WBC 6.7 12.1* 13.7*   HGB 14.1 12.4 12.5    287 258       LFTs -   Recent Labs   Lab Test 08/18/20  1524 12/19/17  1122 08/06/17  0502   ALKPHOS 57 52 61   BILITOTAL 0.4 0.6 0.3   ALT 21 19 31   AST 18 16 31   PROTTOTAL 7.9 7.9 7.7   ALBUMIN 4.0 4.2 4.2       Coags - No lab results found.    Iron Panel - No lab results found.    Endocrine - No lab results found.    IMAGING:  See HPI    Alis Gonzalez MD

## 2022-12-27 NOTE — LETTER
12/27/2022       RE: Shelly Cerda  [address] [place] [id number]     Dear Colleague,    Thank you for referring your patient, Shelly Cerda, to the [facility] NEPHROLOGY [facility]INIC [place] [facility]. Please see a copy of my visit note below.      [facility] Nephrology [facility]  12/27/2022     Shelly Cerda MRN:[id number] YOB: 1995  Primary care provider: [facility]  Requesting physician: Dr. Jayy Koehler    ASSESSMENT AND RECOMMENDATIONS:   Shelly Cerda is a 27 year old presenting for nephrolithiasis.  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  Can try famotidine with evening meal in addition to omeprazole    Family planning - on oral contraceptive. Interested in children in the next 5 years. May want to meet with genetic counselor in 2023.    Return in about 6 months (around 6/27/2023).     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  Managed with high fluid, potassium citrate 20 meq  bid, she had allergic reaction with thiola, hives, chills, fever, swollen lymph nodes and throat was swollen.      Stone surgical history  ~ 9 surgeries  Right stone surgery 2012, Left sided stone surgery October 2017, right PCNL 3/2018 - was stone free after that procedure then presented September 2019 with right obstructing kidney stone and had URS and lithotripsy for that stone.  She was again stone free after that operation.  May have passed stone April 2021.    Last imaging: renal ultrasound 8/3/2021 - no kidney stones  Last Surgery: 10/12/2022 right ureteroscopy and laser lithotripsy (I reviewed op report)  Stone burden: 2 mm lower pole right kidney (CT 5/7/2021)  Stone free on left    Last visit June 2022. Reviewed episode of flank pain in May 2022. Also reviewed medical management of stone prevention which includes captopril 50 mg TID and potassium citrate 20 meq BID. Dietary management with high fluid intake, decreased meat.    10/12/2022 had right ureteroscopy, lithotripsy and stone basketing for 1.4 cm right renal pelvis stone that was identified on CT scan 9/27/2022 after ultrasound demonstrated hydronephrosis.  Had a lot of discomfort with stent and even after stent removal had spasms for 3-4 days. Analysis showed cystine stone (same as previous).   Had visit with Caren Gilbert 11/18/2022 and discussed intermittent right flank pain and opted for watchful waiting.  Continues to have some intermittent discomfort on right - some days worse than others.    Started new job working as  with [employer]. Still working from home.    Water intake is pretty good, trying to increase and drink more at night. Continues to aim for low sodium diet but not as strictly as in previous years.                  PAST MEDICAL HISTORY:  Reviewed  Past Medical History:   Diagnosis Date     Complication of anesthesia      Cystinuria (H)      Kidney stones      PONV (postoperative nausea and vomiting)         PSH:  Reviewed  Past Surgical History:   Procedure Laterality Date     APPENDECTOMY  9/2011     COMBINED CYSTOSCOPY, RETROGRADES, EXCHANGE STENT URETER(S) Right 9/15/2019    Procedure: CYSTOSCOPY, WITH RIGHT RETROGRADE PYELOGRAM AND RIGHT URETERAL STENT PLACEMENT;  Surgeon: Kasi Wade MD;  Location: [facility] OR     COMBINED CYSTOSCOPY, RETROGRADES, URETEROSCOPY, INSERT STENT Left 10/12/2017    Procedure: COMBINED CYSTOSCOPY, RETROGRADES, URETEROSCOPY, INSERT STENT;  COMBINED CYSTOSCOPY, RETROGRADES, URETEROSCOPY, LASER HOLMIUM STANDBY,  INSERT STENT LEFT URETER;  Surgeon: Luis Banda MD;  Location: [facility] OR     COMBINED CYSTOSCOPY, RETROGRADES, URETEROSCOPY, LASER HOLMIUM LITHOTRIPSY URETER(S), INSERT STENT Right 10/12/2022    Procedure: CYSTOURETEROSCOPY, WITH RIGHT RETROGRADE PYELOGRAM, HOLMIUM LASER LITHOTRIPSY, STENT INSERTION;  Surgeon: Jayy Koehler MD;  Location: [facility] OR     CYSTOSCOPY       LASER HOLMIUM LITHOTRIPSY URETER(S), INSERT STENT, COMBINED  9/4/2012    Procedure: COMBINED CYSTOSCOPY, URETEROSCOPY, LASER HOLMIUM LITHOTRIPSY URETER(S), INSERT STENT;  Holmium Laser Lithotripsy.  Right Stent Placement;  Surgeon: Cathi Hernandez MD;  Location: [facility] OR     LASER HOLMIUM LITHOTRIPSY URETER(S), INSERT STENT, COMBINED Right 9/20/2019    Procedure: Cystoscopy, Right Ureteroscopy, Laser Lithotripsy,  no ureteral stent placed.;  Surgeon: Jayy Koehler MD;  Location: [facility] OR     PERCUTANEOUS NEPHROLITHOTOMY Right 3/8/2018    Procedure: PERCUTANEOUS NEPHROLITHOTOMY;  Right Percutaneous Nephrolithotomy;  Surgeon: Jayy Koehler MD;  Location: [facility] OR     Ureteral stent placement with subsequent removal.  2017        MEDICATIONS:  Current Outpatient Medications   Medication Sig Dispense Refill     azelastine (ASTEPRO) 0.15 % nasal spray        captopril (CAPOTEN) 50 MG tablet Take 1 tablet (50 mg) by mouth 3 times daily 270 tablet 3     KURVELO 0.15-30 MG-MCG tablet         levocetirizine (XYZAL) 5 MG tablet        potassium citrate (UROCIT-K) 10 MEQ (1080 MG) CR tablet TAKE 2 TABLETS BY MOUTH TWICE DAILY 360 tablet 3     tamsulosin (FLOMAX) 0.4 MG capsule Take 1 capsule (0.4 mg) by mouth daily 21 capsule 0        ALLERGIES:    Allergies   Allergen Reactions     Thiola [Tiopronin] Rash     Fevers, lymphadenopathy, swelling in throat area       REVIEW OF SYSTEMS:  A 10 point review of systems was negative except as noted above.    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 Not Asked   Social History Narrative    Shelly lives with both parents in [place]. Working as .  Hoping to buy house in 2021.    Sexually active with men and dating.     Social Determinants of Health     Financial Resource Strain: Not on file   Food Insecurity: Not on file   Transportation Needs: Not on file   Physical Activity: Not on file   Stress: Not on file   Social Connections: Not on file   Intimate Partner Violence: Not on file   Housing Stability: Not on file   works out 5 days per week    FAMILY MEDICAL HISTORY:   Family History   Problem Relation Age of Onset     Genetic Disorder Sister         Cystinuria       PHYSICAL EXAM:   There were no vitals taken for this visit.     Exam limited by video appointment. Within those limitations:  GENERAL APPEARANCE: alert and no distress  RESP: no conversational dyspnea  Psych: pleasant, normal  mental status    LABS:   I reviewed:  Electrolytes/Renal -   Recent Labs   Lab Test 08/18/20  1524 03/20/18  1222 03/09/18  2300    137 132*   POTASSIUM 3.7 4.0 3.8   CHLORIDE 107 103 100   CO2 26 26 25   BUN 12 10 14   CR 0.96 0.86 0.94   * 88 105*   VALERIANO 9.1 9.3 8.8       CBC -   Recent Labs   Lab Test 08/18/20  1524 03/09/18  2300 03/09/18  0441   WBC 6.7 12.1* 13.7*   HGB 14.1 12.4 12.5    287 258       LFTs -   Recent Labs   Lab Test 08/18/20  1524 12/19/17  1122 08/06/17  0502   ALKPHOS 57 52 61   BILITOTAL 0.4 0.6 0.3   ALT 21 19 31   AST 18 16 31   PROTTOTAL 7.9 7.9 7.7   ALBUMIN 4.0 4.2 4.2       Coags - No lab results found.    Iron Panel - No lab results found.    Endocrine - No lab results found.    IMAGING:  See HPI    Alis Gonzalez MD

## 2022-12-28 ENCOUNTER — MEDICAL CORRESPONDENCE (OUTPATIENT)
Dept: HEALTH INFORMATION MANAGEMENT | Facility: CLINIC | Age: 27
End: 2022-12-28

## 2022-12-28 NOTE — TELEPHONE ENCOUNTER
Action 12/28/22 MMT   Action Taken  [facility] mailed litholink results to patient's home address per pt request.

## 2023-01-11 ENCOUNTER — LAB (OUTPATIENT)
Dept: LAB | Facility: CLINIC | Age: 28
End: 2023-01-11
Payer: COMMERCIAL

## 2023-01-11 DIAGNOSIS — Z51.81 MEDICATION MONITORING ENCOUNTER: ICD-10-CM

## 2023-01-11 LAB
ANION GAP SERPL CALCULATED.3IONS-SCNC: 9 MMOL/L (ref 3–14)
BUN SERPL-MCNC: 13 MG/DL (ref 7–30)
CALCIUM SERPL-MCNC: 9.5 MG/DL (ref 8.5–10.1)
CHLORIDE BLD-SCNC: 102 MMOL/L (ref 94–109)
CO2 SERPL-SCNC: 25 MMOL/L (ref 20–32)
CREAT SERPL-MCNC: 0.88 MG/DL (ref 0.52–1.04)
GFR SERPL CREATININE-BSD FRML MDRD: >90 ML/MIN/1.73M2
GLUCOSE BLD-MCNC: 97 MG/DL (ref 70–99)
POTASSIUM BLD-SCNC: 3.7 MMOL/L (ref 3.4–5.3)
SODIUM SERPL-SCNC: 136 MMOL/L (ref 133–144)

## 2023-01-11 PROCEDURE — 80048 BASIC METABOLIC PNL TOTAL CA: CPT

## 2023-01-11 PROCEDURE — 36415 COLL VENOUS BLD VENIPUNCTURE: CPT

## 2023-01-26 NOTE — PROGRESS NOTES
Pt evaluated via billable telephone visit d/t COVID-19 restrictions. Time spent: 30 min  Provider location: onsite ([facility]  Outpatient MNT     Time Spent: 30 minutes  Visit Type: follow up (saw pt virtually for same reason 9/2020)  Referring Physician: Rexferkervin  Reason for RD Visit: cystinuria   Pt accompanied by: self     Nutrition Assessment  Shelly would like to focus on talking about weight loss. She reports losing 25 lbs at the beginning of the pandemic, but has since regained it back. She lost weight previously by watching diet/portion size more, working out daily, not drinking/not going out to eat w/ pandemic. She is trying to find a balance now.     She historically has tracked her dietary Na intake for stones, but doesn't strictly track it now. She is mindful of purchasing LS canned goods, looking at labels while shopping, etc. She has not looked online prior to dining out in regard to sodium content of restaurant foods.     With her cystine stones, a lower animal protein (ie meat/eggs) diet is recommended. Consuming adequate fruit/veggies is also helpful, along with drinking adequate water.     Anthropometrics  IBW: 115 lbs/182  DW: 139 lbs/63 kg    Estimated Nutrition Needs:  Protein: 50-63 grams/day (0.8-1 g/kg) for moderate intake for weight loss given h/o cystine stones    Vitamins, Supplements, Pertinent Meds: mvi   Herbal Medicines/Supplements: no     Diet Recall  Breakfast Potatoes with 2 fried eggs w/ ketchup & fruit; 2 string cheese + fruit    Lunch Salad kit (1/2) with 1 tortilla (more in the summer); leftovers (Noodles & Co Mac & cheese); ham & swiss s/w with spicy veggie straws, 100 calorie cookie pack     Dinner Out to eat 1-2x/week- ramen or 1/2 Noodles Mac & Cheese; BBQ chicken on bun w/ sweet kale salad + potato/zucchini    Snacks Spicy veggie straws, dark chocolate (2-4 squares), Simon muffin/brownie, Halo ice cream bars    Beverages 1 cup coffee w/ creamer, water (1  gallon/day)   Alcohol 2x/month- 3-4 drinks   Dining out 1-2x/week     Physical Activity  Uses a fitness alli. Does HIIT videos & strength- 3x/week- 30 minutes, just started to get back into it this week  Walks (weather dependent)    Nutrition Diagnosis  No nutrition diagnosis identified at this time     Nutrition Intervention  Reviewed common recommendations for weight loss: including high fruit/veggie intake, lower carb intake, and moderate protein intake. Will provide her some examples that consider her desire for weight loss along with kidney stone considerations.   Reviewed high fluid intake, which she should continue.     Per Up to Date:  Adults should also be advised to moderately restrict animal protein intake to 0.8 to 1 g/kg per day, as this provides adequate nutrition and should be tolerated by most patients.    Patient Understanding: Pt verbalized understanding of education provided.  Expected Engagement: Good  Follow-Up Plans: PRN     Nutrition Goals  No nutrition goals identified at this time     Ivanna Silverman, RD, LD, CCTD

## 2023-01-31 ENCOUNTER — VIRTUAL VISIT (OUTPATIENT)
Dept: TRANSPLANT | Facility: CLINIC | Age: 28
End: 2023-01-31
Payer: COMMERCIAL

## 2023-01-31 DIAGNOSIS — E72.01 CYSTINURIA (H): ICD-10-CM

## 2023-01-31 PROCEDURE — 97802 MEDICAL NUTRITION INDIV IN: CPT | Mod: TEL,95 | Performed by: DIETITIAN, REGISTERED

## 2023-01-31 NOTE — LETTER
1/31/2023         RE: Shelly Cerda  [address]  [place] [address]        Dear Colleague,    Thank you for referring your patient, Shelly Cerda, to the [facility] TRANSPLANT [facility]IN[facility]. Please see a copy of my visit note below.    Pt evaluated via billable telephone visit d/t COVID-19 restrictions. Time spent: 30 min  Provider location: onsite ([facility])     [facility]  Outpatient MNT     Time Spent: 30 minutes  Visit Type: follow up (saw pt virtually for same reason 9/2020)  Referring Physician: Elfering  Reason for RD Visit: cystinuria   Pt accompanied by: self     Nutrition Assessment  Shelly would like to focus on talking about weight loss. She reports losing 25 lbs at the beginning of the pandemic, but has since regained it back. She lost weight previously by watching diet/portion size more, working out daily, not drinking/not going out to eat w/ pandemic. She is trying to find a balance now.     She historically has tracked her dietary Na intake for stones, but doesn't strictly track it now. She is mindful of purchasing LS canned goods, looking at labels while shopping, etc. She has not looked online prior to dining out in regard to sodium content of restaurant foods.     With her cystine stones, a lower animal protein (ie meat/eggs) diet is recommended. Consuming adequate fruit/veggies is also helpful, along with drinking adequate water.     Anthropometrics  IBW: 115 lbs/182  DW: 139 lbs/63 kg    Estimated Nutrition Needs:  Protein: 50-63 grams/day (0.8-1 g/kg) for moderate intake for weight loss given h/o cystine stones    Vitamins, Supplements, Pertinent Meds: mvi   Herbal Medicines/Supplements: no     Diet Recall  Breakfast Potatoes with 2 fried eggs w/ ketchup & fruit; 2 string cheese + fruit    Lunch Salad kit (1/2) with 1 tortilla (more in the summer); leftovers (Noodles & Co Mac & cheese); ham & swiss s/w with spicy veggie straws, 100 calorie cookie pack     Dinner Out to eat  1-2x/week- ramen or 1/2 Noodles Mac & Cheese; BBQ chicken on bun w/ sweet kale salad + potato/zucchini    Snacks Spicy veggie straws, dark chocolate (2-4 squares), [place] muffin/brownie, Halo ice cream bars    Beverages 1 cup coffee w/ creamer, water (1 gallon/day)   Alcohol 2x/month- 3-4 drinks   Dining out 1-2x/week     Physical Activity  Uses a fitness alli. Does HIIT videos & strength- 3x/week- 30 minutes, just started to get back into it this week  Walks (weather dependent)    Nutrition Diagnosis  No nutrition diagnosis identified at this time     Nutrition Intervention  Reviewed common recommendations for weight loss: including high fruit/veggie intake, lower carb intake, and moderate protein intake. Will provide her some examples that consider her desire for weight loss along with kidney stone considerations.   Reviewed high fluid intake, which she should continue.     Per Up to Date:  Adults should also be advised to moderately restrict animal protein intake to 0.8 to 1 g/kg per day, as this provides adequate nutrition and should be tolerated by most patients.    Patient Understanding: Pt verbalized understanding of education provided.  Expected Engagement: Good  Follow-Up Plans: PRN     Nutrition Goals  No nutrition goals identified at this time     Ivanna Silverman, RD, LD, CCTD

## 2023-02-01 NOTE — PATIENT INSTRUCTIONS
Hal Bravo,    I looked deeper into the amount of protein you can consume daily with your cystine stone history. It might be worth tracking for a bit to understand where you fall now and how to max it out. You can consume up to 65 g/day based on your height, weight, and stone history.     Protein can come from eggs/meat/poultry/fish (these items are more linked to stone formation), dairy products, nuts/seeds/beans/legumes, etc (less linked to stone formation).     Some meal ideas:  - Greek yogurt (plain, without added sugar or honey) with fresh fruit + nuts/liam seeds  - Eggs (scrambled, fried) with sauteed veggies (broccoli, kale, onions, peppers, etc)  - Pasta w/ part zucchini noodles, lots of veggies, some protein  - Roasted veggies - bake at 425 for 20+ min until done to your liking- broccoli, brussels, potatoes, asparagus, beets, carrots, onions + add garlic powder, pepper, etc   - Greek yogurt frozen treat bars: [employer]asso   - Tuna or chicken salad on low carb wrap ([place], [place] Carb Balance- watch sodium for these items)  - Salad with mixed greens, veggies, lentils/beans, nuts/sunflower seeds, cheese, etc     https://Kaai/cauliflower-walnut-vegetarian-taco-meat  This recipe is super yummy - including veggies & some plant protein- use on tortillas or make into a bowl with cauliflower rice, jalapeno, salsa/fresh tomato, avocado, etc     I also recently made a veggie taco recipe using spiralized onion, bell pepper, and sweet potato. There are a lot of fun veggie taco recipes that are an easier way to get veggies in.

## 2023-02-14 ENCOUNTER — PRE VISIT (OUTPATIENT)
Dept: UROLOGY | Facility: CLINIC | Age: 28
End: 2023-02-14
Payer: COMMERCIAL

## 2023-02-14 NOTE — TELEPHONE ENCOUNTER
Reason for visit: follow up      Dx/Hx/Sx: kidney stone, cystinuria     Records/imaging/labs/orders: renal US in epic    At Rooming: video visit

## 2023-03-07 ENCOUNTER — HOSPITAL ENCOUNTER (OUTPATIENT)
Dept: ULTRASOUND IMAGING | Facility: CLINIC | Age: 28
Discharge: HOME | End: 2023-03-07
Attending: NURSE PRACTITIONER | Admitting: NURSE PRACTITIONER
Payer: COMMERCIAL

## 2023-03-07 DIAGNOSIS — N20.0 KIDNEY STONE: ICD-10-CM

## 2023-03-07 PROCEDURE — 76770 US EXAM ABDO BACK WALL COMP: CPT

## 2023-03-10 ENCOUNTER — VIRTUAL VISIT (OUTPATIENT)
Dept: UROLOGY | Facility: CLINIC | Age: 28
End: 2023-03-10
Payer: COMMERCIAL

## 2023-03-10 DIAGNOSIS — E72.09 CYSTINE STONES (H): Primary | ICD-10-CM

## 2023-03-10 PROCEDURE — 99214 OFFICE O/P EST MOD 30 MIN: CPT | Mod: VID | Performed by: NURSE PRACTITIONER

## 2023-03-10 NOTE — PROGRESS NOTES
Video-Visit Details    Type of service:  Video Visit    Video Start Time (time video started): 3:45 pm    Video End Time (time video stopped): 4:15 pm    Originating Location (pt. Location): Home    Distant Location (provider location):  Off-site    Mode of Communication:  Video Conference via [facility]    Assessment/Plan:  27 year old female with a history of cystinuria and recurrent kidney stones with multiple procedures, managed on captopril 50 mg TID and potassium citrate 20 mEq BID. No visible stones on CHRISTY, which we have utilized for monitoring instead of CT (her stones not radiopaque on x-ray). Her biggest concern today is urinary hesitancy and incomplete bladder emptying. She makes several attempts to empty her bladder. Her past two renal ultrasounds have shown a distended bladder despite the fact that she attempted to empty her bladder immediately prior. We discussed that a common reason for obstructive voiding symptoms is the pelvic floor and pelvic floor physical therapy's role in evaluation/managing this. She is hesitant about going this route. Recommend we have her follow up for a nurse visit to obtain a PVR to get an idea of where she is at with this currently. If her PVR is significant, may consider UDS to evaluate detrusor function. However, do not have an obvious reason to suspect detrusor pathology for her.   -Follow up for nurse visit to obtain PVR. She lives nearby the [facility] so would like to do this there, and will try to arrange. Staff completing PVR to notify me of results, once completed. Next steps depending on this result. If PVR reasonable (<150 mL), may consider PFPT eval. If PVR >150 mL, my consider UDS.   -Regarding her stones, will plan for renal ultrasound in 6 months (August).     Caren Gilbert, CNP  Department of Urology      Subjective:   27 year old female with a history of cystinuria and recurrent kidney stones with multiple procedures, s/p URS with Dr. Koehler in  October. I saw her for follow up on 11/18 and she reported some intermittent right flank pain at that time and had passed a stone about one week prior. Unclear if additional stones passing and she was therefore offered a CT to evaluate, though she declined due to the frequency of her scans. Has followed with Dr. Gonzalez for stone prevention and is being treated with captopril 50 mg TID and potassium citrate 20 mEq BID. Has not tolerated thiola.     CHRISTY obtained earlier this week showed no evidence of obstruction. Her stones are not radiopaque so KUBs have been deferred. She has also preferred to avoid CT as able due to her young age and history of multiple scans.     Today, she states that she has been doing fine and is currently asymptomatic. She does, however, c/o urinary hesitancy and incomplete bladder emptying. She often has to return to the bathroom for repeat attempts to empty. She notes that on her past few ultrasounds, her bladder is noted to be distended and she did attempt to empty beforehand.     Objective:     Exam:   Patient is a 27 year old female   No vital signs obtained due to virtual visit.  General Appearance: Well groomed, hygenic  Respiratory: No cough, no respiratory distress or labored breathing  Musculoskeletal: Grossly normal with no gross deficits  Skin: No discoloration or apparent rashes  Neurologic: No tremors  Psychiatric: Alert and oriented  Further examination is deferred due to the nature of our visit.

## 2023-03-10 NOTE — LETTER
3/10/2023       RE: Shelly Cerda  [address] [facility] [address] [place] [id number]     Dear Colleague,    Thank you for referring your patient, Shelly Cerda, to the [facility] [place] at [facility]. Please see a copy of my visit note below.    Video-Visit Details    Type of service:  Video Visit    Video Start Time (time video started): 3:45 pm    Video End Time (time video stopped): 4:15 pm    Originating Location (pt. Location): Home    Distant Location (provider location):  Off-site    Mode of Communication:  Video Conference via [facility]    Assessment/Plan:  27 year old female with a history of cystinuria and recurrent kidney stones with multiple procedures, managed on captopril 50 mg TID and potassium citrate 20 mEq BID. No visible stones on CHRISTY, which we have utilized for monitoring instead of CT (her stones not radiopaque on x-ray). Her biggest concern today is urinary hesitancy and incomplete bladder emptying. She makes several attempts to empty her bladder. Her past two renal ultrasounds have shown a distended bladder despite the fact that she attempted to empty her bladder immediately prior. We discussed that a common reason for obstructive voiding symptoms is the pelvic floor and pelvic floor physical therapy's role in evaluation/managing this. She is hesitant about going this route. Recommend we have her follow up for a nurse visit to obtain a PVR to get an idea of where she is at with this currently. If her PVR is significant, may consider UDS to evaluate detrusor function. However, do not have an obvious reason to suspect detrusor pathology for her.   -Follow up for nurse visit to obtain PVR. She lives nearby the [facility] so would like to do this there, and will try to arrange. Staff completing PVR to notify me of results, once completed. Next steps depending on this result. If PVR reasonable (<150 mL), may consider PFPT eval.  If PVR >150 mL, my consider UDS.   -Regarding her stones, will plan for renal ultrasound in 6 months (August).     Caren Gilbert, CNP  Department of Urology      Subjective:   27 year old female with a history of cystinuria and recurrent kidney stones with multiple procedures, s/p URS with Dr. Koehler in October. I saw her for follow up on 11/18 and she reported some intermittent right flank pain at that time and had passed a stone about one week prior. Unclear if additional stones passing and she was therefore offered a CT to evaluate, though she declined due to the frequency of her scans. Has followed with Dr. Gonzalez for stone prevention and is being treated with captopril 50 mg TID and potassium citrate 20 mEq BID. Has not tolerated thiola.     CHRISTY obtained earlier this week showed no evidence of obstruction. Her stones are not radiopaque so KUBs have been deferred. She has also preferred to avoid CT as able due to her young age and history of multiple scans.     Today, she states that she has been doing fine and is currently asymptomatic. She does, however, c/o urinary hesitancy and incomplete bladder emptying. She often has to return to the bathroom for repeat attempts to empty. She notes that on her past few ultrasounds, her bladder is noted to be distended and she did attempt to empty beforehand.     Objective:     Exam:   Patient is a 27 year old female   No vital signs obtained due to virtual visit.  General Appearance: Well groomed, hygenic  Respiratory: No cough, no respiratory distress or labored breathing  Musculoskeletal: Grossly normal with no gross deficits  Skin: No discoloration or apparent rashes  Neurologic: No tremors  Psychiatric: Alert and oriented  Further examination is deferred due to the nature of our visit.

## 2023-03-10 NOTE — NURSING NOTE
Is the patient currently in the state of [place]? YES    Visit mode:VIDEO    If the visit is dropped, the patient can be reconnected by: VIDEO VISIT: Send to e-mail at: [email or web address]    Will anyone else be joining the visit? NO      How would you like to obtain your AVS? MyChart    Are changes needed to the allergy or medication list? NO    Reason for visit: 6 month follow up with RUS prior Shelby Kocher

## 2023-03-10 NOTE — PATIENT INSTRUCTIONS
UROLOGY CLINIC VISIT PATIENT INSTRUCTIONS    -Will get a visit set up with a nurse in the [place] u[facility] clinic to measure your post-void residual. I will be in touch with you regarding this result, once complete.   -Regarding your stones, will plan for renal ultrasound in August.     If you have any issues, questions or concerns in the meantime, do not hesitate to contact us at [phone] or via [employer].     Caren Gilbert, CNP  Department of Urology

## 2023-03-17 ENCOUNTER — TELEPHONE (OUTPATIENT)
Dept: UROLOGY | Facility: CLINIC | Age: 28
End: 2023-03-17

## 2023-03-17 NOTE — TELEPHONE ENCOUNTER
"[facility]    Phone Message    May a detailed message be left on voicemail: yes     Reason for Call: Other: . pt saw Caren yoon virtually on 3/10 and per the instructions \"Will get a visit set up with a nurse in the [place] urology clinic to measure your post-void residual\"- please call pt to schedule a nurse visit in [place], thank you     Action Taken: Message routed to:  Other: uro    Travel Screening: Not Applicable                                                                      "

## 2023-03-24 ENCOUNTER — ALLIED HEALTH/NURSE VISIT (OUTPATIENT)
Dept: UROLOGY | Facility: CLINIC | Age: 28
End: 2023-03-24
Payer: COMMERCIAL

## 2023-03-24 DIAGNOSIS — R39.89 URINARY PROBLEM: Primary | ICD-10-CM

## 2023-03-24 LAB — RESIDUAL VOLUME (RV) (EXTERNAL): 14

## 2023-03-24 PROCEDURE — 51798 US URINE CAPACITY MEASURE: CPT

## 2023-03-24 NOTE — PROGRESS NOTES
Pt here for PVR.  Pt voided into the toilet... no urine collected.  PVR by scanner=14mL    HEDY Tubbs CMA

## 2023-03-27 ENCOUNTER — MYC MEDICAL ADVICE (OUTPATIENT)
Dept: UROLOGY | Facility: CLINIC | Age: 28
End: 2023-03-27
Payer: COMMERCIAL

## 2023-04-05 ENCOUNTER — OFFICE VISIT (OUTPATIENT)
Dept: FAMILY MEDICINE | Facility: CLINIC | Age: 28
End: 2023-04-05
Payer: COMMERCIAL

## 2023-04-05 VITALS
SYSTOLIC BLOOD PRESSURE: 119 MMHG | HEART RATE: 96 BPM | DIASTOLIC BLOOD PRESSURE: 74 MMHG | HEIGHT: 63 IN | BODY MASS INDEX: 38.84 KG/M2 | RESPIRATION RATE: 18 BRPM | OXYGEN SATURATION: 99 % | WEIGHT: 219.2 LBS | TEMPERATURE: 98.2 F

## 2023-04-05 DIAGNOSIS — G89.29 CHRONIC BILATERAL LOW BACK PAIN WITHOUT SCIATICA: ICD-10-CM

## 2023-04-05 DIAGNOSIS — M54.50 CHRONIC BILATERAL LOW BACK PAIN WITHOUT SCIATICA: ICD-10-CM

## 2023-04-05 DIAGNOSIS — E72.01 CYSTINURIA (H): ICD-10-CM

## 2023-04-05 DIAGNOSIS — Z00.00 ANNUAL PHYSICAL EXAM: Primary | ICD-10-CM

## 2023-04-05 DIAGNOSIS — K21.9 GASTROESOPHAGEAL REFLUX DISEASE WITHOUT ESOPHAGITIS: ICD-10-CM

## 2023-04-05 DIAGNOSIS — R61 NIGHT SWEATS: ICD-10-CM

## 2023-04-05 DIAGNOSIS — N20.0 RECURRENT KIDNEY STONES: ICD-10-CM

## 2023-04-05 DIAGNOSIS — E66.01 MORBID OBESITY (H): ICD-10-CM

## 2023-04-05 LAB
ANION GAP SERPL CALCULATED.3IONS-SCNC: 15 MMOL/L (ref 7–15)
BUN SERPL-MCNC: 10.8 MG/DL (ref 6–20)
CALCIUM SERPL-MCNC: 9.9 MG/DL (ref 8.6–10)
CHLORIDE SERPL-SCNC: 101 MMOL/L (ref 98–107)
CHOLEST SERPL-MCNC: 199 MG/DL
CREAT SERPL-MCNC: 1.27 MG/DL (ref 0.51–0.95)
DEPRECATED HCO3 PLAS-SCNC: 22 MMOL/L (ref 22–29)
ERYTHROCYTE [DISTWIDTH] IN BLOOD BY AUTOMATED COUNT: 11.8 % (ref 10–15)
GFR SERPL CREATININE-BSD FRML MDRD: 59 ML/MIN/1.73M2
GLUCOSE SERPL-MCNC: 92 MG/DL (ref 70–99)
HCT VFR BLD AUTO: 42.3 % (ref 35–47)
HDLC SERPL-MCNC: 73 MG/DL
HGB BLD-MCNC: 14.5 G/DL (ref 11.7–15.7)
LDLC SERPL CALC-MCNC: 107 MG/DL
MCH RBC QN AUTO: 29.8 PG (ref 26.5–33)
MCHC RBC AUTO-ENTMCNC: 34.3 G/DL (ref 31.5–36.5)
MCV RBC AUTO: 87 FL (ref 78–100)
NONHDLC SERPL-MCNC: 126 MG/DL
PLATELET # BLD AUTO: 299 10E3/UL (ref 150–450)
POTASSIUM SERPL-SCNC: 4.3 MMOL/L (ref 3.4–5.3)
RBC # BLD AUTO: 4.86 10E6/UL (ref 3.8–5.2)
SODIUM SERPL-SCNC: 138 MMOL/L (ref 136–145)
TRIGL SERPL-MCNC: 96 MG/DL
TSH SERPL DL<=0.005 MIU/L-ACNC: 1.28 UIU/ML (ref 0.3–4.2)
WBC # BLD AUTO: 8.6 10E3/UL (ref 4–11)

## 2023-04-05 PROCEDURE — 84443 ASSAY THYROID STIM HORMONE: CPT | Performed by: INTERNAL MEDICINE

## 2023-04-05 PROCEDURE — 80048 BASIC METABOLIC PNL TOTAL CA: CPT | Performed by: INTERNAL MEDICINE

## 2023-04-05 PROCEDURE — 99214 OFFICE O/P EST MOD 30 MIN: CPT | Mod: 25 | Performed by: INTERNAL MEDICINE

## 2023-04-05 PROCEDURE — 80061 LIPID PANEL: CPT | Performed by: INTERNAL MEDICINE

## 2023-04-05 PROCEDURE — 85027 COMPLETE CBC AUTOMATED: CPT | Performed by: INTERNAL MEDICINE

## 2023-04-05 PROCEDURE — 99385 PREV VISIT NEW AGE 18-39: CPT | Performed by: INTERNAL MEDICINE

## 2023-04-05 PROCEDURE — 36415 COLL VENOUS BLD VENIPUNCTURE: CPT | Performed by: INTERNAL MEDICINE

## 2023-04-05 ASSESSMENT — ENCOUNTER SYMPTOMS
CONSTIPATION: 0
FEVER: 0
HEARTBURN: 0
HEMATURIA: 0
PALPITATIONS: 0
SORE THROAT: 0
DYSURIA: 0
PARESTHESIAS: 0
HEADACHES: 0
DIZZINESS: 0
CHILLS: 0
COUGH: 0
DIARRHEA: 0
MYALGIAS: 0
JOINT SWELLING: 0
WEAKNESS: 0
HEMATOCHEZIA: 0
ABDOMINAL PAIN: 0
FREQUENCY: 0
SHORTNESS OF BREATH: 0
NAUSEA: 0
BREAST MASS: 0
NERVOUS/ANXIOUS: 1
EYE PAIN: 0
ARTHRALGIAS: 0

## 2023-04-05 ASSESSMENT — PAIN SCALES - GENERAL: PAINLEVEL: NO PAIN (0)

## 2023-04-05 NOTE — PROGRESS NOTES
SUBJECTIVE:   CC: Shelly is an 27 year old who presents for preventive health visit.   Patient has been advised of split billing requirements and indicates understanding: Yes  Healthy Habits:     Getting at least 3 servings of Calcium per day:  NO    Bi-annual eye exam:  Yes    Dental care twice a year:  Yes    Sleep apnea or symptoms of sleep apnea:  None    Diet:  Low salt and Other    Frequency of exercise:  4-5 days/week    Duration of exercise:  15-30 minutes    Taking medications regularly:  No    Barriers to taking medications:  Problems remembering to take them    Medication side effects:  None    PHQ-2 Total Score: 1    Additional concerns today:  Yes    Shelly is a very pleasant 27 year old who presented to the clinic for APE.  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   She recently (October 2022) had lithotripsy, she is usually scheduled for these every year.     Pap was done in April 2021- normal.  Maternal grandfather has a genetic bleeding disorder.      Today's PHQ-2 Score:       4/5/2023     3:17 PM   PHQ-2 ( 1999 [employer])   Q1: Little interest or pleasure in doing things 0   Q2: Feeling down, depressed or hopeless 1   PHQ-2 Score 1   Q1: Little interest or pleasure in doing things Not at all   Q2: Feeling down, depressed or hopeless Several days   PHQ-2 Score 1     Social History     Tobacco Use     Smoking status: Never     Smokeless tobacco: Never   Vaping Use     Vaping status: Never Used   Substance Use Topics     Alcohol use: Yes     Comment: occasionally     Reviewed orders with patient.  Reviewed health maintenance and updated orders accordingly - Yes  Lab work is in process    Breast Cancer Screening:  Any new diagnosis of family breast, ovarian, or bowel cancer? No    Patient under 40 years of age: Routine Mammogram Screening not recommended.   Pertinent mammograms are reviewed under the imaging tab.    History of abnormal Pap smear: NO - age 30- 65 PAP every 3 years  "recommended     Reviewed and updated as needed this visit by clinical staff   Tobacco  Allergies  Meds              Review of Systems   Constitutional: Negative for chills and fever.   HENT: Negative for congestion, ear pain, hearing loss and sore throat.    Eyes: Negative for pain and visual disturbance.   Respiratory: Negative for cough and shortness of breath.    Cardiovascular: Negative for chest pain, palpitations and peripheral edema.   Gastrointestinal: Negative for abdominal pain, constipation, diarrhea, heartburn, hematochezia and nausea.   Breasts:  Negative for tenderness, breast mass and discharge.   Genitourinary: Negative for dysuria, frequency, genital sores, hematuria, pelvic pain, urgency, vaginal bleeding and vaginal discharge.   Musculoskeletal: Negative for arthralgias, joint swelling and myalgias.   Skin: Negative for rash.   Neurological: Negative for dizziness, weakness, headaches and paresthesias.   Psychiatric/Behavioral: Negative for mood changes. The patient is nervous/anxious.         OBJECTIVE:   /74 (BP Location: Left arm, Patient Position: Sitting, Cuff Size: Adult Large)   Pulse 96   Temp 98.2  F (36.8  C) (Oral)   Resp 18   Ht 1.607 m (5' 3.25\")   Wt 99.4 kg (219 lb 3.2 oz)   LMP 03/23/2023 (Exact Date)   SpO2 99%   BMI 38.53 kg/m    Physical Exam  Vitals reviewed.   Constitutional:       Appearance: Normal appearance.   HENT:      Right Ear: Tympanic membrane normal. There is no impacted cerumen.      Left Ear: Tympanic membrane normal. There is no impacted cerumen.      Mouth/Throat:      Mouth: Mucous membranes are moist.      Pharynx: Oropharynx is clear. No oropharyngeal exudate or posterior oropharyngeal erythema.   Cardiovascular:      Rate and Rhythm: Normal rate and regular rhythm.      Heart sounds: Normal heart sounds. No murmur heard.     No gallop.   Pulmonary:      Effort: Pulmonary effort is normal. No respiratory distress.      Breath sounds: Normal " breath sounds. No stridor. No wheezing, rhonchi or rales.   Abdominal:      General: Abdomen is flat. There is no distension.      Palpations: Abdomen is soft. There is no mass.      Tenderness: There is no abdominal tenderness. There is no guarding.      Hernia: No hernia is present.   Musculoskeletal:         General: Normal range of motion.      Cervical back: Normal range of motion and neck supple. No rigidity or tenderness.      Right lower leg: No edema.      Left lower leg: No edema.      Comments: SLR negative    Lymphadenopathy:      Cervical: No cervical adenopathy.   Skin:     General: Skin is warm and dry.   Neurological:      General: No focal deficit present.      Mental Status: She is alert.   Psychiatric:         Mood and Affect: Mood normal.       ASSESSMENT/PLAN:   Shelly was seen today for establish care.    Diagnoses and all orders for this visit:    Annual physical exam  -     CBC with Platelets (Today); Future  -     Basic metabolic panel; Future  -     Lipid Profile; Future    Cystinuria (H)  Stable. Continue follow up with nephrology and urology.  For stones she is on captopril and potassium citrate.     Gastroesophageal reflux disease without esophagitis  Stable. Continue omeprazole.    Recurrent kidney stones  Follows with nephro and urology.     Morbid obesity (H)  Patient interested in weight loss program, non medication.   -     Adult Comprehensive Weight Management  Referral; Future    Night sweats  Wakes up with sweat on her scalp  -     TSH with free T4 reflex    Chronic bilateral low back pain without sciatica  SLR negative on exam.   PT recommended  -     Physical Therapy Referral; Future        Patient has been advised of split billing requirements and indicates understanding: Yes      COUNSELING:  Reviewed preventive health counseling, as reflected in patient instructions       Healthy diet/nutrition      BMI:   Estimated body mass index is 38.53 kg/m  as calculated from  "the following:    Height as of this encounter: 1.607 m (5' 3.25\").    Weight as of this encounter: 99.4 kg (219 lb 3.2 oz).   Weight management plan: Discussed healthy diet and exercise guidelines      She reports that she has never smoked. She has never used smokeless tobacco.    MALAIKA MILIAN MD  [facility]  "

## 2023-04-06 PROBLEM — M54.50 CHRONIC BILATERAL LOW BACK PAIN WITHOUT SCIATICA: Status: ACTIVE | Noted: 2023-04-06

## 2023-04-06 PROBLEM — G89.29 CHRONIC BILATERAL LOW BACK PAIN WITHOUT SCIATICA: Status: ACTIVE | Noted: 2023-04-06

## 2023-04-10 ENCOUNTER — PATIENT OUTREACH (OUTPATIENT)
Dept: UROLOGY | Facility: CLINIC | Age: 28
End: 2023-04-10
Payer: COMMERCIAL

## 2023-04-10 DIAGNOSIS — R39.89 URINARY PROBLEM: ICD-10-CM

## 2023-04-10 DIAGNOSIS — E72.09 CYSTINE STONES (H): Primary | ICD-10-CM

## 2023-04-10 NOTE — TELEPHONE ENCOUNTER
Per Dr. Gonzalez, pt should have renal panel and UA sometime between now and April 19th. Pt agreeable to plan and lab ordered and scheduled.

## 2023-04-14 ENCOUNTER — LAB (OUTPATIENT)
Dept: LAB | Facility: CLINIC | Age: 28
End: 2023-04-14
Payer: COMMERCIAL

## 2023-04-14 DIAGNOSIS — E72.09 CYSTINE STONES (H): ICD-10-CM

## 2023-04-14 DIAGNOSIS — R39.89 URINARY PROBLEM: ICD-10-CM

## 2023-04-14 LAB
ALBUMIN SERPL BCG-MCNC: 4.5 G/DL (ref 3.5–5.2)
ALBUMIN UR-MCNC: NEGATIVE MG/DL
ANION GAP SERPL CALCULATED.3IONS-SCNC: 15 MMOL/L (ref 7–15)
APPEARANCE UR: CLEAR
BACTERIA #/AREA URNS HPF: ABNORMAL /HPF
BILIRUB UR QL STRIP: NEGATIVE
BUN SERPL-MCNC: 11.4 MG/DL (ref 6–20)
CALCIUM SERPL-MCNC: 10 MG/DL (ref 8.6–10)
CHLORIDE SERPL-SCNC: 103 MMOL/L (ref 98–107)
COLOR UR AUTO: YELLOW
CREAT SERPL-MCNC: 0.95 MG/DL (ref 0.51–0.95)
DEPRECATED HCO3 PLAS-SCNC: 22 MMOL/L (ref 22–29)
GFR SERPL CREATININE-BSD FRML MDRD: 84 ML/MIN/1.73M2
GLUCOSE SERPL-MCNC: 99 MG/DL (ref 70–99)
GLUCOSE UR STRIP-MCNC: NEGATIVE MG/DL
HGB UR QL STRIP: NEGATIVE
KETONES UR STRIP-MCNC: NEGATIVE MG/DL
LEUKOCYTE ESTERASE UR QL STRIP: NEGATIVE
NITRATE UR QL: NEGATIVE
PH UR STRIP: 7.5 [PH] (ref 5–7)
PHOSPHATE SERPL-MCNC: 3.4 MG/DL (ref 2.5–4.5)
POTASSIUM SERPL-SCNC: 4 MMOL/L (ref 3.4–5.3)
RBC #/AREA URNS AUTO: ABNORMAL /HPF
SODIUM SERPL-SCNC: 140 MMOL/L (ref 136–145)
SP GR UR STRIP: 1.01 (ref 1–1.03)
SQUAMOUS #/AREA URNS AUTO: ABNORMAL /LPF
UROBILINOGEN UR STRIP-ACNC: 0.2 E.U./DL
WBC #/AREA URNS AUTO: ABNORMAL /HPF

## 2023-04-14 PROCEDURE — 36415 COLL VENOUS BLD VENIPUNCTURE: CPT

## 2023-04-14 PROCEDURE — 80069 RENAL FUNCTION PANEL: CPT

## 2023-04-14 PROCEDURE — 81001 URINALYSIS AUTO W/SCOPE: CPT

## 2023-04-24 ENCOUNTER — THERAPY VISIT (OUTPATIENT)
Dept: PHYSICAL THERAPY | Facility: CLINIC | Age: 28
End: 2023-04-24
Attending: INTERNAL MEDICINE
Payer: COMMERCIAL

## 2023-04-24 DIAGNOSIS — M54.50 CHRONIC RIGHT-SIDED LOW BACK PAIN WITHOUT SCIATICA: ICD-10-CM

## 2023-04-24 DIAGNOSIS — G89.29 CHRONIC RIGHT-SIDED LOW BACK PAIN WITHOUT SCIATICA: ICD-10-CM

## 2023-04-24 DIAGNOSIS — M54.50 CHRONIC BILATERAL LOW BACK PAIN WITHOUT SCIATICA: ICD-10-CM

## 2023-04-24 DIAGNOSIS — G89.29 CHRONIC BILATERAL LOW BACK PAIN WITHOUT SCIATICA: ICD-10-CM

## 2023-04-24 PROCEDURE — 97161 PT EVAL LOW COMPLEX 20 MIN: CPT | Mod: GP | Performed by: PHYSICAL THERAPIST

## 2023-04-24 PROCEDURE — 97530 THERAPEUTIC ACTIVITIES: CPT | Mod: GP | Performed by: PHYSICAL THERAPIST

## 2023-04-24 PROCEDURE — 97110 THERAPEUTIC EXERCISES: CPT | Mod: GP | Performed by: PHYSICAL THERAPIST

## 2023-04-24 NOTE — PROGRESS NOTES
Physical Therapy Initial Examination/Evaluation  April 24, 2023    Shelly Cerda  is a 27 year old  female referred to physical therapy by Dr. Michelle Rodríguez for treatment of LBP.      DOI/onset 10-24-22  Mechanism of injury Patient has had LBP off and on x 2 years without any known incident. The pain has been more consistent the past 6 months prompting her to seek help.  DOS n/a  Prior treatment none to date.     Chief Complaint:   Frequent LBP.   Pain location: B LS area R > L  Quality: aching and sometimes sharp  Constant/Intermittent: intermittent  Time of day: no time pattern  Symptoms have worsened since onset.    Current pain 3/10.  Pain at best 1/10.  Pain at worst 4/10.    Symptoms aggravated by workouts especially squats and lunges. Once aggravated, prolonged standing is difficult.    Symptoms improved with rest.     Social history:  Single lives in a nearby apartment. Normally works out 3-4 x week with hitting exercise [employer] .   Occupation: .  Job duties:  Computer work, prolonged sitting.    Patient having difficulty with ADLs: standing for meal prep.    Patient's goals are to reduce LBP.    Patient reports general health as good.  Related medical history no hx of LBP.    Surgical History:  none.    Imaging: none.    Medications:  none.       Return to MD:  As needed.      Clinical Impression: Patient should respond well to continued PT intervention working to decrease pain and improve strength and mobility through manual therapy and therapeutic exercise as tolerated.      Subjective:  HPI                    Objective:  Standing Alignment:        Lumbar:  Lordosis incr  Pelvic:  Normal (symmetrical pelvis)          Gait:    Gait Type:  Normal         Flexibility/Screens:   Positive screens:  LumbarNegative screens: SI Joint     Lower Extremity:  Decreased left lower extremity flexibility:Hamstrings    Decreased right lower extremity flexibility:  Hamstrings  Spine:      Decreased right  spine flexibility:  Quadratus Lumborum             Lumbar/SI Evaluation  ROM:  AROM Lumbar: normal    Strength: lower abdominals 4/5; R hip ext 4/5  Lumbar Myotomes:  normal                Lumbar Dermtomes:  normal                Neural Tension/Mobility:      Left side:SLR; Slump or Frandy-Lumbar  negative.     Right side:   Slump; Frandy-Lumbar or SLR  negative.   Lumbar Palpation:      Tenderness present at Right: Quadratus Lumborum; Erector Spinae and PSIS      Spinal Segmental Conclusions: No pain with P/A glide lumbar spine  Normal segmental mobility lumbar spine          SI joint/Sacrum:    SI joint provocation tests (-)                                                       General     ROS    Assessment/Plan:    Patient is a 27 year old female with lumbar complaints.    Patient has the following significant findings with corresponding treatment plan.                Diagnosis 1:  LBP  Pain -  manual therapy, self management and education  Decreased strength - therapeutic exercise and therapeutic activities  Impaired muscle performance - neuro re-education  Decreased function - therapeutic activities    Therapy Evaluation Codes:   1) History comprised of:   Personal factors that impact the plan of care:      Time since onset of symptoms.    Comorbidity factors that impact the plan of care are:      None.     Medications impacting care: None.  2) Examination of Body Systems comprised of:   Body structures and functions that impact the plan of care:      Lumbar spine.   Activity limitations that impact the plan of care are:      Cooking and Lifting.  3) Clinical presentation characteristics are:   Stable/Uncomplicated.  4) Decision-Making    Low complexity using standardized patient assessment instrument and/or measureable assessment of functional outcome.  Cumulative Therapy Evaluation is: Low complexity.    Previous and current functional limitations:  (See Goal Flow Sheet for this information)    Short term and  Long term goals: (See Goal Flow Sheet for this information)     Communication ability:  Patient appears to be able to clearly communicate and understand verbal and written communication and follow directions correctly.  Treatment Explanation - The following has been discussed with the patient:   RX ordered/plan of care  Anticipated outcomes  Possible risks and side effects  This patient would benefit from PT intervention to resume normal activities.   Rehab potential is good.    Frequency:  3 X a month, once daily  Duration:  for 2 months  Discharge Plan:  Achieve all LTG.  Independent in home treatment program.  Reach maximal therapeutic benefit.    Please refer to the daily flowsheet for treatment today, total treatment time and time spent performing 1:1 timed codes.

## 2023-04-25 NOTE — PROGRESS NOTES
Physical Therapy Initial Evaluation  Subjective:    Patient Health History             Pertinent medical history includes: kidney disease.     Medical allergies: Thiola.   Surgeries include:  Other (Lithotripsy Oct 2022).    Current medications: Birth control, potassium citrate, captopril, omeprozole.    Current occupation is .   Primary job tasks include:  Computer work and prolonged sitting.                                    Objective:  System    Physical Exam    General     ROS    Assessment/Plan:

## 2023-05-02 ENCOUNTER — THERAPY VISIT (OUTPATIENT)
Dept: PHYSICAL THERAPY | Facility: CLINIC | Age: 28
End: 2023-05-02
Attending: INTERNAL MEDICINE
Payer: COMMERCIAL

## 2023-05-02 DIAGNOSIS — G89.29 CHRONIC RIGHT-SIDED LOW BACK PAIN WITHOUT SCIATICA: Primary | ICD-10-CM

## 2023-05-02 DIAGNOSIS — M54.50 CHRONIC RIGHT-SIDED LOW BACK PAIN WITHOUT SCIATICA: Primary | ICD-10-CM

## 2023-05-02 PROCEDURE — 97110 THERAPEUTIC EXERCISES: CPT | Mod: GP | Performed by: PHYSICAL THERAPIST

## 2023-05-02 PROCEDURE — 97140 MANUAL THERAPY 1/> REGIONS: CPT | Mod: GP | Performed by: PHYSICAL THERAPIST

## 2023-05-09 ENCOUNTER — THERAPY VISIT (OUTPATIENT)
Dept: PHYSICAL THERAPY | Facility: CLINIC | Age: 28
End: 2023-05-09
Attending: INTERNAL MEDICINE
Payer: COMMERCIAL

## 2023-05-09 DIAGNOSIS — M54.50 CHRONIC RIGHT-SIDED LOW BACK PAIN WITHOUT SCIATICA: Primary | ICD-10-CM

## 2023-05-09 DIAGNOSIS — G89.29 CHRONIC RIGHT-SIDED LOW BACK PAIN WITHOUT SCIATICA: Primary | ICD-10-CM

## 2023-05-09 PROCEDURE — 97140 MANUAL THERAPY 1/> REGIONS: CPT | Mod: GP | Performed by: PHYSICAL THERAPIST

## 2023-05-09 PROCEDURE — 97110 THERAPEUTIC EXERCISES: CPT | Mod: GP | Performed by: PHYSICAL THERAPIST

## 2023-05-09 NOTE — PROGRESS NOTES
Subjective:  HPI  Physical Exam                    Objective:  System    Physical Exam    General     ROS    Assessment/Plan:    SUBJECTIVE  Subjective changes as noted by pt:   Pt. cont to note slow steady progress with gradual decreasing LBP.   Current pain level:  3/10   Changes in function:  Yes (See Goal flowsheet attached for changes in current functional level)     Adverse reaction to treatment or activity:  None    OBJECTIVE  Changes in objective findings:  Palpation - cont MF tightness/tenderness in B LS musculature R > L     ASSESSMENT  Shelly continues to require intervention to meet STG and LTG's: PT  Patient is progressing as expected.  Response to therapy has shown an improvement in  pain level, ROM  and strength  Progress made towards STG/LTG?  Yes (See Goal flowsheet attached for updates on achievement of STG and LTG)    PLAN  Current treatment program is being advanced to more complex exercises.    PTA/ATC plan:  N/A    Please refer to the daily flowsheet for treatment today, total treatment time and time spent performing 1:1 timed codes.

## 2023-05-25 ENCOUNTER — THERAPY VISIT (OUTPATIENT)
Dept: PHYSICAL THERAPY | Facility: CLINIC | Age: 28
End: 2023-05-25
Payer: COMMERCIAL

## 2023-05-25 DIAGNOSIS — G89.29 CHRONIC RIGHT-SIDED LOW BACK PAIN WITHOUT SCIATICA: Primary | ICD-10-CM

## 2023-05-25 DIAGNOSIS — M54.50 CHRONIC RIGHT-SIDED LOW BACK PAIN WITHOUT SCIATICA: Primary | ICD-10-CM

## 2023-05-25 PROCEDURE — 97530 THERAPEUTIC ACTIVITIES: CPT | Mod: GP | Performed by: PHYSICAL THERAPIST

## 2023-05-25 PROCEDURE — 97110 THERAPEUTIC EXERCISES: CPT | Mod: GP | Performed by: PHYSICAL THERAPIST

## 2023-07-11 ENCOUNTER — VIRTUAL VISIT (OUTPATIENT)
Dept: SURGERY | Facility: CLINIC | Age: 28
End: 2023-07-11
Payer: COMMERCIAL

## 2023-07-11 ENCOUNTER — ALLIED HEALTH/NURSE VISIT (OUTPATIENT)
Dept: SURGERY | Facility: CLINIC | Age: 28
End: 2023-07-11
Payer: COMMERCIAL

## 2023-07-11 VITALS — HEIGHT: 63 IN | WEIGHT: 222 LBS | BODY MASS INDEX: 39.34 KG/M2

## 2023-07-11 DIAGNOSIS — E72.01 CYSTINURIA (H): ICD-10-CM

## 2023-07-11 DIAGNOSIS — S86.899A MEDIAL TIBIAL STRESS SYNDROME, UNSPECIFIED LATERALITY, INITIAL ENCOUNTER: ICD-10-CM

## 2023-07-11 DIAGNOSIS — E66.01 MORBID OBESITY (H): Primary | ICD-10-CM

## 2023-07-11 DIAGNOSIS — M54.50 CHRONIC BILATERAL LOW BACK PAIN WITHOUT SCIATICA: ICD-10-CM

## 2023-07-11 DIAGNOSIS — G89.29 CHRONIC BILATERAL LOW BACK PAIN WITHOUT SCIATICA: ICD-10-CM

## 2023-07-11 PROCEDURE — 99205 OFFICE O/P NEW HI 60 MIN: CPT | Mod: VID | Performed by: PHYSICIAN ASSISTANT

## 2023-07-11 PROCEDURE — 97802 MEDICAL NUTRITION INDIV IN: CPT

## 2023-07-11 NOTE — PATIENT INSTRUCTIONS
"To ensure quality you may receive a patient satisfaction survey. The greatest compliment you can give is \"Likely to Recommend.\"    Nice to talk with you today.  Thank you for your trust. Below is the plan discussed.-  ARIANA Petty      Plan:  Call to set up apt for orthotics if covered.     Goals:  Add in NEAT exercise throughout the day (utilize your sit/stand desk)  Start 2000 international unit(s) vit D daily.     FOLLOW-UP:    Please call [phone] to schedule your next visit in 3 months.     NEAT  What is NEAT?  NEAT stands for Non-Exercise Activity Thermogenesis.    If refers to the energy used carrying out any daily activity that isn't formal exercise or sleeping.    NEAT exercise is important as it can account for a significant amount of calories you burn in a day,    NEAT is anything that's not working out or being asleep.  Examples of NEAT include:  Washing the car  Fidgeting  Walking upstairs  Walking the dog  Grocery shopping  Cooking  Gardening  Playing with children or pets  Using a standing desk  Walking to the gym, shops, office     Benefits of doing NEAT  Helps with muscle recovery  Can help to relieve stress and improve mental health  Improves cardiovascular health  Helps control blood sugar  Reduces risk of chronic disease  Lowers the level of inflammation in the body  An easier alternative if you're coming back from injury  It's a low-impact form of movement  Requires no skill  Free  Good for both beginners and those more familiar with exercise    It can be to motivate yourself if you are unable to dedicate a full 30 minutes to a work out.  One thing that we have learned in the past few years is exercise is cumulative.  So you can do 5-10 minutes at a time and still make an impact.     10 Healthy Habits  Eat Better ? Move More ? Live Well   Eat breakfast daily.     Try to eat within the first hour after you wake up. This helps you control your appetite during the day, and you are less likely " to snack in the evening.    80% of people who skip meals are overweight or obese.    2.   Include protein with every meal, even breakfast.    Protein will suppress your appetite longer than other types of food. This helps you  feel more satisfied with the amount of food you have eaten.    3.  Fill up on Fiber   Fiber comes from plants--fruits, veggies, whole grains, nuts/seeds and beans   Fiber is low in calories, high in phytonutrients and helps you stay full longer    4.  Eat S-L-[id number]-L-Y   Take 20-30 minutes to eat each meal by taking small bites, chewing foods to applesauce consistency or 20-30 times before you swallow   Eating foods too fast can delay satiety/fullness signals and increase overeating     5.  Avoid Mindless Eating   Be present when you eat--take note of the smell, taste and quality of your food   Make a list of alternative activities you could do to prevent boredom/stress eating  Go for a walk, call a friend, chew gum, paint your nails    6.  Keep a Journal   Writing down what you eat, how you feel and when you are active helps you identify new changes to work on from week to week         Look for ways to cut 100 calories from your current diet 2-3 times per day    7.  Drink 64 ounces of Non Calorie drinks between meals   Water   Zero calorie Propel , Vitamin Water , Crystal Light    Avoid regular soda pop    8.  Stop thinking about food choices as a  diet.    Instead, think of them as healthy, lifelong habits--an effort toward a new way of eating.   Weigh yourself once a week instead of everyday.     9.  Get enough sleep--at least 7 to 8 hours each night.    Lack of sleep is one reason that fat builds up around the waist.    10.  Exercise five to six times per week    Do a combination aerobic exercise (fast walking, biking, swimming) and strength training (Dumbbells, pushups, weight machines, resistance bands).   Start at 10 minutes per day and slowly work your way up to 30 minutes or more.   Taking the stairs instead of the elevator, park at the far end of the parking lot, pace while on the phone, take the dog for a walk.     [email or web address]

## 2023-07-11 NOTE — PROGRESS NOTES
MEDICAL WEIGHT LOSS INITIAL EVALUATION  DIAGNOSIS:  Obesity Class II    NUTRITION HISTORY:  Diet and exercise history per pre-visit questionnaire as follows:    Based on your typical week, how often do you do the following?    Generally, my meals include foods like these: bread, pasta, rice, potatoes, corn, crackers, sweet dessert, pop, or juice. Almost Everyday   Generally, my meals include foods like these: fried meats, brats, burgers, french fries, pizza, cheese, chips, or ice cream. A Few Times a Week   Eat fast food (like [employer], [employer], [employer]). Less Than Weekly   Eat at a buffet or sit-down restaurant. Once a Week   Eat most of my meals in front of the TV or computer. Almost Everyday   Often skip meals, eat at random times, have no regular eating times. Never   Rarely sit down for a meal but snack or graze throughout. Never   Eat extra snacks between meals. Once a Week   Eat most of my food at the end of the day. Never   Eat in the middle of the night or wake up at night to eat. Never   Eat extra snacks to prevent or correct low blood sugar. Never   Eat to prevent acid reflux or stomach pain. Never   Worry about not having enough food to eat. Never   Have you been to the food shelf at least a few times this year? No   Please answer the following questions based on your eating patterns during a typical week.    I eat when I am depressed. Never   I eat when I am stressed. Once a Week   I eat when I am bored. Once a Week   I eat when I am anxious. A Few Times a Week   I eat when I am happy or as a reward. Once a Week   I feel hungry all the time even if I just have eaten. Less Than Weekly   Feeling full is important to me. Once a Week   I finish all the food on my plate even if I am already full. Almost Everyday   I can't resist eating delicious food or walk past the good food/smell. Once a Week   I eat/snack without noticing that I am eating. Less Than Weekly   I eat when I am preparing the meal. A  Few Times a Week   I eat more than usual when I see others eating. Never   I have trouble not eating sweets, ice cream, cookies, or chips if they are around the house. A Few Times a Week   I think about food all day. Almost Everyday   What foods, if any, do you crave? Sweets/Candy/Chocolate   Please list any other foods you crave? I normally need a little sweet and a little salty   Please answer the following questions regarding the amount of food you have eaten over the past 6 months.    I feel out of control when eating. Monthly   I eat a large amount of food, like a loaf of bread, a box of cookies, a pint/quart of ice cream, all at once. Monthly   I eat a large amount of food even when I am not hungry. Monthly   I eat rapidly. Weekly   I eat alone because I feel embarrassed and do not want others to see how much I have eaten. Never   I eat until I am uncomfortably full. Weekly   I feel bad, disgusted, or guilty after I overeat. Monthly   I make myself vomit what I have eaten or use laxatives to get rid of food. Never   Please answer the following questions regarding if you usually eat a meal or not.  Please list all foods where appropriate. There are no wrong or right foods.      Do you typically eat breakfast? Yes   If you do eat breakfast, what types of food do you eat? half bagel with cream cheese and fruit. egg, low carb tortilla, cheese, low sugar syrup and fruit   Do you typically eat lunch? Yes   If you do eat lunch, what types of food do you typically eat? leftovers, tortilla with low sodium black beans, cheese, salsa and light cream cheese.   Do you typically eat supper? Yes   If you do eat supper, what types of food do you typically eat? meat, rice/noodles/potatoes   Do you typically eat snacks? Yes   If you do snack, what types of food do you typically eat? will have yogi. after dinner   Do you like vegetables? Yes   Do you drink water? Yes   How many glasses of juice do you drink in a typical day? 0  "  How many of glasses of milk do you drink in a typical day? 0   How many 8oz glasses of sugar containing drinks such as Arturo-Aid/sweet tea do you drink in a day? 0   How many cans/bottles of sugar pop/soda/tea/sports drinks do you drink in a day? 0   How many cans/bottles of diet pop/soda/tea or sports drink do you drink in a day? 0   How often do you have a drink of alcohol? Monthly or Less   If you do drink, how many drinks might you have in a day? 3-4   Please answer these questions based on a typical 12 hour day including time at work and home.    How much of a typical 12 hour day do you spend sitting? Half the Day   How much of a typical 12 hour day do you spend lying down? Less Than Half the Day   How much of a typical day do you spend walking/standing? Less Than Half the Day   How many hours (not including work) do you spend on the TV/Video Games/Computer/Tablet/Phone? 4-5 Hours   How many times a week are you active for the purpose of exercise? 2-3 Times a Week   What keeps you from being more active? Pain   How many total minutes do you spend doing some activity for the purpose of exercising when you exercise? More Than 30 Minutes       Additional Information: Pt feels her biggest challenges are portion control, as well as balancing her unique dietary needs amidst weight loss recommendations. 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 She has met with RDs in the past but it seems to be as a child/teen and did not fully expand on physiology of her protein restrictions. 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 Methionine content in proteins discussed and resources provided. Vegetarian proteins extensively discussed and sample recipes were sent with patient as she enjoys cooking.     ANTHROPOMETRICS:  Height: 5' 3\"   Weight: 222 lbs 0 oz  BMI:  39.33 kg/m2  NUTRITION DIAGNOSIS:   Obese class II related to " overeating and poor lifestyle habits as evidence by patient's subjective statements and  BMI of 39.33 kg/m2   NUTRITION INTERVENTIONS  Nutrition Prescription:  Recommend modified energy- nutrient intake  Implementation:  Nutrition Education (Content):    Discussed portion sizes and plate method    Provided: Tips for Weight Loss and Weight Management, Protein and Fiber Content of Food, Plate Method      Methionine content in common foods per USDA database    Nutrition Education (Application):     Patient to practice goals as stated below    Patient verbalizes understanding of diet by stating    Anticipate good compliance    Goals:  Take 20-30 minutes to eat each meal  Eat within 1h of waking up  Once weekly, try new vegetarian recipe  Try Ripple protein shake or milk  Aim for 1 serving of meat protein per day, and vegetarian or dairy for other meals/snacks      FOLLOW UP AND MONITORING:    Other  - follow up in 4-8 weeks.     TIME SPENT WITH PATIENT:   45 minutes   Bijal Cerna RD, LD  Clinical Dietitian

## 2023-07-11 NOTE — PROGRESS NOTES
"Shelly is a 28 year old who is being evaluated via a billable video visit.      The patient has been notified of following:     \"This video visit will be conducted via a call between you and your physician/provider. We have found that certain health care needs can be provided without the need for an in-person physical exam.  This service lets us provide the care you need with a video conversation.  If a prescription is necessary we can send it directly to your pharmacy.  If lab work is needed we can place an order for that and you can then stop by our lab to have the test done at a later time.    Video visits are billed at different rates depending on your insurance coverage.  Please reach out to your insurance provider with any questions.    If during the course of the call the physician/provider feels a video visit is not appropriate, you will not be charged for this service.\"    Patient has given verbal consent for Video visit? Yes    How would you like to obtain your AVS? MyChart    If the video visit is dropped, the invitation should be resent by: Text to cell phone: [phone]    Will anyone else be joining your video visit? No    I    Video-Visit Details    Type of service:  Video Visit    Video Start Time: 9:05 AM    Video End Time: 9:55 AM    Originating Location (pt. Location): Home    Distant Location (provider location):  [facility] [place]     Platform used for Video Visit: [employer]     .  [facility] Medical Weight Management Consult    PATIENT:  Shelly Cerda   MRN:         [id number]   :         1995  ANMRATA:         2023      Dear MALAIKA MILIAN MD,    I had the pleasure of seeing your patient, Shelly Cerda. Full intake/assessment was done to determine barriers to weight loss success and develop a treatment plan. Shelly Cerda is a 28 year old female interested in treatment of medical problems associated with excess weight. She has a height of 5' 3\"[Pt reported[, " "a weight of 222 lbs 0 oz, and the calculated Body mass index is 39.33 kg/m .    Assessment & Plan   Problem List Items Addressed This Visit     Cystinuria (H)    Morbid obesity (H) - Primary     7/11/2023 [facility] Initial Wt 222# dietician, Orthotics, NEAT         Relevant Orders    Orthotics and Prosthetics DME Orthotic; Foot Orthotics; Bilateral    Chronic bilateral low back pain without sciatica    Relevant Orders    Orthotics and Prosthetics DME Orthotic; Foot Orthotics; Bilateral    Medial tibial stress syndrome, unspecified laterality, initial encounter    Relevant Orders    Orthotics and Prosthetics DME Orthotic; Foot Orthotics; Bilateral        PROGRAM OVERVIEW  Reviewed options at [facility].   All questions were answered. Education provided on chronic disease management of obesity.     MEDICATIONS:  We discussed healthy habits to assist with weight loss.  We discussed the role of pharmacological agents in the treatment of obesity and the \"off-label\" use of medications in this practice. We reviewed medication that may assist with weight loss. Indications, contraindications, risks/benefits, and potential side effects were discussed.   No medication was prescribed. Discussed that medications must always be used together with lifestyle changes such as improvements in diet choices, portion control and establishing and maintaining a regular exercise program. Reviewed rationale for long term use of pharmacotherapy in chronic disease management for obesity.      AOM Considerations:  Phentermine:  Caution with anxiety, elevated HR  Topiramate:  Contraindicated with hx of  Cystinuria  GLP-1:  No DM, PreDM, No AOM Coverage   Naltrexone:  Not a durga  Wellbutrin:  No depression, minimal hunger  Metformin:  No DM, PreDM,  Contrave: No DM, PreDM, No AOM Coverage  Qsymia: No DM, PreDM, No AOM Coverage           PATIENT INSTRUCTIONS:  Call to set up apt for orthotics if covered.   Start 2000 international " "unit(s) Vit D daily.     Goals:  Add in NEAT exercise throughout the day (utilize your sit/stand desk)    FOLLOW-UP:    Please call [phone] to schedule your next visit in 3 months.     60 minutes spent on the date of the encounter doing chart review, history and exam, review test results, counseling, developing plan of care, documentation, and further activities as noted above.      She has the following co-morbidities:        7/4/2023     9:08 AM   --   I have the following health issues associated with obesity None of the above   I have the following symptoms associated with obesity Knee Pain    Back Pain           7/4/2023     9:08 AM   Referring Provider   Please name the provider who referred you to Medical Weight Management  If you do not know, please answer \"I Don't Know\" Primary Doctor           7/4/2023     9:08 AM   Weight History   How concerned are you about your weight? Somewhat Concerned   I became overweight As a Child   The following factors have contributed to my weight gain Change in Schedule    A Health Crisis    Eating Wrong Types of Food    Eating Too Much    Stress   I have tried the following methods to lose weight Watching Portions or Calories    Exercise   My lowest weight since age 18 was 175   My highest weight since age 18 was 225   The most weight I have ever lost was (lbs) 25   I have the following family history of obesity/being overweight My father is overweight   How has your weight changed over the last year? Gained   How many pounds? 10      Wants to see if she would be a good fit for her life.    Cystinuria is a genetic diease.  Hs to do low salt diet, high hydration.  Only eats meat, fish once a day. (The Cystine levels)  Has  24 hour urine every 6 months.      Has had 10 surgeries for kidney stones.  Wants to has some personalize needs due to her diet.  Wants to be heathy.  Would like to have children in the future.  Has always be overweight.  During Covid she lost 25 lbs " with working out and watching portions.  Felt good at 175 lbs.          7/4/2023     9:08 AM   Diet Recall Review with Patient   If you do eat breakfast, what types of food do you eat? Wake up 6:35 am  7:30 -10:00 am half bagel with cream cheese and fruit. egg, low carb tortilla, cheese, low sugar syrup and fruit   If you do eat lunch, what types of food do you typically eat? Noon   leftovers, tortilla with low sodium black beans, cheese, salsa and light cream cheese.   If you do eat supper, what types of food do you typically eat? 5-6 pm meat, rice/noodles/potatoes     If you do snack, what types of food do you typically eat? will have yogi. after dinner  Dark chocolate Aldi chocolate bar or chocolate chip pancake.     How many glasses of juice do you drink in a typical day? 0   How many of glasses of milk do you drink in a typical day? 0   How many 8oz glasses of sugar containing drinks such as Arturo-Aid/sweet tea do you drink in a day? 0   How many cans/bottles of sugar pop/soda/tea/sports drinks do you drink in a day? 0   How many cans/bottles of diet pop/soda/tea or sports drink do you drink in a day? 0   How often do you have a drink of alcohol? Monthly or Less   If you do drink, how many drinks might you have in a day? 3-4           7/4/2023     9:08 AM   Eating Habits   Generally, my meals include foods like these bread, pasta, rice, potatoes, corn, crackers, sweet dessert, pop, or juice Almost Everyday   Generally, my meals include foods like these fried meats, brats, burgers, french fries, pizza, cheese, chips, or ice cream A Few Times a Week   Eat fast food (like McDonalds, Burger Angel, Taco Bell) Less Than Weekly   Eat at a buffet or sit-down restaurant Once a Week   Eat most of my meals in front of the TV or computer Almost Everyday- eats on the couch in front of TV.     Often skip meals, eat at random times, have no regular eating times Never   Rarely sit down for a meal but snack or graze throughout  Never   Eat extra snacks between meals Once a Week   Eat most of my food at the end of the day Never   Eat in the middle of the night or wake up at night to eat Never   Eat extra snacks to prevent or correct low blood sugar Never   Eat to prevent acid reflux or stomach pain Never   Worry about not having enough food to eat Never   I eat when I am depressed Never   I eat when I am stressed Once a Week   I eat when I am bored Once a Week   I eat when I am anxious A Few Times a Week   I eat when I am happy or as a reward Once a Week   I feel hungry all the time even if I just have eaten Less Than Weekly   Feeling full is important to me Once a Week   I finish all the food on my plate even if I am already full Almost Everyday   I can't resist eating delicious food or walk past the good food/smell Once a Week   I eat/snack without noticing that I am eating Less Than Weekly   I eat when I am preparing the meal A Few Times a Week   I eat more than usual when I see others eating Never   I have trouble not eating sweets, ice cream, cookies, or chips if they are around the house A Few Times a Week   I think about food all day Almost Everyday   What foods, if any, do you crave? Sweets/Candy/Chocolate   Please list any other foods you crave? I normally need a little sweet and a little salty   Moved following Covid (not living on her own which means she has to cook supper) she there was a change in her routine.  Had some back pain.  Was not eating out.  Was walking 3 miles and doing an after work video (Fit on).      Does plan meals for the week on weekend.  Does not meal prep over the weekend.           7/4/2023     9:08 AM   Amount of Food   I feel out of control when eating Monthly   I eat a large amount of food, like a loaf of bread, a box of cookies, a pint/quart of ice cream, all at once Monthly   I eat a large amount of food even when I am not hungry Monthly   I eat rapidly Weekly   I eat alone because I feel embarrassed  and do not want others to see how much I have eaten Never   I eat until I am uncomfortably full Weekly   I feel bad, disgusted, or guilty after I overeat Monthly   IF anything her lunches have gotten bigger.  Used to do a salad and a wrap.  Tempting to overeat if they have leftovers or having cereal following lunch.          7/4/2023     9:08 AM   Activity/Exercise History   How much of a typical 12 hour day do you spend sitting? Half the Day   How much of a typical 12 hour day do you spend lying down? Less Than Half the Day   How much of a typical day do you spend walking/standing? Less Than Half the Day   How many hours (not including work) do you spend on the TV/Video Games/Computer/Tablet/Phone? 4-5 Hours   How many times a week are you active for the purpose of exercise? 2-3 Times a Week   What keeps you from being more active? Pain   How many total minutes do you spend doing some activity for the purpose of exercising when you exercise? More Than 30 Minutes   Lower back pain- did PT which helped but was carrying her 1 year old nephew and back flared.  Injured knee and did PT 2021 but has some lingering pain.  Knee pain does better when she consistently is exercising.  Can get shin splints when walking.      PAST MEDICAL HISTORY:  Past Medical History:   Diagnosis Date     Complication of anesthesia      Cystinuria (H)      Kidney stones      PONV (postoperative nausea and vomiting)            7/4/2023     9:08 AM   Work/Social History Reviewed With Patient   My employment status is Full-Time   My job is    How much of your job is spent on the computer or phone? 100%   How many hours do you spend commuting to work daily? 0   What is your marital status? /In a Relationship   If in a relationship, is your significant other overweight? Yes   Who do you live with? My significant other   Who does the food shopping? we mutually do the shopping   Works for [employer]. Has a sit to stand desk but  normally sits.      Social History     Tobacco Use     Smoking status: Never     Smokeless tobacco: Never   Vaping Use     Vaping Use: Never used   Substance Use Topics     Alcohol use: Yes     Comment: occasionally     Drug use: No            7/4/2023     9:08 AM   Mental Health History Reviewed With Patient   Have you ever been physically or sexually abused? No   How often in the past 2 weeks have you felt little interest or pleasure in doing things? Not at all   Over the past 2 weeks how often have you felt down, depressed, or hopeless? Not at all           7/4/2023     9:08 AM   Questions Reviewed With Patient   How ready are you to make changes regarding your weight? Number 1 = Not ready at all to make changes up to 10 = very ready. 9   How confident are you that you can change? 1 = Not confident that you will be successful making changes up to 10 = very confident. 7           7/4/2023     9:08 AM   Sleep History Reviewed With Patient   How many hours do you sleep at night? 8    Falls asleep in 20-30 min.  Is a light sleeper.  Goes to bed around 9:30-10 PM.      MEDICATIONS:   Current Outpatient Medications   Medication Sig Dispense Refill     azelastine (ASTEPRO) 0.15 % nasal spray        captopril (CAPOTEN) 50 MG tablet Take 1 tablet (50 mg) by mouth 3 times daily 270 tablet 3     KURVELO 0.15-30 MG-MCG tablet Take 1 tablet by mouth daily for 360 days 90 tablet 3     levocetirizine (XYZAL) 5 MG tablet        omeprazole (PRILOSEC) 20 MG DR capsule Take 1 capsule (20 mg) by mouth daily 30 minutes before breakfast 90 capsule 3     potassium citrate (UROCIT-K) 10 MEQ (1080 MG) CR tablet Take 2 tablets (20 mEq) by mouth 2 times daily 360 tablet 3       ALLERGIES:   Allergies   Allergen Reactions     Thiola [Tiopronin] Rash     Fevers, lymphadenopathy, swelling in throat area       ROS:    HEENT  H/O glaucoma:  no  Cardiovascular  CAD:   no  Palpitations:   yes  HTN:    no  Gastrointestinal  GERD:   Yes, If taking  omeprazole 20 mg it is working.    Constipation:   No (has diarrhea)  Liver Dz:   no  H/O Pancreatitis:  no  H/O Gallbladder Dz: no  Psychiatric  Moods Stable:  yes  Anxiety:   yes  Depression:  no  Bipolar:  no  H/O ETOH/Drug Use: no  H/O eating disorder: no  Endocrine  PMH/FMH of MTC or MEN2:  no  Neurologic:  H/O seizures:   no  Headaches:  no  Memory Impairment:  no    H/O kidney stones:  Yes, cystinuria  Kidney disease:  no  Current birth control:      LABS/RECORDS REVIEWED:  TSH   Date Value Ref Range Status   04/05/2023 1.28 0.30 - 4.20 uIU/mL Final     Sodium   Date Value Ref Range Status   04/14/2023 140 136 - 145 mmol/L Final   08/18/2020 138 133 - 144 mmol/L Final     Potassium   Date Value Ref Range Status   04/14/2023 4.0 3.4 - 5.3 mmol/L Final   01/11/2023 3.7 3.4 - 5.3 mmol/L Final   08/18/2020 3.7 3.4 - 5.3 mmol/L Final     Chloride   Date Value Ref Range Status   04/14/2023 103 98 - 107 mmol/L Final   01/11/2023 102 94 - 109 mmol/L Final   08/18/2020 107 94 - 109 mmol/L Final     Carbon Dioxide   Date Value Ref Range Status   08/18/2020 26 20 - 32 mmol/L Final     Carbon Dioxide (CO2)   Date Value Ref Range Status   04/14/2023 22 22 - 29 mmol/L Final   01/11/2023 25 20 - 32 mmol/L Final     Anion Gap   Date Value Ref Range Status   04/14/2023 15 7 - 15 mmol/L Final   01/11/2023 9 3 - 14 mmol/L Final   08/18/2020 5 3 - 14 mmol/L Final     Glucose   Date Value Ref Range Status   04/14/2023 99 70 - 99 mg/dL Final   01/11/2023 97 70 - 99 mg/dL Final   08/18/2020 107 (H) 70 - 99 mg/dL Final     Urea Nitrogen   Date Value Ref Range Status   04/14/2023 11.4 6.0 - 20.0 mg/dL Final   01/11/2023 13 7 - 30 mg/dL Final   08/18/2020 12 7 - 30 mg/dL Final     Creatinine   Date Value Ref Range Status   04/14/2023 0.95 0.51 - 0.95 mg/dL Final   08/18/2020 0.96 0.52 - 1.04 mg/dL Final     GFR Estimate   Date Value Ref Range Status   04/14/2023 84 >60 mL/min/1.73m2 Final     Comment:     eGFR calculated using 2021  CKD-EPI equation.   08/18/2020 82 >60 mL/min/[1.73_m2] Final     Comment:     Non  GFR Calc  Starting 12/18/2018, serum creatinine based estimated GFR (eGFR) will be   calculated using the Chronic Kidney Disease Epidemiology Collaboration   (CKD-EPI) equation.       Calcium   Date Value Ref Range Status   04/14/2023 10.0 8.6 - 10.0 mg/dL Final   08/18/2020 9.1 8.5 - 10.1 mg/dL Final     Bilirubin Total   Date Value Ref Range Status   08/18/2020 0.4 0.2 - 1.3 mg/dL Final     Alkaline Phosphatase   Date Value Ref Range Status   08/18/2020 57 40 - 150 U/L Final     ALT   Date Value Ref Range Status   08/18/2020 21 0 - 50 U/L Final     AST   Date Value Ref Range Status   08/18/2020 18 0 - 45 U/L Final     Cholesterol   Date Value Ref Range Status   04/05/2023 199 <200 mg/dL Final     Direct Measure HDL   Date Value Ref Range Status   04/05/2023 73 >=50 mg/dL Final     LDL Cholesterol Calculated   Date Value Ref Range Status   04/05/2023 107 (H) <=100 mg/dL Final     Triglycerides   Date Value Ref Range Status   04/05/2023 96 <150 mg/dL Final     WBC   Date Value Ref Range Status   08/18/2020 6.7 4.0 - 11.0 10e9/L Final     WBC Count   Date Value Ref Range Status   04/05/2023 8.6 4.0 - 11.0 10e3/uL Final     Hemoglobin   Date Value Ref Range Status   04/05/2023 14.5 11.7 - 15.7 g/dL Final   08/18/2020 14.1 11.7 - 15.7 g/dL Final     Hematocrit   Date Value Ref Range Status   04/05/2023 42.3 35.0 - 47.0 % Final   08/18/2020 42.5 35.0 - 47.0 % Final     MCV   Date Value Ref Range Status   04/05/2023 87 78 - 100 fL Final   08/18/2020 89 78 - 100 fl Final     Platelet Count   Date Value Ref Range Status   04/05/2023 299 150 - 450 10e3/uL Final   08/18/2020 277 150 - 450 10e9/L Final         BP Readings from Last 6 Encounters:   04/05/23 119/74   10/12/22 124/72   11/05/19 127/84   09/20/19 111/66   09/15/19 102/60   12/04/18 113/75       Pulse Readings from Last 6 Encounters:   04/05/23 96   10/12/22 (!) 125  "  09/20/19 94   09/15/19 76   12/04/18 87   09/04/18 89       PHYSICAL EXAM:  Ht 5' 3\" (1.6 m)   Wt 222 lb (100.7 kg)   BMI 39.33 kg/m    GENERAL: Healthy, alert and no distress  EYES: Eyes grossly normal to inspection.  No discharge or erythema, or obvious scleral/conjunctival abnormalities.  RESP: No audible wheeze, cough, or visible cyanosis.  No visible retractions or increased work of breathing.    SKIN: Visible skin clear. No significant rash, abnormal pigmentation or lesions.  NEURO: Cranial nerves grossly intact.  Mentation and speech appropriate for age.  PSYCH: Mentation appears normal, affect normal/bright, judgement and insight intact, normal speech and appearance well-groomed.    COUNSELING:   Reviewed obesity as a chronic disease and comprehensive management stratagies.      We discussed Bariatric Basics including:  -eating 3 meals daily  -eating protein first  -eating slowly, chewing food well  -avoiding/limiting calorie containing beverages  -limiting carbohydrates and changing to whole grains  -limiting restaurant or cafeteria eating to twice a week or less    We discussed the importance of restorative sleep and stress management in maintaining a healthy weight.  We discussed insulin resistance and glycemic index as it relates to appetite and weight control.   We discussed the importance of physical activity including cardiovascular and strength training in maintaining a healthier weight and explored viable options.  Patient education of above written in AVS.      Sincerely,    Malinda Aponte PA-C      "

## 2023-07-11 NOTE — PATIENT INSTRUCTIONS
Meal Options:    Breakfast:  - Winkelman waffles/pancakes + yogurt or peanut butter + fruit  - low-carb tortilla + 2-3 eggs + veggies + cheese  - 1/2 bagel w/cream cheese + 2-3 eggs +/- veggies + fruit     Lunch:  - Vegetarian proteins + fruit + vegetables + starch    Dinner:  - Include vegetables +/- fruit   - Meat once per day      Goals:    Take 20-30 minutes to eat each meal  - Try eating at the table  - Take sips of water between bites     Eat within 1 hour of waking up     Once weekly - try a new vegetarian protein recipe      Methionine Levels in Food: https://Alorica/methionine-restricted-diet/          Protein Drink: Ripple (milk or protein drink)       https://www.139shop/the-most-delicious-meatless-black-bean-loaf-with-creamy-avocado-blanca-sauce-vegan-gluten-free/    https://www.Heartbeater.com/pin/826074139216103019/    https://Tiny Prints.[employer]/2016/03/vegan-easy-chickpea-tikka-masala.html    https://blog.[employer] [facility]/2011/11/eubanks-hearty-[email or web address]-mushroom-shepherds-pie.html    https://www.EpiGaN/Swiss-kidney-bean-emanuel/    https://www.[employer].com/garlic-shrimp-and-white-beans-recipe (not technically meat-free [shrimp] but sooooo good)    https://www.noracooks.com/red-lentil-dahl/    https://www.Bizzabo/sweet-potato-black-bean-quinoa-bake/

## 2023-07-12 VITALS — HEIGHT: 63 IN | BODY MASS INDEX: 39.34 KG/M2 | WEIGHT: 222 LBS

## 2023-07-14 PROBLEM — G89.29 CHRONIC RIGHT-SIDED LOW BACK PAIN WITHOUT SCIATICA: Status: RESOLVED | Noted: 2023-04-24 | Resolved: 2023-07-14

## 2023-07-14 PROBLEM — M54.50 CHRONIC RIGHT-SIDED LOW BACK PAIN WITHOUT SCIATICA: Status: RESOLVED | Noted: 2023-04-24 | Resolved: 2023-07-14

## 2023-07-14 NOTE — PROGRESS NOTES
Patient has not returned to PT since the visit on 5-25-23. Therefore, we will discharge patient from PT at this time and resolve the episode.

## 2023-07-18 ENCOUNTER — TELEPHONE (OUTPATIENT)
Dept: SURGERY | Facility: CLINIC | Age: 28
End: 2023-07-18

## 2023-08-25 ENCOUNTER — PRE VISIT (OUTPATIENT)
Dept: UROLOGY | Facility: CLINIC | Age: 28
End: 2023-08-25
Payer: COMMERCIAL

## 2023-08-29 ENCOUNTER — ANCILLARY PROCEDURE (OUTPATIENT)
Dept: ULTRASOUND IMAGING | Facility: CLINIC | Age: 28
End: 2023-08-29
Attending: NURSE PRACTITIONER
Payer: COMMERCIAL

## 2023-08-29 DIAGNOSIS — E72.09 CYSTINE STONES (H): ICD-10-CM

## 2023-08-29 PROCEDURE — 76770 US EXAM ABDO BACK WALL COMP: CPT | Mod: TC | Performed by: RADIOLOGY

## 2023-09-01 ENCOUNTER — VIRTUAL VISIT (OUTPATIENT)
Dept: UROLOGY | Facility: CLINIC | Age: 28
End: 2023-09-01
Payer: COMMERCIAL

## 2023-09-01 DIAGNOSIS — R10.9 RIGHT FLANK PAIN: Primary | ICD-10-CM

## 2023-09-01 PROCEDURE — 99213 OFFICE O/P EST LOW 20 MIN: CPT | Mod: 95 | Performed by: NURSE PRACTITIONER

## 2023-09-01 ASSESSMENT — PAIN SCALES - GENERAL: PAINLEVEL: MODERATE PAIN (4)

## 2023-09-01 NOTE — PATIENT INSTRUCTIONS
UROLOGY CLINIC VISIT PATIENT INSTRUCTIONS    -Will obtain a CT to check on stones and will be in touch with results.     If you have any issues, questions or concerns in the meantime, do not hesitate to contact us at [phone] or via [email or web address].     Caren Gilbert, CNP  Department of Urology

## 2023-09-01 NOTE — LETTER
9/1/2023       RE: Shelly Cerda  [address] [place] [address]     Dear Colleague,    Thank you for referring your patient, Shelly Cerda, to the [facility] [place] [facility]. Please see a copy of my visit note below.    Virtual Visit Details    Type of service:  Video Visit   Originating Location (pt. Location): Home  Distant Location (provider location):  Off-site  Platform used for Video Visit: [employer]    Video start time: 3:30 PM  Video end time: 3:50 PM    CC: Kidney stones    Assessment/Plan:  28 year old female with history of cystinuria and recurrent kidney stones with multiple procedures, s/p URS with Dr. Koehler last year, managed on captopril 50 mg TID and potassium citrate 20 mEq BID. Continues to feel a pinching feeling near her right kidney, but not the left, where she is noted to have stones. In addition, the stones noted in her left kidney on recent CHRISTY were not present on scan 6 months ago, so unclear if they are true stones versus artifact from US. We discussed the options to monitor now versus pursue CT, which we have tried to avoid, to clarify her stone burden. She would like to move forward with the CT for clarification.     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 Koehler in October. Has followed with Dr. Gonzalez for stone prevention and is being treated with captopril 50 mg TID and potassium citrate 20 mEq BID. Has not tolerated thiola. Stone burden monitored with CHRISTY as her stones are not radiopaque on x-ray and she has preferred to avoid CT scans if able.     Our last visit was in March and at that time, she reported urinary hesitancy and incomplete emptying. Was seen for a nurse visit a few weeks later and PVR low at 14 mL. Referral to pelvic floor PT offered  but patient preferred to monitor. Today, she states that she continues to have some double-voiding, but would like to continue monitoring.     She recently developed some pain in her right kidney. CHRISTY obtained earlier this week showed a few stones in her left kidney but none on the right    Has a follow up visit with Dr. Gonzalez in a few weeks.     Objective:     Exam:   Patient is a 28 year old female   No vital signs obtained due to virtual visit.  General Appearance: Well groomed, hygenic  Respiratory: No cough, no respiratory distress or labored breathing  Musculoskeletal: Grossly normal with no gross deficits  Skin: No discoloration or apparent rashes  Neurologic: No tremors  Psychiatric: Alert and oriented  Further examination is deferred due to the nature of our visit.

## 2023-09-01 NOTE — NURSING NOTE
Is the patient currently in the state of [place]? YES    Visit mode:VIDEO    If the visit is dropped, the patient can be reconnected by: VIDEO VISIT: Text to cell phone:   Telephone Information:   Mobile [phone]       Will anyone else be joining the visit? NO  (If patient encounters technical issues they should call [phone]    How would you like to obtain your AVS? MyChart    Are changes needed to the allergy or medication list? Yes FLOMAX    Reason for visit: Follow Up    Trisha ELKINS

## 2023-09-01 NOTE — PROGRESS NOTES
Virtual Visit Details    Type of service:  Video Visit   Originating Location (pt. 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 Koehler last year, managed on captopril 50 mg TID and potassium citrate 20 mEq BID. Continues to feel a pinching feeling near her right kidney, but not the left, where she is noted to have stones. In addition, the stones noted in her left kidney on recent CHRISTY were not present on scan 6 months ago, so unclear if they are true stones versus artifact from US. We discussed the options to monitor now versus pursue CT, which we have tried to avoid, to clarify her stone burden. She would like to move forward with the CT for clarification.     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 Koehler in October. Has followed with Dr. Gonzalez for stone prevention and is being treated with captopril 50 mg TID and potassium citrate 20 mEq BID. Has not tolerated thiola. Stone burden monitored with CHRISTY as her stones are not radiopaque on x-ray and she has preferred to avoid CT scans if able.     Our last visit was in March and at that time, she reported urinary hesitancy and incomplete emptying. Was seen for a nurse visit a few weeks later and PVR low at 14 mL. Referral to pelvic floor PT offered but patient preferred to monitor. Today, she states that she continues to have some double-voiding, but would like to continue monitoring.     She recently developed some pain in her right kidney. CHRISTY obtained earlier this week showed a few stones in her left kidney but none on the right    Has a follow up  visit with Dr. Gonzalez in a few weeks.     Objective:     Exam:   Patient is a 28 year old female   No vital signs obtained due to virtual visit.  General Appearance: Well groomed, hygenic  Respiratory: No cough, no respiratory distress or labored breathing  Musculoskeletal: Grossly normal with no gross deficits  Skin: No discoloration or apparent rashes  Neurologic: No tremors  Psychiatric: Alert and oriented  Further examination is deferred due to the nature of our visit.

## 2023-09-05 ENCOUNTER — ANCILLARY PROCEDURE (OUTPATIENT)
Dept: CT IMAGING | Facility: CLINIC | Age: 28
End: 2023-09-05
Attending: NURSE PRACTITIONER
Payer: COMMERCIAL

## 2023-09-05 DIAGNOSIS — R10.9 RIGHT FLANK PAIN: ICD-10-CM

## 2023-09-05 PROCEDURE — 74176 CT ABD & PELVIS W/O CONTRAST: CPT | Performed by: RADIOLOGY

## 2023-09-09 ENCOUNTER — TRANSFERRED RECORDS (OUTPATIENT)
Dept: HEALTH INFORMATION MANAGEMENT | Facility: CLINIC | Age: 28
End: 2023-09-09
Payer: COMMERCIAL

## 2023-09-13 ENCOUNTER — PRE VISIT (OUTPATIENT)
Dept: MULTI SPECIALTY CLINIC | Facility: CLINIC | Age: 28
End: 2023-09-13
Payer: COMMERCIAL

## 2023-09-13 ENCOUNTER — TELEPHONE (OUTPATIENT)
Dept: MULTI SPECIALTY CLINIC | Facility: CLINIC | Age: 28
End: 2023-09-13
Payer: COMMERCIAL

## 2023-09-13 NOTE — TELEPHONE ENCOUNTER
Reason for visit: Review litholink     Relevant information: stone prevention    Records/imaging/labs/orders: all records available    Pt called: pt states she sent her litholink 9/9/23      Erica Valentin CMA  9/13/2023  9:11 AM

## 2023-09-19 ENCOUNTER — VIRTUAL VISIT (OUTPATIENT)
Dept: NEPHROLOGY | Facility: CLINIC | Age: 28
End: 2023-09-19
Payer: COMMERCIAL

## 2023-09-19 VITALS — WEIGHT: 220 LBS | BODY MASS INDEX: 38.97 KG/M2

## 2023-09-19 DIAGNOSIS — N20.1 URETEROLITHIASIS: Primary | ICD-10-CM

## 2023-09-19 DIAGNOSIS — E72.01 CYSTINURIA (H): ICD-10-CM

## 2023-09-19 PROCEDURE — 99215 OFFICE O/P EST HI 40 MIN: CPT | Mod: VID | Performed by: INTERNAL MEDICINE

## 2023-09-19 RX ORDER — TAMSULOSIN HYDROCHLORIDE 0.4 MG/1
0.4 CAPSULE ORAL DAILY PRN
Qty: 10 CAPSULE | Refills: 11 | Status: SHIPPED | OUTPATIENT
Start: 2023-09-19

## 2023-09-19 RX ORDER — CAPTOPRIL 50 MG/1
TABLET ORAL
Qty: 270 TABLET | Refills: 3 | Status: SHIPPED | OUTPATIENT
Start: 2023-09-19 | End: 2024-04-16

## 2023-09-19 ASSESSMENT — PAIN SCALES - GENERAL: PAINLEVEL: NO PAIN (0)

## 2023-09-19 NOTE — LETTER
9/19/2023       RE: Shelly Cerda  [address]  [place] [address]     Dear Colleague,    Thank you for referring your patient, Shelly Cerda, to the [facility] NEPHROLOGY [facility] [place] [facility]. Please see a copy of my visit note below.      [facility] Nephrology Comprehensive Stone Clinic  09/19/2023     Shelly Cerda MRN:[id number] YOB: 1995  Primary care provider: [facility]  Requesting physician: Dr. Jayy Koehler    ASSESSMENT AND RECOMMENDATIONS:   Shelly Cerda is a 28 year old presenting for nephrolithiasis.  # Recurrent kidney stones: cystine stones with multiple surgeries  - likely passed stone from right Sept 2023 - was likely too small to be picked up on ultrasound done early September 2023 and passed before CT scan done in September 2023  - cystine capacity worse related to lower fluid intake - discussed high fluid intake and lowering dietary sodium  - has trouble remembering three times daily medication (captopril)  - adjust from captopril 50 mg three times daily to 75 mg twice a day - monitor for sx of light headedness  - Continue potassium citrate 20 meq twice daily     Would not recommend penacillamine due to significant side effect with thiola    # medication monitoring (potassium citrate and captopril)  Potassium and creatinine normal April 2023    GERD - Start omeprazole 20 mg -  take 30 minutes before breakfast.  Taking PRN and it is helping    Family planning - on oral contraceptive. Interested in children in the next 5 years. May want to meet with genetic counselor in 2023.    Cystine litholink Feb 2024  Return in about 6 months (around 3/19/2024).   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  Managed with high fluid, potassium citrate 20 meq bid, she had allergic reaction with thiola, hives, chills, fever, swollen lymph nodes and throat was swollen.      Stone surgical history  ~ 9 surgeries  Right stone surgery 2012, Left sided stone surgery October 2017, right PCNL 3/2018 - was stone free after that procedure then presented September 2019 with right obstructing kidney stone and had URS and lithotripsy for that stone.  She was again stone free after that operation.  May have passed stone April 2021.      Stone procedures/operations: 10/12/2022 right ureteroscopy and laser lithotripsy  Imaging: CT scan 9/5/23 - no kidney stones  Stone burden: stone free based on CT 9/5/2023      Last visit 12/27/2022  Previously reviewed preventative management with captopril 50 mg TID and potassium citrate 20 meq BID. Dietary management with high fluid intake, decreased meat.  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  Met with Caren Gilbert CNP 9/1/2023 and arranged CT scan to assess stone burden, no stones were seen in kidneys or urinary tract (stones noted on ultrasound 8/29/23 were more likely artifact).  While in [place], was having quite a bit of kidney pain, especially with sitting. Ultrasound had shown some stones, took flomax and felt more pain in lower urinary tract so thinks she passed one stone.    Shelly had visit with PCP and creatinine level was elevated to 1.27 mg/dL on 4/5/2023 but then better on recheck (0.95 mg/dL) 4/14/2023.  Has little URI after visiting 2 yo nephew over the weekend.    Taking captopril and potassium citrate but misses  doses on the weekends with change in schedule.                    PAST MEDICAL HISTORY:  Reviewed  Past Medical History:   Diagnosis Date    Complication of anesthesia     Cystinuria (H)     Kidney stones     PONV (postoperative nausea and vomiting)        PSH:  Reviewed  Past Surgical History:   Procedure Laterality Date    APPENDECTOMY  9/2011    COMBINED CYSTOSCOPY, RETROGRADES, EXCHANGE STENT URETER(S) Right 9/15/2019    Procedure: CYSTOSCOPY, WITH RIGHT RETROGRADE PYELOGRAM AND RIGHT URETERAL STENT PLACEMENT;  Surgeon: Kasi Wade MD;  [facility]    COMBINED CYSTOSCOPY, RETROGRADES, URETEROSCOPY, INSERT STENT Left 10/12/2017    Procedure: COMBINED CYSTOSCOPY, RETROGRADES, URETEROSCOPY, INSERT STENT;  COMBINED CYSTOSCOPY, RETROGRADES, URETEROSCOPY, LASER HOLMIUM STANDBY,  INSERT STENT LEFT URETER;  Surgeon: Luis Banda MD;  Location: [facility] OR    COMBINED CYSTOSCOPY, RETROGRADES, URETEROSCOPY, LASER HOLMIUM LITHOTRIPSY URETER(S), INSERT STENT Right 10/12/2022    Procedure: CYSTOURETEROSCOPY, WITH RIGHT RETROGRADE PYELOGRAM, HOLMIUM LASER LITHOTRIPSY, STENT INSERTION;  Surgeon: Jayy Koehler MD;  Location: [facility] OR    CYSTOSCOPY      LASER HOLMIUM LITHOTRIPSY URETER(S), INSERT STENT, COMBINED  9/4/2012    Procedure: COMBINED CYSTOSCOPY, URETEROSCOPY, LASER HOLMIUM LITHOTRIPSY URETER(S), INSERT STENT;  Holmium Laser Lithotripsy.  Right Stent Placement;  Surgeon: Cathi Hernandez MD;  [facility]    LASER HOLMIUM LITHOTRIPSY URETER(S), INSERT STENT, COMBINED Right 9/20/2019    Procedure: Cystoscopy, Right Ureteroscopy, Laser Lithotripsy,  no ureteral stent placed.;  Surgeon: Jayy Koehler MD;  [facility]    PERCUTANEOUS NEPHROLITHOTOMY Right 3/8/2018    Procedure: PERCUTANEOUS NEPHROLITHOTOMY;  Right Percutaneous Nephrolithotomy;  Surgeon: Jayy Koehler MD;  [facility]    Ureteral stent placement with subsequent removal.  2017         MEDICATIONS:  Current Outpatient Medications   Medication Sig Dispense Refill    azelastine (ASTEPRO) 0.15 % nasal spray       captopril (CAPOTEN) 50 MG tablet Take 1 tablet (50 mg) by mouth 3 times daily 270 tablet 3    KURVELO 0.15-30 MG-MCG tablet Take 1 tablet by mouth daily for 360 days 90 tablet 3    levocetirizine (XYZAL) 5 MG tablet       omeprazole (PRILOSEC) 20 MG DR capsule Take 1 capsule (20 mg) by mouth daily 30 minutes before breakfast 90 capsule 3    potassium citrate (UROCIT-K) 10 MEQ (1080 MG) CR tablet Take 2 tablets (20 mEq) by mouth 2 times daily 360 tablet 3        ALLERGIES:    Allergies   Allergen Reactions    Thiola [Tiopronin] Rash     Fevers, lymphadenopathy, swelling in throat area       REVIEW OF SYSTEMS:  A 10 point review of systems was negative except as noted above.    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 [employer]  Social History     Socioeconomic History    Marital status: Single     Spouse name: Not on file    Number of children: Not on file    Years of education: Not on file    Highest education level: Not on file   Occupational History    Occupation:    Tobacco Use    Smoking status: Never    Smokeless tobacco: Never   Vaping Use    Vaping Use: Never used   Substance and Sexual Activity    Alcohol use: Yes     Comment: occasionally    Drug use: No    Sexual activity: Yes     Birth control/protection: OCP   Other Topics Concern    Parent/sibling w/ CABG, MI or angioplasty before 65F 55M? Not Asked   Social History Narrative    Shelly lives with both parents in [place]. Working as .  Hoping to buy house in 2021.    Sexually active with men and dating.     Social Determinants of Health     Financial Resource Strain: Not on file   Food Insecurity: Not on file   Transportation Needs: Not on file   Physical Activity: Not on file   Stress: Not on file   Social Connections: Not on file   Intimate  Partner Violence: Not on file   Housing Stability: Not on file   works out 5 days per week    FAMILY MEDICAL HISTORY:   Family History   Problem Relation Age of Onset    Thyroid Disease Mother     Osteoporosis Mother     Hypertension Father     Genetic Disorder Sister         Cystinuria       PHYSICAL EXAM:   There were no vitals taken for this visit.     Exam limited by video appointment. Within those limitations:  GENERAL APPEARANCE: alert and no distress  RESP: no conversational dyspnea  Psych: pleasant, normal mental status    LABS:   I reviewed:  Electrolytes/Renal -   Recent Labs   Lab Test 04/14/23  1523 04/05/23  1613 01/11/23  1623    138 136   POTASSIUM 4.0 4.3 3.7   CHLORIDE 103 101 102   CO2 22 22 25   BUN 11.4 10.8 13   CR 0.95 1.27* 0.88   GLC 99 92 97   VALERIANO 10.0 9.9 9.5   PHOS 3.4  --   --        CBC -   Recent Labs   Lab Test 04/05/23  1613 08/18/20  1524 03/09/18  2300   WBC 8.6 6.7 12.1*   HGB 14.5 14.1 12.4    277 287       LFTs -   Recent Labs   Lab Test 04/14/23  1523 08/18/20  1524 12/19/17  1122 08/06/17  0502   ALKPHOS  --  57 52 61   BILITOTAL  --  0.4 0.6 0.3   ALT  --  21 19 31   AST  --  18 16 31   PROTTOTAL  --  7.9 7.9 7.7   ALBUMIN 4.5 4.0 4.2 4.2       Coags - No lab results found.    Iron Panel - No lab results found.    Endocrine -   Recent Labs   Lab Test 04/05/23  1613   TSH 1.28       IMAGING:  See HPI    Alis Gonzalez MD

## 2023-09-19 NOTE — PROGRESS NOTES
[facility] Nephrology Comprehensive Stone Clinic  09/19/2023     Shelly Cerda MRN:[id number] YOB: 1995  Primary care provider: [facility]  Requesting physician: Dr. Jayy Koehler    ASSESSMENT AND RECOMMENDATIONS:   Shelly Cerda is a 28 year old presenting for nephrolithiasis.  # Recurrent kidney stones: cystine stones with multiple surgeries  - likely passed stone from right Sept 2023 - was likely too small to be picked up on ultrasound done early September 2023 and passed before CT scan done in September 2023  - cystine capacity worse related to lower fluid intake - discussed high fluid intake and lowering dietary sodium  - has trouble remembering three times daily medication (captopril)  - adjust from captopril 50 mg three times daily to 75 mg twice a day - monitor for sx of light headedness  - Continue potassium citrate 20 meq twice daily     Would not recommend penacillamine due to significant side effect with thiola    # medication monitoring (potassium citrate and captopril)  Potassium and creatinine normal April 2023    GERD - Start omeprazole 20 mg -  take 30 minutes before breakfast.  Taking PRN and it is helping    Family planning - on oral contraceptive. Interested in children in the next 5 years. May want to meet with genetic counselor in 2023.    Cystine litholink Feb 2024  Return in about 6 months (around 3/19/2024).   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  Managed with high fluid,  potassium citrate 20 meq bid, she had allergic reaction with thiola, hives, chills, fever, swollen lymph nodes and throat was swollen.      Stone surgical history  ~ 9 surgeries  Right stone surgery 2012, Left sided stone surgery October 2017, right PCNL 3/2018 - was stone free after that procedure then presented September 2019 with right obstructing kidney stone and had URS and lithotripsy for that stone.  She was again stone free after that operation.  May have passed stone April 2021.      Stone procedures/operations: 10/12/2022 right ureteroscopy and laser lithotripsy  Imaging: CT scan 9/5/23 - no kidney stones  Stone burden: stone free based on CT 9/5/2023      Last visit 12/27/2022  Previously reviewed preventative management with captopril 50 mg TID and potassium citrate 20 meq BID. Dietary management with high fluid intake, decreased meat.  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  Met with Caren Gilbert CNP 9/1/2023 and arranged CT scan to assess stone burden, no stones were seen in kidneys or urinary tract (stones noted on ultrasound 8/29/23 were more likely artifact).  While in [place], was having quite a bit of kidney pain, especially with sitting. Ultrasound had shown some stones, took flomax and felt more pain in lower urinary tract so thinks she passed one stone.    Shelly had visit with PCP and creatinine level was elevated to 1.27 mg/dL on 4/5/2023 but then better on recheck (0.95 mg/dL) 4/14/2023.  Has little URI after visiting 2 yo nephew over the weekend.    Taking captopril and potassium citrate but misses doses on the weekends with change in schedule.                    PAST MEDICAL HISTORY:  Reviewed  Past Medical History:   Diagnosis Date    Complication of anesthesia     Cystinuria (H)     Kidney stones     PONV (postoperative nausea and vomiting)        PSH:  Reviewed  Past Surgical History:   Procedure Laterality  Date    APPENDECTOMY  9/2011    COMBINED CYSTOSCOPY, RETROGRADES, EXCHANGE STENT URETER(S) Right 9/15/2019    Procedure: CYSTOSCOPY, WITH RIGHT RETROGRADE PYELOGRAM AND RIGHT URETERAL STENT PLACEMENT;  Surgeon: Kasi Wade MD;  [facility]    COMBINED CYSTOSCOPY, RETROGRADES, URETEROSCOPY, INSERT STENT Left 10/12/2017    Procedure: COMBINED CYSTOSCOPY, RETROGRADES, URETEROSCOPY, INSERT STENT;  COMBINED CYSTOSCOPY, RETROGRADES, URETEROSCOPY, LASER HOLMIUM STANDBY,  INSERT STENT LEFT URETER;  Surgeon: Luis Banda MD;  Location: [facility] OR    COMBINED CYSTOSCOPY, RETROGRADES, URETEROSCOPY, LASER HOLMIUM LITHOTRIPSY URETER(S), INSERT STENT Right 10/12/2022    Procedure: CYSTOURETEROSCOPY, WITH RIGHT RETROGRADE PYELOGRAM, HOLMIUM LASER LITHOTRIPSY, STENT INSERTION;  Surgeon: Jayy Koehler MD;  Location: [facility] OR    CYSTOSCOPY      LASER HOLMIUM LITHOTRIPSY URETER(S), INSERT STENT, COMBINED  9/4/2012    Procedure: COMBINED CYSTOSCOPY, URETEROSCOPY, LASER HOLMIUM LITHOTRIPSY URETER(S), INSERT STENT;  Holmium Laser Lithotripsy.  Right Stent Placement;  Surgeon: Cathi Hernandez MD;  [facility]    LASER HOLMIUM LITHOTRIPSY URETER(S), INSERT STENT, COMBINED Right 9/20/2019    Procedure: Cystoscopy, Right Ureteroscopy, Laser Lithotripsy,  no ureteral stent placed.;  Surgeon: Jayy Koehler MD;  [facility]    PERCUTANEOUS NEPHROLITHOTOMY Right 3/8/2018    Procedure: PERCUTANEOUS NEPHROLITHOTOMY;  Right Percutaneous Nephrolithotomy;  Surgeon: Jayy Koehler MD;  [facility]    Ureteral stent placement with subsequent removal.  2017        MEDICATIONS:  Current Outpatient Medications   Medication Sig Dispense Refill    azelastine (ASTEPRO) 0.15 % nasal spray       captopril (CAPOTEN) 50 MG tablet Take 1 tablet (50 mg) by mouth 3 times daily 270 tablet 3    KURVELO 0.15-30 MG-MCG tablet Take 1 tablet by mouth daily for 360 days 90 tablet 3    levocetirizine (XYZAL) 5 MG  tablet       omeprazole (PRILOSEC) 20 MG DR capsule Take 1 capsule (20 mg) by mouth daily 30 minutes before breakfast 90 capsule 3    potassium citrate (UROCIT-K) 10 MEQ (1080 MG) CR tablet Take 2 tablets (20 mEq) by mouth 2 times daily 360 tablet 3        ALLERGIES:    Allergies   Allergen Reactions    Thiola [Tiopronin] Rash     Fevers, lymphadenopathy, swelling in throat area       REVIEW OF SYSTEMS:  A 10 point review of systems was negative except as noted above.    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  Social History     Socioeconomic History    Marital status: Single     Spouse name: Not on file    Number of children: Not on file    Years of education: Not on file    Highest education level: Not on file   Occupational History    Occupation:    Tobacco Use    Smoking status: Never    Smokeless tobacco: Never   Vaping Use    Vaping Use: Never used   Substance and Sexual Activity    Alcohol use: Yes     Comment: occasionally    Drug use: No    Sexual activity: Yes     Birth control/protection: OCP   Other Topics Concern    Parent/sibling w/ CABG, MI or angioplasty before 65F 55M? Not Asked   Social History Narrative    Shelly lives with both parents in [place]. Working as .  Hoping to buy house in 2021.    Sexually active with men and dating.     Social Determinants of Health     Financial Resource Strain: Not on file   Food Insecurity: Not on file   Transportation Needs: Not on file   Physical Activity: Not on file   Stress: Not on file   Social Connections: Not on file   Intimate Partner Violence: Not on file   Housing Stability: Not on file   works out 5 days per week    FAMILY MEDICAL HISTORY:   Family History   Problem Relation Age of Onset    Thyroid Disease Mother     Osteoporosis Mother     Hypertension Father     Genetic Disorder Sister         Cystinuria       PHYSICAL EXAM:   There were no vitals taken for  this visit.     Exam limited by video appointment. Within those limitations:  GENERAL APPEARANCE: alert and no distress  RESP: no conversational dyspnea  Psych: pleasant, normal mental status    LABS:   I reviewed:  Electrolytes/Renal -   Recent Labs   Lab Test 04/14/23  1523 04/05/23  1613 01/11/23  1623    138 136   POTASSIUM 4.0 4.3 3.7   CHLORIDE 103 101 102   CO2 22 22 25   BUN 11.4 10.8 13   CR 0.95 1.27* 0.88   GLC 99 92 97   VALERIANO 10.0 9.9 9.5   PHOS 3.4  --   --        CBC -   Recent Labs   Lab Test 04/05/23  1613 08/18/20  1524 03/09/18  2300   WBC 8.6 6.7 12.1*   HGB 14.5 14.1 12.4    277 287       LFTs -   Recent Labs   Lab Test 04/14/23  1523 08/18/20  1524 12/19/17  1122 08/06/17  0502   ALKPHOS  --  57 52 61   BILITOTAL  --  0.4 0.6 0.3   ALT  --  21 19 31   AST  --  18 16 31   PROTTOTAL  --  7.9 7.9 7.7   ALBUMIN 4.5 4.0 4.2 4.2       Coags - No lab results found.    Iron Panel - No lab results found.    Endocrine -   Recent Labs   Lab Test 04/05/23  1613   TSH 1.28       IMAGING:  See HPI    Alis Gonzalez MD

## 2023-09-19 NOTE — PATIENT INSTRUCTIONS
Change captopril to 75 mg twice per day - take 1 hour before meals  - monitor for symptoms of light headedness. Check blood pressure if you are able.    Cystine litholink February 2024    Return in about 6 months (around 3/19/2024).

## 2023-09-19 NOTE — PROGRESS NOTES
Virtual Visit Details    Type of service:  Video Visit   Joined the video at 9/19/2023, 3:04:07 pm.  Left the video at 9/19/2023, 3:38:01 pm.  Originating Location (pt. Location): Home    Distant Location (provider location):  Off-site  Platform used for Video Visit: Gerda

## 2023-09-19 NOTE — NURSING NOTE
Is the patient currently in the state of [place]? YES    Visit mode:VIDEO    If the visit is dropped, the patient can be reconnected by: VIDEO VISIT: Text to cell phone:   Telephone Information:   Mobile [phone]       Will anyone else be joining the visit? NO  (If patient encounters technical issues they should call [phone]    How would you like to obtain your AVS? MyChart    Are changes needed to the allergy or medication list? No    Reason for visit: Video Visit (Recheck)    Tish ELKINS

## 2023-09-20 DIAGNOSIS — N20.1 URETEROLITHIASIS: ICD-10-CM

## 2023-09-21 RX ORDER — POTASSIUM CITRATE 10 MEQ/1
20 TABLET, EXTENDED RELEASE ORAL 2 TIMES DAILY
Qty: 360 TABLET | Refills: 3 | Status: SHIPPED | OUTPATIENT
Start: 2023-09-21

## 2023-09-22 ENCOUNTER — TELEPHONE (OUTPATIENT)
Dept: NEPHROLOGY | Facility: CLINIC | Age: 28
End: 2023-09-22
Payer: COMMERCIAL

## 2023-09-22 NOTE — PROGRESS NOTES
Shelly is a 28 year old who is being evaluated via a billable video visit.      How would you like to obtain your AVS? MyChart  If the video visit is dropped, the invitation should be resent by: Text to cell phone: [phone]  Will anyone else be joining your video visit? No              Video-Visit Details    Type of service:  Video Visit     Originating Location (pt. Location): Home    Distant Location (provider location):  Off-site  Platform used for Video Visit: [facility]     MEDICAL WEIGHT LOSS FOLLOW UP    Reason for visit: medical weight loss     DIAGNOSIS:  Class II Obesity    ANTHROPOMETRICS:  Initial Weight: 222 pounds  Current Weight:  218 lbs 0 oz   BMI: 38.62 kg/m2    Weight Loss Medications:   None    NUTRITION HISTORY:  Breakfast: [wakes at 6:25am, eats within 1h] protein bar   Lunch: [12pm] leftovers   Dinner: [5pm] bbq meatball sliders + salad  chicken kiev + vegetable + mashed potatoes  soups/crockpot meals  steak carnitas  jumbalaya rice + peas + kielbasa  Snacks: [mornings] yogurt, string cheese, leftovers, cereal, chips  [evenings] ice cream cone  Beverage choices: water (32oz), coffee (8oz; creamer)   Eating behaviors: stress eating, distracted eating    Dining Out:  How often do you dine out: 2-3x/week  What types of food do you order: Chon Torres's slim ham and cheese + jalapeno chips  Solange's pizza  piedad    Exercise:  Type: walking on lunch break  Duration: 20 minutes  Frequency: 3-4x/week    Additional Information: Pt reports she was focused on her goals initially but then moved into a new home which was somewhat disruptive to her habits. Appropriate questions about non-meat snacks. She will explore options for a gym membership near [facility].     EVALUATION/PROGRESS TOWARDS GOALS:  Previous Goals:   Take 20-30 minutes to eat each meal - not met  Eat within 1h of waking up - met  Once weekly, try new vegetarian recipe - not met  Try Ripple protein shake or milk - not met  Aim for 1  serving of meat protein per day, and vegetarian or dairy for other meals/snacks - not met    Previous Nutrition Diagnosis:  Obese class II related to excess energy intake and self-monitoring deficit as evidenced by BMI of 39.33 kg/m2.  No change, modified below     Current Nutrition Diagnosis:   Obese class II related to excess energy intake and self-monitoring deficit as evidenced by BMI of 38.62 kg/m2.    INTERVENTION:    Nutrition Prescription:  Recommend modified nutrient intake by decreasing energy intake.    Implementation:    Nutrition Education (Content):  Discussed previous goals and determined new goals  Encourage physical activity  Supported patient in attempted weight loss and behavior changes  Congratulated patient on successful weight loss   Patient verbalizes understanding of diet by stating she will increase fiber at breakfast  Anticipate fair-good compliance    Goals:  Increase fruit/vegetable intake  Include fruit at breakfast  Explore options for gym memberships  Increase water to 64oz per day  Take 20-30 minutes to eat meals    Follow Up/Monitoring:  Other  -  patient to follow up in 8 weeks    Time Spent With Patient:  16 Minutes       Bijal Cerna RD, LD  Clinical Dietitian   [facility]      Physical Exam

## 2023-09-25 ENCOUNTER — VIRTUAL VISIT (OUTPATIENT)
Dept: SURGERY | Facility: CLINIC | Age: 28
End: 2023-09-25
Payer: COMMERCIAL

## 2023-09-25 VITALS — HEIGHT: 63 IN | WEIGHT: 218 LBS | BODY MASS INDEX: 38.62 KG/M2

## 2023-09-25 DIAGNOSIS — E66.01 MORBID OBESITY (H): Primary | ICD-10-CM

## 2023-09-25 PROCEDURE — 97803 MED NUTRITION INDIV SUBSEQ: CPT | Mod: VID

## 2023-09-25 NOTE — PATIENT INSTRUCTIONS
"Hal Bravo!      It was great chatting with you again today! Here's a summary of the goals we discussed:    1. Include fruit with breakfast  - As discussed, I'm hopeful that the additional fiber will better sustain your appetite    2. Increase fruits/vegetables throughout the day  - Try to include a vegetable and/or snack with each meal, as well as with snacks    3. Eat slowly  - Take 20-30 minutes to eat each meal    4. Explore options for gym memberships    5. Increase water to at least 64oz per day      Meatless Snack Proteins:  - Dairy: cottage cheese, yogurt, low-fat string cheese  - Hard boiled eggs  - \"Bada Bean Bada Boom\" (roasted broad beans)  - \"Beanitos\" (bean-based tortilla chips)  - roasted chickpeas  - edamame  - nuts/seeds (in moderation)      Plan on following up in 2 months. This can be scheduled via our call center at . Reach out sooner with any questions or concerns. Have a great day!      Bijal Cerna RD, LD  Clinical Dietitian       "

## 2023-10-19 ENCOUNTER — TELEPHONE (OUTPATIENT)
Dept: NEPHROLOGY | Facility: CLINIC | Age: 28
End: 2023-10-19
Payer: COMMERCIAL

## 2023-10-19 DIAGNOSIS — N20.1 URETEROLITHIASIS: ICD-10-CM

## 2023-10-19 RX ORDER — POTASSIUM CITRATE 10 MEQ/1
20 TABLET, EXTENDED RELEASE ORAL 2 TIMES DAILY
Qty: 360 TABLET | Status: CANCELLED | OUTPATIENT
Start: 2023-10-19

## 2023-10-19 NOTE — TELEPHONE ENCOUNTER
potassium citrate (UROCIT-K) 10 MEQ (1080 MG) CR tablet   Pharmacy would like to know if the med is regular  Release tabs or Extended Release Tabs.  Please verify and update pharmacy.     Thank you     Lissy Zhou RN  Central Triage Red Flags/Med Refills

## 2023-10-20 NOTE — TELEPHONE ENCOUNTER
"Alis Gonzalez MD Galinson, Aaron L, RN, RN17 hours ago (1:17 PM)     SE  Extended release, I do not think \"regular release\" or immediate release is possible.       Called pharmacy and left a message for the updated information they needed for the med below.     Extended release for the Potassium order.   10/20/2023 Message left.     Lissy Zhou RN  Central Triage Red Flags/Med Refills    "

## 2023-10-20 NOTE — PROGRESS NOTES
"Shelly is a 28 year old who is being evaluated via a billable video visit.      The patient has been notified of following:     \"This video visit will be conducted via a call between you and your physician/provider. We have found that certain health care needs can be provided without the need for an in-person physical exam.  This service lets us provide the care you need with a video conversation.  If a prescription is necessary we can send it directly to your pharmacy.  If lab work is needed we can place an order for that and you can then stop by our lab to have the test done at a later time.    Video visits are billed at different rates depending on your insurance coverage.  Please reach out to your insurance provider with any questions.    If during the course of the call the physician/provider feels a video visit is not appropriate, you will not be charged for this service.\"    Patient has given verbal consent for Video visit? Yes    How would you like to obtain your AVS? MyChart    If the video visit is dropped, the invitation should be resent by: Text to cell phone: [phone]    Will anyone else be joining your video visit? No    I    Video-Visit Details    Type of service:  Video Visit    Video Start Time: 11:33 AM    Video End Time:11:54 AM    Originating Location (pt. Location): Home    Distant Location (provider location):  [facility] [place]     Platform used for Video Visit: Xoft            10/24/2023      Return Medical Weight Management Note     Shelly Cerda  MRN:  [id number]  :  1995    Dear Michelle Rodríguez MD,    I had the pleasure of seeing your patient Shelly Cerda. She is a 28 year old female who I am continuing to see for treatment of obesity related to:        2023     9:08 AM   --   I have the following health issues associated with obesity None of the above   I have the following symptoms associated with obesity Knee Pain    Back Pain "       Assessment & Plan   Problem List Items Addressed This Visit       Gastroesophageal reflux disease without esophagitis     Anticipate improvement with further weight loss.         Morbid obesity (H) - Primary     Patient was congratulated on wt loss success thus far. Healthy habits to assist with further weight loss were discussed. We discussed the merits of frequent touch points when starting a weight loss journey.  She will continue seeing the dietician monthly and add our health coaching sessions.  Goals noted in pt instructions.                  PATIENT INSTRUCTIONS:  Work with the dietician monthly  Set up and initial evaluation with [facility]   Start 2000 international unit(s) of vitamin D daily.    Goals:   Add in weight training to your weekly activity routine  Get back to mindful eating: slowing down when eating  Utilize your sit/stand desk     FOLLOW-UP:  Please call [phone] to schedule your next visit in 3 months and set up health coaching initial session.     26 minutes spent on the date of the encounter doing chart review, history and exam, result review, counseling, developing plan of care, documentation, and further activities as noted      INTERVAL HISTORY:  Shelly returns for medical weight management follow up.  Last seen on 7/11/2023 for [facility] initial eval.  Seeing dietician twice.  Has a food journal.  Biggest thing that has happened since July was she bought a house.  Moved in and this made it hard to keep a routine.  Ate out a lot. Was eating healthier and slowed down her eating.  This week she started off good. Small victory.  Is not on AOM.      AOM Considerations:  Phentermine:  Caution with anxiety, elevated HR  Topiramate:  Contraindicated with hx of Cystinuria  GLP-1:  No DM, PreDM, No AOM Coverage   Naltrexone:  Not a durga  Wellbutrin:  No depression, minimal hunger  Metformin:  No DM, PreDM,  Contrave: No DM, PreDM, No AOM Coverage  Qsymia: No DM, PreDM, No AOM  Coverage           PATIENT INSTRUCTIONS:  Call to set up apt for orthotics if covered. - issue with needing a diagnosis.  Went for a mile an a half walk and had  burning pain in shins.  Last PCP care appt was in April so plans on getting scheduled.     Start 2000 international unit(s) Vit D daily. - not met    Goals:  Add in NEAT exercise throughout the day (utilize your sit/stand desk)- has this in place in her new house. Needs to get this back.  For melanie she asked for a under desk treadmill    Has started in person visits in the office.  This has increased her activity slightly.            WEIGHT METRICS:  Body mass index is 38.97 kg/m .   Current Weight: 220 lb (99.8 kg) (PATIENT REPORTED)  Last Visits Weight: 222 lb (100.7 kg)  Initial Weight (lbs): 222 lbs  Cumulative weight loss (lbs): 2  Weight Loss Percentage: 0.9%    Wt Readings from Last 10 Encounters:   10/23/23 220 lb (99.8 kg)   09/25/23 218 lb (98.9 kg)   09/19/23 220 lb (99.8 kg)   07/12/23 222 lb (100.7 kg)   07/11/23 222 lb (100.7 kg)   04/05/23 219 lb 3.2 oz (99.4 kg)   12/27/22 210 lb (95.3 kg)   10/12/22 181 lb (82.1 kg)   09/29/20 181 lb (82.1 kg)   11/05/19 195 lb (88.5 kg)            10/17/2023    12:19 PM   Weight Loss Medication History Reviewed With Patient   Which weight loss medications are you currently taking on a regular basis? None           10/17/2023    12:19 PM   Changes and Difficulties   I have made the following changes to my diet since my last visit: Meeting with Dietitian, Tracking intake, more vegetarian meals   With regards to my diet, I am still struggling with: portion control and eating out.   I have made the following changes to my activity/exercise since my last visit: walking more , standing more   With regards to my activity/exercise, I am still struggling with: Consistency         MEDICATIONS:   Current Outpatient Medications   Medication Sig Dispense Refill    azelastine (ASTEPRO) 0.15 % nasal spray        captopril (CAPOTEN) 50 MG tablet Take 75 mg (1.5 tabs) twice per day - take 1 hour before meal. 270 tablet 3    KURVELO 0.15-30 MG-MCG tablet Take 1 tablet by mouth daily for 360 days 90 tablet 3    levocetirizine (XYZAL) 5 MG tablet       omeprazole (PRILOSEC) 20 MG DR capsule Take 1 capsule (20 mg) by mouth daily 30 minutes before breakfast 90 capsule 3    potassium citrate (UROCIT-K) 10 MEQ (1080 MG) CR tablet Take 2 tablets (20 mEq) by mouth 2 times daily 360 tablet 3    tamsulosin (FLOMAX) 0.4 MG capsule Take 1 capsule (0.4 mg) by mouth daily as needed (for kidney stone pain) 10 capsule 11       LABS:  TSH   Date Value Ref Range Status   04/05/2023 1.28 0.30 - 4.20 uIU/mL Final     Sodium   Date Value Ref Range Status   04/14/2023 140 136 - 145 mmol/L Final   08/18/2020 138 133 - 144 mmol/L Final     Potassium   Date Value Ref Range Status   04/14/2023 4.0 3.4 - 5.3 mmol/L Final   01/11/2023 3.7 3.4 - 5.3 mmol/L Final   08/18/2020 3.7 3.4 - 5.3 mmol/L Final     Chloride   Date Value Ref Range Status   04/14/2023 103 98 - 107 mmol/L Final   01/11/2023 102 94 - 109 mmol/L Final   08/18/2020 107 94 - 109 mmol/L Final     Carbon Dioxide   Date Value Ref Range Status   08/18/2020 26 20 - 32 mmol/L Final     Carbon Dioxide (CO2)   Date Value Ref Range Status   04/14/2023 22 22 - 29 mmol/L Final   01/11/2023 25 20 - 32 mmol/L Final     Anion Gap   Date Value Ref Range Status   04/14/2023 15 7 - 15 mmol/L Final   01/11/2023 9 3 - 14 mmol/L Final   08/18/2020 5 3 - 14 mmol/L Final     Glucose   Date Value Ref Range Status   04/14/2023 99 70 - 99 mg/dL Final   01/11/2023 97 70 - 99 mg/dL Final   08/18/2020 107 (H) 70 - 99 mg/dL Final     Urea Nitrogen   Date Value Ref Range Status   04/14/2023 11.4 6.0 - 20.0 mg/dL Final   01/11/2023 13 7 - 30 mg/dL Final   08/18/2020 12 7 - 30 mg/dL Final     Creatinine   Date Value Ref Range Status   04/14/2023 0.95 0.51 - 0.95 mg/dL Final   08/18/2020 0.96 0.52 - 1.04 mg/dL Final      GFR Estimate   Date Value Ref Range Status   04/14/2023 84 >60 mL/min/1.73m2 Final     Comment:     eGFR calculated using 2021 CKD-EPI equation.   08/18/2020 82 >60 mL/min/[1.73_m2] Final     Comment:     Non  GFR Calc  Starting 12/18/2018, serum creatinine based estimated GFR (eGFR) will be   calculated using the Chronic Kidney Disease Epidemiology Collaboration   (CKD-EPI) equation.       Calcium   Date Value Ref Range Status   04/14/2023 10.0 8.6 - 10.0 mg/dL Final   08/18/2020 9.1 8.5 - 10.1 mg/dL Final     Bilirubin Total   Date Value Ref Range Status   08/18/2020 0.4 0.2 - 1.3 mg/dL Final     Alkaline Phosphatase   Date Value Ref Range Status   08/18/2020 57 40 - 150 U/L Final     ALT   Date Value Ref Range Status   08/18/2020 21 0 - 50 U/L Final     AST   Date Value Ref Range Status   08/18/2020 18 0 - 45 U/L Final     Cholesterol   Date Value Ref Range Status   04/05/2023 199 <200 mg/dL Final     Direct Measure HDL   Date Value Ref Range Status   04/05/2023 73 >=50 mg/dL Final     LDL Cholesterol Calculated   Date Value Ref Range Status   04/05/2023 107 (H) <=100 mg/dL Final     Triglycerides   Date Value Ref Range Status   04/05/2023 96 <150 mg/dL Final     Triglycerides (External)   Date Value Ref Range Status   05/06/2022 118 0 - 149 mg/dL Final     WBC   Date Value Ref Range Status   08/18/2020 6.7 4.0 - 11.0 10e9/L Final     WBC Count   Date Value Ref Range Status   04/05/2023 8.6 4.0 - 11.0 10e3/uL Final     Hemoglobin   Date Value Ref Range Status   04/05/2023 14.5 11.7 - 15.7 g/dL Final   08/18/2020 14.1 11.7 - 15.7 g/dL Final     Hematocrit   Date Value Ref Range Status   04/05/2023 42.3 35.0 - 47.0 % Final   08/18/2020 42.5 35.0 - 47.0 % Final     MCV   Date Value Ref Range Status   04/05/2023 87 78 - 100 fL Final   08/18/2020 89 78 - 100 fl Final     Platelet Count   Date Value Ref Range Status   04/05/2023 299 150 - 450 10e3/uL Final   08/18/2020 277 150 - 450 10e9/L Final      "    BP Readings from Last 6 Encounters:   04/05/23 119/74   10/12/22 124/72   11/05/19 127/84   09/20/19 111/66   09/15/19 102/60   12/04/18 113/75       Pulse Readings from Last 6 Encounters:   04/05/23 96   10/12/22 (!) 125   09/20/19 94   09/15/19 76   12/04/18 87   09/04/18 89       PE:  Ht 5' 3\" (1.6 m)   Wt 220 lb (99.8 kg)   BMI 38.97 kg/m    GENERAL: Healthy, alert and no distress  EYES: Eyes grossly normal to inspection.  No discharge or erythema, or obvious scleral/conjunctival abnormalities.  RESP: No audible wheeze, cough, or visible cyanosis.  No visible retractions or increased work of breathing.    SKIN: Visible skin clear. No significant rash, abnormal pigmentation or lesions.  NEURO: Cranial nerves grossly intact.  Mentation and speech appropriate for age.  PSYCH: Mentation appears normal, affect normal/bright, judgement and insight intact, normal speech and appearance well-groomed.      Sincerely,      Malinda Aponte PA-C        "

## 2023-10-23 VITALS — HEIGHT: 63 IN | WEIGHT: 220 LBS | BODY MASS INDEX: 38.98 KG/M2

## 2023-10-24 ENCOUNTER — VIRTUAL VISIT (OUTPATIENT)
Dept: SURGERY | Facility: CLINIC | Age: 28
End: 2023-10-24
Payer: COMMERCIAL

## 2023-10-24 DIAGNOSIS — K21.9 GASTROESOPHAGEAL REFLUX DISEASE WITHOUT ESOPHAGITIS: ICD-10-CM

## 2023-10-24 DIAGNOSIS — E66.01 MORBID OBESITY (H): Primary | ICD-10-CM

## 2023-10-24 PROBLEM — E66.812 CLASS 2 OBESITY DUE TO EXCESS CALORIES WITHOUT SERIOUS COMORBIDITY WITH BODY MASS INDEX (BMI) OF 38.0 TO 38.9 IN ADULT: Status: ACTIVE | Noted: 2023-10-24

## 2023-10-24 PROBLEM — S86.899A MEDIAL TIBIAL STRESS SYNDROME, UNSPECIFIED LATERALITY, INITIAL ENCOUNTER: Status: RESOLVED | Noted: 2023-07-11 | Resolved: 2023-10-24

## 2023-10-24 PROBLEM — E66.09 CLASS 2 OBESITY DUE TO EXCESS CALORIES WITHOUT SERIOUS COMORBIDITY WITH BODY MASS INDEX (BMI) OF 38.0 TO 38.9 IN ADULT: Status: ACTIVE | Noted: 2023-10-24

## 2023-10-24 PROCEDURE — 99213 OFFICE O/P EST LOW 20 MIN: CPT | Mod: VID | Performed by: PHYSICIAN ASSISTANT

## 2023-10-24 NOTE — ASSESSMENT & PLAN NOTE
Patient was congratulated on wt loss success thus far. Healthy habits to assist with further weight loss were discussed. We discussed the merits of frequent touch points when starting a weight loss journey.  She will continue seeing the dietician monthly and add our health coaching sessions.  Goals noted in pt instructions.

## 2023-10-24 NOTE — PATIENT INSTRUCTIONS
"To ensure quality you may receive a patient satisfaction survey. The greatest compliment you can give is \"Likely to Recommend.\"    Nice to talk with you today.  Thank you for your trust. Below is the plan discussed.-  ARIANA Petty      Plan:  Work with the dietician monthly  Set up and initial evaluation with [facility]   Start 2000 international unit(s) of vitamin D daily.    Goals:   Add in weight training to your weekly activity routine  Get back to mindful eating: slowing down when eating  Utilize your sit/stand desk     FOLLOW-UP:  Please call [phone] to schedule your next visit in 3 months and set up health coaching initial session.     Strength Training  Strength training is any type of exercise that involves your own body weight or equipment to build muscle mass, endurance, and strength. increasing the amount of muscle in your body aids in increasing your metabolism. Research shows that your metabolic rate is increased up to 72 hours after strength-training exercise.    Weight Management Strengthening Exercises   Http://[email or web address]    Muscle Conditioning: Helping You Get and Stay Fit  https://[email or web address]    No Equipment Home Exercise Lists from [facility] [employer]   https://www.[employer].org/public/upload/files/general/OU53_CR_BY_EoWdpe_Ssrpuwpv_Zawqmvad.pdf    Strength training YouTube workouts  https://[email or web address].com.com/user/KozakSportsPerform    [facility] Fitness Basics: Strength Training How-to video collection  https://www.[facility].org/healthy-lifestyle/fitness/basics/fitness-basics/hlv-40976290    Beginning strength training exercise articles  https://www.RidePal.com/2022/10/12/well/move/strength-training-beginners-guide.html  [email or web address]://www.[employer].Fantastic.cl/fitness/workouts/strength-training-beginners            "

## 2023-12-21 DIAGNOSIS — K21.9 GASTROESOPHAGEAL REFLUX DISEASE, UNSPECIFIED WHETHER ESOPHAGITIS PRESENT: ICD-10-CM

## 2023-12-27 NOTE — TELEPHONE ENCOUNTER
OMEPRAZOLE DR 20 MG CAPSULE      Last Written Prescription Date:  12/27/22  Last Fill Quantity: 90,   # refills: 3  Last Office Visit : 9/19/23 [facility]   Future Office visit:  4/16/24    Routing refill request to provider for review/approval because:  Omeprazole not on the Urology [facility] refill protocol

## 2024-01-02 ENCOUNTER — MEDICAL CORRESPONDENCE (OUTPATIENT)
Dept: HEALTH INFORMATION MANAGEMENT | Facility: CLINIC | Age: 29
End: 2024-01-02
Payer: COMMERCIAL

## 2024-01-19 ENCOUNTER — VIRTUAL VISIT (OUTPATIENT)
Dept: UROLOGY | Facility: CLINIC | Age: 29
End: 2024-01-19
Payer: COMMERCIAL

## 2024-01-19 ENCOUNTER — TELEPHONE (OUTPATIENT)
Dept: UROLOGY | Facility: CLINIC | Age: 29
End: 2024-01-19

## 2024-01-19 DIAGNOSIS — N20.0 KIDNEY STONE: Primary | ICD-10-CM

## 2024-01-19 PROCEDURE — 99213 OFFICE O/P EST LOW 20 MIN: CPT | Mod: 95 | Performed by: NURSE PRACTITIONER

## 2024-01-19 NOTE — LETTER
1/19/2024       RE: Shelly Cerda  [address]   [place] [address]     Dear Colleague,    Thank you for referring your patient, Shelly Cerda, to the [facility] [place] [facility]. Please see a copy of my visit note below.    Virtual Visit Details    Type of service:  Video Visit   Originating Location (pt. 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 Koehler last year. Has followed with Dr. Gonzalez for stone prevention and is being treated with captopril 50 mg TID and potassium citrate 20 mEq BID. Imaging pursued in the late-summer/fall due to right flank pain, though CT showed no stones. She continues to have occasional ache on that side but nothing severe or continuous. Starts taking her supply of Flomax if this happens. Voiding symptoms seem better as of late with her increase in physical activity.   -See Dr. Gonzalez in a few months with [facility]ol[facility] completed beforehand, as scheduled.   -As she tends to have stone flare-ups after the summer, will time our next visit to be in August. Will get a CHRISTY at that time and she will see me to review and check in.    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 Koehler last year. Has followed with Dr. Gonzalez for stone prevention and is being treated with captopril 50 mg TID and potassium citrate 20 mEq BID. Has not tolerated thiola. Stone burden monitored with CHRISTY as her stones are not radiopaque on x-ray and she has preferred to avoid CT scans if able.     In March, she reported  urinary hesitancy and incomplete emptying. Was seen for a nurse visit and PVR found to be low at 14 mL. PFPT referral offered, though she preferred to monitor. At our last visit in September, she endorsed some ongoing double-voiding, but wanted to still monitor. She reported some right kidney pain at that time despite a recent CHRISTY showing no stones on her right (some on the left). We therefore moved forward with CT that showed no urinary tract stones. Therefore, suspicion that the stones on her CHRISTY were likely artifact.     Today, she states that she has been doing well. Continues to occasionally get a twinge of pain on her right side but nothing significant. Every once in a while, she will start taking Flomax in case something is passing.     She has been working on more lately and her voiding symptoms seen better.     Objective:     Exam:   Patient is a 28 year old female   No vital signs obtained due to virtual visit.  General Appearance: Well groomed, hygenic  Respiratory: No cough, no respiratory distress or labored breathing  Musculoskeletal: Grossly normal with no gross deficits  Skin: No discoloration or apparent rashes  Neurologic: No tremors  Psychiatric: Alert and oriented  Further examination is deferred due to the nature of our visit.

## 2024-01-19 NOTE — PATIENT INSTRUCTIONS
UROLOGY CLINIC VISIT PATIENT INSTRUCTIONS    Follow up with me in August with a renal ultrasound done beforehand.    If you have any issues, questions or concerns in the meantime, do not hesitate to contact us at [phone] or via [employer].     Caren Gilbert, CNP  Department of Urology

## 2024-01-19 NOTE — NURSING NOTE
Is the patient currently in the state of [place]? YES    Visit mode:VIDEO    If the visit is dropped, the patient can be reconnected by: VIDEO VISIT: Text to cell phone:   Telephone Information:   Mobile [phone]       Will anyone else be joining the visit? NO  (If patient encounters technical issues they should call [phone]    How would you like to obtain your AVS? MyChart    Are changes needed to the allergy or medication list? No, Patient denies any changes since echeck-in regarding medication and allergies and states all information entered during echeck-in remains accurate.      Reason for visit: RECHECK    Rebekah ELKINS

## 2024-01-19 NOTE — PROGRESS NOTES
Virtual Visit Details    Type of service:  Video Visit   Originating Location (pt. 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 Koehler last year. Has followed with Dr. Gonzalez for stone prevention and is being treated with captopril 50 mg TID and potassium citrate 20 mEq BID. Imaging pursued in the late-summer/fall due to right flank pain, though CT showed no stones. She continues to have occasional ache on that side but nothing severe or continuous. Starts taking her supply of Flomax if this happens. Voiding symptoms seem better as of late with her increase in physical activity.   -See Dr. Gonzalez in a few months with [facility] completed beforehand, as scheduled.   -As she tends to have stone flare-ups after the summer, will time our next visit to be in August. Will get a CHRISTY at that time and she will see me to review and check in.    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 Koehler last year. Has followed with Dr. Gonzalez for stone prevention and is being treated with captopril 50 mg TID and potassium citrate 20 mEq BID. Has not tolerated thiola. Stone burden monitored with CHRISTY as her stones are not radiopaque on x-ray and she has preferred to avoid CT scans if able.     In March, she reported urinary hesitancy and incomplete emptying. Was seen for a nurse visit and PVR found to be low at 14 mL. PFPT referral offered, though she preferred to monitor. At our last visit in September, she endorsed some ongoing double-voiding, but wanted to still monitor. She reported some right kidney pain at that time  despite a recent CHRISTY showing no stones on her right (some on the left). We therefore moved forward with CT that showed no urinary tract stones. Therefore, suspicion that the stones on her CHRISTY were likely artifact.     Today, she states that she has been doing well. Continues to occasionally get a twinge of pain on her right side but nothing significant. Every once in a while, she will start taking Flomax in case something is passing.     She has been working on more lately and her voiding symptoms seen better.     Objective:     Exam:   Patient is a 28 year old female   No vital signs obtained due to virtual visit.  General Appearance: Well groomed, hygenic  Respiratory: No cough, no respiratory distress or labored breathing  Musculoskeletal: Grossly normal with no gross deficits  Skin: No discoloration or apparent rashes  Neurologic: No tremors  Psychiatric: Alert and oriented  Further examination is deferred due to the nature of our visit.

## 2024-01-19 NOTE — TELEPHONE ENCOUNTER
Left Voicemail (1st Attempt) for the patient to call back and schedule the following:    Appointment type: Return  Provider: Caren Gilbert  Return date: August 2024  Specialty phone number: [phone]  Additional appointment(s) needed: Renal Ultrasound prior  Additonal Notes: August follow up with US prior

## 2024-02-09 SDOH — HEALTH STABILITY: PHYSICAL HEALTH: ON AVERAGE, HOW MANY MINUTES DO YOU ENGAGE IN EXERCISE AT THIS LEVEL?: 30 MIN

## 2024-02-09 SDOH — HEALTH STABILITY: PHYSICAL HEALTH: ON AVERAGE, HOW MANY DAYS PER WEEK DO YOU ENGAGE IN MODERATE TO STRENUOUS EXERCISE (LIKE A BRISK WALK)?: 3 DAYS

## 2024-02-09 ASSESSMENT — SOCIAL DETERMINANTS OF HEALTH (SDOH): HOW OFTEN DO YOU GET TOGETHER WITH FRIENDS OR RELATIVES?: ONCE A WEEK

## 2024-02-16 ENCOUNTER — OFFICE VISIT (OUTPATIENT)
Dept: FAMILY MEDICINE | Facility: CLINIC | Age: 29
End: 2024-02-16
Payer: COMMERCIAL

## 2024-02-16 VITALS
SYSTOLIC BLOOD PRESSURE: 124 MMHG | HEART RATE: 110 BPM | HEIGHT: 63 IN | RESPIRATION RATE: 16 BRPM | TEMPERATURE: 98.1 F | OXYGEN SATURATION: 96 % | BODY MASS INDEX: 39.34 KG/M2 | DIASTOLIC BLOOD PRESSURE: 79 MMHG | WEIGHT: 222 LBS

## 2024-02-16 DIAGNOSIS — Z30.41 SURVEILLANCE FOR BIRTH CONTROL, ORAL CONTRACEPTIVES: ICD-10-CM

## 2024-02-16 DIAGNOSIS — Z13.1 SCREENING FOR DIABETES MELLITUS: ICD-10-CM

## 2024-02-16 DIAGNOSIS — Z00.00 ROUTINE GENERAL MEDICAL EXAMINATION AT A HEALTH CARE FACILITY: Primary | ICD-10-CM

## 2024-02-16 DIAGNOSIS — E66.09 CLASS 2 OBESITY DUE TO EXCESS CALORIES WITHOUT SERIOUS COMORBIDITY WITH BODY MASS INDEX (BMI) OF 39.0 TO 39.9 IN ADULT: ICD-10-CM

## 2024-02-16 DIAGNOSIS — Z12.4 CERVICAL CANCER SCREENING: ICD-10-CM

## 2024-02-16 DIAGNOSIS — N20.0: ICD-10-CM

## 2024-02-16 DIAGNOSIS — Z13.220 LIPID SCREENING: ICD-10-CM

## 2024-02-16 DIAGNOSIS — Z82.69 FAMILY HISTORY OF OSTEOPENIA: ICD-10-CM

## 2024-02-16 DIAGNOSIS — Z91.09 ENVIRONMENTAL ALLERGIES: ICD-10-CM

## 2024-02-16 DIAGNOSIS — E66.812 CLASS 2 OBESITY DUE TO EXCESS CALORIES WITHOUT SERIOUS COMORBIDITY WITH BODY MASS INDEX (BMI) OF 39.0 TO 39.9 IN ADULT: ICD-10-CM

## 2024-02-16 LAB — HBA1C MFR BLD: 5.1 % (ref 0–5.6)

## 2024-02-16 PROCEDURE — 99213 OFFICE O/P EST LOW 20 MIN: CPT | Mod: 25 | Performed by: PHYSICIAN ASSISTANT

## 2024-02-16 PROCEDURE — 36415 COLL VENOUS BLD VENIPUNCTURE: CPT | Performed by: PHYSICIAN ASSISTANT

## 2024-02-16 PROCEDURE — 80048 BASIC METABOLIC PNL TOTAL CA: CPT | Performed by: PHYSICIAN ASSISTANT

## 2024-02-16 PROCEDURE — 99395 PREV VISIT EST AGE 18-39: CPT | Performed by: PHYSICIAN ASSISTANT

## 2024-02-16 PROCEDURE — 80061 LIPID PANEL: CPT | Performed by: PHYSICIAN ASSISTANT

## 2024-02-16 PROCEDURE — G0145 SCR C/V CYTO,THINLAYER,RESCR: HCPCS | Performed by: PHYSICIAN ASSISTANT

## 2024-02-16 PROCEDURE — 83036 HEMOGLOBIN GLYCOSYLATED A1C: CPT | Performed by: PHYSICIAN ASSISTANT

## 2024-02-16 RX ORDER — LEVONORGESTREL AND ETHINYL ESTRADIOL 0.15-0.03
1 KIT ORAL DAILY
Qty: 90 TABLET | Refills: 3 | Status: SHIPPED | OUTPATIENT
Start: 2024-02-16

## 2024-02-16 RX ORDER — LEVOCETIRIZINE DIHYDROCHLORIDE 5 MG/1
5 TABLET, FILM COATED ORAL DAILY
Qty: 90 TABLET | Refills: 3 | Status: SHIPPED | OUTPATIENT
Start: 2024-02-16

## 2024-02-16 ASSESSMENT — PAIN SCALES - GENERAL: PAINLEVEL: MODERATE PAIN (4)

## 2024-02-16 NOTE — PROGRESS NOTES
"Preventive Care Visit  [facility]  LESLIE DESAI PA-C, Physician Assistant - Medical  Feb 16, 2024    Assessment & Plan     Routine general medical examination at a health care facility  Healthy  Somewhat uncomfortable with positive CVA tenderness  Unable to take weight loss medications due to cystinuria disease per specialist so she is working on diet and exercise.  Immunizations as noted in HPI  Screening as noted below.  Normal risk for breast/colon cancers. Begin screening at 40/45 respectively.   OCP's given. Low risk.   Recommend 1000mg calcium daily either supplemental or in diet.   ---patient's mother has \"stage 4 osteopenia\" and her sister has DXA scans every couple years.   Recommend eye and dental exams  Discussed birth control methods at length, risks, benefits.  F/U annually for preventive care and in the interim as needed for problem visits.     Cervical cancer screening  - Pap Screen reflex to HPV if ASCUS - recommend age 25 - 29    Screening for diabetes mellitus  - Hemoglobin A1c; Future  - Hemoglobin A1c    Lipid screening  - Lipid Profile (Chol, Trig, HDL, LDL calc); Future  - Lipid Profile (Chol, Trig, HDL, LDL calc)    Surveillance for birth control, oral contraceptives  Rx for OCPs sent    Cystine calculus of kidney  Continue nephrology and urology care  - Adult Genetics & Metabolism Referral; Future  - Basic metabolic panel  (Ca, Cl, CO2, Creat, Gluc, K, Na, BUN); Future  - Basic metabolic panel  (Ca, Cl, CO2, Creat, Gluc, K, Na, BUN)    Environmental allergies  - levocetirizine (XYZAL) 5 MG tablet; Take 1 tablet (5 mg) by mouth daily    Class 2 obesity due to excess calories without serious comorbidity with body mass index (BMI) of 39.0 to 39.9 in adult  See above    Family hx osteopenia  --wDXA ordered.    BMI  Estimated body mass index is 39.33 kg/m  as calculated from the following:    Height as of this encounter: 1.6 m (5' 3\").    Weight as of this encounter: " 100.7 kg (222 lb).   Weight management plan: Patient has had referral to medical weight loss clinic and has had a brief consultation but is not a candidate for medications due to her cystinuria issues. She is working on diet and exercise.     Counseling  Appropriate preventive services were discussed with this patient, including applicable screening as appropriate for fall prevention, nutrition, physical activity, Tobacco-use cessation, weight loss and cognition.  Checklist reviewing preventive services available has been given to the patient.      Jens Bravo is a 28 year old, presenting for the following:  Physical        2024     2:43 PM   Additional Questions   Roomed by Deb   Accompanied by self         2024     2:43 PM   Patient Reported Additional Medications   Patient reports taking the following new medications n/a        Health Care Directive  Patient does not have a Health Care Directive or Living Will.    Well Women Physical Exam:    Date of last exam: 2023    LMP:  (on OCP)  Fertility history: G 0 P 0  Birth Control: OCP's extended cycle. Increased estrogen because when visited with sisters, menses would sync.   STD Screening: None    PAP/Cervical Cancer Screenin,   Mammogram: No breast cancer in family  Colon Cancer Screening: No fam hx colon cancer  DXA: N/A  Immunizations: UTD    Smoker: No  Interested in Smoking Cessation: n/a  Alcohol Use: Maybe one per week    Diet: Trying to eat low salt, meat once/day (high in cystine) high water intake. Eats some eggs. Some black beans.   Exercise: Has gym membership and goes 3 times/week or more. Has an under desk treadmill walking 1-2 miles on that.   Supplements: Mom has osteopenia. Sister gets yearly bone density testing.   Cholesterol Screening: Due  Diabetes Screening: Due    Depression and Anxiety Screening: Has some anxiety and has done therapy (taking a break right now).     Other Concerns: Was referred  to weight management clinic last year. Trying not to have an abundance of appointments to see if she can do it on her own. Has a lot with urology and nephrology. They did not think any medications were a good fit with kidney stones. Lost 25 pounds during CoVID but has gained back (was living with parents at that time). Now living with significant other.             2/9/2024   General Health   How would you rate your overall physical health? Good   Feel stress (tense, anxious, or unable to sleep) To some extent   (!) STRESS CONCERN      2/9/2024   Nutrition   Three or more servings of calcium each day? (!) I DON'T KNOW   Diet: Low salt   How many servings of fruit and vegetables per day? (!) 2-3   How many sweetened beverages each day? 0-1         2/9/2024   Exercise   Days per week of moderate/strenous exercise 3 days   Average minutes spent exercising at this level 30 min         2/9/2024   Social Factors   Frequency of gathering with friends or relatives Once a week   Worry food won't last until get money to buy more No   Food not last or not have enough money for food? No   Do you have housing?  Yes   Are you worried about losing your housing? No   Lack of transportation? No   Unable to get utilities (heat,electricity)? No         2/9/2024   Dental   Dentist two times every year? Yes         2/9/2024   TB Screening   Were you born outside of [place]?  No       Today's PHQ-2 Score:       1/19/2024     1:56 PM   PHQ-2 ( 1999 [employer])   Q1: Little interest or pleasure in doing things 0   Q2: Feeling down, depressed or hopeless 0   PHQ-2 Score 0         2/9/2024   Substance Use   Alcohol more than 3/day or more than 7/wk No   Do you use any other substances recreationally? No     Social History     Tobacco Use    Smoking status: Never    Smokeless tobacco: Never   Vaping Use    Vaping Use: Never used   Substance Use Topics    Alcohol use: Yes     Comment: occasionally    Drug use: No           2/9/2024   Breast Cancer  "Screening   Family history of breast, colon, or ovarian cancer? No / Unknown      Mammogram Screening - Patient under 40 years of age: Routine Mammogram Screening not recommended.         2/9/2024   STI Screening   New sexual partner(s) since last STI/HIV test? No     History of abnormal Pap smear: NO - age 21-29 PAP every 3 years recommended        4/19/2021     4:43 PM   PAP / HPV   PAP-ABSTRACT See Scanned Document           This result is from an external source.           2/9/2024   Contraception/Family Planning   Questions about contraception or family planning (!) YES See HPI.        Reviewed and updated as needed this visit by Provider    ROS: as per HPI, otherwise negative     Objective    Exam  /79   Pulse 110   Temp 98.1  F (36.7  C) (Tympanic)   Resp 16   Ht 1.6 m (5' 3\")   Wt 100.7 kg (222 lb)   LMP 12/14/2023   SpO2 96%   BMI 39.33 kg/m     Estimated body mass index is 39.33 kg/m  as calculated from the following:    Height as of this encounter: 1.6 m (5' 3\").    Weight as of this encounter: 100.7 kg (222 lb).    Physical Exam  Vitals reviewed.   Constitutional:       Appearance: Normal appearance. She is well-developed. She is obese. She is not ill-appearing.   HENT:      Head: Normocephalic and atraumatic.      Jaw: No trismus.      Right Ear: Tympanic membrane, ear canal and external ear normal.      Left Ear: Tympanic membrane, ear canal and external ear normal.      Nose: Nose normal. No rhinorrhea.      Mouth/Throat:      Mouth: Mucous membranes are moist.      Dentition: Normal dentition.      Tongue: No lesions. Tongue does not deviate from midline.      Pharynx: Oropharynx is clear. Uvula midline.      Tonsils: No tonsillar exudate. 2+ on the right. 2+ on the left.   Eyes:      General: Lids are normal. No allergic shiner.     Extraocular Movements: Extraocular movements intact.      Conjunctiva/sclera: Conjunctivae normal.   Neck:      Thyroid: No thyroid mass, thyromegaly or " thyroid tenderness.      Trachea: Trachea normal.   Cardiovascular:      Rate and Rhythm: Normal rate and regular rhythm.      Pulses:           Posterior tibial pulses are 2+ on the right side and 2+ on the left side.      Heart sounds: Normal heart sounds. No murmur heard.  Pulmonary:      Effort: Pulmonary effort is normal.      Breath sounds: Normal breath sounds.   Chest:   Breasts:     Angel Score is 5.      Right: Normal.      Left: Normal.      Comments: Taught breast exam technique.  Abdominal:      General: Abdomen is flat. Bowel sounds are normal.      Palpations: Abdomen is soft.      Tenderness: There is no abdominal tenderness.      Hernia: No hernia is present.   Genitourinary:     General: Normal vulva.      Vagina: Normal.      Cervix: Normal.   Musculoskeletal:      Cervical back: Normal range of motion.      Right lower leg: No edema.      Left lower leg: No edema.      Comments: Ambulatory without assistive device  Limbs with grossly normal AROM    CVA tenderness right side   Lymphadenopathy:      Head:      Right side of head: No submental, submandibular, tonsillar, preauricular or posterior auricular adenopathy.      Left side of head: No submental, submandibular, tonsillar, preauricular or posterior auricular adenopathy.      Cervical: No cervical adenopathy.      Upper Body:      Right upper body: No supraclavicular or axillary adenopathy.      Left upper body: No supraclavicular or axillary adenopathy.   Skin:     General: Skin is warm and dry.      Capillary Refill: Capillary refill takes 2 to 3 seconds.      Findings: No lesion, rash or wound.   Neurological:      General: No focal deficit present.      Mental Status: She is alert.      Motor: Motor function is intact.      Coordination: Coordination is intact.      Gait: Gait is intact.      Deep Tendon Reflexes:      Reflex Scores:       Patellar reflexes are 2+ on the right side and 2+ on the left side.     Comments: CN II-XII grossly  intact   Psychiatric:         Attention and Perception: Attention normal.         Mood and Affect: Mood normal.         Behavior: Behavior is cooperative.         Thought Content: Thought content normal.         Judgment: Judgment normal.       Signed Electronically by: LESLIE DESAI PA-C

## 2024-02-16 NOTE — PATIENT INSTRUCTIONS
Preventive Care Advice   This is general advice given by our system to help you stay healthy. However, your care team may have specific advice just for you. Please talk to your care team about your preventive care needs.  Nutrition  Eat 5 or more servings of fruits and vegetables each day.  Try wheat bread, brown rice and whole grain pasta (instead of white bread, rice, and pasta).  Get enough calcium and vitamin D. Check the label on foods and aim for 100% of the RDA (recommended daily allowance).  Lifestyle  Exercise at least 150 minutes each week  (30 minutes a day, 5 days a week).  Do muscle strengthening activities 2 days a week. These help control your weight and prevent disease.  No smoking.  Wear sunscreen to prevent skin cancer.  Have a dental exam and cleaning every 6 months.  Yearly exams  See your health care team every year to talk about:  Any changes in your health.  Any medicines your care team has prescribed.  Preventive care, family planning, and ways to prevent chronic diseases.  Shots (vaccines)   HPV shots (up to age 26), if you've never had them before.  Hepatitis B shots (up to age 59), if you've never had them before.  COVID-19 shot: Get this shot when it's due.  Flu shot: Get a flu shot every year.  Tetanus shot: Get a tetanus shot every 10 years.  Pneumococcal, hepatitis A, and RSV shots: Ask your care team if you need these based on your risk.  Shingles shot (for age 50 and up)  General health tests  Diabetes screening:  Starting at age 35, Get screened for diabetes at least every 3 years.  If you are younger than age 35, ask your care team if you should be screened for diabetes.  Cholesterol test: At age 39, start having a cholesterol test every 5 years, or more often if advised.  Bone density scan (DEXA): At age 50, ask your care team if you should have this scan for osteoporosis (brittle bones).  Hepatitis C: Get tested at least once in your life.  STIs (sexually transmitted  infections)  Before age 24: Ask your care team if you should be screened for STIs.  After age 24: Get screened for STIs if you're at risk. You are at risk for STIs (including HIV) if:  You are sexually active with more than one person.  You don't use condoms every time.  You or a partner was diagnosed with a sexually transmitted infection.  If you are at risk for HIV, ask about PrEP medicine to prevent HIV.  Get tested for HIV at least once in your life, whether you are at risk for HIV or not.  Cancer screening tests  Cervical cancer screening: If you have a cervix, begin getting regular cervical cancer screening tests starting at age 21.  Breast cancer scan (mammogram): If you've ever had breasts, begin having regular mammograms starting at age 40. This is a scan to check for breast cancer.  Colon cancer screening: It is important to start screening for colon cancer at age 45.  Have a colonoscopy test every 10 years (or more often if you're at risk) Or, ask your provider about stool tests like a FIT test every year or Cologuard test every 3 years.  To learn more about your testing options, visit:   https://www.PlayerPro/660949.pdf.  For help making a decision, visit:   https://bit.ly/ea39084.  Prostate cancer screening test: If you have a prostate, ask your care team if a prostate cancer screening test (PSA) at age 55 is right for you.  Lung cancer screening: If you are a current or former smoker ages 50 to 80, ask your care team if ongoing lung cancer screenings are right for you.  For informational purposes only. Not to replace the advice of your health care provider. Copyright   2023 [facility]. All rights reserved. Clinically reviewed by the [facility] Transitions Program. [employer] 387916 - REV 01/24.    Learning About Stress  What is stress?     Stress is your body's response to a hard situation. Your body can have a physical, emotional, or mental response. Stress is a fact of life for  most people, and it affects everyone differently. What causes stress for you may not be stressful for someone else.  A lot of things can cause stress. You may feel stress when you go on a job interview, take a test, or run a race. This kind of short-term stress is normal and even useful. It can help you if you need to work hard or react quickly. For example, stress can help you finish an important job on time.  Long-term stress is caused by ongoing stressful situations or events. Examples of long-term stress include long-term health problems, ongoing problems at work, or conflicts in your family. Long-term stress can harm your health.  How does stress affect your health?  When you are stressed, your body responds as though you are in danger. It makes hormones that speed up your heart, make you breathe faster, and give you a burst of energy. This is called the fight-or-flight stress response. If the stress is over quickly, your body goes back to normal and no harm is done.  But if stress happens too often or lasts too long, it can have bad effects. Long-term stress can make you more likely to get sick, and it can make symptoms of some diseases worse. If you tense up when you are stressed, you may develop neck, shoulder, or low back pain. Stress is linked to high blood pressure and heart disease.  Stress also harms your emotional health. It can make you seivlla, tense, or depressed. Your relationships may suffer, and you may not do well at work or school.  What can you do to manage stress?  You can try these things to help manage stress:   Do something active. Exercise or activity can help reduce stress. Walking is a great way to get started. Even everyday activities such as housecleaning or yard work can help.  Try yoga or ric chi. These techniques combine exercise and meditation. You may need some training at first to learn them.  Do something you enjoy. For example, listen to music or go to a movie. Practice your  "hobby or do volunteer work.  Meditate. This can help you relax, because you are not worrying about what happened before or what may happen in the future.  Do guided imagery. Imagine yourself in any setting that helps you feel calm. You can use online videos, books, or a teacher to guide you.  Do breathing exercises. For example:  From a standing position, bend forward from the waist with your knees slightly bent. Let your arms dangle close to the floor.  Breathe in slowly and deeply as you return to a standing position. Roll up slowly and lift your head last.  Hold your breath for just a few seconds in the standing position.  Breathe out slowly and bend forward from the waist.  Let your feelings out. Talk, laugh, cry, and express anger when you need to. Talking with supportive friends or family, a counselor, or a андрей leader about your feelings is a healthy way to relieve stress. Avoid discussing your feelings with people who make you feel worse.  Write. It may help to write about things that are bothering you. This helps you find out how much stress you feel and what is causing it. When you know this, you can find better ways to cope.  What can you do to prevent stress?  You might try some of these things to help prevent stress:  Manage your time. This helps you find time to do the things you want and need to do.  Get enough sleep. Your body recovers from the stresses of the day while you are sleeping.  Get support. Your family, friends, and community can make a difference in how you experience stress.  Limit your news feed. Avoid or limit time on social media or news that may make you feel stressed.  Do something active. Exercise or activity can help reduce stress. Walking is a great way to get started.  Where can you learn more?  Go to https://www.healthwise.net/patiented  Enter N032 in the search box to learn more about \"Learning About Stress.\"  Current as of: February 26, 2023               Content Version: " 13.8    4715-9392 [employer].   Care instructions adapted under license by your healthcare professional. If you have questions about a medical condition or this instruction, always ask your healthcare professional. [employer] disclaims any warranty or liability for your use of this information.

## 2024-02-17 LAB
ANION GAP SERPL CALCULATED.3IONS-SCNC: 11 MMOL/L (ref 7–15)
BUN SERPL-MCNC: 11.8 MG/DL (ref 6–20)
CALCIUM SERPL-MCNC: 9.7 MG/DL (ref 8.6–10)
CHLORIDE SERPL-SCNC: 104 MMOL/L (ref 98–107)
CHOLEST SERPL-MCNC: 225 MG/DL
CREAT SERPL-MCNC: 1.07 MG/DL (ref 0.51–0.95)
DEPRECATED HCO3 PLAS-SCNC: 25 MMOL/L (ref 22–29)
EGFRCR SERPLBLD CKD-EPI 2021: 72 ML/MIN/1.73M2
FASTING STATUS PATIENT QL REPORTED: NO
GLUCOSE SERPL-MCNC: 84 MG/DL (ref 70–99)
HDLC SERPL-MCNC: 65 MG/DL
LDLC SERPL CALC-MCNC: 144 MG/DL
NONHDLC SERPL-MCNC: 160 MG/DL
POTASSIUM SERPL-SCNC: 3.7 MMOL/L (ref 3.4–5.3)
SODIUM SERPL-SCNC: 140 MMOL/L (ref 135–145)
TRIGL SERPL-MCNC: 80 MG/DL

## 2024-02-19 ENCOUNTER — ANCILLARY PROCEDURE (OUTPATIENT)
Dept: ULTRASOUND IMAGING | Facility: CLINIC | Age: 29
End: 2024-02-19
Attending: NURSE PRACTITIONER
Payer: COMMERCIAL

## 2024-02-19 ENCOUNTER — MYC MEDICAL ADVICE (OUTPATIENT)
Dept: UROLOGY | Facility: CLINIC | Age: 29
End: 2024-02-19
Payer: COMMERCIAL

## 2024-02-19 DIAGNOSIS — N20.0 KIDNEY STONE: ICD-10-CM

## 2024-02-19 DIAGNOSIS — N20.0 KIDNEY STONE: Primary | ICD-10-CM

## 2024-02-19 PROCEDURE — 76770 US EXAM ABDO BACK WALL COMP: CPT | Mod: TC | Performed by: INTERNAL MEDICINE

## 2024-02-20 DIAGNOSIS — R10.9 RIGHT FLANK PAIN: Primary | ICD-10-CM

## 2024-02-20 LAB
BKR LAB AP GYN ADEQUACY: NORMAL
BKR LAB AP GYN INTERPRETATION: NORMAL
BKR LAB AP HPV REFLEX: NORMAL
BKR LAB AP LMP: NORMAL
BKR LAB AP PREVIOUS ABNORMAL: NORMAL
PATH REPORT.COMMENTS IMP SPEC: NORMAL
PATH REPORT.COMMENTS IMP SPEC: NORMAL
PATH REPORT.RELEVANT HX SPEC: NORMAL

## 2024-02-22 DIAGNOSIS — R10.9 RIGHT FLANK PAIN: Primary | ICD-10-CM

## 2024-02-23 ENCOUNTER — ANCILLARY PROCEDURE (OUTPATIENT)
Dept: CT IMAGING | Facility: CLINIC | Age: 29
End: 2024-02-23
Attending: NURSE PRACTITIONER
Payer: COMMERCIAL

## 2024-02-23 DIAGNOSIS — R10.9 RIGHT FLANK PAIN: ICD-10-CM

## 2024-02-23 PROCEDURE — 74176 CT ABD & PELVIS W/O CONTRAST: CPT | Mod: TC | Performed by: RADIOLOGY

## 2024-02-25 DIAGNOSIS — R10.9 RIGHT FLANK PAIN: Primary | ICD-10-CM

## 2024-03-07 ENCOUNTER — TELEPHONE (OUTPATIENT)
Dept: CONSULT | Facility: CLINIC | Age: 29
End: 2024-03-07
Payer: COMMERCIAL

## 2024-03-07 NOTE — TELEPHONE ENCOUNTER
[facility] Call Center    Phone Message    May a detailed message be left on voicemail: yes     Reason for Call: Other: Pt has Genetic referral has been reviewed but has not been called for scheduling and would like follow up please assist.     Action Taken: Other: Gene    Travel Screening: Not Applicable

## 2024-03-11 ENCOUNTER — ANCILLARY PROCEDURE (OUTPATIENT)
Dept: NUCLEAR MEDICINE | Facility: CLINIC | Age: 29
End: 2024-03-11
Attending: NURSE PRACTITIONER
Payer: COMMERCIAL

## 2024-03-11 DIAGNOSIS — R10.9 RIGHT FLANK PAIN: ICD-10-CM

## 2024-03-11 PROCEDURE — 78708 K FLOW/FUNCT IMAGE W/DRUG: CPT | Mod: GC | Performed by: RADIOLOGY

## 2024-03-11 PROCEDURE — A9562 TC99M MERTIATIDE: HCPCS | Performed by: RADIOLOGY

## 2024-03-11 RX ORDER — FUROSEMIDE 10 MG/ML
20 INJECTION INTRAMUSCULAR; INTRAVENOUS ONCE
Status: COMPLETED | OUTPATIENT
Start: 2024-03-11 | End: 2024-03-11

## 2024-03-11 RX ADMIN — FUROSEMIDE 20 MG: 10 INJECTION INTRAMUSCULAR; INTRAVENOUS at 12:21

## 2024-03-11 NOTE — TELEPHONE ENCOUNTER
Pt scheduled 5/3/24 @ 9am w/ Arianne Pitt GC in Neph GC clinic per referral request from Jessica Lemon PA-C @ [facility].  Confirmed date/ time/ provider/ mode of visit with pt.    Letha Elizondo  Pediatric Nephrology/ Neph Genetic Clinic  Sr. Patient Coordinator/ Sr. Complex Referral Specialist  [facility]

## 2024-03-12 ENCOUNTER — PRE VISIT (OUTPATIENT)
Dept: MULTI SPECIALTY CLINIC | Facility: CLINIC | Age: 29
End: 2024-03-12

## 2024-03-12 NOTE — TELEPHONE ENCOUNTER
Reason for visit: stone prevention consult     Relevant information: cystine litholink    Records/imaging/labs/orders: Litholink on 3/16/2024    Pt called: Quintin message sent      Erica Valentin CMA  3/12/2024  9:34 AM

## 2024-03-16 ENCOUNTER — TRANSFERRED RECORDS (OUTPATIENT)
Dept: HEALTH INFORMATION MANAGEMENT | Facility: CLINIC | Age: 29
End: 2024-03-16
Payer: COMMERCIAL

## 2024-04-08 ENCOUNTER — TELEPHONE (OUTPATIENT)
Dept: CONSULT | Facility: CLINIC | Age: 29
End: 2024-04-08

## 2024-04-08 ENCOUNTER — ANCILLARY PROCEDURE (OUTPATIENT)
Dept: BONE DENSITY | Facility: CLINIC | Age: 29
End: 2024-04-08
Attending: PHYSICIAN ASSISTANT
Payer: COMMERCIAL

## 2024-04-08 DIAGNOSIS — Z82.69 FAMILY HISTORY OF OSTEOPENIA: ICD-10-CM

## 2024-04-08 PROCEDURE — 77080 DXA BONE DENSITY AXIAL: CPT | Performed by: RADIOLOGY

## 2024-04-08 NOTE — TELEPHONE ENCOUNTER
[facility] Call Center    Phone Message    May a detailed message be left on voicemail: yes     Reason for Call: Other: Patient is requesting to reschedule the 5/3/2024 8am  appt with Yoandy.     Action Taken: Other: Genetics     Travel Screening: Not Applicable

## 2024-04-16 ENCOUNTER — TELEPHONE (OUTPATIENT)
Dept: NEPHROLOGY | Facility: CLINIC | Age: 29
End: 2024-04-16

## 2024-04-16 ENCOUNTER — VIRTUAL VISIT (OUTPATIENT)
Dept: NEPHROLOGY | Facility: CLINIC | Age: 29
End: 2024-04-16
Payer: COMMERCIAL

## 2024-04-16 DIAGNOSIS — E66.01 MORBID OBESITY (H): ICD-10-CM

## 2024-04-16 DIAGNOSIS — E72.01 CYSTINURIA (H): ICD-10-CM

## 2024-04-16 DIAGNOSIS — Z51.81 MEDICATION MONITORING ENCOUNTER: Primary | ICD-10-CM

## 2024-04-16 DIAGNOSIS — R79.89 ELEVATED SERUM CREATININE: ICD-10-CM

## 2024-04-16 PROCEDURE — 99214 OFFICE O/P EST MOD 30 MIN: CPT | Mod: 95 | Performed by: INTERNAL MEDICINE

## 2024-04-16 RX ORDER — CAPTOPRIL 100 MG/1
100 TABLET ORAL 2 TIMES DAILY
Qty: 180 TABLET | Refills: 3 | Status: SHIPPED | OUTPATIENT
Start: 2024-04-16

## 2024-04-16 NOTE — TELEPHONE ENCOUNTER
Patient confirmed scheduled appointment:  Date: October 22nd   Time: 3:30pm   Visit type: Return Kidney Stone  Provider: Dr. Gonzalez   Location: [facility]  Testing/imaging: N/A  Additional notes: per check out notes- Return in about 6 months (around 10/16/2024).

## 2024-04-16 NOTE — PATIENT INSTRUCTIONS
Take captopril and potassium citrate at the same time, one hour before meals    Captopril 100 mg twice a day - one hour before meals  Potassium citrate 20 meq twice a day - one hour before meals    Please get labs (blood and urine) approx 4/30/2024    Cystine bambi September 2024    Return in about 6 months (around 10/16/2024).

## 2024-04-16 NOTE — PROGRESS NOTES
[facility] Nephrology [facility]  04/16/2024     Shelly Cerda MRN:[id number] YOB: 1995  Primary care provider: [facility]  Requesting physician: Dr. Jayy Koehler    ASSESSMENT AND RECOMMENDATIONS:   Shelly Cerda is a 28 year old presenting for nephrolithiasis.  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   Having trouble remembering medication because of timing around meals. Will try to simplify:  - adjust captopril to 100 mg twice a day - is supposed to be taken 1 hour before meal  - Continue potassium citrate 20 meq twice daily - take 1 hour before meal with captopril - warned on possible GI SE with taking potassium citrate on empty stomach.    Would not recommend penacillamine due to significant side effect with thiola    # medication monitoring (potassium citrate and captopril)  Intermittent creatinine elevation  - ordered BMP and urine protein to be done at the end of April 2024    GERD - Start omeprazole 20 mg -  take 30 minutes before breakfast.  Taking PRN and it is helping    Family planning - on oral contraceptive. Interested in children in the next 5 years. Planning to meet with genetic counselor. Thinking about trying in about 2 years.    Cystine litholink Sept 2024  Return in about 6 months (around 10/16/2024).       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  Managed with high fluid, potassium citrate 20 meq bid, she had allergic reaction with thiola, hives, chills, fever, swollen lymph nodes and throat was swollen.      Stone surgical history  ~ 9 surgeries  Right stone surgery 2012, Left sided stone surgery October 2017, right PCNL 3/2018 - was stone free after that procedure then presented September 2019 with right obstructing kidney stone and had URS and lithotripsy for that stone.  She was again stone free after that operation.  May have passed stone April 2021.      Stone procedures/operations: 10/12/2022 right ureteroscopy and laser lithotripsy  Imaging:   CT scan 2/23/2024 no kidney stones  CT scan 9/5/23 - no kidney stones  Stone burden: stone free based on CT 2/2024    Stone prevention:  Captopril 75 mg bid (could not remember to take when dosed tid)  Potassium citrate 20 meq bid  High fluid, decreased meat    Last visit 9/19/2023    Met with Caren Gilbert CNP 1/19/2024. Ultrasound after that visit showed mild fullness of right extrarenal pelvis without significant hydro. Additional imaging completed with CT Scan and then NM renogram. Renogram was normal. CT scan showed no stones. Next follow up will be August 2024 with imaging.  Thought she had a stone recently with on and off pain on right side - similar to previous stone episodes but nothing on imaging noted above. Continues to have pain on right but not as much.    Still forgets medications on weekends.   Taking captopril 100 mg at bedtime instead of cutting pills. Needs to take captopril 1 hour before meals and potassium citrate she takes after meals so this makes it hard to remember medications.    Trying to get enough water  in.                      PAST MEDICAL HISTORY:  Reviewed  Past Medical History:   Diagnosis Date    Complication of anesthesia     Cystinuria (H24)     Kidney stones     PONV (postoperative nausea and vomiting)        PSH:  Reviewed  Past Surgical History:   Procedure Laterality Date    APPENDECTOMY  9/2011    COMBINED CYSTOSCOPY, RETROGRADES, EXCHANGE STENT URETER(S) Right 9/15/2019    Procedure: CYSTOSCOPY, WITH RIGHT RETROGRADE PYELOGRAM AND RIGHT URETERAL STENT PLACEMENT;  Surgeon: Kasi Wade MD;  [facility]    COMBINED CYSTOSCOPY, RETROGRADES, URETEROSCOPY, INSERT STENT Left 10/12/2017    Procedure: COMBINED CYSTOSCOPY, RETROGRADES, URETEROSCOPY, INSERT STENT;  COMBINED CYSTOSCOPY, RETROGRADES, URETEROSCOPY, LASER HOLMIUM STANDBY,  INSERT STENT LEFT URETER;  Surgeon: Luis Banda MD;  Location: [facility]    COMBINED CYSTOSCOPY, RETROGRADES, URETEROSCOPY, LASER HOLMIUM LITHOTRIPSY URETER(S), INSERT STENT Right 10/12/2022    Procedure: CYSTOURETEROSCOPY, WITH RIGHT RETROGRADE PYELOGRAM, HOLMIUM LASER LITHOTRIPSY, STENT INSERTION;  Surgeon: Jayy Koehler MD;  Location: [facility] OR    CYSTOSCOPY      LASER HOLMIUM LITHOTRIPSY URETER(S), INSERT STENT, COMBINED  9/4/2012    Procedure: COMBINED CYSTOSCOPY, URETEROSCOPY, LASER HOLMIUM LITHOTRIPSY URETER(S), INSERT STENT;  Holmium Laser Lithotripsy.  Right Stent Placement;  Surgeon: Cathi Hernandez MD;  [facility]    LASER HOLMIUM LITHOTRIPSY URETER(S), INSERT STENT, COMBINED Right 9/20/2019    Procedure: Cystoscopy, Right Ureteroscopy, Laser Lithotripsy,  no ureteral stent placed.;  Surgeon: Jayy Koehler MD;  [facility]    PERCUTANEOUS NEPHROLITHOTOMY Right 3/8/2018    Procedure: PERCUTANEOUS NEPHROLITHOTOMY;  Right Percutaneous Nephrolithotomy;  Surgeon: Jayy Koehler MD;  [facility]    Ureteral stent placement with subsequent removal.  2017        MEDICATIONS:  Current Outpatient Medications   Medication  Sig Dispense Refill    azelastine (ASTEPRO) 0.15 % nasal spray       captopril (CAPOTEN) 50 MG tablet Take 75 mg (1.5 tabs) twice per day - take 1 hour before meal. 270 tablet 3    KURVELO 0.15-30 MG-MCG tablet Take 1 tablet by mouth daily 90 tablet 3    levocetirizine (XYZAL) 5 MG tablet Take 1 tablet (5 mg) by mouth daily 90 tablet 3    omeprazole (PRILOSEC) 20 MG DR capsule TAKE 1 CAPSULE BY MOUTH EVERY DAY 30 MINUTES BEFORE BREAKFAST 90 capsule 3    potassium citrate (UROCIT-K) 10 MEQ (1080 MG) CR tablet Take 2 tablets (20 mEq) by mouth 2 times daily 360 tablet 3    tamsulosin (FLOMAX) 0.4 MG capsule Take 1 capsule (0.4 mg) by mouth daily as needed (for kidney stone pain) 10 capsule 11        ALLERGIES:    Allergies   Allergen Reactions    Thiola [Tiopronin] Rash     Fevers, lymphadenopathy, swelling in throat area       REVIEW OF SYSTEMS:  A 10 point review of systems was negative except as noted above.    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 [employer]  Social History     Socioeconomic History    Marital status: Single     Spouse name: Not on file    Number of children: Not on file    Years of education: Not on file    Highest education level: Not on file   Occupational History    Occupation:    Tobacco Use    Smoking status: Never    Smokeless tobacco: Never   Vaping Use    Vaping status: Never Used   Substance and Sexual Activity    Alcohol use: Yes     Comment: occasionally    Drug use: No    Sexual activity: Yes     Partners: Male     Birth control/protection: Pill     Comment: Would like refill on birth control   Other Topics Concern    Parent/sibling w/ CABG, MI or angioplasty before 65F 55M? No   Social History Narrative    Shelly lives with both parents in [place]. Working as .  Hoping to buy house in 2021.    Sexually active with men and dating.     Social Determinants of Health     Financial Resource Strain: Low Risk   (2/9/2024)    Financial Resource Strain     Within the past 12 months, have you or your family members you live with been unable to get utilities (heat, electricity) when it was really needed?: No   Food Insecurity: Low Risk  (2/9/2024)    Food Insecurity     Within the past 12 months, did you worry that your food would run out before you got money to buy more?: No     Within the past 12 months, did the food you bought just not last and you didn t have money to get more?: No   Transportation Needs: Low Risk  (2/9/2024)    Transportation Needs     Within the past 12 months, has lack of transportation kept you from medical appointments, getting your medicines, non-medical meetings or appointments, work, or from getting things that you need?: No   Physical Activity: Insufficiently Active (2/9/2024)    Exercise Vital Sign     Days of Exercise per Week: 3 days     Minutes of Exercise per Session: 30 min   Stress: Stress Concern Present (2/9/2024)    [place] [place] [employer] - Occupational Stress Questionnaire     Feeling of Stress : To some extent   Social Connections: Unknown (2/9/2024)    Social Connection and Isolation Panel [NHANES]     Frequency of Communication with Friends and Family: Not on file     Frequency of Social Gatherings with Friends and Family: Once a week     Attends Christianity Services: Not on file     Active Member of Clubs or Organizations: Not on file     Attends Club or Organization Meetings: Not on file     Marital Status: Not on file   Interpersonal Safety: Low Risk  (2/16/2024)    Interpersonal Safety     Do you feel physically and emotionally safe where you currently live?: Yes     Within the past 12 months, have you been hit, slapped, kicked or otherwise physically hurt by someone?: No     Within the past 12 months, have you been humiliated or emotionally abused in other ways by your partner or ex-partner?: No   Housing Stability: Low Risk  (2/9/2024)    Housing Stability      Do you have housing? : Yes     Are you worried about losing your housing?: No   works out 5 days per week    FAMILY MEDICAL HISTORY:   Family History   Problem Relation Age of Onset    Thyroid Disease Mother     Osteoporosis Mother         Would like a bone density scan to ensure my bone composition is within healthy range    Hypertension Father     Genetic Disorder Sister         Cystinuria       PHYSICAL EXAM:   Exam limited by video appointment.   GENERAL APPEARANCE: alert and no distress  RESP: no conversational dyspnea  Psych: pleasant, normal mental status    LABS:   I reviewed:  Electrolytes/Renal -   Recent Labs   Lab Test 02/16/24  1624 04/14/23  1523 04/05/23  1613    140 138   POTASSIUM 3.7 4.0 4.3   CHLORIDE 104 103 101   CO2 25 22 22   BUN 11.8 11.4 10.8   CR 1.07* 0.95 1.27*   GLC 84 99 92   VALERIANO 9.7 10.0 9.9   PHOS  --  3.4  --        CBC -   Recent Labs   Lab Test 04/05/23  1613 08/18/20  1524 03/09/18  2300   WBC 8.6 6.7 12.1*   HGB 14.5 14.1 12.4    277 287       LFTs -   Recent Labs   Lab Test 04/14/23  1523 08/18/20  1524 12/19/17  1122 08/06/17  0502   ALKPHOS  --  57 52 61   BILITOTAL  --  0.4 0.6 0.3   ALT  --  21 19 31   AST  --  18 16 31   PROTTOTAL  --  7.9 7.9 7.7   ALBUMIN 4.5 4.0 4.2 4.2       Coags - No lab results found.    Iron Panel - No lab results found.    Endocrine -   Recent Labs   Lab Test 02/16/24  1624 04/05/23  1613   A1C 5.1  --    TSH  --  1.28       IMAGING:  See HPI      Virtual Visit Details    Type of service:  Video Visit   Joined the call at 4/16/2024, 2:56:15 pm.  Left the call at 4/16/2024, 3:27:09 pm.  Originating Location (pt. Location): Home  Distant Location (provider location):  Off-site  Platform used for Video Visit: Gerda Gonzalez MD

## 2024-04-16 NOTE — LETTER
4/16/2024       RE: Shelly Cerda  [address]  [place] [address]     Dear Colleague,    Thank you for referring your patient, Shelly Cerda, to the [facility] NEPHROLOGY [facility] [place] at [facility]. Please see a copy of my visit note below.    [facility]  04/16/2024     Shelly Cerda MRN:[id number] YOB: 1995  Primary care provider: [facility]  Requesting physician: Dr. Jayy Koehler    ASSESSMENT AND RECOMMENDATIONS:   Shelly Cerda is a 28 year old presenting for nephrolithiasis.  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   Having trouble remembering medication because of timing around meals. Will try to simplify:  - adjust captopril to 100 mg twice a day - is supposed to be taken 1 hour before meal  - Continue potassium citrate 20 meq twice daily - take 1 hour before meal with captopril - warned on possible GI SE with taking potassium citrate on empty stomach.    Would not recommend penacillamine due to significant side effect with thiola    # medication monitoring (potassium citrate and captopril)  Intermittent creatinine elevation  - ordered BMP and urine protein to be done at the end of April 2024    GERD - Start omeprazole 20 mg -  take 30 minutes before breakfast.  Taking PRN and it is helping    Family planning - on oral contraceptive. Interested in children in the next 5 years. Planning to meet with genetic counselor. Thinking about trying in about 2 years.    Cystine litholink Sept  2024  Return in about 6 months (around 10/16/2024).       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  Managed with high fluid, potassium citrate 20 meq bid, she had allergic reaction with thiola, hives, chills, fever, swollen lymph nodes and throat was swollen.      Stone surgical history  ~ 9 surgeries  Right stone surgery 2012, Left sided stone surgery October 2017, right PCNL 3/2018 - was stone free after that procedure then presented September 2019 with right obstructing kidney stone and had URS and lithotripsy for that stone.  She was again stone free after that operation.  May have passed stone April 2021.      Stone procedures/operations: 10/12/2022 right ureteroscopy and laser lithotripsy  Imaging:   CT scan 2/23/2024 no kidney stones  CT scan 9/5/23 - no kidney stones  Stone burden: stone free based on CT 2/2024    Stone prevention:  Captopril 75 mg bid (could not remember to take when dosed tid)  Potassium citrate 20 meq bid  High fluid, decreased meat    Last visit 9/19/2023    Met with Caren Gilbert CNP 1/19/2024. Ultrasound after that visit showed mild fullness of right extrarenal pelvis without significant hydro. Additional imaging completed with CT Scan and then NM renogram. Renogram was normal. CT scan showed no stones. Next follow up will be August 2024 with imaging.  Thought she had a stone recently with on and off pain on right side - similar to previous stone episodes but nothing on imaging noted above. Continues to have  pain on right but not as much.    Still forgets medications on weekends.   Taking captopril 100 mg at bedtime instead of cutting pills. Needs to take captopril 1 hour before meals and potassium citrate she takes after meals so this makes it hard to remember medications.    Trying to get enough water in.                      PAST MEDICAL HISTORY:  Reviewed  Past Medical History:   Diagnosis Date     Complication of anesthesia      Cystinuria (H24)      Kidney stones      PONV (postoperative nausea and vomiting)        PSH:  Reviewed  Past Surgical History:   Procedure Laterality Date     APPENDECTOMY  9/2011     COMBINED CYSTOSCOPY, RETROGRADES, EXCHANGE STENT URETER(S) Right 9/15/2019    Procedure: CYSTOSCOPY, WITH RIGHT RETROGRADE PYELOGRAM AND RIGHT URETERAL STENT PLACEMENT;  Surgeon: Kasi Wade MD;  [facility]     COMBINED CYSTOSCOPY, RETROGRADES, URETEROSCOPY, INSERT STENT Left 10/12/2017    Procedure: COMBINED CYSTOSCOPY, RETROGRADES, URETEROSCOPY, INSERT STENT;  COMBINED CYSTOSCOPY, RETROGRADES, URETEROSCOPY, LASER HOLMIUM STANDBY,  INSERT STENT LEFT URETER;  Surgeon: Luis Banda MD;  Location: [facility] OR     COMBINED CYSTOSCOPY, RETROGRADES, URETEROSCOPY, LASER HOLMIUM LITHOTRIPSY URETER(S), INSERT STENT Right 10/12/2022    Procedure: CYSTOURETEROSCOPY, WITH RIGHT RETROGRADE PYELOGRAM, HOLMIUM LASER LITHOTRIPSY, STENT INSERTION;  Surgeon: Jayy Koehler MD;  Location: [facility] OR     CYSTOSCOPY       LASER HOLMIUM LITHOTRIPSY URETER(S), INSERT STENT, COMBINED  9/4/2012    Procedure: COMBINED CYSTOSCOPY, URETEROSCOPY, LASER HOLMIUM LITHOTRIPSY URETER(S), INSERT STENT;  Holmium Laser Lithotripsy.  Right Stent Placement;  Surgeon: Cathi Hernandez MD;  [facility]     LASER HOLMIUM LITHOTRIPSY URETER(S), INSERT STENT, COMBINED Right 9/20/2019    Procedure: Cystoscopy, Right Ureteroscopy, Laser Lithotripsy,  no ureteral stent placed.;  Surgeon: Jayy Koehler MD;   [facility]     PERCUTANEOUS NEPHROLITHOTOMY Right 3/8/2018    Procedure: PERCUTANEOUS NEPHROLITHOTOMY;  Right Percutaneous Nephrolithotomy;  Surgeon: Jayy Koehler MD;  [facility]     Ureteral stent placement with subsequent removal.  2017        MEDICATIONS:  Current Outpatient Medications   Medication Sig Dispense Refill     azelastine (ASTEPRO) 0.15 % nasal spray        captopril (CAPOTEN) 50 MG tablet Take 75 mg (1.5 tabs) twice per day - take 1 hour before meal. 270 tablet 3     KURVELO 0.15-30 MG-MCG tablet Take 1 tablet by mouth daily 90 tablet 3     levocetirizine (XYZAL) 5 MG tablet Take 1 tablet (5 mg) by mouth daily 90 tablet 3     omeprazole (PRILOSEC) 20 MG DR capsule TAKE 1 CAPSULE BY MOUTH EVERY DAY 30 MINUTES BEFORE BREAKFAST 90 capsule 3     potassium citrate (UROCIT-K) 10 MEQ (1080 MG) CR tablet Take 2 tablets (20 mEq) by mouth 2 times daily 360 tablet 3     tamsulosin (FLOMAX) 0.4 MG capsule Take 1 capsule (0.4 mg) by mouth daily as needed (for kidney stone pain) 10 capsule 11        ALLERGIES:    Allergies   Allergen Reactions     Thiola [Tiopronin] Rash     Fevers, lymphadenopathy, swelling in throat area       REVIEW OF SYSTEMS:  A 10 point review of systems was negative except as noted above.    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  Social History     Socioeconomic History     Marital status: Single     Spouse name: Not on file     Number of children: Not on file     Years of education: Not on file     Highest education level: Not on file   Occupational History     Occupation:    Tobacco Use     Smoking status: Never     Smokeless tobacco: Never   Vaping Use     Vaping status: Never Used   Substance and Sexual Activity     Alcohol use: Yes     Comment: occasionally     Drug use: No     Sexual activity: Yes     Partners: Male     Birth control/protection: Pill     Comment: Would like refill on birth  control   Other Topics Concern     Parent/sibling w/ CABG, MI or angioplasty before 65F 55M? No   Social History Narrative    Shelly lives with both parents in [place]. Working as .  Hoping to buy house in 2021.    Sexually active with men and dating.     Social Determinants of Health     Financial Resource Strain: Low Risk  (2/9/2024)    Financial Resource Strain      Within the past 12 months, have you or your family members you live with been unable to get utilities (heat, electricity) when it was really needed?: No   Food Insecurity: Low Risk  (2/9/2024)    Food Insecurity      Within the past 12 months, did you worry that your food would run out before you got money to buy more?: No      Within the past 12 months, did the food you bought just not last and you didn t have money to get more?: No   Transportation Needs: Low Risk  (2/9/2024)    Transportation Needs      Within the past 12 months, has lack of transportation kept you from medical appointments, getting your medicines, non-medical meetings or appointments, work, or from getting things that you need?: No   Physical Activity: Insufficiently Active (2/9/2024)    Exercise Vital Sign      Days of Exercise per Week: 3 days      Minutes of Exercise per Session: 30 min   Stress: Stress Concern Present (2/9/2024)    [place] [employer]ational [employer] - Occupational Stress Questionnaire      Feeling of Stress : To some extent   Social Connections: Unknown (2/9/2024)    Social Connection and Isolation Panel [NHANES]      Frequency of Communication with Friends and Family: Not on file      Frequency of Social Gatherings with Friends and Family: Once a week      Attends [employer] Services: Not on file      Active Member of Clubs or Organizations: Not on file      Attends Club or Organization Meetings: Not on file      Marital Status: Not on file   Interpersonal Safety: Low Risk  (2/16/2024)    Interpersonal Safety      Do you feel physically  and emotionally safe where you currently live?: Yes      Within the past 12 months, have you been hit, slapped, kicked or otherwise physically hurt by someone?: No      Within the past 12 months, have you been humiliated or emotionally abused in other ways by your partner or ex-partner?: No   Housing Stability: Low Risk  (2/9/2024)    Housing Stability      Do you have housing? : Yes      Are you worried about losing your housing?: No   works out 5 days per week    FAMILY MEDICAL HISTORY:   Family History   Problem Relation Age of Onset     Thyroid Disease Mother      Osteoporosis Mother         Would like a bone density scan to ensure my bone composition is within healthy range     Hypertension Father      Genetic Disorder Sister         Cystinuria       PHYSICAL EXAM:   Exam limited by video appointment.   GENERAL APPEARANCE: alert and no distress  RESP: no conversational dyspnea  Psych: pleasant, normal mental status    LABS:   I reviewed:  Electrolytes/Renal -   Recent Labs   Lab Test 02/16/24  1624 04/14/23  1523 04/05/23  1613    140 138   POTASSIUM 3.7 4.0 4.3   CHLORIDE 104 103 101   CO2 25 22 22   BUN 11.8 11.4 10.8   CR 1.07* 0.95 1.27*   GLC 84 99 92   VALERIANO 9.7 10.0 9.9   PHOS  --  3.4  --        CBC -   Recent Labs   Lab Test 04/05/23  1613 08/18/20  1524 03/09/18  2300   WBC 8.6 6.7 12.1*   HGB 14.5 14.1 12.4    277 287       LFTs -   Recent Labs   Lab Test 04/14/23  1523 08/18/20  1524 12/19/17  1122 08/06/17  0502   ALKPHOS  --  57 52 61   BILITOTAL  --  0.4 0.6 0.3   ALT  --  21 19 31   AST  --  18 16 31   PROTTOTAL  --  7.9 7.9 7.7   ALBUMIN 4.5 4.0 4.2 4.2       Coags - No lab results found.    Iron Panel - No lab results found.    Endocrine -   Recent Labs   Lab Test 02/16/24  1624 04/05/23  1613   A1C 5.1  --    TSH  --  1.28       IMAGING:  See HPI      Virtual Visit Details    Type of service:  Video Visit   Joined the call at 4/16/2024, 2:56:15 pm.  Left the call at 4/16/2024,  3:27:09 pm.  Originating Location (pt. Location): Home  Distant Location (provider location):  Off-site  Platform used for Video Visit: Gerda Gonzalez MD       Again, thank you for allowing me to participate in the care of your patient.      Sincerely,    Alis Gonzalez MD

## 2024-04-17 NOTE — TELEPHONE ENCOUNTER
Pt rescheduled w/ Arianne Pitt GC for virtual visit on Friday, August 2nd, 2024 @ 11am per pt request.  Pt declined to be placed on wait list for sooner appt.    Letha Elizondo  Pediatric Nephrology/ Neph Genetic Clinic  Sr. Patient Coordinator/ Sr. Complex Referral Specialist  [facility]

## 2024-06-14 DIAGNOSIS — E72.01 CYSTINURIA (H): Primary | ICD-10-CM

## 2024-06-21 RX ORDER — CAPTOPRIL 50 MG/1
TABLET ORAL
Qty: 270 TABLET | Refills: 2 | Status: SHIPPED | OUTPATIENT
Start: 2024-06-21

## 2024-07-19 ENCOUNTER — TELEPHONE (OUTPATIENT)
Dept: CONSULT | Facility: CLINIC | Age: 29
End: 2024-07-19
Payer: COMMERCIAL

## 2024-07-19 NOTE — TELEPHONE ENCOUNTER
[facility] Call Center    Phone Message    May a detailed message be left on voicemail: yes     Reason for Call: Other: Patient called requesting to reschedule GC appointment on 5/3/2024 8am with Yoandy. Please call back to reschedule.    Thank you.     Action Taken: Other: Peds Genetics    Travel Screening: Not Applicable

## 2024-07-25 NOTE — TELEPHONE ENCOUNTER
Pt is currently scheduled on 8/2/24 @ 11am w/ Arianne Pitt GC for virtual visit.  LM w/ writer's direct call back information inquiring if patient is wanting to reschedule this appointment to future date.    Letha Elizondo  Pediatric Nephrology/ Neph Genetic Clinic  Sr. Patient Coordinator/ Sr. Complex Referral Specialist  [facility]/ [facility]

## 2024-07-30 ENCOUNTER — PRE VISIT (OUTPATIENT)
Dept: UROLOGY | Facility: CLINIC | Age: 29
End: 2024-07-30
Payer: COMMERCIAL

## 2024-07-30 NOTE — TELEPHONE ENCOUNTER
Reason for visit: follow-up     Relevant information: right flank pain/ kidney stone    Records/imaging/labs/orders: all records available    Pt called: No need for a call    At Rooming: standard procedure    Neli Atkinson  7/30/2024  10:57 AM

## 2024-08-06 ENCOUNTER — LAB (OUTPATIENT)
Dept: LAB | Facility: CLINIC | Age: 29
End: 2024-08-06
Payer: COMMERCIAL

## 2024-08-06 ENCOUNTER — ANCILLARY PROCEDURE (OUTPATIENT)
Dept: ULTRASOUND IMAGING | Facility: CLINIC | Age: 29
End: 2024-08-06
Attending: NURSE PRACTITIONER
Payer: COMMERCIAL

## 2024-08-06 DIAGNOSIS — Z51.81 MEDICATION MONITORING ENCOUNTER: ICD-10-CM

## 2024-08-06 DIAGNOSIS — R79.89 ELEVATED SERUM CREATININE: ICD-10-CM

## 2024-08-06 DIAGNOSIS — N20.0 KIDNEY STONE: ICD-10-CM

## 2024-08-06 LAB
ALBUMIN MFR UR ELPH: <6 MG/DL
ANION GAP SERPL CALCULATED.3IONS-SCNC: 12 MMOL/L (ref 7–15)
BUN SERPL-MCNC: 13.4 MG/DL (ref 6–20)
CALCIUM SERPL-MCNC: 9.9 MG/DL (ref 8.8–10.4)
CHLORIDE SERPL-SCNC: 101 MMOL/L (ref 98–107)
CREAT SERPL-MCNC: 1 MG/DL (ref 0.51–0.95)
CREAT UR-MCNC: 45.9 MG/DL
EGFRCR SERPLBLD CKD-EPI 2021: 78 ML/MIN/1.73M2
GLUCOSE SERPL-MCNC: 82 MG/DL (ref 70–99)
HCO3 SERPL-SCNC: 26 MMOL/L (ref 22–29)
POTASSIUM SERPL-SCNC: 4.3 MMOL/L (ref 3.4–5.3)
PROT/CREAT 24H UR: NORMAL MG/G{CREAT}
SODIUM SERPL-SCNC: 139 MMOL/L (ref 135–145)

## 2024-08-06 PROCEDURE — 76770 US EXAM ABDO BACK WALL COMP: CPT | Performed by: STUDENT IN AN ORGANIZED HEALTH CARE EDUCATION/TRAINING PROGRAM

## 2024-08-06 PROCEDURE — 36415 COLL VENOUS BLD VENIPUNCTURE: CPT

## 2024-08-06 PROCEDURE — 84156 ASSAY OF PROTEIN URINE: CPT

## 2024-08-06 PROCEDURE — 80048 BASIC METABOLIC PNL TOTAL CA: CPT

## 2024-08-09 NOTE — TELEPHONE ENCOUNTER
Pt rescheduled visit w/ Arianne Pitt GC to 10/4/24 @ 9am for virtual visit per request.  Confirmed date/ time/ provider/ mode of visit with patient.    Letha Elizondo  Pediatric Nephrology/ Neph Genetic Clinic  Sr. Patient Coordinator/ Sr. Complex Referral Specialist  [facility]/ [facility]

## 2024-08-12 NOTE — PROGRESS NOTES
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 Gonzalez for stone prevention. Updated CHRISTY showing no stones; CT from back in February also showed none. Continues to void frequently with some double-voiding, but she is not very bothered by this. Will continue to monitor.   -As she tends to have stone episodes in the late-Summer, will plan to see her back in June, with a CHRISTY completed beforehand.     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 Koehler last year. Has followed with Dr. Gonzalez for stone prevention and is being treated with captopril and potassium citrate 20 mEq BID. The captopril dose was adjusted a few months ago. Has not tolerated thiola. Stone burden monitored with CHRISTY as her stones are not radiopaque on x-ray and she has preferred to avoid CT scans if able.      In September, she reported some right flank pain. CT pursued that showed no urinary tract stones.     CHRISTY obtained last week, showing no stones. She has been doing well. Denies any symptoms today. Continues to have urinary frequency and some double-voiding, but this is not very bothersome to her. She stays well-hydrated.     Will see Dr. Gonzalez in October.    Objective:     Exam:   Patient is a 29 year old female   No vital signs obtained due to virtual visit.  General Appearance: Well groomed, hygenic  Respiratory: No cough, no respiratory distress or labored breathing  Musculoskeletal: Grossly normal with no gross deficits  Skin: No discoloration or apparent rashes  Neurologic: No tremors  Psychiatric: Alert and oriented  Further examination is deferred due to the nature of our visit.

## 2024-08-13 ENCOUNTER — VIRTUAL VISIT (OUTPATIENT)
Dept: UROLOGY | Facility: CLINIC | Age: 29
End: 2024-08-13
Payer: COMMERCIAL

## 2024-08-13 DIAGNOSIS — N20.0 KIDNEY STONE: Primary | ICD-10-CM

## 2024-08-13 PROCEDURE — 99213 OFFICE O/P EST LOW 20 MIN: CPT | Mod: 95 | Performed by: NURSE PRACTITIONER

## 2024-08-13 NOTE — LETTER
8/13/2024       RE: Shelly Cerda  [address]   [place] [address]     Dear Colleague,    Thank you for referring your patient, Shelly Cerda, to the [facility] [place] at [facility]. Please see a copy of my visit note below.    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 Gonzalez for stone prevention. Updated CHRISTY showing no stones; CT from back in February also showed none. Continues to void frequently with some double-voiding, but she is not very bothered by this. Will continue to monitor.   -As she tends to have stone episodes in the late-Summer, will plan to see her back in June, with a CHRISTY completed beforehand.     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 Koehler last year. Has followed with Dr. Gonzalez for stone prevention and is being treated with captopril and potassium citrate 20 mEq BID. The captopril dose was adjusted a few months ago. Has not tolerated thiola. Stone burden monitored with CHRISTY as her stones are not radiopaque on x-ray and she has preferred to avoid CT scans if able.      In September, she reported some right flank pain. CT pursued that showed no urinary tract stones.     CHRISTY obtained last week, showing no stones. She has been doing well. Denies any symptoms today. Continues to have urinary frequency and some double-voiding, but this is not very bothersome to her. She stays well-hydrated.     Will see Dr. Gonzalez in October.    Objective:     Exam:   Patient is a 29 year old female   No vital signs obtained due to virtual visit.  General Appearance: Well groomed, hygenic  Respiratory: No  cough, no respiratory distress or labored breathing  Musculoskeletal: Grossly normal with no gross deficits  Skin: No discoloration or apparent rashes  Neurologic: No tremors  Psychiatric: Alert and oriented  Further examination is deferred due to the nature of our visit.             Again, thank you for allowing me to participate in the care of your patient.      Sincerely,    Caren Gilbert, CNP

## 2024-08-13 NOTE — NURSING NOTE
Current patient location: [address] [place] [address]  [facility] [address]    Is the patient currently in the state of [place]? YES    Visit mode:VIDEO    If the visit is dropped, the patient can be reconnected by: VIDEO VISIT: Text to cell phone:   Telephone Information:   Mobile [phone]       Will anyone else be joining the visit? NO  (If patient encounters technical issues they should call [phone])    How would you like to obtain your AVS? MyChart    Are changes needed to the allergy or medication list? No, Pt stated no changes to allergies, and Pt stated no med changes    Are refills needed on medications prescribed by this physician? NO    Rooming Documentation:  Not applicable      Reason for visit: KENNY ELKINS

## 2024-08-13 NOTE — PATIENT INSTRUCTIONS
UROLOGY CLINIC VISIT PATIENT INSTRUCTIONS    Follow up with me in June with a renal ultrasound completed beforehand.     If you have any issues, questions or concerns in the meantime, do not hesitate to contact us at [phone] or via [employer].     Caren Gilbert, CNP  Department of Urology

## 2024-09-10 ENCOUNTER — TELEPHONE (OUTPATIENT)
Dept: UROLOGY | Facility: CLINIC | Age: 29
End: 2024-09-10
Payer: COMMERCIAL

## 2024-09-11 ENCOUNTER — TELEPHONE (OUTPATIENT)
Dept: UROLOGY | Facility: CLINIC | Age: 29
End: 2024-09-11
Payer: COMMERCIAL

## 2024-09-12 NOTE — TELEPHONE ENCOUNTER
[facility] Call Center    Phone Message    May a detailed message be left on voicemail: yes     Reason for Call: Other: Pt can only do 1130, writer went to go reschedule and see there is something already booked. Unsure if I can double garcia, but it is the only time that works. Please call pt back to discuss      Action Taken: Other: neph    Travel Screening: Not Applicable     Date of Service:

## 2024-09-21 ENCOUNTER — TRANSFERRED RECORDS (OUTPATIENT)
Dept: HEALTH INFORMATION MANAGEMENT | Facility: CLINIC | Age: 29
End: 2024-09-21
Payer: COMMERCIAL

## 2024-10-04 ENCOUNTER — VIRTUAL VISIT (OUTPATIENT)
Dept: NEPHROLOGY | Facility: CLINIC | Age: 29
End: 2024-10-04
Attending: GENETIC COUNSELOR, MS
Payer: COMMERCIAL

## 2024-10-04 ENCOUNTER — PRE VISIT (OUTPATIENT)
Dept: UROLOGY | Facility: CLINIC | Age: 29
End: 2024-10-04
Payer: COMMERCIAL

## 2024-10-04 DIAGNOSIS — Z71.83 ENCOUNTER FOR NONPROCREATIVE GENETIC COUNSELING AND TESTING: ICD-10-CM

## 2024-10-04 DIAGNOSIS — N20.0 RECURRENT KIDNEY STONES: ICD-10-CM

## 2024-10-04 DIAGNOSIS — Z13.71 ENCOUNTER FOR NONPROCREATIVE GENETIC COUNSELING AND TESTING: ICD-10-CM

## 2024-10-04 DIAGNOSIS — E72.01 CYSTINURIA (H): Primary | ICD-10-CM

## 2024-10-04 DIAGNOSIS — Z82.49 FAMILY HISTORY OF HEREDITARY HEMORRHAGIC TELANGIECTASIA (HHT): ICD-10-CM

## 2024-10-04 PROCEDURE — 999N000069 HC STATISTIC GENETIC COUNSELING, < 16 MIN: Mod: GT,95

## 2024-10-04 PROCEDURE — 96040 HC GENETIC COUNSELING, EACH 30 MINUTES: CPT | Mod: GT,95

## 2024-10-04 NOTE — PROGRESS NOTES
"GENETIC COUNSELING CONSULTATION     Name:  Shelly Cerda \"Shelly\"  :   1995  MRN:   [id number]  Date of service: Oct 4, 2024  Primary Provider: Jessica Lemon  Referring Provider: Alis Gonzalez    Presenting Information:  Shelly Cerda is a 29 year old female presenting to genetic counseling via video visit due to a personal and family history of cystinuria. She was unaccompanied to today's visit. I met with the family at the request of Dr. Alis Gonzalez to obtain a 3 generation family history, discuss genetic testing options and obtain informed consent.     HPI:  Please see Dr. Alis Gonzalez's referral for a detailed personal history.   Patient Active Problem List   Diagnosis    Cystinuria (H)    Nausea with vomiting    Ureterolithiasis    Acute nontraumatic kidney injury (H)    Acute abdominal pain in right flank    Constipation due to pain medication    Group B streptococcal infection    Ureteral stone    Urolithiasis    Postoperative pain    Right ureteral stone    Sebaceous cyst    Vertigo    Annual physical exam    Gastroesophageal reflux disease without esophagitis    Morbid obesity (H)    Recurrent kidney stones    Night sweats    Chronic bilateral low back pain without sciatica    Class 2 obesity due to excess calories without serious comorbidity with body mass index (BMI) of 38.0 to 38.9 in adult      Past Medical History:   Diagnosis Date    Complication of anesthesia     Cystinuria (H)     Kidney stones     PONV (postoperative nausea and vomiting)      Previous Genetic Testing  None    Family History:   A three generation pedigree was obtained today by patient report and scanned into the EMR. The following information is significant:    Children:  Shelly is considering having children in the next ~5 or so years.  Siblings:  Shelly has 4 full siblings:  A sister who also has cystinuria. She has not had genetic testing. She has 2 children (9 months and 2.5 years who are well and " "have not been tested either).  Two sisters who are well and do not have children  A brother who has a history of frequent nosebleeds. He does not have children.  Maternal Relatives:  Mother 62 years old and has a history of a single calcium kidney stone and osteoporosis. She has not had testing for HHT (Hereditary Hemorrhagic Telangiectasia) or symptoms concerning for this.  She has two sisters and a brother:  One sister has HHT and has a daughter with HHT. Shelly believes her cousin has had genetic testing and will request a copy of this.  The other sister and brother are well  Grandmother is alive and well.  Grandfather passed away in his 70s - Shelly believes this was from complications related to his HHT.  Paternal Relatives:  Father has had a single calcium kidney stone.  He has two full brothers.  One brother passed away at 54 years old from a \"carcinoid\" cancer, possibly originating in the stomach or throat? He had two daughters who are well.  Another brother who is well  Grandmother passed away from dementia.  Grandfather passed away from congestive heart failure.      Ancestry deferred. Consanguinity denied.     The remainder of the family history was negative for kidney stones, kidney disease, genetic conditions, genetic testing, nosebleeds, GI bleeds, cancers, HHT, telangiectasias.     Please see scanned in pedigree under the media tab.     Discussion:    We discussed the option of genetic testing in light of Shelly's personal and family history of cystinuria and family history of hereditary hemorrhagic telangiectasia (HHT).    Genes are the instructions we are born with that tell our bodies how to grow and develop. Genes are made up of the molecule DNA and are organized into pairs of structures called chromosomes. Each chromosome pair consists of one from our mother and one from our father. Humans typically have 23 pairs of chromosomes, the first 22 of which are the same for all sexes. The last pair " "determine a person's biological sex. Biological females typically have two \"X\" chromosomes while biological males typically have one \"X\" and one \"Y\" chromosome. Each chromosome contains many different genes and can be thought of like books that contain many different recipes.     Sometimes a gene may have a change which changes how the body uses those instructions. A gene change is also referred to as a \"mutation\" or \"variant\". We all have many genetic changes which do not impact our health. However, some gene changes prevent the body from working properly and cause health differences.    Per [employer] [facility]:  Cystinuria is a condition characterized by the buildup of the amino acid cystine, a building block of most proteins, in the kidneys and bladder. As the kidneys filter blood to create urine, cystine is normally absorbed back into the bloodstream. People with cystinuria cannot properly reabsorb cystine into their bloodstream, so the amino acid accumulates in their urine.    As urine becomes more concentrated in the kidneys, the excess cystine forms crystals. Larger crystals become stones that may lodge in the kidneys or in the bladder. Sometimes cystine crystals combine with calcium molecules in the kidneys to form large stones. These crystals and stones can create blockages in the urinary tract and reduce the ability of the kidneys to eliminate waste through urine. The stones also provide sites where bacteria may cause infections. Cystinuria affects approximately 1 in 10,000 people.     Mutations in the SLC3A1 or SLC7A9 gene cause cystinuria. The SLC3A1 and SLC7A9 genes provide instructions for making the two parts (subunits) of a protein complex that is primarily found in the kidneys. Normally this protein complex controls the reabsorption of certain amino acids, including cystine, into the blood from the filtered fluid that will become urine. Mutations in either the SLC3A1 gene or SLC7A9 gene disrupt " the ability of the protein complex to reabsorb amino acids, which causes the amino acids to become concentrated in the urine. As the levels of cystine in the urine increase, the crystals typical of cystinuria form. The other amino acids that are reabsorbed by the protein complex do not create crystals when they accumulate in the urine.    This condition is inherited in an autosomal recessive pattern, which means both copies of the gene in each cell have mutations. The parents of an individual with an autosomal recessive condition each carry one copy of the mutated gene, but they typically do not show signs and symptoms of the condition.    We discussed that based on Shelly's clinical diagnosis of cystinuria (and her sister's clinical cystinuria diagnosis) we anticipate Shelly will have 2 variants in one of these genes. We talked about how, if this is true, all future children would be at least carriers. Their risk for having cystinuria would depend on if Shelly's  is a carrier for that type of cystinuria. We can discuss testing him after we have Shelly's results.    Shelly also shared her family history of hereditary hemorrhagic telangiectasia (HHT). We reviewed that hereditary hemorrhagic telangiectasia (HHT) is a rare genetic disorder. It is estimated that at least 1 in 10,000 individuals has HHT in [place]. HHT is a disorder that results in the development of multiple abnormalities in the blood vessels.     For background, blood carrying oxygen from the lungs is normally pumped by the heart into the arteries at high pressure. The pressure allows the blood to make its way through the arteries to the smaller vessels (arterioles and capillaries) that supply oxygen to the body's tissues. By the time blood reaches the capillaries, the pressure is much lower. The blood then proceeds from the capillaries into veins, through which it eventually returns to the heart. In HHT, some arterial vessels flow  directly into veins rather than into the capillaries. These abnormalities are called arteriovenous malformations. When they occur in vessels near the surface of the skin, where they are visible as red markings, they are known as telangiectases (the singular is telangiectasia).    Without the normal buffer of the capillaries, the blood moves from the arteries at high pressure into the thinner walled, less elastic veins. The extra pressure tends to strain and enlarge these blood vessels, and may result in compression or irritation of adjacent tissues and frequent episodes of severe bleeding (hemorrhage). Nosebleeds are very common in people with hereditary hemorrhagic telangiectasia, and more serious problems may arise from hemorrhages in the brain, liver, lungs, or other organs.    HHT is a dominant genetic condition. This means that one copy of gene with a genetic change of variant is sufficient to cause disease.There are several genes associated with HHT including ACVRL1, ENG, and SMAD4. ACVRL1 is the most commonly associated gene with HHT and hepatic AVMs are believed to be more common with this form of HHT. Individuals with a disease-causing variant in the SMAD4 gene are also at risk for the symptoms of Juvenile Polyposis Syndrome (JPS), including gastrointestinal polyps and colorectal cancer.    If a person with HHT has biological children, there is a 1/2 or 50% chance to pass this condition down to their offspring. There is a spectrum of reproductive options is available to families with HHT ranging from IVF to testing a child after birth. Shelly will request the genetic testing report from her cousin with HHT to determine which gene (and ideally which variant) runs in her family so we can target testing appropriately.     We discussed genetic testing for cystinuria (and possibly HHT once Shelly has the report from her cousin).    We reviewed the three possible results from these genetic tests:  A Positive  result would mean that we found a change in one of the genes that we believe is causing Eddies symptoms.   A Negative result would mean that we did not find any changes in the genes we looked at that are known to cause a genetic condition.  A Variant of Uncertain Significance (VUS) means that we found a change in one of Eddies genes, but we are not sure if it causes health issues or if it is just part of the normal variation between individuals. Sometimes the lab requests parental samples in these instances, if that will help them better understand the gene change. A VUS may be reclassified into a positive or negative result in the future, as we learn more about different genes.    We also talked about how there are limitations to genetic testing, namely that it is limited by our current knowledge regarding genetic conditions.    Medical Necessity  It is medically necessary to determine if there is an underlying genetic cause for Eddies cystinuria:  This can be important for her own health as some diagnoses may have specific treatment/management recommendation. It is also possible that an underlying cause may predispose Shelly to other health risks; knowing about these additional health risks can help us stay ahead of Shellys healthcare to maximize Eddies health.  Discovering an underlying reason may help predict the chance for other family members to have similar healthcare needs. Likewise, a genetic diagnosis can provide important reproductive information for Shelly and their relatives.  Having a specific confirmed diagnosis can often help individuals receive the services they need to help reach their full potential in school, work, and daily life.     It is medically necessary to determine if Shelly has inherited the familial variant which is causative of Hereditary Hemorrhagic Telangiectasia  Positive results of this genetic test could provide important information related to Eddies health and may  have implications for her medical management. These may include but are not limited to changes in recommended screening, imaging and/or appointments with different types of medical providers.   These test results may also provide information that will allow Shelly's current doctors to treat her more effectively.   This information additionally has implications for any children Shelly may have in the future.        Plan  1. We will submit for prior authorization for the cystinuria genetic testing at the [facility].   2. Shelly will request the genetic testing report from her cousin with HHT and send that via [employer] message to us and, if needed, we will adjust the prior authorization plan.  3. We anticipate the prior authorization returning in 2-3 weeks and will contact Shelly at that time. She can then decide if she wanted to proceed and schedule a blood draw. Results take 4-6 weeks once testing begins; we will call when available.  4. Pending cystinuria results, we can discuss carrier testing for Shelly's   5. Additional recommendations per Dr. Alis Gonzalez     Please do not hesitate to reach out with any questions or concerns. It was a pleasure to participate in Shelly's care today.       Arianne Pitt MS, [facility]  Genetic Counseling  [facility]  Email: [email or web address]  Phone: [phone]  Fax: [phone]    Approximate Time Spent in Consultation: 38 min    Virtual Visit Details  Type of service:  Video Visit   Originating Location (pt. Location): Home  Distant Location (provider location):  On-site  Platform used for Video Visit: Gerda

## 2024-10-04 NOTE — TELEPHONE ENCOUNTER
Reason for visit: 6 month follow up     Relevant information: hx cystine stones, requestioned repeat 24 hour urine in sept    Records/imaging/labs/orders: cystine panel done 09/30, in media tab    Pt called: No need for a call    At Rooming: ernesto Magaña RN  10/4/2024  10:14 AM

## 2024-10-04 NOTE — LETTER
"10/4/2024      RE: Shelly Cerda  [address] [id number]     Dear Colleague,    Thank you for the opportunity to participate in the care of your patient, Shelly Cerda, at the [facility] at [facility]. Please see a copy of my visit note below.    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 She was unaccompanied to today's visit. I met with the family at the request of Dr. Alis Gonzalez to obtain a 3 generation family history, discuss genetic testing options and obtain informed consent.     HPI:  Please see Dr. Alis Gonzalez's referral for a detailed personal history.   Patient Active Problem List   Diagnosis     Cystinuria (H)     Nausea with vomiting     Ureterolithiasis     Acute nontraumatic kidney injury (H)     Acute abdominal pain in right flank     Constipation due to pain medication     Group B streptococcal infection     Ureteral stone     Urolithiasis     Postoperative pain     Right ureteral stone     Sebaceous cyst     Vertigo     Annual physical exam     Gastroesophageal reflux disease without esophagitis     Morbid obesity (H)     Recurrent kidney stones     Night sweats     Chronic bilateral low back pain without sciatica     Class 2 obesity due to excess calories without serious comorbidity with body mass index (BMI) of 38.0 to 38.9 in adult      Past Medical History:   Diagnosis Date     Complication of anesthesia      Cystinuria (H)      Kidney stones      PONV (postoperative nausea and vomiting)      Previous Genetic Testing  None    Family History: " "  A three generation pedigree was obtained today by patient report and scanned into the EMR. The following information is significant:    Children:  Shelly is considering having children in the next ~5 or so years.  Siblings:  Shelly has 4 full siblings:  A sister who also has cystinuria. She has not had genetic testing. She has 2 children (9 months and 2.5 years who are well and have not been tested either).  Two sisters who are well and do not have children  A brother who has a history of frequent nosebleeds. He does not have children.  Maternal Relatives:  Mother 62 years old and has a history of a single calcium kidney stone and osteoporosis. She has not had testing for HHT (Hereditary Hemorrhagic Telangiectasia) or symptoms concerning for this.  She has two sisters and a brother:  One sister has HHT and has a daughter with HHT. Shelly believes her cousin has had genetic testing and will request a copy of this.  The other sister and brother are well  Grandmother is alive and well.  Grandfather passed away in his 70s - Shelly believes this was from complications related to his HHT.  Paternal Relatives:  Father has had a single calcium kidney stone.  He has two full brothers.  One brother passed away at 54 years old from a \"carcinoid\" cancer, possibly originating in the stomach or throat? He had two daughters who are well.  Another brother who is well  Grandmother passed away from dementia.  Grandfather passed away from congestive heart failure.      Ancestry deferred. Consanguinity denied.     The remainder of the family history was negative for kidney stones, kidney disease, genetic conditions, genetic testing, nosebleeds, GI bleeds, cancers, HHT, telangiectasias.     Please see scanned in pedigree under the media tab.     Discussion:    We discussed the option of genetic testing in light of Shelly's personal and family history of cystinuria and family history of hereditary hemorrhagic telangiectasia " "(T).    Genes are the instructions we are born with that tell our bodies how to grow and develop. Genes are made up of the molecule DNA and are organized into pairs of structures called chromosomes. Each chromosome pair consists of one from our mother and one from our father. Humans typically have 23 pairs of chromosomes, the first 22 of which are the same for all sexes. The last pair determine a person's biological sex. Biological females typically have two \"X\" chromosomes while biological males typically have one \"X\" and one \"Y\" chromosome. Each chromosome contains many different genes and can be thought of like books that contain many different recipes.     Sometimes a gene may have a change which changes how the body uses those instructions. A gene change is also referred to as a \"mutation\" or \"variant\". We all have many genetic changes which do not impact our health. However, some gene changes prevent the body from working properly and cause health differences.    Per [employer] [facility]:  Cystinuria is a condition characterized by the buildup of the amino acid cystine, a building block of most proteins, in the kidneys and bladder. As the kidneys filter blood to create urine, cystine is normally absorbed back into the bloodstream. People with cystinuria cannot properly reabsorb cystine into their bloodstream, so the amino acid accumulates in their urine.    As urine becomes more concentrated in the kidneys, the excess cystine forms crystals. Larger crystals become stones that may lodge in the kidneys or in the bladder. Sometimes cystine crystals combine with calcium molecules in the kidneys to form large stones. These crystals and stones can create blockages in the urinary tract and reduce the ability of the kidneys to eliminate waste through urine. The stones also provide sites where bacteria may cause infections. Cystinuria affects approximately 1 in 10,000 people.     Mutations in the SLC3A1 or SLC7A9 " gene cause cystinuria. The SLC3A1 and SLC7A9 genes provide instructions for making the two parts (subunits) of a protein complex that is primarily found in the kidneys. Normally this protein complex controls the reabsorption of certain amino acids, including cystine, into the blood from the filtered fluid that will become urine. Mutations in either the SLC3A1 gene or SLC7A9 gene disrupt the ability of the protein complex to reabsorb amino acids, which causes the amino acids to become concentrated in the urine. As the levels of cystine in the urine increase, the crystals typical of cystinuria form. The other amino acids that are reabsorbed by the protein complex do not create crystals when they accumulate in the urine.    This condition is inherited in an autosomal recessive pattern, which means both copies of the gene in each cell have mutations. The parents of an individual with an autosomal recessive condition each carry one copy of the mutated gene, but they typically do not show signs and symptoms of the condition.    We discussed that based on Shelly's clinical diagnosis of cystinuria (and her sister's clinical cystinuria diagnosis) we anticipate Shelly will have 2 variants in one of these genes. We talked about how, if this is true, all future children would be at least carriers. Their risk for having cystinuria would depend on if Shelly's  is a carrier for that type of cystinuria. We can discuss testing him after we have Shelly's results.    Shelly also shared her family history of hereditary hemorrhagic telangiectasia (HHT). We reviewed that hereditary hemorrhagic telangiectasia (HHT) is a rare genetic disorder. It is estimated that at least 1 in 10,000 individuals has HHT in [place]. HHT is a disorder that results in the development of multiple abnormalities in the blood vessels.     For background, blood carrying oxygen from the lungs is normally pumped by the heart into the arteries at high  pressure. The pressure allows the blood to make its way through the arteries to the smaller vessels (arterioles and capillaries) that supply oxygen to the body's tissues. By the time blood reaches the capillaries, the pressure is much lower. The blood then proceeds from the capillaries into veins, through which it eventually returns to the heart. In HHT, some arterial vessels flow directly into veins rather than into the capillaries. These abnormalities are called arteriovenous malformations. When they occur in vessels near the surface of the skin, where they are visible as red markings, they are known as telangiectases (the singular is telangiectasia).    Without the normal buffer of the capillaries, the blood moves from the arteries at high pressure into the thinner walled, less elastic veins. The extra pressure tends to strain and enlarge these blood vessels, and may result in compression or irritation of adjacent tissues and frequent episodes of severe bleeding (hemorrhage). Nosebleeds are very common in people with hereditary hemorrhagic telangiectasia, and more serious problems may arise from hemorrhages in the brain, liver, lungs, or other organs.    HHT is a dominant genetic condition. This means that one copy of gene with a genetic change of variant is sufficient to cause disease.There are several genes associated with HHT including ACVRL1, ENG, and SMAD4. ACVRL1 is the most commonly associated gene with HHT and hepatic AVMs are believed to be more common with this form of HHT. Individuals with a disease-causing variant in the SMAD4 gene are also at risk for the symptoms of Juvenile Polyposis Syndrome (JPS), including gastrointestinal polyps and colorectal cancer.    If a person with HHT has biological children, there is a 1/2 or 50% chance to pass this condition down to their offspring. There is a spectrum of reproductive options is available to families with HHT ranging from IVF to testing a child after  birth. Shelly will request the genetic testing report from her cousin with HHT to determine which gene (and ideally which variant) runs in her family so we can target testing appropriately.     We discussed genetic testing for cystinuria (and possibly HHT once Shelly has the report from her cousin).    We reviewed the three possible results from these genetic tests:  A Positive result would mean that we found a change in one of the genes that we believe is causing Shelly's symptoms.   A Negative result would mean that we did not find any changes in the genes we looked at that are known to cause a genetic condition.  A Variant of Uncertain Significance (VUS) means that we found a change in one of Eddies genes, but we are not sure if it causes health issues or if it is just part of the normal variation between individuals. Sometimes the lab requests parental samples in these instances, if that will help them better understand the gene change. A VUS may be reclassified into a positive or negative result in the future, as we learn more about different genes.    We also talked about how there are limitations to genetic testing, namely that it is limited by our current knowledge regarding genetic conditions.    Medical Necessity  It is medically necessary to determine if there is an underlying genetic cause for Eddies cystinuria:  This can be important for her own health as some diagnoses may have specific treatment/management recommendation. It is also possible that an underlying cause may predispose Shelly to other health risks; knowing about these additional health risks can help us stay ahead of Shelly's healthcare to maximize Shelly's health.  Discovering an underlying reason may help predict the chance for other family members to have similar healthcare needs. Likewise, a genetic diagnosis can provide important reproductive information for Shelly and their relatives.  Having a specific confirmed diagnosis can  often help individuals receive the services they need to help reach their full potential in school, work, and daily life.     It is medically necessary to determine if Shelly has inherited the familial variant which is causative of Hereditary Hemorrhagic Telangiectasia  Positive results of this genetic test could provide important information related to Shelly's health and may have implications for her medical management. These may include but are not limited to changes in recommended screening, imaging and/or appointments with different types of medical providers.   These test results may also provide information that will allow Shelly's current doctors to treat her more effectively.   This information additionally has implications for any children Shelly may have in the future.        Plan  1. We will submit for prior authorization for the cystinuria genetic testing at the [facility].   2. Shelly will request the genetic testing report from her cousin with HHT and send that via [employer] message to us and, if needed, we will adjust the prior authorization plan.  3. We anticipate the prior authorization returning in 2-3 weeks and will contact Shelly at that time. She can then decide if she wanted to proceed and schedule a blood draw. Results take 4-6 weeks once testing begins; we will call when available.  4. Pending cystinuria results, we can discuss carrier testing for Shelly's   5. Additional recommendations per Dr. Alis Gonzalez     Please do not hesitate to reach out with any questions or concerns. It was a pleasure to participate in Shelly's care today.       Arianne Pitt MS, [facility]  Genetic Counseling  [facility]  Email: [email or web address]  Phone: [phone]  Fax: [phone]    Approximate Time Spent in Consultation: 38 min    Virtual Visit Details  Type of service:  Video Visit   Originating Location  (pt. Location): Home  Distant Location (provider location):  On-site  Platform used for Video Visit: Gerda    ADDENDUM:    Shelly emailed me after our appointment to let me know that her mother is in the process of having the HHT testing and that the familial gene is ENG. We will await Shelly's mother's results before determining whether we need to pursue this testing for Shelly.    Arianne Pitt MS, [facility]  Genetic Counseling  [facility]  Email: [email or web address]  Phone: [phone]  Fax: [phone]      Please do not hesitate to contact me if you have any questions/concerns.     Sincerely,       Arianne Pitt, GC

## 2024-10-04 NOTE — PATIENT INSTRUCTIONS
Plan  1. We will submit for prior authorization for the cystinuria genetic testing at the [facility].   2. Shelly will request the genetic testing report from her cousin with HHT and send that via [employer] message to us and, if needed, we will adjust the prior authorization plan.  3. We anticipate the prior authorization returning in 2-3 weeks and will contact Shelly at that time. She can then decide if she wanted to proceed and schedule a blood draw. Results take 4-6 weeks once testing begins; we will call when available.  4. Pending cystinuria results, we can discuss carrier testing for Shelly's   5. Additional recommendations per Dr. Alis Gonzalez     Please do not hesitate to reach out with any questions or concerns. It was a pleasure to participate in Shelly's care today.       Arianne Pitt MS, [facility]  Genetic Counseling  [facility]  Email: [email or web address]  Phone: [phone]  Fax: [phone]

## 2024-10-04 NOTE — NURSING NOTE
Shelly Cerda complains of    Chief Complaint   Patient presents with    Consult     Cystinuria       Patient would like the video invitation sent by: Other e-mail: [email or web address]      Patient is located in [place]? Yes     I have reviewed and updated the patient's medication list, allergies and preferred pharmacy.      Ivanna Nava, [facility]

## 2024-10-04 NOTE — PROGRESS NOTES
ADDENDUM:    Shelly emailed me after our appointment to let me know that her mother is in the process of having the HHT testing and that the familial gene is ENG. We will await Shelly's mother's results before determining whether we need to pursue this testing for Shelly.    Arianne Pitt MS, [facility]  Genetic Counseling  [facility]  Email: [email or web address]  Phone: [phone]  Fax: [phone]

## 2024-10-22 ENCOUNTER — VIRTUAL VISIT (OUTPATIENT)
Dept: NEPHROLOGY | Facility: CLINIC | Age: 29
End: 2024-10-22
Payer: COMMERCIAL

## 2024-10-22 VITALS — WEIGHT: 220 LBS | BODY MASS INDEX: 38.98 KG/M2 | HEIGHT: 63 IN

## 2024-10-22 DIAGNOSIS — E72.01 CYSTINURIA (H): ICD-10-CM

## 2024-10-22 PROCEDURE — G2211 COMPLEX E/M VISIT ADD ON: HCPCS | Performed by: INTERNAL MEDICINE

## 2024-10-22 PROCEDURE — 99214 OFFICE O/P EST MOD 30 MIN: CPT | Mod: 95 | Performed by: INTERNAL MEDICINE

## 2024-10-22 RX ORDER — CAPTOPRIL 100 MG/1
100 TABLET ORAL 2 TIMES DAILY
Qty: 180 TABLET | Refills: 3 | Status: SHIPPED | OUTPATIENT
Start: 2024-10-22

## 2024-10-22 ASSESSMENT — PAIN SCALES - GENERAL: PAINLEVEL: NO PAIN (0)

## 2024-10-22 NOTE — NURSING NOTE
Current patient location: [address] [place] [id number]    Is the patient currently in the state of [place]? YES    Visit mode:VIDEO    If the visit is dropped, the patient can be reconnected by: VIDEO VISIT: Send to e-mail at: [email or web address]    Will anyone else be joining the visit? NO  (If patient encounters technical issues they should call [phone]    Are changes needed to the allergy or medication list? No    Are refills needed on medications prescribed by this physician? NO    Rooming Documentation:  Questionnaire(s) completed    Reason for visit: KENNY ELKINS

## 2024-10-22 NOTE — PATIENT INSTRUCTIONS
No change in medications    Cystine litholink Sept 2025    Discussed home BP monitoring, check in the morning after going to the bathroom and before eating/drinking.  https://[email or web address].org/en/health-topics/high-blood-pressure/understanding-blood-pressure-readings/monitoring-your-blood-pressure-[email or web address]-home    Let me know if you are planning pregnancy, need to stop captopril as it can cause birth defect in baby.    Return in about 1 year (around 10/22/2025).

## 2024-10-22 NOTE — PROGRESS NOTES
[facility]  10/22/2024     Shelly Cerda MRN:[id number] YOB: 1995  Primary care provider: [facility]  Requesting physician: Dr. Jayy Koehler    ASSESSMENT AND RECOMMENDATIONS:   Shelly Cerda is a 29 year old presenting for nephrolithiasis.  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   - continue captopril to 100 mg twice a day - is supposed to be taken 1 hour before meal  - Continue potassium citrate 20 meq twice daily - take 1 hour before meal with captopril - warned on possible GI SE with taking potassium citrate on empty stomach.    Would not recommend penacillamine due to significant side effect with thiola    # medication monitoring (potassium citrate and captopril)  Intermittent creatinine elevation  -BMP and urine protein 8/6/2024 shows normal electrolytes and no proteinuria.  Creatinine is within baseline at 1 mg/dL, likely hemodynamic related to ACE inhibitor.  - elevated BP - discussed home BP monitoring, take BP in morning, after going to bathroom and before eating/drinking    GERD - omeprazole 20 mg -  take 30 minutes before breakfast.  Taking PRN and it is helping    Family planning - on oral contraceptive. Interested in children in the next 5 years.  Has met with genetic counselor. Thinking about trying in about 2 years.  Discussed that cannot take captopril while pregnant. Stop captopril when trying to become pregnant.    Cystine litholink Sept 2025  Return in about 1 year (around 10/22/2025).     31 minutes spent on visit, meeting with Shelly Cerda and in  chart review and documentation on date of encounter, 10/22/24    The longitudinal plan of care for the diagnosis(es)/condition(s) as documented were addressed during this visit. Due to the added complexity in care, I will continue to support Shelly in the subsequent management and with ongoing continuity of care.    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  Managed with high fluid, potassium citrate 20 meq bid, she had allergic reaction with thiola, hives, chills, fever, swollen lymph nodes and throat was swollen.      Stone surgical history  ~ 9 surgeries  Right stone surgery 2012, Left sided stone surgery October 2017, right PCNL 3/2018 - was stone free after that procedure then presented September 2019 with right obstructing kidney stone and had URS and lithotripsy for that stone.  She was again stone free after that operation.  May have passed stone April 2021.      Stone procedures/operations: 10/12/2022 right ureteroscopy and laser lithotripsy  Imaging:   Renal ultrasound 8/6/2024: No kidney stones  CT scan 2/23/2024 no kidney stones  CT scan 9/5/23 - no kidney stones  Stone burden: stone free based on CT 2/2024    Stone prevention:  Captopril 100 mg bid (could not remember to take when dosed tid)  Potassium citrate 20 meq bid  High fluid, decreased meat    Last visit 4/16/2024.  Reviewed that she continues to take captopril and potassium citrate for kidney stone prevention.  She met with a genetic counselor and has appt to get blood draw for testing.  Fluid intake is still a challenge related to work, does home visits and has little option for bathroom.  With  twisting motions sometimes gets a pinch feeling on right flank.  No hematuria, no dysuria.  Did have some episodes of light headedness and heart racing. -140/80-90.                    PAST MEDICAL HISTORY:  Reviewed  Past Medical History:   Diagnosis Date    Complication of anesthesia     Cystinuria (H)     Kidney stones     PONV (postoperative nausea and vomiting)        PSH:  Reviewed  Past Surgical History:   Procedure Laterality Date    APPENDECTOMY  9/2011    COMBINED CYSTOSCOPY, RETROGRADES, EXCHANGE STENT URETER(S) Right 9/15/2019    Procedure: CYSTOSCOPY, WITH RIGHT RETROGRADE PYELOGRAM AND RIGHT URETERAL STENT PLACEMENT;  Surgeon: Kasi Wade MD;  [facility]    COMBINED CYSTOSCOPY, RETROGRADES, URETEROSCOPY, INSERT STENT Left 10/12/2017    Procedure: COMBINED CYSTOSCOPY, RETROGRADES, URETEROSCOPY, INSERT STENT;  COMBINED CYSTOSCOPY, RETROGRADES, URETEROSCOPY, LASER HOLMIUM STANDBY,  INSERT STENT LEFT URETER;  Surgeon: Luis Banda MD;  [facility]: [facility]    COMBINED CYSTOSCOPY, RETROGRADES, URETEROSCOPY, LASER HOLMIUM LITHOTRIPSY URETER(S), INSERT STENT Right 10/12/2022    Procedure: CYSTOURETEROSCOPY, WITH RIGHT RETROGRADE PYELOGRAM, HOLMIUM LASER LITHOTRIPSY, STENT INSERTION;  Surgeon: Jayy Koehler MD;  Location: [facility] OR    CYSTOSCOPY      LASER HOLMIUM LITHOTRIPSY URETER(S), INSERT STENT, COMBINED  9/4/2012    Procedure: COMBINED CYSTOSCOPY, URETEROSCOPY, LASER HOLMIUM LITHOTRIPSY URETER(S), INSERT STENT;  Holmium Laser Lithotripsy.  Right Stent Placement;  Surgeon: Cathi Hernandez MD;  [facility]    LASER HOLMIUM LITHOTRIPSY URETER(S), INSERT STENT, COMBINED Right 9/20/2019    Procedure: Cystoscopy, Right Ureteroscopy, Laser Lithotripsy,  no ureteral stent placed.;  Surgeon: Jayy Koehler MD;  [facility]    PERCUTANEOUS NEPHROLITHOTOMY Right 3/8/2018    Procedure: PERCUTANEOUS NEPHROLITHOTOMY;  Right Percutaneous Nephrolithotomy;  Surgeon:  Jayy Koehler MD;  [facility]    Ureteral stent placement with subsequent removal.  2017        MEDICATIONS:  Current Outpatient Medications   Medication Sig Dispense Refill    azelastine (ASTEPRO) 0.15 % nasal spray       captopril (CAPOTEN) 100 MG tablet Take 1 tablet (100 mg) by mouth 2 times daily Take 75 mg (1.5 tabs) twice per day - take 1 hour before meal. 180 tablet 3    KURVELO 0.15-30 MG-MCG tablet Take 1 tablet by mouth daily 90 tablet 3    levocetirizine (XYZAL) 5 MG tablet Take 1 tablet (5 mg) by mouth daily 90 tablet 3    omeprazole (PRILOSEC) 20 MG DR capsule TAKE 1 CAPSULE BY MOUTH EVERY DAY 30 MINUTES BEFORE BREAKFAST 90 capsule 3    potassium citrate (UROCIT-K) 10 MEQ (1080 MG) CR tablet Take 2 tablets (20 mEq) by mouth 2 times daily 360 tablet 3    tamsulosin (FLOMAX) 0.4 MG capsule Take 1 capsule (0.4 mg) by mouth daily as needed (for kidney stone pain) 10 capsule 11        ALLERGIES:    Allergies   Allergen Reactions    Thiola [Tiopronin] Rash     Fevers, lymphadenopathy, swelling in throat area       REVIEW OF SYSTEMS:  A 10 point review of systems was negative except as noted above.    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   GENERAL APPEARANCE: alert and no distress  RESP: no conversational dyspnea  Psych: pleasant, normal mental status    LABS:   I reviewed:  Electrolytes/Renal -   Recent Labs   Lab Test 08/06/24  1142 02/16/24  1624 04/14/23  1523    140 140   POTASSIUM 4.3 3.7 4.0   CHLORIDE 101 104 103   CO2 26 25 22   BUN 13.4 11.8 11.4   CR 1.00* 1.07* 0.95   GLC 82 84 99   VALERINAO 9.9 9.7 10.0   PHOS  --   --  3.4       CBC -   Recent Labs   Lab Test 04/05/23  1613 08/18/20  1524 03/09/18  2300   WBC  8.6 6.7 12.1*   HGB 14.5 14.1 12.4    277 287       LFTs -   Recent Labs   Lab Test 04/14/23  1523 08/18/20  1524 12/19/17  1122 08/06/17  0502   ALKPHOS  --  57 52 61   BILITOTAL  --  0.4 0.6 0.3   ALT  --  21 19 31   AST  --  18 16 31   PROTTOTAL  --  7.9 7.9 7.7   ALBUMIN 4.5 4.0 4.2 4.2       Coags - No lab results found.    Iron Panel - No lab results found.    Endocrine -   Recent Labs   Lab Test 02/16/24  1624 04/05/23  1613   A1C 5.1  --    TSH  --  1.28       IMAGING:  See HPI    Virtual Visit Details    Type of service:  Video Visit   Joined the call at 10/22/2024, 11:24:01 am.  Left the call at 10/22/2024, 11:52:45 am.  Originating Location (pt. Location): Home  Distant Location (provider location):  Off-site  Platform used for Video Visit: Gerda Gonzalez MD

## 2024-10-22 NOTE — LETTER
10/22/2024       RE: Shelly Cerda  [address]  [place] [address]       Dear Colleague,    Thank you for referring your patient, Shelly Cerda, to the [facility] [place] at [facility]. Please see a copy of my visit note below.      [facility]  10/22/2024     Shelly Cerda MRN:[id number] YOB: 1995  Primary care provider: [facility]  Requesting physician: Dr. Jayy Koehler    ASSESSMENT AND RECOMMENDATIONS:   Shelly Cerda is a 29 year old presenting for nephrolithiasis.  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   - continue captopril to 100 mg twice a day - is supposed to be taken 1 hour before meal  - Continue potassium citrate 20 meq twice daily - take 1 hour before meal with captopril - warned on possible GI SE with taking potassium citrate on empty stomach.    Would not recommend penacillamine due to significant side effect with thiola    # medication monitoring (potassium citrate and captopril)  Intermittent creatinine elevation  -BMP and urine protein 8/6/2024 shows normal electrolytes and no proteinuria.  Creatinine is within baseline at 1 mg/dL, likely hemodynamic related to ACE inhibitor.  - elevated BP - discussed home BP monitoring, take BP in morning, after going to bathroom and before eating/drinking    GERD - omeprazole 20 mg -  take 30 minutes before breakfast.  Taking PRN and it is helping    Family planning - on oral contraceptive. Interested in children in the next 5  years.  Has met with genetic counselor. Thinking about trying in about 2 years.  Discussed that cannot take captopril while pregnant. Stop captopril when trying to become pregnant.    Cystine litholink Sept 2025  Return in about 1 year (around 10/22/2025).     31 minutes spent on visit, meeting with Shelly Cerda and in chart review and documentation on date of encounter, 10/22/24    The longitudinal plan of care for the diagnosis(es)/condition(s) as documented were addressed during this visit. Due to the added complexity in care, I will continue to support Shelly in the subsequent management and with ongoing continuity of care.    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  Managed with high fluid, potassium citrate 20 meq bid, she had allergic reaction with thiola, hives, chills, fever, swollen lymph nodes and throat was swollen.      Stone surgical history  ~ 9 surgeries  Right stone surgery 2012, Left sided stone surgery October 2017, right PCNL 3/2018 - was stone free after that procedure then presented September 2019 with right obstructing kidney stone and had URS and lithotripsy for that stone.  She was again stone free after that operation.  May have passed stone April 2021.      Stone procedures/operations: 10/12/2022 right ureteroscopy and laser lithotripsy  Imaging:   Renal ultrasound 8/6/2024: No kidney stones  CT scan 2/23/2024 no kidney stones  CT scan 9/5/23 - no kidney stones  Stone burden: stone free based on CT 2/2024    Stone prevention:  Captopril 100 mg bid (could not remember to take when dosed tid)  Potassium citrate 20 meq bid  High fluid,  decreased meat    Last visit 4/16/2024.  Reviewed that she continues to take captopril and potassium citrate for kidney stone prevention.  She met with a genetic counselor and has appt to get blood draw for testing.  Fluid intake is still a challenge related to work, does home visits and has little option for bathroom.  With twisting motions sometimes gets a pinch feeling on right flank.  No hematuria, no dysuria.  Did have some episodes of light headedness and heart racing. -140/80-90.                    PAST MEDICAL HISTORY:  Reviewed  Past Medical History:   Diagnosis Date    Complication of anesthesia     Cystinuria (H)     Kidney stones     PONV (postoperative nausea and vomiting)        PSH:  Reviewed  Past Surgical History:   Procedure Laterality Date    APPENDECTOMY  9/2011    COMBINED CYSTOSCOPY, RETROGRADES, EXCHANGE STENT URETER(S) Right 9/15/2019    Procedure: CYSTOSCOPY, WITH RIGHT RETROGRADE PYELOGRAM AND RIGHT URETERAL STENT PLACEMENT;  Surgeon: Kasi Wade MD;  [facility]    COMBINED CYSTOSCOPY, RETROGRADES, URETEROSCOPY, INSERT STENT Left 10/12/2017    Procedure: COMBINED CYSTOSCOPY, RETROGRADES, URETEROSCOPY, INSERT STENT;  COMBINED CYSTOSCOPY, RETROGRADES, URETEROSCOPY, LASER HOLMIUM STANDBY,  INSERT STENT LEFT URETER;  Surgeon: Luis Banda MD;  Location: [facility]    COMBINED CYSTOSCOPY, RETROGRADES, URETEROSCOPY, LASER HOLMIUM LITHOTRIPSY URETER(S), INSERT STENT Right 10/12/2022    Procedure: CYSTOURETEROSCOPY, WITH RIGHT RETROGRADE PYELOGRAM, HOLMIUM LASER LITHOTRIPSY, STENT INSERTION;  Surgeon: Jayy Koehler MD;  Location: [facility] OR    CYSTOSCOPY      LASER HOLMIUM LITHOTRIPSY URETER(S), INSERT STENT, COMBINED  9/4/2012    Procedure: COMBINED CYSTOSCOPY, URETEROSCOPY, LASER HOLMIUM LITHOTRIPSY URETER(S), INSERT STENT;  Holmium Laser Lithotripsy.  Right Stent Placement;  Surgeon: Cathi Hernandez MD;  [facility]    LASER HOLMIUM LITHOTRIPSY  URETER(S), INSERT STENT, COMBINED Right 9/20/2019    Procedure: Cystoscopy, Right Ureteroscopy, Laser Lithotripsy,  no ureteral stent placed.;  Surgeon: Jayy Koehler MD;  [facility]    PERCUTANEOUS NEPHROLITHOTOMY Right 3/8/2018    Procedure: PERCUTANEOUS NEPHROLITHOTOMY;  Right Percutaneous Nephrolithotomy;  Surgeon: Jayy Koehler MD;  [facility]    Ureteral stent placement with subsequent removal.  2017        MEDICATIONS:  Current Outpatient Medications   Medication Sig Dispense Refill    azelastine (ASTEPRO) 0.15 % nasal spray       captopril (CAPOTEN) 100 MG tablet Take 1 tablet (100 mg) by mouth 2 times daily Take 75 mg (1.5 tabs) twice per day - take 1 hour before meal. 180 tablet 3    KURVELO 0.15-30 MG-MCG tablet Take 1 tablet by mouth daily 90 tablet 3    levocetirizine (XYZAL) 5 MG tablet Take 1 tablet (5 mg) by mouth daily 90 tablet 3    omeprazole (PRILOSEC) 20 MG DR capsule TAKE 1 CAPSULE BY MOUTH EVERY DAY 30 MINUTES BEFORE BREAKFAST 90 capsule 3    potassium citrate (UROCIT-K) 10 MEQ (1080 MG) CR tablet Take 2 tablets (20 mEq) by mouth 2 times daily 360 tablet 3    tamsulosin (FLOMAX) 0.4 MG capsule Take 1 capsule (0.4 mg) by mouth daily as needed (for kidney stone pain) 10 capsule 11        ALLERGIES:    Allergies   Allergen Reactions    Thiola [Tiopronin] Rash     Fevers, lymphadenopathy, swelling in throat area       REVIEW OF SYSTEMS:  A 10 point review of systems was negative except as noted above.    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   GENERAL APPEARANCE: alert and no distress  RESP: no conversational dyspnea  Psych: pleasant, normal mental status    LABS:   I reviewed:  Electrolytes/Renal -    Recent Labs   Lab Test 08/06/24  1142 02/16/24  1624 04/14/23  1523    140 140   POTASSIUM 4.3 3.7 4.0   CHLORIDE 101 104 103   CO2 26 25 22   BUN 13.4 11.8 11.4   CR 1.00* 1.07* 0.95   GLC 82 84 99   VALERIANO 9.9 9.7 10.0   PHOS  --   --  3.4       CBC -   Recent Labs   Lab Test 04/05/23  1613 08/18/20  1524 03/09/18  2300   WBC 8.6 6.7 12.1*   HGB 14.5 14.1 12.4    277 287       LFTs -   Recent Labs   Lab Test 04/14/23  1523 08/18/20  1524 12/19/17  1122 08/06/17  0502   ALKPHOS  --  57 52 61   BILITOTAL  --  0.4 0.6 0.3   ALT  --  21 19 31   AST  --  18 16 31   PROTTOTAL  --  7.9 7.9 7.7   ALBUMIN 4.5 4.0 4.2 4.2       Coags - No lab results found.    Iron Panel - No lab results found.    Endocrine -   Recent Labs   Lab Test 02/16/24  1624 04/05/23  1613   A1C 5.1  --    TSH  --  1.28       IMAGING:  See HPI        Again, thank you for allowing me to participate in the care of your patient.      Sincerely,    Alis Gonzalez MD

## 2024-10-24 ENCOUNTER — LAB (OUTPATIENT)
Dept: LAB | Facility: CLINIC | Age: 29
End: 2024-10-24
Payer: COMMERCIAL

## 2024-10-24 DIAGNOSIS — Z71.83 ENCOUNTER FOR NONPROCREATIVE GENETIC COUNSELING AND TESTING: ICD-10-CM

## 2024-10-24 DIAGNOSIS — Z13.71 ENCOUNTER FOR NONPROCREATIVE GENETIC COUNSELING AND TESTING: ICD-10-CM

## 2024-10-24 DIAGNOSIS — N20.0 RECURRENT KIDNEY STONES: ICD-10-CM

## 2024-10-24 DIAGNOSIS — E72.01 CYSTINURIA (H): ICD-10-CM

## 2024-10-24 LAB
INTERPRETATION: ABNORMAL
LAB PDF RESULT: ABNORMAL
SIGNIFICANT RESULTS: ABNORMAL
SPECIMEN DESCRIPTION: ABNORMAL
TEST DETAILS, MDL: ABNORMAL

## 2024-10-29 LAB — INTERPRETATION: NORMAL

## 2024-10-30 ENCOUNTER — LAB (OUTPATIENT)
Dept: LAB | Facility: CLINIC | Age: 29
End: 2024-10-30
Payer: COMMERCIAL

## 2024-10-30 DIAGNOSIS — Z13.71 ENCOUNTER FOR NONPROCREATIVE GENETIC COUNSELING AND TESTING: ICD-10-CM

## 2024-10-30 DIAGNOSIS — Z71.83 ENCOUNTER FOR NONPROCREATIVE GENETIC COUNSELING AND TESTING: ICD-10-CM

## 2024-10-30 DIAGNOSIS — N20.0 RECURRENT KIDNEY STONES: Primary | ICD-10-CM

## 2024-10-30 DIAGNOSIS — E72.01 CYSTINURIA (H): ICD-10-CM

## 2024-10-30 PROCEDURE — G0452 MOLECULAR PATHOLOGY INTERPR: HCPCS | Mod: 26 | Performed by: STUDENT IN AN ORGANIZED HEALTH CARE EDUCATION/TRAINING PROGRAM

## 2024-11-06 ENCOUNTER — TELEPHONE (OUTPATIENT)
Dept: CONSULT | Facility: CLINIC | Age: 29
End: 2024-11-06
Payer: COMMERCIAL

## 2024-11-08 ENCOUNTER — TELEPHONE (OUTPATIENT)
Dept: CONSULT | Facility: CLINIC | Age: 29
End: 2024-11-08
Payer: COMMERCIAL

## 2024-11-08 DIAGNOSIS — E72.01 CYSTINURIA (H): Primary | Chronic | ICD-10-CM

## 2024-11-08 NOTE — TELEPHONE ENCOUNTER
November 8, 2024    I spoke with Shelly on the phone to discuss her recent genetic testing.    Reason for Testing  Personal and family history of cystinuria; family planning    Testing Ordered  Cystinuria Panel at the [facility] (SLC3A1, SLC7A9)    Result  Likely POSITIVE. 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 While autosomal dominant and recessive inheritance has been described in cystinuria, the c.1400T>C (p.[id number]) variant is regarded as one of the most common mutations associated with autosomal recessive disease. A second rare, novel missense variant in SLC3A1 was detected and is classified as a variant with possible clinical significance. The phase of these two variants could not be determined from the next generation sequencing data alone. Segregation analysis through targeted testing of informative family members may help to further clarify the clinical significance of this variant.     Interpretation  Cystinuria is a condition characterized by the buildup of the amino acid cystine, a building block of most proteins, in the kidneys and bladder. As the kidneys filter blood to create urine, cystine is normally absorbed back into the bloodstream. People with cystinuria cannot properly reabsorb cystine into their bloodstream, so the amino acid accumulates in their urine. As urine becomes more concentrated in the kidneys, the excess cystine forms crystals. Larger crystals become stones that may lodge in the kidneys or in the bladder. Sometimes cystine crystals combine with calcium molecules in the kidneys to form large stones. These crystals and stones can  create blockages in the urinary tract and reduce the ability of the kidneys to eliminate waste through urine. The stones also provide sites where bacteria may cause infections. Cystinuria affects approximately 1 in 10,000 people.      Harmful variants in the SLC3A1 or SLC7A9 gene cause cystinuria. These genes provide instructions for making the two parts (subunits) of a protein complex that is primarily found in the kidneys. Normally this protein complex controls the reabsorption of certain amino acids, including cystine, into the blood from the filtered fluid that will become urine. Variation in these genes can disrupt the ability of the protein complex to reabsorb amino acids, which causing cystinuria.     This condition is typically inherited in an autosomal recessive pattern, which means a person needs to have two harmful variants, one on each copy of the gene, in order to be affected. A person with only one harmful variant is called a carrier and they typically do not show signs and symptoms of the condition. Shelly was found to have one known harmful variant (called pathogenic) and one variant of uncertain significance in the SLC3A1 gene.    We reviewed a Variant of Uncertain Significance (VUS) means that we found a change the gene, but we are not sure if it causes health issues or if it is just part of the normal variation between individuals. A VUS may be reclassified into a positive or negative result in the future, as we learn more about different genes. Sometimes the lab requests samples from relatives in these instances, if that will help them better understand the gene change. The lab has indicated that testing of Shelly's relatives for this variant could aid in its interpretation and they are offering this testing free of charge. The most informative relatives to test would be Shelly's parents. If they are interested, they are welcome to reach out to me at [phone].    We discussed that given  Shelly's personal history we are strongly suspicious that the uncertain variant is actually harmful and that this represents a positive genetic result for Shelly.     For the purposes of reproductive planning, we have now identified which cystinuria gene Shelly has variants in, and thus, which gene would need to be assessed in a reproductive partner to better characterize the chance of having an affected child. The approximate carrier frequency (I.e. how many people have one harmful SLC3A1 variant) in a population of general  ancestry is ~1 in 93.  I would be happy to assist and coordinate in testing Shelly's  for variants in the SLC3A1 gene. He can call our genetics  at [phone] and request a genetic counseling visit with me to discuss and initiate this testing. This can be a virtual visit, if preferred.    Sources:  Genetics prevalence of cystine stone: A 6 year longitudinal comparison. Kasi Kelly et al. Genetics in Medicine Open, Volume 1, Issue 1, 576974  MedlinePlus [Internet]. [place]): National Library of Medicine ([place]); Cystinuria, Available at: https://medlineplus.gov/genetics/condition/cystinuria/#references    Recommendations  Consider parental testing at no charge through the [facility]ostics Laboratory to determine if [place] variants are on the same or opposite copies of the SLC3A1 gene. If we find Shelly has inherited one variant from each parent, that would support this being her genetic diagnosis.  Genetic testing of Shelly's  for variants in the SLC3A1 gene to determine if future children would be at risk of cystinuria.  Consideration of expanded Carrier Screening for family planning purposes; I am happy to refer Shelly to a prenatal genetic counselor for discussion of this testing if desired. A basic description of Carrier Screening can be found at [email or web address]    Shelly is encouraged  to reach out with any additional questions or concerns.    Arianne Pitt MS, [facility]  Genetic Counseling  [facility]  Email: [email or web address]  Phone: [phone]  Fax: [phone]

## 2024-11-08 NOTE — LETTER
11/8/2024    RE: Shelly Cerda  [address]   [place] [address]     Dear Shelly,    Thank you for the opportunity to participate in your care at [facility]. Please let the following serve as a summary of our conversation regarding your recent genetic testing results. A copy of those results is also enclosed.     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  SLC3A1: NM_000341.4; c.1400T>C ([id number]), Heterozygous, Pathogenic   SLC3A1: NM_000341.4; c.417C>G ([id number]), Heterozygous, Uncertain Significance   Two heterozygous missense variants were detected in the SLC3A1 gene. While autosomal dominant and recessive inheritance has been described in cystinuria, the c.1400T>C ([id number]) variant is regarded as one of the most common mutations associated with autosomal recessive disease. A second rare, novel missense variant in SLC3A1 was detected and is classified as a variant with possible clinical significance. The phase of these two variants could not be determined from the next generation sequencing data alone. Segregation analysis through targeted testing of informative family members may help to further clarify the clinical significance of this variant.     Interpretation  Cystinuria is a condition characterized by the buildup of the amino acid cystine, a building block of most proteins, in the kidneys and bladder. As the kidneys filter blood to create urine, cystine is normally absorbed back into the bloodstream. People with cystinuria cannot properly reabsorb cystine into their bloodstream, so the amino acid accumulates in their urine. As urine becomes more concentrated in the kidneys, the excess  cystine forms crystals. Larger crystals become stones that may lodge in the kidneys or in the bladder. Sometimes cystine crystals combine with calcium molecules in the kidneys to form large stones. These crystals and stones can create blockages in the urinary tract and reduce the ability of the kidneys to eliminate waste through urine. The stones also provide sites where bacteria may cause infections. Cystinuria affects approximately 1 in 10,000 people.      Harmful variants in the SLC3A1 or SLC7A9 gene cause cystinuria. These genes provide instructions for making the two parts (subunits) of a protein complex that is primarily found in the kidneys. Normally this protein complex controls the reabsorption of certain amino acids, including cystine, into the blood from the filtered fluid that will become urine. Variation in these genes can disrupt the ability of the protein complex to reabsorb amino acids, which causing cystinuria.     This condition is typically inherited in an autosomal recessive pattern, which means a person needs to have two harmful variants, one on each copy of the gene, in order to be affected. A person with only one harmful variant is called a carrier and they typically do not show signs and symptoms of the condition. Shelly was found to have one known harmful variant (called pathogenic) and one variant of uncertain significance in the SLC3A1 gene.    We reviewed a Variant of Uncertain Significance (VUS) means that we found a change the gene, but we are not sure if it causes health issues or if it is just part of the normal variation between individuals. A VUS may be reclassified into a positive or negative result in the future, as we learn more about different genes. Sometimes the lab requests samples from relatives in these instances, if that will help them better understand the gene change. The lab has indicated that testing of Shelly's relatives for this variant could aid in its  interpretation and they are offering this testing free of charge. The most informative relatives to test would be Shelly's parents. If they are interested, they are welcome to reach out to me at [phone].    We discussed that given Shelly's personal history we are strongly suspicious that the uncertain variant is actually harmful and that this represents a positive genetic result for Shelly.     For the purposes of reproductive planning, we have now identified which cystinuria gene Shelly has variants in, and thus, which gene would need to be assessed in a reproductive partner to better characterize the chance of having an affected child. The approximate carrier frequency (I.e. how many people have one harmful SLC3A1 variant) in a population of general  ancestry is ~1 in 93.  I would be happy to assist and coordinate in testing Shelly's  for variants in the SLC3A1 gene. He can call our genetics  at [phone] and request a genetic counseling visit with me to discuss and initiate this testing. This can be a virtual visit, if preferred.    Recommendations  Consider parental testing at no charge through the [facility] to determine if Eddies variants are on the same or opposite copies of the SLC3A1 gene. If we find Shelly has inherited one variant from each parent, that would support this being her genetic diagnosis.  Genetic testing of Shelly's  for variants in the SLC3A1 gene to determine if future children would be at risk of cystinuria.  Consideration of expanded Carrier Screening for family planning purposes; I am happy to refer Shelly to a prenatal genetic counselor for discussion of this testing if desired. A basic description of Carrier Screening can be found at [email or web address]    Shelly is encouraged to reach out with any additional questions or concerns.    Arianne Pitt MS,  C  Genetic Counseling  [facility]  Email: [email or web address]  Phone: [phone]

## 2024-12-02 NOTE — TELEPHONE ENCOUNTER
Patient wants a different med to replace thiola  I sent dr lees the message to reorder. Layla Alvarenga LPN Staff Nurse     0 = swallows foods/liquids without difficulty

## 2025-02-14 NOTE — ED NOTES
Progress Note - Critical Care/ICU   Name: Lynda Shaffer 43 y.o. female I MRN: [id number]  Unit/Bed#: ICU 08 I Date of Admission: 2/13/2025   Date of Service: 2/14/2025 I Hospital Day: 1      Assessment & Plan   Neuro:   Diagnosis: CSF leak  10/31/24 CT head/sinus: CT head/sinus demonstrated partially empty sella with a loculated CSF fluid attenuation equivalent area within the posterior aspect of the sphenoid sinus along the anterior and inferior margin of the bony sella turcica   Plan:   S/p  endonasal CSF leak repair and lumbar drain placement with ENT and NSGY  Diamox BID   Lumbar drain at 10cc/hr  Analgesia: Acetaminophen 976mg q8h PRN mild pain, oxycodone 2.5 q4h PRN moderate pain, oxycodone 5mg q4h PRN severe pain, Dilaudid 0.2mg PRN breakthrough  CV:   Diagnosis: HTN on lisinopril 20mg daily  Plan:    Hold home medication in setting of soft BPs    Pulm:  Diagnosis: None  Plan:   Monitor and maintain SpO2 >92%    GI:   Diagnosis: Hx of nausea post-anesthesia  Plan:   Miralax and docusate daily  IV Zofran to be given before meals   IV Droperidol 0.625mg x 1    :   Diagnosis: None  Plan:   Monitor I/O and BMP    F/E/N:   Diagnosis: None  Plan:   Encourage PO intake  Monitor and replenish electrolytes K>4, Phos>3 Mg>2  Nutrition: Regular diet    Heme/Onc:   Diagnosis: None  Plan:   Monitor CBC and transfuse <7     Endo:   Diagnosis: None  Plan:   Monitor blood glucose     ID:   Diagnosis: None  Plan:   On Ceftin 500mg BID s/p procedure (2/13-)  Monitor vitals and CBC     MSK/Skin:   Diagnosis: Rosacea pm Rhofade 1% for flares  Plan:   Use formulary medication if needed  OOB as tolerated  Disposition: Critical care    ICU Core Measures     A: Assess, Prevent, and Manage Pain Has pain been assessed? Yes  Need for changes to pain regimen? Yes   B: Both SAT/SAT  N/A   C: Choice of Sedation RASS Goal: 0 Alert and Calm  Need for changes to sedation or analgesia regimen? No   D: Delirium CAM-ICU: Negative   E: Early  Pt had kidney stones removed last Tuesday  Then stent out on Sunday  This was in [place]   Seen at emergency room yesterday with pain  Dx with UTI on Cipro and given hydrocodone  Pain still bad   Rates pain 9/10    Has f/u with urology on 18  But pain has not gotten any better  Has been urinating  Here for re franal    Mobility  Plan for early mobility? Yes   F: Family Engagement Plan for family engagement today? No       Antibiotic Review: Post op requirements       Prophylaxis:  VTE VTE covered by:  [Held by provider] heparin (porcine), Subcutaneous, 5,000 Units at 02/13/25 1631       Stress Ulcer  not ordered         24 Hour Events : Patient reports HA. She has had the same HA prior to surgery, however the pain has worsened since surgery. She reports HA is at the top and front of her head as well as in her neck. She reports nausea as well. Denies changes in vision. Patient reports taking oxycodone in the past and has experienced side effect of nausea.  Subjective   Review of Systems: See HPI for Review of Systems    Objective :                   Vitals I/O      Most Recent Min/Max in 24hrs   Temp 97.6 °F (36.4 °C) Temp  Min: 97.3 °F (36.3 °C)  Max: 99.5 °F (37.5 °C)   Pulse 78 Pulse  Min: 48  Max: 92   Resp 19 Resp  Min: 14  Max: 25   /60 BP  Min: 96/56  Max: 147/70   O2 Sat 98 % SpO2  Min: 94 %  Max: 99 %      Intake/Output Summary (Last 24 hours) at 2/14/2025 0735  Last data filed at 2/14/2025 0700  Gross per 24 hour   Intake 3513.33 ml   Output 1330 ml   Net 2183.33 ml       Diet Regular; Regular House    Invasive Monitoring           Physical Exam   Physical Exam  Eyes:      Pupils: Pupils are equal, round, and reactive to light.   Skin:     General: Skin is warm.   HENT:      Mouth/Throat:      Mouth: Mucous membranes are moist.   Cardiovascular:      Rate and Rhythm: Normal rate and regular rhythm.      Pulses: Normal pulses.   Abdominal:      Tenderness: There is no abdominal tenderness.      Comments: Hard   Constitutional:       Appearance: She is well-developed and well-nourished.   Pulmonary:      Effort: Pulmonary effort is normal.      Breath sounds: Normal breath sounds.   Neurological:      General: No focal deficit present.      Mental Status: She is alert and oriented to person, place and time.       Cranial Nerves: No dysarthria or facial asymmetry.      Motor: Strength full and intact in all extremities.          Diagnostic Studies        Lab Results: I have reviewed the following results:     Medications:  Scheduled PRN   acetaZOLAMIDE, 500 mg, Q12H KALEY  cefuroxime, 500 mg, Q12H KALEY  docusate sodium, 100 mg, BID  [Held by provider] heparin (porcine), 5,000 Units, Q8H KALEY  lisinopril, 20 mg, HS  ondansetron, 4 mg, TID AC  polyethylene glycol, 17 g, Daily  scopolamine, 1 patch, Q72H      acetaminophen, 975 mg, Q8H PRN  oxyCODONE, 2.5 mg, Q4H PRN   Or  oxyCODONE, 5 mg, Q4H PRN       Continuous          Labs:   CBC    Recent Labs     02/13/25  1523 02/14/25  0545   WBC  --  8.23   HGB  --  11.9   HCT  --  36.0    246     BMP    Recent Labs     02/14/25  0545   SODIUM 139   K 3.7   *   CO2 20*   AGAP 7   BUN 10   CREATININE 0.71   CALCIUM 8.8       Coags    No recent results     Additional Electrolytes  Recent Labs     02/14/25  0545   MG 2.4   PHOS 2.6*          Blood Gas    No recent results  No recent results LFTs  No recent results    Infectious  No recent results  Glucose  Recent Labs     02/14/25  0545   GLUC 94

## 2025-03-01 DIAGNOSIS — Z30.011 ENCOUNTER FOR INITIAL PRESCRIPTION OF CONTRACEPTIVE PILLS: ICD-10-CM

## 2025-03-03 RX ORDER — LEVONORGESTREL AND ETHINYL ESTRADIOL 0.15-0.03
1 KIT ORAL DAILY
Qty: 84 TABLET | Refills: 0 | Status: SHIPPED | OUTPATIENT
Start: 2025-03-03

## 2025-03-04 ENCOUNTER — MYC REFILL (OUTPATIENT)
Dept: FAMILY MEDICINE | Facility: CLINIC | Age: 30
End: 2025-03-04
Payer: COMMERCIAL

## 2025-03-04 DIAGNOSIS — Z30.011 ENCOUNTER FOR INITIAL PRESCRIPTION OF CONTRACEPTIVE PILLS: ICD-10-CM

## 2025-03-05 RX ORDER — LEVONORGESTREL AND ETHINYL ESTRADIOL 0.15-0.03
1 KIT ORAL DAILY
Qty: 84 TABLET | Refills: 0 | OUTPATIENT
Start: 2025-03-05

## 2025-03-13 DIAGNOSIS — Z30.011 ENCOUNTER FOR INITIAL PRESCRIPTION OF CONTRACEPTIVE PILLS: ICD-10-CM

## 2025-03-13 RX ORDER — LEVONORGESTREL AND ETHINYL ESTRADIOL 0.15-0.03
1 KIT ORAL DAILY
Qty: 84 TABLET | Refills: 0 | Status: SHIPPED | OUTPATIENT
Start: 2025-03-13

## 2025-03-16 NOTE — TELEPHONE ENCOUNTER
[facility] Call Center    Phone Message    May a detailed message be left on voicemail: yes    Reason for Call: Other: Pt is requesting a call back to schedule her 2nd surgery with Dr. Wade. She stated that there was discussion about her being seen again within the next couple of days. Please call pt to assist. Thanks!     Action Taken: Message routed to:  [facility] ([facility] Urology     (M6) obeys commands

## 2025-03-22 ENCOUNTER — HEALTH MAINTENANCE LETTER (OUTPATIENT)
Age: 30
End: 2025-03-22

## 2025-04-24 ENCOUNTER — NURSE TRIAGE (OUTPATIENT)
Dept: FAMILY MEDICINE | Facility: CLINIC | Age: 30
End: 2025-04-24
Payer: COMMERCIAL

## 2025-04-24 NOTE — TELEPHONE ENCOUNTER
Patient calling and reports nausea and vomiting started around 2 am this morning. Patient has vomited a few times, and has had about 4 liquid stools. Patient is able to keep fluids down, and patient last urinated around 7 am. Patient unsure if she has fever, but has had chills intermittently. Patient reports a burning type feeling in the middle of her stomach (around the belly-button area). Discussed calling [facility] for patient to be seen today. Patient reports she does not have transportation until later in the afternoon until 3 pm. Patient will continue to monitor symptoms for now. Patient will call clinic if symptoms worsen. Reviewed home care advice. Patient agrees to plan of care.    Disposition: See in office in three days: discussed [employer] [facility] and patient does not have transportation until 3 pm. Patient will call back, if symptoms worsen and wants to be seen today.     Reason for Disposition   MILD diarrhea (e.g., 1-3 or more stools than normal in past 24 hours) diarrhea and present > 7 days  (Exception: Chronic diarrhea that is not worse.)    Additional Information   Negative: Shock suspected (e.g., cold/pale/clammy skin, too weak to stand, low BP, rapid pulse)   Negative: Difficult to awaken or acting confused (e.g., disoriented, slurred speech)   Negative: Sounds like a life-threatening emergency to the triager   Negative: Vomiting also present and worse than the diarrhea   Negative: Blood in stool and without diarrhea   Negative: Diarrhea begins while taking an antibiotic by mouth (oral antibiotic)   Negative: SEVERE abdominal pain (e.g., excruciating) and present > 1 hour   Negative: SEVERE abdominal pain and age > 60 years   Negative: Bloody, black, or tarry bowel movements  (Exception: Chronic-unchanged black-grey bowel movements and is taking iron pills or Pepto-Bismol.)   Negative: SEVERE diarrhea (e.g., 7 or more times / day more than normal) and age > 60 years   Negative: Constant abdominal pain  lasting > 2 hours   Negative: Drinking very little and dehydration suspected (e.g., no urine > 12 hours, very dry mouth, very lightheaded)   Negative: Patient sounds very sick or weak to the triager   Negative: SEVERE diarrhea (e.g., 7 or more times / day more than normal) and present > 24 hours (1 day)   Negative: MODERATE diarrhea (e.g., 4-6 times / day more than normal) and age > 70 years   Negative: Abdominal pain  (Exceptions: Pain clears completely with each passage of diarrhea stool,  or symptoms similar to previously diagnosed irritable bowel syndrome.)   Negative: Fever > 101 F (38.3 C)   Negative: Blood in the stool  (Exception: Only on toilet paper. Reason: Diarrhea can cause rectal irritation with blood on wiping.)   Negative: Mucus or pus in stool has been present > 2 days and diarrhea is more than mild   Negative: Weak immune system (e.g., HIV positive, cancer chemo, splenectomy, organ transplant, chronic steroids)   Negative: Travel to a foreign country in past month   Negative: Recent antibiotic therapy (i.e., within last 2 months) and diarrhea present > 3 days since antibiotic was stopped   Negative: Recent hospitalization and diarrhea present > 3 days   Negative: Tube feedings (e.g., nasogastric, g-tube, j-tube)   Negative: MODERATE diarrhea (e.g., 4-6 times / day more than normal) and present > 48 hours (2 days)    Protocols used: Diarrhea-A-OH      Tana Kohler RN

## 2025-05-23 ENCOUNTER — ANCILLARY PROCEDURE (OUTPATIENT)
Dept: ULTRASOUND IMAGING | Facility: CLINIC | Age: 30
End: 2025-05-23
Attending: NURSE PRACTITIONER
Payer: COMMERCIAL

## 2025-05-23 DIAGNOSIS — N20.0 KIDNEY STONE: ICD-10-CM

## 2025-05-23 PROCEDURE — 76770 US EXAM ABDO BACK WALL COMP: CPT | Mod: TC | Performed by: RADIOLOGY

## 2025-06-04 ENCOUNTER — PRE VISIT (OUTPATIENT)
Dept: UROLOGY | Facility: CLINIC | Age: 30
End: 2025-06-04
Payer: COMMERCIAL

## 2025-06-04 NOTE — TELEPHONE ENCOUNTER
Reason for visit: follow-up     Relevant information: 8 month, review CHRISTY. CHRISTY completed on 05/23/2025.    Records/imaging/labs/orders: all records available    Neli Atkinson  6/4/2025  4:14 PM

## 2025-06-14 DIAGNOSIS — Z30.011 ENCOUNTER FOR INITIAL PRESCRIPTION OF CONTRACEPTIVE PILLS: ICD-10-CM

## 2025-06-16 RX ORDER — LEVONORGESTREL AND ETHINYL ESTRADIOL 0.15-0.03
1 KIT ORAL DAILY
Qty: 84 TABLET | Refills: 0 | OUTPATIENT
Start: 2025-06-16

## 2025-06-16 NOTE — TELEPHONE ENCOUNTER
Anytime a patient needs an appointment, please forward to the  pool to assist patient.     Casi DANN, RN

## 2025-07-01 ENCOUNTER — ANCILLARY PROCEDURE (OUTPATIENT)
Dept: GENERAL RADIOLOGY | Facility: CLINIC | Age: 30
End: 2025-07-01
Payer: COMMERCIAL

## 2025-07-01 ENCOUNTER — OFFICE VISIT (OUTPATIENT)
Dept: URGENT CARE | Facility: URGENT CARE | Age: 30
End: 2025-07-01
Payer: COMMERCIAL

## 2025-07-01 VITALS
TEMPERATURE: 98.7 F | OXYGEN SATURATION: 99 % | HEIGHT: 63 IN | DIASTOLIC BLOOD PRESSURE: 84 MMHG | RESPIRATION RATE: 18 BRPM | HEART RATE: 94 BPM | WEIGHT: 224 LBS | BODY MASS INDEX: 39.69 KG/M2 | SYSTOLIC BLOOD PRESSURE: 124 MMHG

## 2025-07-01 DIAGNOSIS — R05.1 ACUTE COUGH: ICD-10-CM

## 2025-07-01 DIAGNOSIS — J02.9 SORE THROAT: ICD-10-CM

## 2025-07-01 DIAGNOSIS — J06.9 VIRAL URI WITH COUGH: Primary | ICD-10-CM

## 2025-07-01 LAB
DEPRECATED S PYO AG THROAT QL EIA: NEGATIVE
S PYO DNA THROAT QL NAA+PROBE: NOT DETECTED

## 2025-07-01 PROCEDURE — 99213 OFFICE O/P EST LOW 20 MIN: CPT

## 2025-07-01 PROCEDURE — 3074F SYST BP LT 130 MM HG: CPT

## 2025-07-01 PROCEDURE — 87651 STREP A DNA AMP PROBE: CPT

## 2025-07-01 PROCEDURE — 71046 X-RAY EXAM CHEST 2 VIEWS: CPT | Mod: TC | Performed by: RADIOLOGY

## 2025-07-01 PROCEDURE — 3079F DIAST BP 80-89 MM HG: CPT

## 2025-07-01 RX ORDER — BENZONATATE 200 MG/1
200 CAPSULE ORAL 3 TIMES DAILY PRN
Qty: 21 CAPSULE | Refills: 0 | Status: SHIPPED | OUTPATIENT
Start: 2025-07-01

## 2025-07-01 NOTE — PATIENT INSTRUCTIONS
Strep test is negative for strep throat.  Chest x-ray does not show any pneumonia per the radiologist report. Get plenty of rest and drink fluids.  Can use Tylenol and/or ibuprofen as needed for pain.  Maximum dose of Tylenol is 4000mg in a 24 hour period of time.  Take ibuprofen with food to avoid stomach upset.  You can also try warm salt water gargles, hot/warm water or tea with honey and/or lemon and/or Cepacol lozenges or spray for your sore throat.  Take the benzonatate as prescribed for the cough.  Follow-up with your primary care provider should symptoms persist.

## 2025-07-01 NOTE — PROGRESS NOTES
Urgent Care Clinic Visit    Chief Complaint   Patient presents with    Urgent Care    Throat Problem     Patient reports having swollen lymph nodes, swollen eyes, cough, post nasal drip, head pressure and sore throat, states boyfriend was diagnosed with double ear infection and sinus infection concern she may also have that too                7/1/2025    11:44 AM   Additional Questions   Roomed by JAVI Smyth   Accompanied by Self

## 2025-07-01 NOTE — PROGRESS NOTES
ASSESSMENT:  (J06.9) Viral URI with cough  (primary encounter diagnosis)  Plan: benzonatate (TESSALON) 200 MG capsule    (J02.9) Sore throat  Plan: Streptococcus A Rapid Screen w/Reflex to PCR,         Group A Streptococcus PCR Throat Swab    (R05.1) Acute cough  Plan: XR Chest 2 Views    PLAN:  Informed the patient that the strep test is negative for strep throat and the chest x-ray does not show any pneumonia per the radiologist report.  We discussed that her symptoms are likely related to a viral illness pending the strep PCR result.  We also discussed the need to get plenty rest, drink fluids and use Tylenol and or ibuprofen as needed for pain with the maximum dose of Tylenol being 4000 mg in a 24-hour period of time and to take ibuprofen with food to avoid upset stomach.  Informed the patient to take benzonatate as prescribed for the cough.  We discussed trying warm salt water gargles, hot/warm water or tea with honey and/or lemon and/or Cepacol lozenges or spray for the sore throat.  We also discussed following up with her primary care provider should symptoms persist.  Patient acknowledged understanding of the above plan.    The use of Dragon[employer] dictation services may have been used to construct the content in this note; any grammatical or spelling errors are non-intentional. Please contact the author of this note directly if you are in need of any clarification.      BRITTNEE Stokes CNP      SUBJECTIVE:   Shelly Cerda is a 29 year old female presenting with a chief complaint of runny nose, stuffy nose, cough - productive, ear itching, and sore throat.  Onset of symptoms was 8 day(s) ago.  Course of illness is same.    Patient denies: vomiting and diarrhea  Treatment measures tried include Antihistamine and OTC Cough med.  Predisposing factors include ill contact: boyfriend.    The patient did not do an at home COVID test.     ROS:  Negative except noted above.    OBJECTIVE:  /84    "Pulse 94   Temp 98.7  F (37.1  C) (Tympanic)   Resp 18   Ht 1.6 m (5' 3\")   Wt 101.6 kg (224 lb)   LMP 06/10/2025   SpO2 99%   BMI 39.68 kg/m    GENERAL APPEARANCE: healthy, alert and no distress  EYES: EOMI,  PERRL, conjunctiva clear  HENT: ear canals and TM's normal.  Nose and mouth without ulcers, erythema or lesions  NECK: supple, nontender, no lymphadenopathy  RESP: lungs clear to auscultation - no rales, rhonchi or wheezes  CV: regular rates and rhythm, normal S1 S2, no murmur noted  SKIN: no suspicious lesions or rashes    Rapid Strep test: Negative    X-RAY: No pneumonia per the radiologist report  "

## 2025-07-03 ENCOUNTER — DOCUMENTATION ONLY (OUTPATIENT)
Dept: OTHER | Facility: CLINIC | Age: 30
End: 2025-07-03
Payer: COMMERCIAL

## 2025-07-25 ENCOUNTER — OFFICE VISIT (OUTPATIENT)
Dept: FAMILY MEDICINE | Facility: CLINIC | Age: 30
End: 2025-07-25
Payer: COMMERCIAL

## 2025-07-25 VITALS
BODY MASS INDEX: 38.07 KG/M2 | RESPIRATION RATE: 20 BRPM | HEART RATE: 70 BPM | WEIGHT: 223 LBS | TEMPERATURE: 98.1 F | SYSTOLIC BLOOD PRESSURE: 139 MMHG | DIASTOLIC BLOOD PRESSURE: 85 MMHG | HEIGHT: 64 IN | OXYGEN SATURATION: 98 %

## 2025-07-25 DIAGNOSIS — E66.812 CLASS 2 OBESITY DUE TO EXCESS CALORIES WITHOUT SERIOUS COMORBIDITY WITH BODY MASS INDEX (BMI) OF 39.0 TO 39.9 IN ADULT: ICD-10-CM

## 2025-07-25 DIAGNOSIS — E66.09 CLASS 2 OBESITY DUE TO EXCESS CALORIES WITHOUT SERIOUS COMORBIDITY WITH BODY MASS INDEX (BMI) OF 39.0 TO 39.9 IN ADULT: ICD-10-CM

## 2025-07-25 DIAGNOSIS — Z13.1 SCREENING FOR DIABETES MELLITUS: ICD-10-CM

## 2025-07-25 DIAGNOSIS — N20.0: ICD-10-CM

## 2025-07-25 DIAGNOSIS — L98.9 CHANGING SKIN LESION: Primary | ICD-10-CM

## 2025-07-25 DIAGNOSIS — Z91.09 ENVIRONMENTAL ALLERGIES: ICD-10-CM

## 2025-07-25 DIAGNOSIS — Z13.0 SCREENING FOR DEFICIENCY ANEMIA: ICD-10-CM

## 2025-07-25 DIAGNOSIS — Z13.220 LIPID SCREENING: ICD-10-CM

## 2025-07-25 DIAGNOSIS — Z30.011 ENCOUNTER FOR INITIAL PRESCRIPTION OF CONTRACEPTIVE PILLS: ICD-10-CM

## 2025-07-25 PROBLEM — N20.1 RIGHT URETERAL STONE: Status: RESOLVED | Noted: 2019-09-15 | Resolved: 2025-07-25

## 2025-07-25 PROBLEM — N20.1 URETERAL STONE: Status: RESOLVED | Noted: 2017-10-12 | Resolved: 2025-07-25

## 2025-07-25 PROCEDURE — 3075F SYST BP GE 130 - 139MM HG: CPT | Performed by: PHYSICIAN ASSISTANT

## 2025-07-25 PROCEDURE — 99214 OFFICE O/P EST MOD 30 MIN: CPT | Performed by: PHYSICIAN ASSISTANT

## 2025-07-25 PROCEDURE — 3079F DIAST BP 80-89 MM HG: CPT | Performed by: PHYSICIAN ASSISTANT

## 2025-07-25 PROCEDURE — 1126F AMNT PAIN NOTED NONE PRSNT: CPT | Performed by: PHYSICIAN ASSISTANT

## 2025-07-25 RX ORDER — LEVONORGESTREL AND ETHINYL ESTRADIOL 0.15-0.03
1 KIT ORAL DAILY
Qty: 84 TABLET | Refills: 0 | Status: SHIPPED | OUTPATIENT
Start: 2025-07-25 | End: 2025-07-25

## 2025-07-25 RX ORDER — LEVONORGESTREL AND ETHINYL ESTRADIOL 0.15-0.03
1 KIT ORAL DAILY
Qty: 84 TABLET | Refills: 4 | Status: SHIPPED | OUTPATIENT
Start: 2025-07-25

## 2025-07-25 RX ORDER — LEVOCETIRIZINE DIHYDROCHLORIDE 5 MG/1
5 TABLET, FILM COATED ORAL DAILY
Qty: 90 TABLET | Refills: 3 | Status: SHIPPED | OUTPATIENT
Start: 2025-07-25

## 2025-07-25 ASSESSMENT — VISUAL ACUITY: OU: 1

## 2025-07-25 ASSESSMENT — PAIN SCALES - GENERAL: PAINLEVEL_OUTOF10: NO PAIN (0)

## 2025-07-25 ASSESSMENT — ENCOUNTER SYMPTOMS: WHEEZING: 1

## 2025-07-25 NOTE — PROGRESS NOTES
"  Assessment & Plan     Encounter for initial prescription of contraceptive pills  - levonorgestrel-ethinyl estradiol (KURVELO) 0.15-30 MG-MCG tablet; Take 1 tablet by mouth daily.    Environmental allergies  - levocetirizine (XYZAL) 5 MG tablet; Take 1 tablet (5 mg) by mouth daily.    Changing skin lesion  - Adult Dermatology  Referral; Future    Lipid screening  - Lipid panel reflex to direct LDL Fasting; Future    Screening for diabetes mellitus  - Hemoglobin A1c; Future    Cystine calculus of kidney  - Comprehensive metabolic panel (BMP + Alb, Alk Phos, ALT, AST, Total. Bili, TP); Future    Screening for deficiency anemia  - CBC with platelets; Future    Class 2 obesity due to excess calories without serious comorbidity with body mass index (BMI) of 39.0 to 39.9 in adult  - TSH with free T4 reflex; Future    BMI  Estimated body mass index is 38.88 kg/m  as calculated from the following:    Height as of this encounter: 1.613 m (5' 3.5\").    Weight as of this encounter: 101.2 kg (223 lb).       Jens Bravo is a 30 year old, presenting for the following health issues:  Contraception, Allergies, and Referral (dermatologist)      7/25/2025     8:19 AM   Additional Questions   Roomed by aime   Accompanied by self         7/25/2025     8:19 AM   Patient Reported Additional Medications   Patient reports taking the following new medications see chart     Contraception    Allergies    History of Present Illness       Reason for visit:  Primary care She is missing 1 dose(s) of medications per week.      Shelly Cerda is a pleasant 30 year old female who comes into the clinic today to review her current medications, obtain a few refills, and for a dermatology referral for mole evaluation.     Patient states that overall she is doing well. She is interested in an dermatology referral for some moles that she would like evaluated. One is located on the outer aspect of her left calf and the second is " located just above her left eyebrow. She would also like to refill her xyzal and kurvelo as she is starting to run low. Patient states she has discontinued her captopril and potassium citrate. 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 Patient is working with her urologist and nephrologist during this transition period. Patient has also noticed some vision changes recently. Patient works on a computer regularly and states that on  3 separate occasions in the last few months she has noticed some blurring, spots, and then everything going dark. These episodes last for about 20 minutes. They are not accompanied by headaches, nausea, or pain and her vision does return to normal. No other symptoms. Advised that she be seen by an eye doctor sooner then later and if no causes for her symptoms are identified that she should follow up in clinic as further imaging may be required.     There was some confusion about the type of appointment that she needed to schedule today. Patient states she was told she needed to come in for care but did not realize that she was due/needed an annual physical exam. Discussed with patient that it is recommended that she be seen annually for a physical especially if she needs medication refills. Patient verbalizes understanding and will plan a physical exam for first thing next year or sooner if her lab results come back with any abnormalities. Patient is not fasting today, so lab work was postponed to a later date. Will follow up once results are obtained. Patient has no other concerns today.      Review of Systems  Constitutional, HEENT, cardiovascular, pulmonary, GI, , musculoskeletal, neuro, skin, endocrine and psych systems are negative, except as otherwise noted.      Objective    /85   Pulse 70   Temp 98.1  F (36.7  C) (Tympanic)   Resp 20   Ht  "1.613 m (5' 3.5\")   Wt 101.2 kg (223 lb)   LMP 06/10/2025 (Exact Date)   SpO2 98%   BMI 38.88 kg/m    Body mass index is 38.88 kg/m .    Physical Exam  Vitals and nursing note reviewed.   Constitutional:       Appearance: Normal appearance.   HENT:      Head: Normocephalic and atraumatic.      Right Ear: External ear normal.      Left Ear: External ear normal.      Nose: Nose normal.      Mouth/Throat:      Lips: Pink.   Eyes:      General: Lids are normal. Vision grossly intact. Gaze aligned appropriately.      Extraocular Movements: Extraocular movements intact.      Conjunctiva/sclera: Conjunctivae normal.   Cardiovascular:      Rate and Rhythm: Normal rate and regular rhythm.      Pulses: Normal pulses.      Heart sounds: Normal heart sounds.   Pulmonary:      Effort: Pulmonary effort is normal.      Breath sounds: Normal breath sounds and air entry. No wheezing, rhonchi or rales.   Musculoskeletal:      Cervical back: Normal range of motion and neck supple.   Skin:     General: Skin is warm and dry.      Comments: Patient has a mole located just above her left eyebrow that has a second starting to grow out of it and what looks like a seborrheic keratosis skin growth on the lateral aspect of her left calf.   Neurological:      Mental Status: She is alert and oriented to person, place, and time.   Psychiatric:         Attention and Perception: Attention and perception normal.         Mood and Affect: Mood and affect normal.         Speech: Speech normal.         Behavior: Behavior normal. Behavior is cooperative.         Thought Content: Thought content normal.         Cognition and Memory: Cognition and memory normal.         Judgment: Judgment normal.     PHONG Kang  Patient seen in conjunction with Leslie Desai PA-C    Signed Electronically by: LESLIE DESAI PA-C    "

## 2025-07-28 ENCOUNTER — PATIENT OUTREACH (OUTPATIENT)
Dept: CARE COORDINATION | Facility: CLINIC | Age: 30
End: 2025-07-28
Payer: COMMERCIAL

## 2025-07-30 ENCOUNTER — PATIENT OUTREACH (OUTPATIENT)
Dept: CARE COORDINATION | Facility: CLINIC | Age: 30
End: 2025-07-30
Payer: COMMERCIAL

## 2025-08-18 ENCOUNTER — MYC MEDICAL ADVICE (OUTPATIENT)
Dept: UROLOGY | Facility: CLINIC | Age: 30
End: 2025-08-18
Payer: COMMERCIAL

## 2025-08-18 DIAGNOSIS — N20.0 KIDNEY STONE: Primary | ICD-10-CM

## 2025-08-19 ENCOUNTER — ANCILLARY PROCEDURE (OUTPATIENT)
Dept: ULTRASOUND IMAGING | Facility: CLINIC | Age: 30
End: 2025-08-19
Attending: NURSE PRACTITIONER
Payer: COMMERCIAL

## 2025-08-19 ENCOUNTER — LAB (OUTPATIENT)
Dept: LAB | Facility: CLINIC | Age: 30
End: 2025-08-19
Payer: COMMERCIAL

## 2025-08-19 DIAGNOSIS — N20.0 KIDNEY STONE: ICD-10-CM

## 2025-08-19 LAB
ALBUMIN UR-MCNC: NEGATIVE MG/DL
APPEARANCE UR: CLEAR
BILIRUB UR QL STRIP: NEGATIVE
COLOR UR AUTO: YELLOW
GLUCOSE UR STRIP-MCNC: NEGATIVE MG/DL
HGB UR QL STRIP: NEGATIVE
KETONES UR STRIP-MCNC: NEGATIVE MG/DL
LEUKOCYTE ESTERASE UR QL STRIP: NEGATIVE
NITRATE UR QL: NEGATIVE
PH UR STRIP: 7 [PH] (ref 5–7)
RBC #/AREA URNS AUTO: NORMAL /HPF
SP GR UR STRIP: 1.01 (ref 1–1.03)
UROBILINOGEN UR STRIP-ACNC: 0.2 E.U./DL
WBC #/AREA URNS AUTO: NORMAL /HPF

## 2025-08-19 PROCEDURE — 81001 URINALYSIS AUTO W/SCOPE: CPT

## 2025-08-19 PROCEDURE — 76770 US EXAM ABDO BACK WALL COMP: CPT | Mod: TC | Performed by: RADIOLOGY

## (undated) DEVICE — TUBING SET THERMEDX UROLOGY SGL USE LL0006

## (undated) DEVICE — TUBING VINYL CONNECTING 14FR 30CM G02278

## (undated) DEVICE — SPECIMEN CONTAINER 5OZ STERILE 2600SA

## (undated) DEVICE — WIRE GUIDE NITINOL BIWIRE 0.035"X150CM G46141

## (undated) DEVICE — PAD CHUX UNDERPAD 30X30"

## (undated) DEVICE — BAG DRAIN BILIARY NEPHROSTOMY REMINGTON 600ML LF 600-D

## (undated) DEVICE — SOL NACL 0.9% IRRIG 3000ML BAG 2B7477

## (undated) DEVICE — RAD RX ISOVUE 300 (50ML) 61% IOPAMIDOL CHARGE PER ML

## (undated) DEVICE — Device

## (undated) DEVICE — BASKET NITINOL TIPLESS HALO  1.5FRX120CM 554120

## (undated) DEVICE — LINEN TOWEL PACK X5 5464

## (undated) DEVICE — PAD CHUX UNDERPAD 23X24" 7136

## (undated) DEVICE — SU ETHILON 3-0 PS-1 18" 1663H

## (undated) DEVICE — LINEN ORTHO PACK 5446

## (undated) DEVICE — PACK CYSTO UMMC CUSTOM

## (undated) DEVICE — PREP CHLORAPREP 26ML TINTED ORANGE  260815

## (undated) DEVICE — GUIDEWIRE SENSOR DUAL FLEX STR 0.035"X150CM M0066703080

## (undated) DEVICE — LINEN GOWN X4 5410

## (undated) DEVICE — STRAP KNEE/BODY 31143004

## (undated) DEVICE — TAPE CLOTH 3" CARDINAL 3TRCL03

## (undated) DEVICE — CATH URETERAL OPEN END TIP 05FR 70CM 134105LT / 134105RT

## (undated) DEVICE — GUIDEWIRE URO STR .038"X150CM NITINOL 150NFS38

## (undated) DEVICE — DRSG TEGADERM 4X4 3/4" 1626W

## (undated) DEVICE — TUBING SUCTION MEDI-VAC 1/4"X20' N620A

## (undated) DEVICE — LINEN FULL SHEET 5511

## (undated) DEVICE — TUBING IRRIG TUR Y TYPE 96" LF 6543-01

## (undated) DEVICE — SYR INFLATION DEVICE 20ML W/ 26GA TORQUE DEVICE M001151062

## (undated) DEVICE — DRAPE C-ARM W/STRAPS 42X72" 07-CA104

## (undated) DEVICE — PREP TECHNI-CARE CHLOROXYLENOL 3% 4OZ BOTTLE C222-4ZWO

## (undated) DEVICE — CATH URETERAL OPEN END 5FRX70CM M0064002010

## (undated) DEVICE — KIT ENDO FIRST STEP DISINFECTANT 200ML W/POUCH EP-4

## (undated) DEVICE — CATH ANGIO JB1 SOFT-VU 5FRX65CM MARINER 11734101

## (undated) DEVICE — WIRE GUIDE 0.035"X145CM BENTSON TSFB-35-145-BH

## (undated) DEVICE — CONNECTOR WATER VALVE PERFUSION PACK STR 020272801

## (undated) DEVICE — WIRE GUIDE AMPLATZ SUPER STIFF 0.035"X145CM 46-524

## (undated) DEVICE — SUCTION MANIFOLD DORNOCH ULTRA CART UL-CL500

## (undated) DEVICE — SUCTION MANIFOLD NEPTUNE 2 SYS 4 PORT 0702-020-000

## (undated) DEVICE — LINEN HALF SHEET 5512

## (undated) DEVICE — SOL WATER IRRIG 1000ML BOTTLE 2F7114

## (undated) DEVICE — CATH URETERAL FLEX TIP TIGERTAIL 06FRX70CM 139006

## (undated) DEVICE — PACK CYSTO CUSTOM ASC

## (undated) DEVICE — CATH ANGIO WEDGE PRESSURE 7FRX110CM DL AI-07127

## (undated) DEVICE — SYR 10ML LL W/O NDL 302995

## (undated) DEVICE — CATH NEPHROSTOMY 10FR FLEXIMA M001271800

## (undated) DEVICE — GLOVE PROTEXIS W/NEU-THERA 7.5  2D73TE75

## (undated) DEVICE — JELLY LUBRICATING SURGILUBE 2OZ TUBE

## (undated) DEVICE — NDL TROCAR TWO-PART 18GA 20CM

## (undated) DEVICE — GLOVE PROTEXIS POWDER FREE SMT 7.5  2D72PT75X

## (undated) DEVICE — SUCTION MANIFOLD NEPTUNE 2 SYS 1 PORT 702-025-000

## (undated) DEVICE — BASKET STONE EXTRACTOR NITINOL NCIRCLE 1.5FRX115CM G46206

## (undated) DEVICE — RAD RX CONRAY 60% (50ML) CHARGE PER ML

## (undated) DEVICE — ENDO SEAL BX PORT GREEN CS-W7S

## (undated) DEVICE — COVER FOOTSWITCH W/CINCH 20X24" 923267

## (undated) DEVICE — ADAPTER SCOPE UROLOK II LF M0067301400

## (undated) DEVICE — ENDO SEAL BX PORT BPS-A

## (undated) DEVICE — CATH URETERAL CONE 08FR 70CM 138008LT / 138008RT

## (undated) DEVICE — GLOVE PROTEXIS W/NEU-THERA 8.0  2D73TE80

## (undated) DEVICE — WIPES FOLEY CARE SURESTEP PROVON DFC100

## (undated) DEVICE — SOL NACL 0.9% IRRIG 1000ML BOTTLE 2F7124

## (undated) DEVICE — LASER FIBER HOLMIUM FLEXIVA 200UM M0068403910 840-391

## (undated) DEVICE — DRSG GAUZE 4X4" TRAY 6939

## (undated) DEVICE — BAG CLEAR TRASH 1.3M 39X33" P4040C

## (undated) DEVICE — SUCTION WATERBUG FLOOR PUDDLE VAC 9321

## (undated) DEVICE — SYR 30ML LL W/O NDL 302832

## (undated) DEVICE — DRAPE PCNL 41-0021

## (undated) DEVICE — BLADE KNIFE SURG 11 371111

## (undated) DEVICE — PACK CYSTO CUSTOM RIDGES

## (undated) DEVICE — GOWN XLG DISP 9545

## (undated) DEVICE — TUBING EXTENSION SET 20" B1327

## (undated) DEVICE — DRSG KERLIX FLUFFS X5

## (undated) DEVICE — PUMP SYSTEM SINGLE ACTION M0067201000

## (undated) DEVICE — CATH TRAY FOLEY SURESTEP 16FR W/URINE MTR STATLK LF A303416A

## (undated) DEVICE — LASER FIBER FLEXIVA PULSE 242 M006L8405910

## (undated) RX ORDER — FENTANYL CITRATE 50 UG/ML
INJECTION, SOLUTION INTRAMUSCULAR; INTRAVENOUS
Status: DISPENSED
Start: 2019-09-20

## (undated) RX ORDER — FENTANYL CITRATE 50 UG/ML
INJECTION, SOLUTION INTRAMUSCULAR; INTRAVENOUS
Status: DISPENSED
Start: 2018-03-08

## (undated) RX ORDER — KETOROLAC TROMETHAMINE 30 MG/ML
INJECTION, SOLUTION INTRAMUSCULAR; INTRAVENOUS
Status: DISPENSED
Start: 2019-09-20

## (undated) RX ORDER — FENTANYL CITRATE 50 UG/ML
INJECTION, SOLUTION INTRAMUSCULAR; INTRAVENOUS
Status: DISPENSED
Start: 2022-10-12

## (undated) RX ORDER — ONDANSETRON 2 MG/ML
INJECTION INTRAMUSCULAR; INTRAVENOUS
Status: DISPENSED
Start: 2019-09-15

## (undated) RX ORDER — PROPOFOL 10 MG/ML
INJECTION, EMULSION INTRAVENOUS
Status: DISPENSED
Start: 2018-03-08

## (undated) RX ORDER — ACETAMINOPHEN 325 MG/1
TABLET ORAL
Status: DISPENSED
Start: 2019-09-20

## (undated) RX ORDER — CEFAZOLIN SODIUM 1 G/3ML
INJECTION, POWDER, FOR SOLUTION INTRAMUSCULAR; INTRAVENOUS
Status: DISPENSED
Start: 2019-09-20

## (undated) RX ORDER — FENTANYL CITRATE 50 UG/ML
INJECTION, SOLUTION INTRAMUSCULAR; INTRAVENOUS
Status: DISPENSED
Start: 2019-09-15

## (undated) RX ORDER — ONDANSETRON 2 MG/ML
INJECTION INTRAMUSCULAR; INTRAVENOUS
Status: DISPENSED
Start: 2017-10-12

## (undated) RX ORDER — LIDOCAINE HYDROCHLORIDE 10 MG/ML
INJECTION, SOLUTION EPIDURAL; INFILTRATION; INTRACAUDAL; PERINEURAL
Status: DISPENSED
Start: 2017-10-12

## (undated) RX ORDER — DEXAMETHASONE SODIUM PHOSPHATE 4 MG/ML
INJECTION, SOLUTION INTRA-ARTICULAR; INTRALESIONAL; INTRAMUSCULAR; INTRAVENOUS; SOFT TISSUE
Status: DISPENSED
Start: 2022-10-12

## (undated) RX ORDER — GLYCOPYRROLATE 0.2 MG/ML
INJECTION, SOLUTION INTRAMUSCULAR; INTRAVENOUS
Status: DISPENSED
Start: 2018-03-08

## (undated) RX ORDER — LIDOCAINE HYDROCHLORIDE 20 MG/ML
INJECTION, SOLUTION EPIDURAL; INFILTRATION; INTRACAUDAL; PERINEURAL
Status: DISPENSED
Start: 2022-10-12

## (undated) RX ORDER — EPHEDRINE SULFATE 50 MG/ML
INJECTION, SOLUTION INTRAMUSCULAR; INTRAVENOUS; SUBCUTANEOUS
Status: DISPENSED
Start: 2018-03-08

## (undated) RX ORDER — PROPOFOL 10 MG/ML
INJECTION, EMULSION INTRAVENOUS
Status: DISPENSED
Start: 2017-10-12

## (undated) RX ORDER — OXYCODONE HYDROCHLORIDE 5 MG/1
TABLET ORAL
Status: DISPENSED
Start: 2018-03-08

## (undated) RX ORDER — KETOROLAC TROMETHAMINE 30 MG/ML
INJECTION, SOLUTION INTRAMUSCULAR; INTRAVENOUS
Status: DISPENSED
Start: 2018-03-08

## (undated) RX ORDER — ONDANSETRON 2 MG/ML
INJECTION INTRAMUSCULAR; INTRAVENOUS
Status: DISPENSED
Start: 2019-09-20

## (undated) RX ORDER — DEXAMETHASONE SODIUM PHOSPHATE 4 MG/ML
INJECTION, SOLUTION INTRA-ARTICULAR; INTRALESIONAL; INTRAMUSCULAR; INTRAVENOUS; SOFT TISSUE
Status: DISPENSED
Start: 2018-03-08

## (undated) RX ORDER — CEFAZOLIN SODIUM 2 G/50ML
SOLUTION INTRAVENOUS
Status: DISPENSED
Start: 2022-10-12

## (undated) RX ORDER — ONDANSETRON 2 MG/ML
INJECTION INTRAMUSCULAR; INTRAVENOUS
Status: DISPENSED
Start: 2018-03-08

## (undated) RX ORDER — PHENYLEPHRINE HCL IN 0.9% NACL 1 MG/10 ML
SYRINGE (ML) INTRAVENOUS
Status: DISPENSED
Start: 2018-03-08

## (undated) RX ORDER — DEXAMETHASONE SODIUM PHOSPHATE 4 MG/ML
INJECTION, SOLUTION INTRA-ARTICULAR; INTRALESIONAL; INTRAMUSCULAR; INTRAVENOUS; SOFT TISSUE
Status: DISPENSED
Start: 2017-10-12

## (undated) RX ORDER — OXYBUTYNIN CHLORIDE 5 MG/1
TABLET ORAL
Status: DISPENSED
Start: 2022-10-12

## (undated) RX ORDER — MEPERIDINE HYDROCHLORIDE 25 MG/ML
INJECTION INTRAMUSCULAR; INTRAVENOUS; SUBCUTANEOUS
Status: DISPENSED
Start: 2017-10-12

## (undated) RX ORDER — OXYCODONE HYDROCHLORIDE 5 MG/1
TABLET ORAL
Status: DISPENSED
Start: 2022-10-12

## (undated) RX ORDER — DEXAMETHASONE SODIUM PHOSPHATE 4 MG/ML
INJECTION, SOLUTION INTRA-ARTICULAR; INTRALESIONAL; INTRAMUSCULAR; INTRAVENOUS; SOFT TISSUE
Status: DISPENSED
Start: 2019-09-20

## (undated) RX ORDER — FENTANYL CITRATE 50 UG/ML
INJECTION, SOLUTION INTRAMUSCULAR; INTRAVENOUS
Status: DISPENSED
Start: 2017-10-12

## (undated) RX ORDER — CIPROFLOXACIN 500 MG/1
TABLET, FILM COATED ORAL
Status: DISPENSED
Start: 2022-10-17

## (undated) RX ORDER — SCOLOPAMINE TRANSDERMAL SYSTEM 1 MG/1
PATCH, EXTENDED RELEASE TRANSDERMAL
Status: DISPENSED
Start: 2022-10-12

## (undated) RX ORDER — SCOLOPAMINE TRANSDERMAL SYSTEM 1 MG/1
PATCH, EXTENDED RELEASE TRANSDERMAL
Status: DISPENSED
Start: 2018-03-08

## (undated) RX ORDER — PROPOFOL 10 MG/ML
INJECTION, EMULSION INTRAVENOUS
Status: DISPENSED
Start: 2019-09-20

## (undated) RX ORDER — PROPOFOL 10 MG/ML
INJECTION, EMULSION INTRAVENOUS
Status: DISPENSED
Start: 2022-10-12

## (undated) RX ORDER — GLYCOPYRROLATE 0.2 MG/ML
INJECTION INTRAMUSCULAR; INTRAVENOUS
Status: DISPENSED
Start: 2017-10-12

## (undated) RX ORDER — NEOSTIGMINE METHYLSULFATE 1 MG/ML
VIAL (ML) INJECTION
Status: DISPENSED
Start: 2018-03-08

## (undated) RX ORDER — HYDROMORPHONE HYDROCHLORIDE 1 MG/ML
INJECTION, SOLUTION INTRAMUSCULAR; INTRAVENOUS; SUBCUTANEOUS
Status: DISPENSED
Start: 2019-09-15

## (undated) RX ORDER — LIDOCAINE HYDROCHLORIDE 20 MG/ML
INJECTION, SOLUTION EPIDURAL; INFILTRATION; INTRACAUDAL; PERINEURAL
Status: DISPENSED
Start: 2019-09-20

## (undated) RX ORDER — ACETAMINOPHEN 325 MG/1
TABLET ORAL
Status: DISPENSED
Start: 2022-10-12

## (undated) RX ORDER — ONDANSETRON 2 MG/ML
INJECTION INTRAMUSCULAR; INTRAVENOUS
Status: DISPENSED
Start: 2022-10-12

## (undated) RX ORDER — LIDOCAINE HYDROCHLORIDE 20 MG/ML
INJECTION, SOLUTION EPIDURAL; INFILTRATION; INTRACAUDAL; PERINEURAL
Status: DISPENSED
Start: 2018-03-08

## (undated) RX ORDER — HYDROCODONE BITARTRATE AND ACETAMINOPHEN 5; 325 MG/1; MG/1
TABLET ORAL
Status: DISPENSED
Start: 2017-10-12